# Patient Record
Sex: MALE | Race: WHITE | NOT HISPANIC OR LATINO | Employment: OTHER | URBAN - METROPOLITAN AREA
[De-identification: names, ages, dates, MRNs, and addresses within clinical notes are randomized per-mention and may not be internally consistent; named-entity substitution may affect disease eponyms.]

---

## 2017-01-09 ENCOUNTER — OFFICE VISIT (OUTPATIENT)
Dept: MEDICAL GROUP | Facility: MEDICAL CENTER | Age: 82
End: 2017-01-09
Payer: MEDICARE

## 2017-01-09 VITALS
BODY MASS INDEX: 40.4 KG/M2 | SYSTOLIC BLOOD PRESSURE: 114 MMHG | HEIGHT: 63 IN | HEART RATE: 86 BPM | RESPIRATION RATE: 16 BRPM | WEIGHT: 228 LBS | TEMPERATURE: 98.2 F | DIASTOLIC BLOOD PRESSURE: 64 MMHG | OXYGEN SATURATION: 94 %

## 2017-01-09 DIAGNOSIS — Z95.1 S/P CABG (CORONARY ARTERY BYPASS GRAFT): ICD-10-CM

## 2017-01-09 DIAGNOSIS — E78.2 MIXED HYPERLIPIDEMIA: ICD-10-CM

## 2017-01-09 DIAGNOSIS — I10 ESSENTIAL HYPERTENSION, BENIGN: ICD-10-CM

## 2017-01-09 DIAGNOSIS — E11.9 CONTROLLED TYPE 2 DIABETES MELLITUS WITHOUT COMPLICATION, WITHOUT LONG-TERM CURRENT USE OF INSULIN (HCC): ICD-10-CM

## 2017-01-09 LAB
HBA1C MFR BLD: 9.3 % (ref ?–5.8)
INT CON NEG: NORMAL
INT CON POS: NORMAL

## 2017-01-09 PROCEDURE — 83036 HEMOGLOBIN GLYCOSYLATED A1C: CPT | Performed by: FAMILY MEDICINE

## 2017-01-09 PROCEDURE — 99214 OFFICE O/P EST MOD 30 MIN: CPT | Performed by: FAMILY MEDICINE

## 2017-01-09 NOTE — MR AVS SNAPSHOT
"        Julio Azar   2017 9:20 AM   Office Visit   MRN: [id number]    Department:  [address] [place] [address]   Dept Phone:  [phone]    Description:  Male : 1932   Provider:  Karlee Cheng M.D.           Reason for Visit     Follow-Up routine f/u      Allergies as of 2017     Allergen Noted Reactions    Pcn [Penicillins] 2011   Swelling    Latex 2013   Rash      You were diagnosed with     Controlled type 2 diabetes mellitus without complication, without long-term current use of insulin (HCC)   [7066143]       S/P CABG (coronary artery bypass graft)   [932171]       Essential hypertension, benign   [401.1.ICD-9-CM]       Mixed hyperlipidemia   [272.2.ICD-9-CM]         Vital Signs     Blood Pressure Pulse Temperature Respirations Height Weight    114/64 mmHg 86 36.8 °C (98.2 °F) 16 1.6 m (5' 3\") 103.42 kg (228 lb)    Body Mass Index Oxygen Saturation Smoking Status             40.40 kg/m2 94% Former Smoker         Basic Information     Date Of Birth Sex Race Ethnicity Preferred Language    1932 Male White Non- English      Your appointments     Apr 10, 2017 10:20 AM   Established Patient with Karlee Cheng M.D.   [address] ([address])    [address] [place] [id number]   [phone]           You will be receiving a confirmation call a few days before your appointment from our automated call confirmation system.              Problem List              ICD-10-CM Priority Class Noted - Resolved    Type 2 diabetes mellitus without complication (HCC) E11.9   2013 - Present    Encounter for long-term (current) use of insulin (HCC) Z79.4   2013 - Present    Vitamin D deficiency E55.9   2013 - Present    Mixed hyperlipidemia E78.2   2013 - Present    CAD (coronary artery disease), native coronary artery I25.10   3/31/2014 - Present    Essential hypertension, benign I10   3/31/2014 - Present    Senile " ectropion H02.139   12/31/2014 - Present    Obesity E66.9   3/30/2015 - Present    Dermatophytosis of other specified sites B35.8   3/30/2015 - Present      Health Maintenance        Date Due Completion Dates    IMM DTaP/Tdap/Td Vaccine (1 - Tdap) 7/6/1951 ---    DIABETES MONOFILAMTENT / LE EXAM 9/30/2016 9/30/2015 (Done), 4/6/2015 (Done), 7/30/2014 (Prv Comp), 3/31/2014, 9/18/2013 (Done)    Override on 9/30/2015: Done    Override on 4/6/2015: Done    Override on 7/30/2014: Previously completed    Override on 9/18/2013: Done    A1C SCREENING 3/26/2017 9/26/2016, 9/26/2016, 7/5/2016, 2/16/2016, 9/30/2015, 4/6/2015, 10/6/2014, 6/9/2014, 2/25/2014, 9/12/2013, 11/9/2012, 8/14/2012, 5/11/2012, 2/2/2012, 10/26/2011, 7/5/2011    FASTING LIPID PROFILE 9/26/2017 9/26/2016, 7/5/2016, 2/16/2016, 12/15/2015, 12/30/2014 (Prv Comp), 12/5/2014, 12/5/2014, 6/9/2014, 2/25/2014, 9/12/2013, 11/9/2012, 5/11/2012, 2/2/2012, 10/26/2011, 7/5/2011    Override on 12/30/2014: Previously completed    URINE ACR / MICROALBUMIN 9/26/2017 9/26/2016, 2/16/2016, 12/5/2014 (Prv Comp), 12/5/2014, 9/12/2013, 11/9/2012, 8/14/2012, 5/11/2012, 2/2/2012, 10/26/2011, 7/5/2011    Override on 12/5/2014: Previously completed    SERUM CREATININE 9/26/2017 9/26/2016, 9/26/2016, 7/5/2016, 2/16/2016, 12/15/2015, 3/23/2015, 12/5/2014, 12/5/2014, 6/9/2014, 2/25/2014, 12/20/2013, 12/6/2013, 9/12/2013, 8/7/2013, 11/26/2012, 11/9/2012, 8/14/2012, 5/11/2012, 2/2/2012, 10/26/2011, 7/5/2011, 11/2/2006    RETINAL SCREENING 11/16/2017 11/16/2016 (Done), 11/30/2014 (Prv Comp), 4/16/2013    Override on 11/16/2016: Done    Override on 11/30/2014: Previously completed            Results     POCT Hemoglobin A1c      Component    Glycohemoglobin    9.3    Internal Control Negative    Internal Control Positive                        Current Immunizations     13-VALENT PCV PREVNAR 10/9/2015    INFLUENZA VACCINE H1N1 11/13/2009    Influenza Vaccine Adult HD 10/3/2016, 10/9/2015,  9/26/2014, 11/15/2008    Influenza Vaccine Quad Inj (Preserved) 10/8/2013, 10/5/2012, 10/18/2011    Pneumococcal polysaccharide vaccine (PPSV-23) 10/9/2014    SHINGLES VACCINE 11/22/2009      Below and/or attached are the medications your provider expects you to take. Review all of your home medications and newly ordered medications with your provider and/or pharmacist. Follow medication instructions as directed by your provider and/or pharmacist. Please keep your medication list with you and share with your provider. Update the information when medications are discontinued, doses are changed, or new medications (including over-the-counter products) are added; and carry medication information at all times in the event of emergency situations     Allergies:  PCN - Swelling     LATEX - Rash               Medications  Valid as of: January 09, 2017 -  9:44 AM    Generic Name Brand Name Tablet Size Instructions for use    Aspirin (Chew Tab) ASA 81 MG Take 81 mg by mouth every day.        Atorvastatin Calcium (Tab) LIPITOR 40 MG Take 40 mg by mouth every evening.        B Complex Vitamins   Take  by mouth every day.        Blood Glucose Monitoring Suppl (Misc) Blood Glucose Monitoring Suppl SUPPLIES [employer] Yaneth blood glucose test strips, checking blood sugar 2 daily  .02 insulin requiring.        Cholecalciferol (Cap) Vitamin D 2000 UNITS Take  by mouth every day.        Exenatide (Solution Pen-injector) BYETTA 10 MCG/0.04ML Inject 10 mcg as instructed 2 times daily, before breakfast and dinner.        Furosemide (Tab) LASIX 40 MG Take 40 mg by mouth every day.        GlipiZIDE (Tab) GLUCOTROL 5 MG Take 1 Tab by mouth 2 times a day.        Insulin Glargine (Solution Pen-injector) LANTUS 100 UNIT/ML Inject 20 Units as instructed every evening.        Insulin Pen Needle (Misc) B-D ULTRAFINE III SHORT PEN 31G X 8 MM USE 3 TIMES DAILY        Lancets (Misc) Lancets  His new meter uses the Accu-Chek SoftClix lancets  for 3 time daily testing.  Dx. Code 250.02        Lisinopril (Tab) PRINIVIL 2.5 MG Take 1 Tab by mouth every day.        MetFORMIN HCl (Tab) GLUCOPHAGE 500 MG TAKE 2 TABLETS BY MOUTH TWICE DAILY WITHMEALS        Potassium Chloride Monica CR (Tab CR) K-DUR 20 MEQ Take 20 mEq by mouth every day.        Tadalafil (Tab) CIALIS 20 MG Take 20 mg by mouth every 36 hours. As needed on an empty stomach        Tamsulosin HCl (Cap) FLOMAX 0.4 MG Take 0.4 mg by mouth ONE-HALF HOUR AFTER BREAKFAST. Indications: #90 w/ 3 refills called Sheila Goff 12/14/16        .                 Medicines prescribed today were sent to:     [id number] - [place], [place] - [address] [facility]    [address] [place] [address] [place] [address]    Phone: [phone] Fax: [phone]    Open 24 Hours?: No      Medication refill instructions:       If your prescription bottle indicates you have medication refills left, it is not necessary to call your provider’s office. Please contact your pharmacy and they will refill your medication.    If your prescription bottle indicates you do not have any refills left, you may request refills at any time through one of the following ways: The online [employer] system (except Urgent Care), by calling your provider’s office, or by asking your pharmacy to contact your provider’s office with a refill request. Medication refills are processed only during regular business hours and may not be available until the next business day. Your provider may request additional information or to have a follow-up visit with you prior to refilling your medication.   *Please Note: Medication refills are assigned a new Rx number when refilled electronically. Your pharmacy may indicate that no refills were authorized even though a new prescription for the same medication is available at the pharmacy. Please request the medicine by name with the pharmacy before contacting your provider for a refill.        Your To Do List     Future Labs/Procedures  Complete By Expires    ECHOCARDIOGRAM COMP W/O CONT  As directed 1/9/2018      Instructions      Diabetes Education    • Take your Diabetes medicine as directed, as well as all other prescribed medication, even if you feel good. Tell your provider if you cannot afford your medications or if you are experiencing any side effects.  • Keep simple sugars or glucose tabs with you at all times in the event of low blood sugar. Carry or wear a medical alert card or bracelet/necklace with your diabetic information.   • Check your blood sugar levels daily and according to your providers' recommendations. Keep a log of your blood sugar levels and bring with you to all your appointments. Follow the correctional scale prescribed by your provider, if you were instructed to use rapid acting insulin before meals and before bed. Be sure to account for the carbs in your meal as directed by your provider. Rapid acting insulin: Humulog (lispro), Novolog (aspart), Apidra (glulisine).  • Schedule and keep follow up appointments as indicated by your provider for: diabetes follow ups (bring your glucose meter and blood sugar log), annual dilated eye exam with a qualified ophthalmologist/optometrist, lab tests as requested by your provider (includes blood work and urine test) and semi-annual dental exam.  • Check your feet using a hand held mirror every day for cuts, cracks, sores, bumps, and or red spots. Avoid walking on bare feet. Only use clippers for toenails. See a podiatrist for callous care. Call or schedule an appointment with your provider if you notice sores that are not healing.  • Eat a small meal or healthy snack every 3 - 4 hours. Don't skip meals. Include protein with carbohydrate at most meals. Drink water, plain or flavored with fresh fruit, instead of juice or soda. An ideal plate is made up of half non-starchy vegetables, one quarter protein and one quarter starch.  • Stay or get to a healthy weight by using your meal  plan and moving more. Set a goal to be more active most days of the week. Start slow by taking 10 minute walks 3 times a day. Take the stairs instead of an elevator or escalator. Twice a week, work to increase your muscle strength. Use stretch bands, do yoga, heavy gardening (digging and planting with tools), or use dumb bells to strengthen your arms.  • When to call your Endocrinologist or Primary Care Provider   -If your blood glucose stays over 300 mg/dL for 24 hours    -If you are having frequent low blood sugars    -If you experience any one or all of the following symptoms: blurry    vision, fast breathing, weakness, dizziness or shakiness,     headache, rapid heartbeat, confusion, or irritability.                     MyChart Status: Patient Declined

## 2017-01-09 NOTE — PROGRESS NOTES
CC: Regular follow up visit.    HPI:   Julio presents today to discuss the following issues:    Diabetes mellitus type 2 in obese, has not been adequately controlled. A1C today is 9.9, it was 7.1 last visit. However patient has been asymptomatic.He is currently on metformin 1000 mg bid, Glipizide 10 mg bid, and lantus 15 units daily, but he has not been taking his Byetta injection 10 mcg daily , patient statewd that it is expensive, however he has been getting it less cheaper from a different pharmacy, dso he will change the pharmacy.    Essential hypertension, BP adequately controlled. Has been on HCTZ 50 mg daily, discussed with the patient the benefits of ACEI for her as a diabetic patient.She has not been taking the HCTZ regularly, she takes it only as needed ( as per patient). Discussed that HCTZ are not the antihypertensive of choice in a diabetic patients.    Coronary artery disease, S/P CABG x 3. Has been stable, asymptomatic, however he has been having chronic leg swelling, but no SOB..He is currently on Statin, and Aspirin.His last echocardiogram was done in 1/2014 showed normal EF ( 65-70%).Has normal RT ventricular pressure.He follows up with cardiologist Dr Hammond.    Mixed hyperlipidemia, he has been tolerating the statin. Denies muscle pain LFTs has been normal.He is on lipitor 40 mg daily.      Patient Active Problem List    Diagnosis Date Noted   • Obesity 03/30/2015   • Dermatophytosis of other specified sites 03/30/2015   • Senile ectropion 12/31/2014   • CAD (coronary artery disease), native coronary artery 03/31/2014   • Essential hypertension, benign 03/31/2014   • Type 2 diabetes mellitus without complication (HCC) 09/18/2013   • Encounter for long-term (current) use of insulin (HCC) 09/18/2013   • Vitamin D deficiency 09/18/2013   • Mixed hyperlipidemia 09/18/2013       Current Outpatient Prescriptions   Medication Sig Dispense Refill   • lisinopril (PRINIVIL) 2.5 MG Tab Take 1 Tab by mouth  "every day. 30 Tab 11   • CIALIS 20 MG tablet Take 20 mg by mouth every 36 hours. As needed on an empty stomach     • glipiZIDE (GLUCOTROL) 5 MG Tab Take 1 Tab by mouth 2 times a day. 180 Tab 3   • potassium chloride SA (K-DUR) 20 MEQ Tab CR Take 20 mEq by mouth every day.     • atorvastatin (LIPITOR) 40 MG Tab Take 40 mg by mouth every evening.     • exenatide (BYETTA 10 MCG PEN) 10 MCG/0.04ML Solution Pen-injector Inject 10 mcg as instructed 2 times daily, before breakfast and dinner. 2.4 mL 6   • furosemide (LASIX) 40 MG TABS Take 40 mg by mouth every day.     • B-D ULTRAFINE III SHORT PEN 31G X 8 MM MISC USE 3 TIMES DAILY 100 Each 11   • insulin glargine (LANTUS SOLOSTAR) 100 UNIT/ML SOPN injection Inject 20 Units as instructed every evening. 30 mL 3   • Lancets MISC His new meter uses the Accu-Chek SoftClix lancets for 3 time daily testing.  Dx. Code 250.02 300 Each 3   • Blood Glucose Monitoring Suppl SUPPLIES MISC Accucheck Yaneth blood glucose test strips, checking blood sugar 2 daily  .02 insulin requiring. 200 Strip 3   • tamsulosin (FLOMAX) 0.4 MG capsule Take 0.4 mg by mouth ONE-HALF HOUR AFTER BREAKFAST. Indications: #90 w/ 3 refills called Sheila Goff 12/14/16     • B Complex Vitamins (VITAMIN B COMPLEX PO) Take  by mouth every day.     • Cholecalciferol (VITAMIN D) 2000 UNITS CAPS Take  by mouth every day.     • aspirin (ASA) 81 MG CHEW Take 81 mg by mouth every day.     • metformin (GLUCOPHAGE) 500 MG Tab TAKE 2 TABLETS BY MOUTH TWICE DAILY WITHMEALS 360 Tab 3     No current facility-administered medications for this visit.         Allergies as of 01/09/2017 - Rebel as Reviewed 01/09/2017   Allergen Reaction Noted   • Pcn [penicillins] Swelling 12/25/2011   • Latex Rash 08/07/2013        ROS: Denies any chest pain, Shortness of breath, Changes bowel or bladder, Lower extremity edema.    Physical Exam:  /64 mmHg  Pulse 86  Temp(Src) 36.8 °C (98.2 °F)  Resp 16  Ht 1.6 m (5' 3\")  Wt " 103.42 kg (228 lb)  BMI 40.40 kg/m2  SpO2 94%  Gen.: Well-developed, well-nourished, no apparent distress,pleasant and cooperative with the examination  Skin:  Warm and dry with good turgor. No rashes or suspicious lesions in visible areas  HEENT:Sinuses nontender with palpation, TMs clear, nares patent with pink mucosa and clear rhinorrhea,no septal deviation ,polyps or lesions. lips without lesions, oropharynx clear.  Neck: Trachea midline,no masses or adenopathy. No JVD.  Cor: Regular rate and rhythm without murmur, gallop or rub.  Lungs: Respirations unlabored.Clear to auscultation with equal breath sounds bilaterally. No wheezes, rhonchi.  Extremities: No cyanosis,no clubbing , bilateral leg edema.    Monofilament testing with a 10 gram force: sensation intact: all locations  Visual Inspection: Feet without maceration, ulcers, fissures.  Pedal pulses:intact.      Assessment and Plan.   84 y.o. male     1. Uncontrolled type 2 diabetes mellitus without complication, with long-term current use of insulin (HCC)  Not adequately controlled. A1C today is 9.9, it was 7.1 last visit.  Patient has not been taking the Byetta injection .  Continue on metformin 1000 mg bid, Glipizide 5 mg bid, , Will increase the lantus to 20 units daily.  Resume the Byetta injection.  Monofilament feet exam showed no sign of neuropathy.     - POCT Hemoglobin A1c  - Diabetic Monofilament Lower Extremity Exam  - exenatide (BYETTA 10 MCG PEN) 10 MCG/0.04ML Solution Pen-injector; Inject 10 mcg as instructed 2 times daily, before breakfast and dinner.  Dispense: 2.4 mL; Refill: 6POCT Hemoglobin A1c    2. S/P CABG (coronary artery bypass graft)  Stable, asymptomatic.  Continue on Statin, and Aspirin.  Last echocardiogram was done in 1/2014 showed normal EF ( 65-70%).Has normal RT ventricular pressure.  Will repeat echocardiogram.    - ECHOCARDIOGRAM COMP W/O CONT; Future.    3. Essential hypertension, benign  Adequately controlled. Has been on  HCTZ 50 mg daily, advised to stop the HCTZ nd start lisinopril 2.5 mg daily.    4. Mixed hyperlipidemia  He has been tolerating the statin. Denies muscle pain LFTs has been normal  Continue on lipitor 40 mg daily

## 2017-02-01 ENCOUNTER — HOSPITAL ENCOUNTER (OUTPATIENT)
Dept: CARDIOLOGY | Facility: MEDICAL CENTER | Age: 82
End: 2017-02-01
Attending: FAMILY MEDICINE
Payer: MEDICARE

## 2017-02-01 DIAGNOSIS — Z95.1 S/P CABG (CORONARY ARTERY BYPASS GRAFT): ICD-10-CM

## 2017-02-01 LAB
LV EJECT FRACT  99904: 65
LV EJECT FRACT MOD 2C 99903: 62.82
LV EJECT FRACT MOD 4C 99902: 54.56
LV EJECT FRACT MOD BP 99901: 55.12

## 2017-02-01 PROCEDURE — 93306 TTE W/DOPPLER COMPLETE: CPT

## 2017-02-10 ENCOUNTER — HOSPITAL ENCOUNTER (OUTPATIENT)
Dept: LAB | Facility: MEDICAL CENTER | Age: 82
End: 2017-02-10
Attending: INTERNAL MEDICINE
Payer: MEDICARE

## 2017-02-10 LAB
ALBUMIN SERPL BCP-MCNC: 4.2 G/DL (ref 3.2–4.9)
ALBUMIN/GLOB SERPL: 1.3 G/DL
ALP SERPL-CCNC: 59 U/L (ref 30–99)
ALT SERPL-CCNC: 13 U/L (ref 2–50)
ANION GAP SERPL CALC-SCNC: 6 MMOL/L (ref 0–11.9)
AST SERPL-CCNC: 16 U/L (ref 12–45)
BILIRUB SERPL-MCNC: 0.7 MG/DL (ref 0.1–1.5)
BUN SERPL-MCNC: 28 MG/DL (ref 8–22)
CALCIUM SERPL-MCNC: 9.8 MG/DL (ref 8.5–10.5)
CHLORIDE SERPL-SCNC: 105 MMOL/L (ref 96–112)
CHOLEST SERPL-MCNC: 145 MG/DL (ref 100–199)
CO2 SERPL-SCNC: 28 MMOL/L (ref 20–33)
CREAT SERPL-MCNC: 1.25 MG/DL (ref 0.5–1.4)
GLOBULIN SER CALC-MCNC: 3.3 G/DL (ref 1.9–3.5)
GLUCOSE SERPL-MCNC: 77 MG/DL (ref 65–99)
HDLC SERPL-MCNC: 56 MG/DL
LDLC SERPL CALC-MCNC: 76 MG/DL
POTASSIUM SERPL-SCNC: 4.8 MMOL/L (ref 3.6–5.5)
PROT SERPL-MCNC: 7.5 G/DL (ref 6–8.2)
SODIUM SERPL-SCNC: 139 MMOL/L (ref 135–145)
TRIGL SERPL-MCNC: 65 MG/DL (ref 0–149)

## 2017-02-10 PROCEDURE — 80053 COMPREHEN METABOLIC PANEL: CPT

## 2017-02-10 PROCEDURE — 36415 COLL VENOUS BLD VENIPUNCTURE: CPT

## 2017-02-10 PROCEDURE — 80061 LIPID PANEL: CPT

## 2017-04-10 ENCOUNTER — OFFICE VISIT (OUTPATIENT)
Dept: MEDICAL GROUP | Facility: MEDICAL CENTER | Age: 82
End: 2017-04-10
Payer: MEDICARE

## 2017-04-10 VITALS
WEIGHT: 223.55 LBS | DIASTOLIC BLOOD PRESSURE: 68 MMHG | RESPIRATION RATE: 14 BRPM | TEMPERATURE: 97.8 F | BODY MASS INDEX: 39.61 KG/M2 | HEIGHT: 63 IN | HEART RATE: 73 BPM | SYSTOLIC BLOOD PRESSURE: 118 MMHG | OXYGEN SATURATION: 93 %

## 2017-04-10 DIAGNOSIS — Z95.1 S/P CABG X 3: ICD-10-CM

## 2017-04-10 DIAGNOSIS — E78.2 MIXED HYPERLIPIDEMIA: ICD-10-CM

## 2017-04-10 DIAGNOSIS — Z79.4 TYPE 2 DIABETES MELLITUS WITHOUT COMPLICATION, WITH LONG-TERM CURRENT USE OF INSULIN (HCC): ICD-10-CM

## 2017-04-10 DIAGNOSIS — E11.9 TYPE 2 DIABETES MELLITUS WITHOUT COMPLICATION, WITH LONG-TERM CURRENT USE OF INSULIN (HCC): ICD-10-CM

## 2017-04-10 DIAGNOSIS — N52.8 OTHER MALE ERECTILE DYSFUNCTION: ICD-10-CM

## 2017-04-10 DIAGNOSIS — I10 ESSENTIAL HYPERTENSION, BENIGN: ICD-10-CM

## 2017-04-10 LAB
HBA1C MFR BLD: 7 % (ref ?–5.8)
INT CON NEG: NEGATIVE
INT CON POS: POSITIVE

## 2017-04-10 PROCEDURE — G8417 CALC BMI ABV UP PARAM F/U: HCPCS | Performed by: FAMILY MEDICINE

## 2017-04-10 PROCEDURE — 4040F PNEUMOC VAC/ADMIN/RCVD: CPT | Performed by: FAMILY MEDICINE

## 2017-04-10 PROCEDURE — 99214 OFFICE O/P EST MOD 30 MIN: CPT | Performed by: FAMILY MEDICINE

## 2017-04-10 PROCEDURE — 0518F FALL PLAN OF CARE DOCD: CPT | Mod: 8P | Performed by: FAMILY MEDICINE

## 2017-04-10 PROCEDURE — G8432 DEP SCR NOT DOC, RNG: HCPCS | Performed by: FAMILY MEDICINE

## 2017-04-10 PROCEDURE — 83036 HEMOGLOBIN GLYCOSYLATED A1C: CPT | Performed by: FAMILY MEDICINE

## 2017-04-10 PROCEDURE — 1036F TOBACCO NON-USER: CPT | Performed by: FAMILY MEDICINE

## 2017-04-10 PROCEDURE — G8598 ASA/ANTIPLAT THER USED: HCPCS | Performed by: FAMILY MEDICINE

## 2017-04-10 PROCEDURE — 3288F FALL RISK ASSESSMENT DOCD: CPT | Performed by: FAMILY MEDICINE

## 2017-04-10 PROCEDURE — 1100F PTFALLS ASSESS-DOCD GE2>/YR: CPT | Performed by: FAMILY MEDICINE

## 2017-04-10 RX ORDER — SILDENAFIL 50 MG/1
50 TABLET, FILM COATED ORAL PRN
Qty: 10 TAB | Refills: 3 | Status: SHIPPED | OUTPATIENT
Start: 2017-04-10 | End: 2017-04-10

## 2017-04-10 RX ORDER — SILDENAFIL 50 MG/1
50 TABLET, FILM COATED ORAL PRN
Qty: 10 TAB | Refills: 3 | Status: SHIPPED | OUTPATIENT
Start: 2017-04-10 | End: 2020-02-19

## 2017-04-10 NOTE — PROGRESS NOTES
CC: Multiple medical problems.    HPI:   Julio presents today follow up of his chronic medical issues.    Type 2 diabetes mellitus without complication,has been adequately controlled. His A1C today is 7.0, it was 9.3 last visit.Denies polyuria, and polydipsia.he has been on metformin 1000 mg bid, Glipizide 10 mg bid, , Will increase the lantus to 20 units daily.Continue on metformin 1000 mg bid, Glipizide 5 mg bid, lantus to 10 units daily, and Victoza 1.8 mg daily    Essential hypertension, BP has been adequately controlled on current medication. Denies headache, chest pain, and SOB.has been on lisinopril 2.5 mg daily.No side effects.    H/O coronary artery disease, S/P CABG x 3, has been stable, and asymptomatic, denies chest pain, SOB, and leg swelling.His most recent echocardiogram in  showed normal EF ( 65%), with mild pulmonary hypertension ( RVSP 40 mmHg).has been on Statin, and Aspirin.    Mixed hyperlipidemia, he has been tolerating the statin. Denies muscle pain LFTs has been normal.He is on lipitor 40 mg daily.    Erectile dysfunction, patient wants to try vaigra again, he tried it before and it worked. Discussed avoiding Viagra with flomax within at least 4 hours. Discussed risk of low BP with the flomax, patient understand and agrees with the plan.      Patient Active Problem List    Diagnosis Date Noted   • Obesity 03/30/2015   • Dermatophytosis of other specified sites 03/30/2015   • Senile ectropion 12/31/2014   • CAD (coronary artery disease), native coronary artery 03/31/2014   • Essential hypertension, benign 03/31/2014   • Type 2 diabetes mellitus without complication (CMS-[facility]) 09/18/2013   • Encounter for long-term (current) use of insulin (CMS-[facility]) 09/18/2013   • Vitamin D deficiency 09/18/2013   • Mixed hyperlipidemia 09/18/2013       Current Outpatient Prescriptions   Medication Sig Dispense Refill   • Liraglutide -Weight Management 18 MG/3ML Solution Pen-injector Inject  as instructed.     •  sildenafil citrate (VIAGRA) 50 MG tablet Take 1 Tab by mouth as needed for Erectile Dysfunction. 10 Tab 3   • tamsulosin (FLOMAX) 0.4 MG capsule Take 0.4 mg by mouth 2 times a day. Indications: 1/2 hr after breakfast and dinner  #180 w/ 2 refills called in 1/31/16     • lisinopril (PRINIVIL) 2.5 MG Tab Take 1 Tab by mouth every day. 30 Tab 11   • CIALIS 20 MG tablet Take 20 mg by mouth every 36 hours. As needed on an empty stomach     • glipiZIDE (GLUCOTROL) 5 MG Tab Take 1 Tab by mouth 2 times a day. 180 Tab 3   • potassium chloride SA (K-DUR) 20 MEQ Tab CR Take 20 mEq by mouth every day.     • atorvastatin (LIPITOR) 40 MG Tab Take 40 mg by mouth every evening.     • metformin (GLUCOPHAGE) 500 MG Tab TAKE 2 TABLETS BY MOUTH TWICE DAILY WITHMEALS 360 Tab 3   • furosemide (LASIX) 40 MG TABS Take 40 mg by mouth every day.     • B-D ULTRAFINE III SHORT PEN 31G X 8 MM MISC USE 3 TIMES DAILY 100 Each 11   • insulin glargine (LANTUS SOLOSTAR) 100 UNIT/ML SOPN injection Inject 20 Units as instructed every evening. 30 mL 3   • Lancets MISC His new meter uses the Accu-Chek SoftClix lancets for 3 time daily testing.  Dx. Code 250.02 300 Each 3   • Blood Glucose Monitoring Suppl SUPPLIES MISC Accucheck Yaneth blood glucose test strips, checking blood sugar 2 daily  .02 insulin requiring. 200 Strip 3   • B Complex Vitamins (VITAMIN B COMPLEX PO) Take  by mouth every day.     • Cholecalciferol (VITAMIN D) 2000 UNITS CAPS Take  by mouth every day.     • aspirin (ASA) 81 MG CHEW Take 81 mg by mouth every day.     • exenatide (BYETTA 10 MCG PEN) 10 MCG/0.04ML Solution Pen-injector Inject 10 mcg as instructed 2 times daily, before breakfast and dinner. 2.4 mL 6     No current facility-administered medications for this visit.         Allergies as of 04/10/2017 - Rebel as Reviewed 04/10/2017   Allergen Reaction Noted   • Pcn [penicillins] Swelling 12/25/2011   • Latex Rash 08/07/2013        ROS: Denies any chest pain, Shortness of  "breath, Changes bowel or bladder, Lower extremity edema.    Physical Exam:  /68 mmHg  Pulse 73  Temp(Src) 36.6 °C (97.8 °F)  Resp 14  Ht 1.6 m (5' 2.99\")  Wt 101.4 kg (223 lb 8.7 oz)  BMI 39.61 kg/m2  SpO2 93%  Gen.: Well-developed, well-nourished, no apparent distress,pleasant and cooperative with the examination  Skin:  Warm and dry with good turgor. No rashes or suspicious lesions in visible areas  HEENT:Sinuses nontender with palpation, TMs clear, nares patent with pink mucosa and clear rhinorrhea,no septal deviation ,polyps or lesions. lips without lesions, oropharynx clear.  Neck: Trachea midline,no masses or adenopathy. No JVD.  Cor: Regular rate and rhythm without murmur, gallop or rub.  Lungs: Respirations unlabored.Clear to auscultation with equal breath sounds bilaterally. No wheezes, rhonchi.  Extremities: No cyanosis, clubbing or edema.      Assessment and Plan.   84 y.o. male     1. Type 2 diabetes mellitus without complication, with long-term current use of insulin (CMS-[facility])  Adequately controlled. A1C today is 7.0, it was 9.3 last visit.  Continue on metformin 1000 mg bid, Glipizide 5 mg bid, lantus to 10 units daily, and Victoza 1.8 mg daily    - POCT  A1C    2. Essential hypertension, benign  Adequately controlled.   Continue on lisinopril 2.5 mg daily.    3. H/O coronary artery disease, S/P CABG x 3  Stable, asymptomatic.  Continue on Statin, and Aspirin.  Most recent echocardiogram in  showed normal EF ( 65%), with mild pulmonary hypertension ( RVSP 40 mmHg).    4. Mixed hyperlipidemia  He has been tolerating the statin. Denies muscle pain LFTs has been normal  Continue on lipitor 40 mg daily    5. Other male erectile dysfunction  Wants to try vaigra again. Discussed avoiding Viagra with flomax within at least 4 hours..  - sildenafil citrate (VIAGRA) 50 MG tablet; Take 1 Tab by mouth as needed for Erectile Dysfunction.  Dispense: 10 Tab; Refill: 3                  "

## 2017-04-10 NOTE — MR AVS SNAPSHOT
"        Julio Gaviriaparkertroyshira   4/10/2017 10:20 AM   Office Visit   MRN: [id number]    Department:  [address] [place] [address]   Dept Phone:  [phone]    Description:  Male : 1932   Provider:  Karlee Cheng M.D.           Reason for Visit     Follow-Up 3 month check up    Cerumen Impaction would like ears checked and cleaned if needed      Allergies as of 4/10/2017     Allergen Noted Reactions    Pcn [Penicillins] 2011   Swelling    Latex 2013   Rash      You were diagnosed with     Type 2 diabetes mellitus without complication, with long-term current use of insulin (CMS-HCC)   [6629243]       Essential hypertension, benign   [401.1.ICD-9-CM]       S/P CABG x 3   [007175]       Mixed hyperlipidemia   [272.2.ICD-9-CM]       Other male erectile dysfunction   [3139073]         Vital Signs     Blood Pressure Pulse Temperature Respirations Height Weight    118/68 mmHg 73 36.6 °C (97.8 °F) 14 1.6 m (5' 2.99\") 101.4 kg (223 lb 8.7 oz)    Body Mass Index Oxygen Saturation Smoking Status             39.61 kg/m2 93% Former Smoker         Basic Information     Date Of Birth Sex Race Ethnicity Preferred Language    1932 Male White Non- English      Your appointments     Jul 10, 2017  8:20 AM   Established Patient with Karlee Cheng M.D.   [facility] [address]    [address] [place] [id number] [phone]           You will be receiving a confirmation call a few days before your appointment from our automated call confirmation system.              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   12/31/2014 - Present    Obesity E66.9   3/30/2015 - Present    Dermatophytosis of other specified sites B35.8   3/30/2015 - Present      Health Maintenance        Date Due Completion Dates    IMM DTaP/Tdap/Td Vaccine (1 - Tdap) [birth year]6/1951 ---    A1C SCREENING 7/9/2017 1/9/2017, 9/26/2016, 9/26/2016, 7/5/2016, 2/16/2016, 9/30/2015, 4/6/2015, 10/6/2014, 6/9/2014, 2/25/2014, 9/12/2013, 11/9/2012, 8/14/2012, 5/11/2012, 2/2/2012, 10/26/2011, 7/5/2011    URINE ACR / MICROALBUMIN 9/26/2017 9/26/2016, 2/16/2016, 12/5/2014 (Prv Comp), 12/5/2014, 9/12/2013, 11/9/2012, 8/14/2012, 5/11/2012, 2/2/2012, 10/26/2011, 7/5/2011    Override on 12/5/2014: Previously completed    RETINAL SCREENING 11/16/2017 11/16/2016 (Done), 11/30/2014 (Prv Comp), 4/16/2013    Override on 11/16/2016: Done    Override on 11/30/2014: Previously completed    DIABETES MONOFILAMENT / LE EXAM 1/9/2018 1/9/2017, 9/30/2015 (Done), 4/6/2015 (Done), 7/30/2014 (Prv Comp), 3/31/2014, 9/18/2013 (Done)    Override on 9/30/2015: Done    Override on 4/6/2015: Done    Override on 7/30/2014: Previously completed    Override on 9/18/2013: Done    FASTING LIPID PROFILE 2/10/2018 2/10/2017, 9/26/2016, 7/5/2016, 2/16/2016, 12/15/2015, 12/30/2014 (Prv Comp), 12/5/2014, 12/5/2014, 6/9/2014, 2/25/2014, 9/12/2013, 11/9/2012, 5/11/2012, 2/2/2012, 10/26/2011, 7/5/2011    Override on 12/30/2014: Previously completed    SERUM CREATININE 2/10/2018 2/10/2017, 9/26/2016, 9/26/2016, 7/5/2016, 2/16/2016, 12/15/2015, 3/23/2015, 12/5/2014, 12/5/2014, 6/9/2014, 2/25/2014, 12/20/2013, 12/6/2013, 9/12/2013, 8/7/2013, 11/26/2012, 11/9/2012, 8/14/2012, 5/11/2012, 2/2/2012, 10/26/2011, 7/5/2011, 11/2/2006            Results     POCT  A1C      Component    Glycohemoglobin    7.0    Internal Control Negative    Negative    Internal Control Positive    Positive                           Current Immunizations     13-VALENT PCV PREVNAR 10/9/2015    INFLUENZA VACCINE H1N1 11/13/2009    Influenza Vaccine Adult HD 10/3/2016, 10/9/2015, 9/26/2014, 11/15/2008    Influenza Vaccine Quad Inj (Preserved) 10/8/2013, 10/5/2012, 10/18/2011    Pneumococcal polysaccharide vaccine (PPSV-23) 10/9/2014    SHINGLES VACCINE 11/22/2009      Below and/or attached are the medications your provider expects you to take. Review all of your home medications and newly ordered medications with your provider and/or pharmacist. Follow medication instructions as directed by your provider and/or pharmacist. Please keep your medication list with you and share with your provider. Update the information when medications are discontinued, doses are changed, or new medications (including over-the-counter products) are added; and carry medication information at all times in the event of emergency situations     Allergies:  PCN - Swelling     LATEX - Rash               Medications  Valid as of: April 10, 2017 - 11:05 AM    Generic Name Brand Name Tablet Size Instructions for use    Aspirin (Chew Tab) ASA 81 MG Take 81 mg by mouth every day.        Atorvastatin Calcium (Tab) LIPITOR 40 MG Take 40 mg by mouth every evening.        B Complex Vitamins   Take  by mouth every day.        Blood Glucose Monitoring Suppl (Misc) Blood Glucose Monitoring Suppl SUPPLIES Accucheck Yaneth blood glucose test strips, checking blood sugar 2 daily  .02 insulin requiring.        Cholecalciferol (Cap) Vitamin D 2000 UNITS Take  by mouth every day.        Exenatide (Solution Pen-injector) BYETTA 10 MCG/0.04ML Inject 10 mcg as instructed 2 times daily, before breakfast and dinner.        Furosemide (Tab) LASIX 40 MG Take 40 mg by mouth every day.        GlipiZIDE (Tab) GLUCOTROL 5 MG Take 1 Tab by mouth 2 times a day.        Insulin Glargine (Solution Pen-injector) LANTUS 100 UNIT/ML Inject 20 Units as instructed every evening.        Insulin  Pen Needle (Misc) B-D ULTRAFINE III SHORT PEN 31G X 8 MM USE 3 TIMES DAILY        Lancets (Misc) Lancets  His new meter uses the Accu-Chek SoftClix lancets for 3 time daily testing.  Dx. Code 250.02        Liraglutide -Weight Management (Solution Pen-injector) Liraglutide -Weight Management 18 MG/3ML Inject  as instructed.        Lisinopril (Tab) PRINIVIL 2.5 MG Take 1 Tab by mouth every day.        MetFORMIN HCl (Tab) GLUCOPHAGE 500 MG TAKE 2 TABLETS BY MOUTH TWICE DAILY WITHMEALS        Potassium Chloride Monica CR (Tab CR) Kdur 20 MEQ Take 20 mEq by mouth every day.        Sildenafil Citrate (Tab) VIAGRA 50 MG Take 1 Tab by mouth as needed for Erectile Dysfunction.        Tadalafil (Tab) CIALIS 20 MG Take 20 mg by mouth every 36 hours. As needed on an empty stomach        Tamsulosin HCl (Cap) FLOMAX 0.4 MG Take 0.4 mg by mouth 2 times a day. Indications: 1/2 hr after breakfast and dinner  #180 w/ 2 refills called in 1/31/16        .                 Medicines prescribed today were sent to:     [id number] [place], [place] - [address] [place] [address]    Phone: [phone] Fax: [phone]    Open 24 Hours?: No      Medication refill instructions:       If your prescription bottle indicates you have medication refills left, it is not necessary to call your provider’s office. Please contact your pharmacy and they will refill your medication.    If your prescription bottle indicates you do not have any refills left, you may request refills at any time through one of the following ways: The online [employer] system (except Urgent Care), by calling your provider’s office, or by asking your pharmacy to contact your provider’s office with a refill request. Medication refills are processed only during regular business hours and may not be available until the next business day. Your provider may request additional information or to have a follow-up visit with you prior to refilling your medication.    *Please Note: Medication refills are assigned a new Rx number when refilled electronically. Your pharmacy may indicate that no refills were authorized even though a new prescription for the same medication is available at the pharmacy. Please request the medicine by name with the pharmacy before contacting your provider for a refill.           MyChart Status: Patient Declined

## 2017-05-31 ENCOUNTER — HOSPITAL ENCOUNTER (OUTPATIENT)
Dept: LAB | Facility: MEDICAL CENTER | Age: 82
End: 2017-05-31
Attending: UROLOGY
Payer: MEDICARE

## 2017-05-31 LAB — PSA SERPL-MCNC: 0.09 NG/ML (ref 0–4)

## 2017-05-31 PROCEDURE — 36415 COLL VENOUS BLD VENIPUNCTURE: CPT

## 2017-05-31 PROCEDURE — 84153 ASSAY OF PSA TOTAL: CPT

## 2017-06-13 PROBLEM — C44.329 SQUAMOUS CELL CARCINOMA OF SKIN OF OTHER PARTS OF FACE: Status: ACTIVE | Noted: 2017-06-13

## 2017-06-13 PROBLEM — C44.42 SQUAMOUS CELL CARCINOMA OF SKIN OF SCALP AND NECK: Status: ACTIVE | Noted: 2017-06-13

## 2017-06-27 ENCOUNTER — PATIENT OUTREACH (OUTPATIENT)
Dept: HEALTH INFORMATION MANAGEMENT | Facility: OTHER | Age: 82
End: 2017-06-27

## 2017-06-27 NOTE — PROGRESS NOTES
6/27/17  -  WebIZ Checked & Epic Updated: yes  HealthConnect Verified: yes  Verify PCP: yes    Communication Preference Obtained: yes     Review Care Team: yes    Annual Wellness Visit Scheduling  1. Scheduling Status:Scheduled        Care Gap Scheduling (Attempt to Schedule EACH Overdue Care Gap!)     Health Maintenance Due   Topic Date Due   • IMM DTaP/Tdap/Td Vaccine (1 - Tdap) Wants to discuss with PCP first         MyChart Activation: declined

## 2017-06-28 ENCOUNTER — TELEPHONE (OUTPATIENT)
Dept: MEDICAL GROUP | Facility: MEDICAL CENTER | Age: 82
End: 2017-06-28

## 2017-06-28 NOTE — TELEPHONE ENCOUNTER
Future Appointments       Provider Department Center    7/10/2017 8:20 AM Karlee Cheng M.D. [address]    8/1/2017 11:00 AM Karlee Cheng M.D.; [facility]  [address]          ESTABLISHED PATIENT PRE-VISIT PLANNING     Note: Patient will not be contacted if there is no indication to call. PT was not Contacted.    1.    Reviewed note from last office visit with PCP: YES Last office visit: 04/10/17    2.  If any orders were placed at last visit, do we have Results/Consult Notes?        •  Labs - Labs ordered, completed and results are in chart. 05/31/17       •  Imaging - Imaging was not ordered at last office visit.        •  Referrals - No referrals were ordered at last office visit.     3.  Immunizations were updated in Epic using WebIZ?: Yes       •  Web Iz Recommendations: HEPATITIS A  TDAP    4.  Patient is due for the following Health Maintenance Topics:   Health Maintenance Due   Topic Date Due   • IMM DTaP/Tdap/Td Vaccine (1 - Tdap) 07/06/1951       5.  Patient was not informed to arrive 15 min prior to their scheduled appointment and bring in their medication bottles.

## 2017-07-10 ENCOUNTER — OFFICE VISIT (OUTPATIENT)
Dept: MEDICAL GROUP | Facility: MEDICAL CENTER | Age: 82
End: 2017-07-10
Payer: MEDICARE

## 2017-07-10 VITALS
SYSTOLIC BLOOD PRESSURE: 104 MMHG | WEIGHT: 222.4 LBS | RESPIRATION RATE: 14 BRPM | TEMPERATURE: 97.7 F | OXYGEN SATURATION: 96 % | DIASTOLIC BLOOD PRESSURE: 70 MMHG | HEART RATE: 73 BPM | HEIGHT: 63 IN | BODY MASS INDEX: 39.41 KG/M2

## 2017-07-10 DIAGNOSIS — E11.9 TYPE 2 DIABETES MELLITUS WITHOUT COMPLICATION, WITH LONG-TERM CURRENT USE OF INSULIN (HCC): ICD-10-CM

## 2017-07-10 DIAGNOSIS — Z00.00 HEALTH CARE MAINTENANCE: ICD-10-CM

## 2017-07-10 DIAGNOSIS — I10 ESSENTIAL HYPERTENSION, BENIGN: ICD-10-CM

## 2017-07-10 DIAGNOSIS — Z95.1 S/P CABG (CORONARY ARTERY BYPASS GRAFT): ICD-10-CM

## 2017-07-10 DIAGNOSIS — Z79.4 TYPE 2 DIABETES MELLITUS WITHOUT COMPLICATION, WITH LONG-TERM CURRENT USE OF INSULIN (HCC): ICD-10-CM

## 2017-07-10 DIAGNOSIS — E78.2 MIXED HYPERLIPIDEMIA: ICD-10-CM

## 2017-07-10 LAB
HBA1C MFR BLD: 7.8 % (ref ?–5.8)
INT CON NEG: NEGATIVE
INT CON POS: POSITIVE

## 2017-07-10 PROCEDURE — 90471 IMMUNIZATION ADMIN: CPT | Performed by: FAMILY MEDICINE

## 2017-07-10 PROCEDURE — 99214 OFFICE O/P EST MOD 30 MIN: CPT | Mod: 25 | Performed by: FAMILY MEDICINE

## 2017-07-10 PROCEDURE — 83036 HEMOGLOBIN GLYCOSYLATED A1C: CPT | Performed by: FAMILY MEDICINE

## 2017-07-10 PROCEDURE — 90715 TDAP VACCINE 7 YRS/> IM: CPT | Performed by: FAMILY MEDICINE

## 2017-07-10 ASSESSMENT — PATIENT HEALTH QUESTIONNAIRE - PHQ9: CLINICAL INTERPRETATION OF PHQ2 SCORE: 0

## 2017-07-10 NOTE — MR AVS SNAPSHOT
"        Julio Gaviriajose   7/10/2017 8:20 AM   Office Visit   MRN: [id number]    Department:  [address] [place] [address]   Dept Phone:  [phone]    Description:  Male : 1932   Provider:  Karlee Cheng M.D.           Reason for Visit     Cerumen Impaction would like to have ears check and cleaned.    Immunizations TDAP vaccine      Allergies as of 7/10/2017     Allergen Noted Reactions    Pcn [Penicillins] 2011   Swelling    Latex 2013   Rash      You were diagnosed with     Type 2 diabetes mellitus without complication, with long-term current use of insulin (CMS-[facility])   [4129216]       Essential hypertension, benign   [401.1.ICD-9-CM]       Mixed hyperlipidemia   [272.2.ICD-9-CM]       Health care maintenance   [936707]       S/P CABG (coronary artery bypass graft)   [512954]         Vital Signs     Blood Pressure Pulse Temperature Respirations Height Weight    104/70 mmHg 73 36.5 °C (97.7 °F) 14 1.6 m (5' 3\") 100.88 kg (222 lb 6.4 oz)    Body Mass Index Oxygen Saturation Smoking Status             39.41 kg/m2 96% Former Smoker         Basic Information     Date Of Birth Sex Race Ethnicity Preferred Language    1932 Male White Non- English      Your appointments     Aug 01, 2017 11:00 AM   ANNUAL WELLNESS with Karlee Cheng M.D., [employer]    [facility] [address]    [address] [facility] [address]  [email or web address] [place] [id number] [phone]            Oct 10, 2017  9:40 AM   Established Patient with Karlee Cheng M.D.   [facility] [address]    [address]  [facility] [address]  [email or web address] [place] [id number] [phone]           You will be receiving a confirmation call a few days before your appointment from our automated call confirmation system.              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   12/31/2014 - Present    Obesity E66.9   3/30/2015 - Present    Dermatophytosis of other specified sites B35.8   3/30/2015 - Present      Health Maintenance        Date Due Completion Dates    IMM DTaP/Tdap/Td Vaccine (1 - Tdap) 7[birth year]6/1951 ---    IMM INFLUENZA (1) 9/1/2017 10/3/2016, 10/9/2015, 9/26/2014, 10/8/2013, 10/5/2012, 10/18/2011, 11/15/2008    URINE ACR / MICROALBUMIN 9/26/2017 9/26/2016, 2/16/2016, 12/5/2014 (Prv Comp), 12/5/2014, 9/12/2013, 11/9/2012, 8/14/2012, 5/11/2012, 2/2/2012, 10/26/2011, 7/5/2011    Override on 12/5/2014: Previously completed    A1C SCREENING 10/10/2017 4/10/2017, 1/9/2017, 9/26/2016, 9/26/2016, 7/5/2016, 2/16/2016, 9/30/2015, 4/6/2015, 10/6/2014, 6/9/2014, 2/25/2014, 9/12/2013, 11/9/2012, 8/14/2012, 5/11/2012, 2/2/2012, 10/26/2011, 7/5/2011    RETINAL SCREENING 11/16/2017 11/16/2016 (Done), 11/30/2014 (Prv Comp), 4/16/2013    Override on 11/16/2016: Done    Override on 11/30/2014: Previously completed    DIABETES MONOFILAMENT / LE EXAM 1/9/2018 1/9/2017, 9/30/2015 (Done), 4/6/2015 (Done), 7/30/2014 (Prv Comp), 3/31/2014, 9/18/2013 (Done)    Override on 9/30/2015: Done    Override on 4/6/2015: Done    Override on 7/30/2014: Previously completed    Override on 9/18/2013: Done    FASTING LIPID PROFILE 2/10/2018 2/10/2017, 9/26/2016, 7/5/2016, 2/16/2016, 12/15/2015, 12/30/2014 (Prv Comp), 12/5/2014, 12/5/2014, 6/9/2014, 2/25/2014, 9/12/2013, 11/9/2012, 5/11/2012, 2/2/2012, 10/26/2011, 7/5/2011    Override on 12/30/2014: Previously completed    SERUM CREATININE 2/10/2018 2/10/2017, 9/26/2016, 9/26/2016, 7/5/2016, 2/16/2016, 12/15/2015,  3/23/2015, 12/5/2014, 12/5/2014, 6/9/2014, 2/25/2014, 12/20/2013, 12/6/2013, 9/12/2013, 8/7/2013, 11/26/2012, 11/9/2012, 8/14/2012, 5/11/2012, 2/2/2012, 10/26/2011, 7/5/2011, 11/2/2006            Results     POCT  A1C      Component    Glycohemoglobin    7.8    Internal Control Negative    Negative    Internal Control Positive    Positive                        Current Immunizations     13-VALENT PCV PREVNAR 10/9/2015    INFLUENZA VACCINE H1N1 11/13/2009    Influenza Vaccine Adult HD 10/3/2016, 10/9/2015, 9/26/2014, 11/15/2008    Influenza Vaccine Quad Inj (Preserved) 10/8/2013, 10/5/2012, 10/18/2011    Pneumococcal polysaccharide vaccine (PPSV-23) 10/9/2014    SHINGLES VACCINE 11/22/2009    Tdap Vaccine  Incomplete      Below and/or attached are the medications your provider expects you to take. Review all of your home medications and newly ordered medications with your provider and/or pharmacist. Follow medication instructions as directed by your provider and/or pharmacist. Please keep your medication list with you and share with your provider. Update the information when medications are discontinued, doses are changed, or new medications (including over-the-counter products) are added; and carry medication information at all times in the event of emergency situations     Allergies:  PCN - Swelling     LATEX - Rash               Medications  Valid as of: July 10, 2017 -  9:08 AM    Generic Name Brand Name Tablet Size Instructions for use    Aspirin (Chew Tab) ASA 81 MG Take 81 mg by mouth every day.        Atorvastatin Calcium (Tab) LIPITOR 40 MG Take 40 mg by mouth every evening.        B Complex Vitamins   Take  by mouth every day.        Blood Glucose Monitoring Suppl (Misc) Blood Glucose Monitoring Suppl SUPPLIES Accucheck Yaneth blood glucose test strips, checking blood sugar 2 daily  .02 insulin requiring.        Cholecalciferol (Cap) Vitamin D 2000 UNITS Take  by mouth every day.        Exenatide  (Solution Pen-injector) BYETTA 10 MCG/0.04ML Inject 10 mcg as instructed 2 times daily, before breakfast and dinner.        Furosemide (Tab) LASIX 40 MG Take 40 mg by mouth every day.        GlipiZIDE (Tab) GLUCOTROL 5 MG Take 1 Tab by mouth 2 times a day.        Insulin Glargine (Solution Pen-injector) LANTUS 100 UNIT/ML Inject 20 Units as instructed every evening.        Insulin Pen Needle (Misc) B-D ULTRAFINE III SHORT PEN 31G X 8 MM USE 3 TIMES DAILY        Lancets (Misc) Lancets  His new meter uses the Accu-Chek SoftClix lancets for 3 time daily testing.  Dx. Code 250.02        Liraglutide -Weight Management (Solution Pen-injector) Liraglutide -Weight Management 18 MG/3ML Inject  as instructed.        Lisinopril (Tab) PRINIVIL 2.5 MG Take 1 Tab by mouth every day.        MetFORMIN HCl (Tab) GLUCOPHAGE 500 MG TAKE 2 TABLETS BY MOUTH TWICE DAILY WITHMEALS        Potassium Chloride Monica CR (Tab CR) Kdur 20 MEQ Take 20 mEq by mouth every day.        Sildenafil Citrate (Tab) VIAGRA 50 MG Take 1 Tab by mouth as needed for Erectile Dysfunction.        Tadalafil (Tab) CIALIS 20 MG Take 20 mg by mouth every 36 hours. As needed on an empty stomach        Tamsulosin HCl (Cap) FLOMAX 0.4 MG Take 0.4 mg by mouth 2 times a day. Indications: 1/2 hr after breakfast and dinner  #180 w/ 2 refills called in 1/31/16        .                 Medicines prescribed today were sent to:     [id number] [place] - [address] [address]    Phone: [phone] Fax: [phone]    Open 24 Hours?: No      Medication refill instructions:       If your prescription bottle indicates you have medication refills left, it is not necessary to call your provider’s office. Please contact your pharmacy and they will refill your medication.    If your prescription bottle indicates you do not have any refills left, you may request refills at any time through one of the following ways: The online [employer] system  (except Urgent Care), by calling your provider’s office, or by asking your pharmacy to contact your provider’s office with a refill request. Medication refills are processed only during regular business hours and may not be available until the next business day. Your provider may request additional information or to have a follow-up visit with you prior to refilling your medication.   *Please Note: Medication refills are assigned a new Rx number when refilled electronically. Your pharmacy may indicate that no refills were authorized even though a new prescription for the same medication is available at the pharmacy. Please request the medicine by name with the pharmacy before contacting your provider for a refill.           MyChart Status: Patient Declined

## 2017-07-10 NOTE — PROGRESS NOTES
CC: multiple medical issues.    HPI:   Julio presents today to discuss the following medical issues:    Type 2 diabetes mellitus without complication,has not been adequately controlled. His A1C today 7.8, was  7.0,last visit it was 9.3 last visit. Denies polyuria, and polydipsia.  Patient admitted he has not been behaving in terms of his diet. Has been eating a lot of cake.however he has been checking his BS at home, and it is always less than 160, the highest number was 155.He has been on metformin 1000 mg bid, Glipizide 5 mg bid, lantus to 10 units daily, and Victoza 1.8 mg daily. Continue check BS at home. He has been following up with Dr Baig.    Essential hypertension, BP has been adequately controlled on current medication. Denies headache, chest pain, and SOB.has been on lisinopril 2.5 mg daily.No side effects.    Mixed hyperlipidemia, he has been tolerating the statin. Denies muscle pain LFTs has been normal.He is on lipitor 40 mg daily.    H/O S/P CABG x 3, has been stable, and asymptomatic, denies chest pain, SOB, and leg swelling.Last echocardiogram was normal EF ( 65%), with mild pulmonary hypertension ( RVSP 40 mmHg).has been on Statin, and Aspirin.    Due for Tdap.    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     • sildenafil citrate (VIAGRA) 50 MG tablet Take 1 Tab by mouth as needed for Erectile  Dysfunction. 10 Tab 3   • tamsulosin (FLOMAX) 0.4 MG capsule Take 0.4 mg by mouth 2 times a day. Indications: 1/2 hr after breakfast and dinner  #180 w/ 2 refills called in 1/31/16     • exenatide (BYETTA 10 MCG PEN) 10 MCG/0.04ML Solution Pen-injector Inject 10 mcg as instructed 2 times daily, before breakfast and dinner. 2.4 mL 6   • lisinopril (PRINIVIL) 2.5 MG Tab Take 1 Tab by mouth every day. 30 Tab 11   • CIALIS 20 MG tablet Take 20 mg by mouth every 36 hours. As needed on an empty stomach     • glipiZIDE (GLUCOTROL) 5 MG Tab Take 1 Tab by mouth 2 times a day. 180 Tab 3   • potassium chloride SA (K-DUR) 20 MEQ Tab CR Take 20 mEq by mouth every day.     • atorvastatin (LIPITOR) 40 MG Tab Take 40 mg by mouth every evening.     • metformin (GLUCOPHAGE) 500 MG Tab TAKE 2 TABLETS BY MOUTH TWICE DAILY WITHMEALS 360 Tab 3   • furosemide (LASIX) 40 MG TABS Take 40 mg by mouth every day.     • B-D ULTRAFINE III SHORT PEN 31G X 8 MM MISC USE 3 TIMES DAILY 100 Each 11   • insulin glargine (LANTUS SOLOSTAR) 100 UNIT/ML SOPN injection Inject 20 Units as instructed every evening. 30 mL 3   • Lancets MISC His new meter uses the Accu-Chek SoftClix lancets for 3 time daily testing.  Dx. Code 250.02 300 Each 3   • Blood Glucose Monitoring Suppl SUPPLIES MISC Accucheck Yaneth blood glucose test strips, checking blood sugar 2 daily  .02 insulin requiring. 200 Strip 3   • B Complex Vitamins (VITAMIN B COMPLEX PO) Take  by mouth every day.     • Cholecalciferol (VITAMIN D) 2000 UNITS CAPS Take  by mouth every day.     • aspirin (ASA) 81 MG CHEW Take 81 mg by mouth every day.       No current facility-administered medications for this visit.         Allergies as of 07/10/2017 - Rebel as Reviewed 07/10/2017   Allergen Reaction Noted   • Pcn [penicillins] Swelling 12/25/2011   • Latex Rash 08/07/2013        ROS: Denies any chest pain, Shortness of breath, Changes bowel or bladder, Lower extremity edema.    Physical Exam:  BP  "104/70 mmHg  Pulse 73  Temp(Src) 36.5 °C (97.7 °F)  Resp 14  Ht 1.6 m (5' 3\")  Wt 100.88 kg (222 lb 6.4 oz)  BMI 39.41 kg/m2  SpO2 96%  Gen.: Well-developed, well-nourished, no apparent distress,pleasant and cooperative with the examination  Skin:  Warm and dry with good turgor. No rashes or suspicious lesions in visible areas  HEENT:Sinuses nontender with palpation, TMs clear, nares patent with pink mucosa and clear rhinorrhea,no septal deviation ,polyps or lesions. lips without lesions, oropharynx clear.  Neck: Trachea midline,no masses or adenopathy. No JVD.  Cor: Regular rate and rhythm without murmur, gallop or rub.  Lungs: Respirations unlabored.Clear to auscultation with equal breath sounds bilaterally. No wheezes, rhonchi.  Extremities: No cyanosis, clubbing or edema.      Assessment and Plan.   85 y.o. male     1. Type 2 diabetes mellitus without complication, with long-term current use of insulin (CMS-HCC)  Not adequately controlled.  A1C today is 7.8,last visit it was 7.0. Patient admitted he has not been behaving in terms of his diet. Has been eating a lot of cake.however he has been checking his BS at home, and it is always less than 160, the highest number was 155.  For now continue on metformin 1000 mg bid, Glipizide 5 mg bid, lantus to 10 units daily, and Victoza 1.8 mg daily. Continue check BS at home. Continue follow up with Dr Baig,    - POCT  A1C    2. Essential hypertension, benign  Has been adequately controlled on current medication. Denies headache, chest pain, and SOB.  Continue on lisinopril 2.5 mg daily    3. Mixed hyperlipidemia  He has been tolerating the statin. Denies muscle pain LFTs has been normal  Continue on lipitor 40 mg daily    4. S/P CABG (coronary artery bypass graft)  Stable, asymptomatic.  Continue on Statin, and Aspirin.  Last echocardiogram was normal EF ( 65%), mild pulmonary hypertension ( RVSP 40 mmHg).    5. Health care maintenance  Tdap is given today.    - " TDAP VACCINE =>8YO IM

## 2017-07-20 DIAGNOSIS — E11.9 TYPE 2 DIABETES MELLITUS WITHOUT COMPLICATION, UNSPECIFIED LONG TERM INSULIN USE STATUS: ICD-10-CM

## 2017-07-20 RX ORDER — GLIPIZIDE 5 MG/1
5 TABLET ORAL 2 TIMES DAILY
Qty: 180 TAB | Refills: 3 | Status: SHIPPED | OUTPATIENT
Start: 2017-07-20 | End: 2018-09-06 | Stop reason: SDUPTHER

## 2017-08-07 ENCOUNTER — HOSPITAL ENCOUNTER (OUTPATIENT)
Dept: LAB | Facility: MEDICAL CENTER | Age: 82
End: 2017-08-07
Attending: INTERNAL MEDICINE
Payer: MEDICARE

## 2017-08-07 LAB
ALBUMIN SERPL BCP-MCNC: 3.9 G/DL (ref 3.2–4.9)
ALBUMIN/GLOB SERPL: 1.3 G/DL
ALP SERPL-CCNC: 54 U/L (ref 30–99)
ALT SERPL-CCNC: 18 U/L (ref 2–50)
ANION GAP SERPL CALC-SCNC: 7 MMOL/L (ref 0–11.9)
AST SERPL-CCNC: 19 U/L (ref 12–45)
BILIRUB SERPL-MCNC: 1.1 MG/DL (ref 0.1–1.5)
BUN SERPL-MCNC: 15 MG/DL (ref 8–22)
CALCIUM SERPL-MCNC: 9.6 MG/DL (ref 8.5–10.5)
CHLORIDE SERPL-SCNC: 104 MMOL/L (ref 96–112)
CHOLEST SERPL-MCNC: 126 MG/DL (ref 100–199)
CO2 SERPL-SCNC: 26 MMOL/L (ref 20–33)
CREAT SERPL-MCNC: 0.99 MG/DL (ref 0.5–1.4)
GFR SERPL CREATININE-BSD FRML MDRD: >60 ML/MIN/1.73 M 2
GLOBULIN SER CALC-MCNC: 3.1 G/DL (ref 1.9–3.5)
GLUCOSE SERPL-MCNC: 88 MG/DL (ref 65–99)
HDLC SERPL-MCNC: 60 MG/DL
LDLC SERPL CALC-MCNC: 52 MG/DL
POTASSIUM SERPL-SCNC: 4.5 MMOL/L (ref 3.6–5.5)
PROT SERPL-MCNC: 7 G/DL (ref 6–8.2)
SODIUM SERPL-SCNC: 137 MMOL/L (ref 135–145)
TRIGL SERPL-MCNC: 69 MG/DL (ref 0–149)

## 2017-08-07 PROCEDURE — 80061 LIPID PANEL: CPT

## 2017-08-07 PROCEDURE — 80053 COMPREHEN METABOLIC PANEL: CPT

## 2017-08-07 PROCEDURE — 36415 COLL VENOUS BLD VENIPUNCTURE: CPT

## 2017-08-29 ENCOUNTER — OFFICE VISIT (OUTPATIENT)
Dept: URGENT CARE | Facility: CLINIC | Age: 82
End: 2017-08-29
Payer: MEDICARE

## 2017-08-29 VITALS
HEIGHT: 63 IN | SYSTOLIC BLOOD PRESSURE: 150 MMHG | WEIGHT: 227 LBS | DIASTOLIC BLOOD PRESSURE: 80 MMHG | HEART RATE: 88 BPM | RESPIRATION RATE: 16 BRPM | OXYGEN SATURATION: 91 % | TEMPERATURE: 98 F | BODY MASS INDEX: 40.22 KG/M2

## 2017-08-29 DIAGNOSIS — H61.23 CERUMINOSIS, BILATERAL: ICD-10-CM

## 2017-08-29 DIAGNOSIS — H93.8X2 PLUGGED FEELING IN EAR, LEFT: ICD-10-CM

## 2017-08-29 PROCEDURE — 69210 REMOVE IMPACTED EAR WAX UNI: CPT | Mod: 50 | Performed by: FAMILY MEDICINE

## 2017-08-29 ASSESSMENT — ENCOUNTER SYMPTOMS
CHILLS: 0
FEVER: 0
DIZZINESS: 0
COUGH: 0
FOCAL WEAKNESS: 0
SPUTUM PRODUCTION: 0

## 2017-08-29 NOTE — PROGRESS NOTES
Subjective:      Julio Azar is a 85 y.o. male who presents with Ear Fullness (x 5 days/ left ear plugged)    Chief Complaint   Patient presents with   • Ear Fullness     x 5 days/ left ear plugged        - This is a very pleasant 85 y.o. male with complaints of audiologist told him his ear is clogged. Reports decreased hearing and hard to hear out of Lt ear.           ALLERGIES:  Pcn [penicillins] and Latex     PMH:  Past Medical History:   Diagnosis Date   • CAD (coronary artery disease), native coronary artery 3/31/2014   • Essential hypertension, benign 3/31/2014   • Cancer (CMS-HCC) 2014    PROSTATE-RADIATION [place]   • Type II or unspecified type diabetes mellitus without mention of complication, uncontrolled 9/18/2013   • Encounter for long-term (current) use of insulin (CMS-HCC) 9/18/2013   • Unspecified vitamin D deficiency 9/18/2013   • Other and unspecified hyperlipidemia 9/18/2013   • CAD (coronary artery disease)    • Dental disorder     upper and lower partials   • Diabetes     Dr Latif, IDDM   • Heart burn    • Snoring     has had sleep study        MEDS:    Current Outpatient Prescriptions:   •  glipiZIDE (GLUCOTROL) 5 MG Tab, Take 1 Tab by mouth 2 times a day., Disp: 180 Tab, Rfl: 3  •  Liraglutide -Weight Management 18 MG/3ML Solution Pen-injector, Inject  as instructed., Disp: , Rfl:   •  sildenafil citrate (VIAGRA) 50 MG tablet, Take 1 Tab by mouth as needed for Erectile Dysfunction., Disp: 10 Tab, Rfl: 3  •  tamsulosin (FLOMAX) 0.4 MG capsule, Take 0.4 mg by mouth 2 times a day. Indications: 1/2 hr after breakfast and dinner  #180 w/ 2 refills called in 1/31/16, Disp: , Rfl:   •  exenatide (BYETTA 10 MCG PEN) 10 MCG/0.04ML Solution Pen-injector, Inject 10 mcg as instructed 2 times daily, before breakfast and dinner., Disp: 2.4 mL, Rfl: 6  •  lisinopril (PRINIVIL) 2.5 MG Tab, Take 1 Tab by mouth every day., Disp: 30 Tab, Rfl: 11  •  CIALIS 20 MG tablet, Take 20 mg by mouth every 36 hours. As  "needed on an empty stomach, Disp: , Rfl:   •  potassium chloride SA (K-DUR) 20 MEQ Tab CR, Take 20 mEq by mouth every day., Disp: , Rfl:   •  atorvastatin (LIPITOR) 40 MG Tab, Take 40 mg by mouth every evening., Disp: , Rfl:   •  metformin (GLUCOPHAGE) 500 MG Tab, TAKE 2 TABLETS BY MOUTH TWICE DAILY WITHMEALS, Disp: 360 Tab, Rfl: 3  •  furosemide (LASIX) 40 MG TABS, Take 40 mg by mouth every day., Disp: , Rfl:   •  B-D ULTRAFINE III SHORT PEN 31G X 8 MM MISC, USE 3 TIMES DAILY, Disp: 100 Each, Rfl: 11  •  insulin glargine (LANTUS SOLOSTAR) 100 UNIT/ML SOPN injection, Inject 20 Units as instructed every evening., Disp: 30 mL, Rfl: 3  •  Lancets MIS, His new meter uses the Accu-Chek SoftClix lancets for 3 time daily testing.  Dx. Code 250.02, Disp: 300 Each, Rfl: 3  •  Blood Glucose Monitoring Suppl SUPPLIES MISC, Accucheck Yaneth blood glucose test strips, checking blood sugar 2 daily  .02 insulin requiring., Disp: 200 Strip, Rfl: 3  •  B Complex Vitamins (VITAMIN B COMPLEX PO), Take  by mouth every day., Disp: , Rfl:   •  Cholecalciferol (VITAMIN D) 2000 UNITS CAPS, Take  by mouth every day., Disp: , Rfl:   •  aspirin (ASA) 81 MG CHEW, Take 81 mg by mouth every day., Disp: , Rfl:     ** I have documented what I find to be significant in regards to past medical, social, family and surgical history  in my HPI or under PMH/PSH/FH review section, otherwise it is contributory **           HPI    Review of Systems   Constitutional: Negative for chills and fever.   HENT: Positive for ear pain. Negative for congestion and ear discharge.    Respiratory: Negative for cough and sputum production.    Neurological: Negative for dizziness and focal weakness.          Objective:     /80   Pulse 88   Temp 36.7 °C (98 °F)   Resp 16   Ht 1.6 m (5' 3\")   Wt 103 kg (227 lb)   SpO2 91%   BMI 40.21 kg/m²      Physical Exam   Constitutional: He appears well-developed. No distress.   HENT:   Head: Normocephalic and " atraumatic.   Mouth/Throat: Oropharynx is clear and moist.   Eyes: Conjunctivae are normal.   Neck: Neck supple.   Cardiovascular: Regular rhythm.    No murmur heard.  Pulmonary/Chest: Effort normal. No respiratory distress.   Neurological: He is alert. He exhibits normal muscle tone.   Skin: Skin is warm and dry.   Psychiatric: He has a normal mood and affect. Judgment normal.   Nursing note and vitals reviewed.  Lt ears blocked w/ wax, improved w/ my use of curette and some lavage. Rt ear w/ a few flakes of wax.               Assessment/Plan:         1. Plugged feeling in ear, left     2. Ceruminosis, bilateral               Dx & d/c instructions discussed w/ patient and/or family members. Follow up w/ Prvt Dr or here in 3-4 days if not getting better, sooner if needed,  ER if worse and UC/PCP unavailable.        Possible side effects (i.e. Rash, GI upset/constipation, sedation, elevation of BP or sugars) of any medications given discussed.

## 2017-09-06 ENCOUNTER — TELEPHONE (OUTPATIENT)
Dept: MEDICAL GROUP | Facility: PHYSICIAN GROUP | Age: 82
End: 2017-09-06

## 2017-09-06 NOTE — TELEPHONE ENCOUNTER
Left message for patient to call back regarding pre-visit planning. Please transfer call to Claudia at [phone].

## 2017-09-07 ENCOUNTER — OFFICE VISIT (OUTPATIENT)
Dept: MEDICAL GROUP | Facility: PHYSICIAN GROUP | Age: 82
End: 2017-09-07
Payer: MEDICARE

## 2017-09-07 VITALS
BODY MASS INDEX: 40.57 KG/M2 | HEIGHT: 63 IN | DIASTOLIC BLOOD PRESSURE: 60 MMHG | WEIGHT: 229 LBS | OXYGEN SATURATION: 95 % | HEART RATE: 67 BPM | SYSTOLIC BLOOD PRESSURE: 106 MMHG | TEMPERATURE: 98.2 F | RESPIRATION RATE: 14 BRPM

## 2017-09-07 DIAGNOSIS — Z79.4 TYPE 2 DIABETES MELLITUS WITHOUT COMPLICATION, WITH LONG-TERM CURRENT USE OF INSULIN (HCC): ICD-10-CM

## 2017-09-07 DIAGNOSIS — E11.9 TYPE 2 DIABETES MELLITUS WITHOUT COMPLICATION, WITH LONG-TERM CURRENT USE OF INSULIN (HCC): ICD-10-CM

## 2017-09-07 DIAGNOSIS — I10 ESSENTIAL HYPERTENSION, BENIGN: ICD-10-CM

## 2017-09-07 DIAGNOSIS — B35.8: ICD-10-CM

## 2017-09-07 DIAGNOSIS — E78.2 MIXED HYPERLIPIDEMIA: ICD-10-CM

## 2017-09-07 DIAGNOSIS — I25.10 CORONARY ARTERY DISEASE INVOLVING NATIVE CORONARY ARTERY OF NATIVE HEART WITHOUT ANGINA PECTORIS: ICD-10-CM

## 2017-09-07 DIAGNOSIS — E11.43 DIABETIC AUTONOMIC NEUROPATHY ASSOCIATED WITH TYPE 2 DIABETES MELLITUS (HCC): ICD-10-CM

## 2017-09-07 DIAGNOSIS — E55.9 VITAMIN D DEFICIENCY: ICD-10-CM

## 2017-09-07 PROCEDURE — 99214 OFFICE O/P EST MOD 30 MIN: CPT | Performed by: NURSE PRACTITIONER

## 2017-09-07 ASSESSMENT — PAIN SCALES - GENERAL: PAINLEVEL: NO PAIN

## 2017-09-07 NOTE — LETTER
[facility]  Karlee Cheng M.D.  [address] [facility] [address]  [place] [address]  Fax: [phone]   Authorization for Release/Disclosure of   Protected Health Information   Name: SHAYY ARCHIBALD : 1932 SSN: [id number]   Address:  [place] [address] Phone:    [phone] (home)    I authorize the entity listed below to release/disclose the PHI below to:   [facility]/Karlee Cheng M.D. and Jean Claude Ponce, PACO.P.RMinorN.   Provider or Entity Name:  Dr. Pierre  [facility]   Address   City, [facility], [facility]   Phone:      Fax:     Reason for request: continuity of care   Information to be released:    [  ] LAST COLONOSCOPY,  including any PATH REPORT and follow-up  [  ] LAST FIT/COLOGUARD RESULT [  ] LAST DEXA  [  ] LAST MAMMOGRAM  [  ] LAST PAP  [  ] LAST LABS [  ] RETINA EXAM REPORT  [  ] IMMUNIZATION RECORDS  [x] Release all info      [  ] Check here and initial the line next to each item to release ALL health information INCLUDING  _____ Care and treatment for drug and / or alcohol abuse  _____ HIV testing, infection status, or AIDS  _____ Genetic Testing    DATES OF SERVICE OR TIME PERIOD TO BE DISCLOSED: _____________  I understand and acknowledge that:  * This Authorization may be revoked at any time by you in writing, except if your health information has already been used or disclosed.  * Your health information that will be used or disclosed as a result of you signing this authorization could be re-disclosed by the recipient. If this occurs, your re-disclosed health information may no longer be protected by State or Federal laws.  * You may refuse to sign this Authorization. Your refusal will not affect your ability to obtain treatment.  * This Authorization becomes effective upon signing and will  on (date) __________.      If no date is indicated, this Authorization will  one (1) year from the signature date.    Name: Shayy Baig  Doe    Signature:   Date:     9/7/2017       PLEASE FAX REQUESTED RECORDS BACK TO: [phone]

## 2017-09-08 NOTE — ASSESSMENT & PLAN NOTE
Chronic in nature. Stable. Patient does not take any medication for this issue states he is doing well this time. Patient is followed by Dr. Wilcox podiatry.

## 2017-09-08 NOTE — ASSESSMENT & PLAN NOTE
Chronic in nature. Stable per patient. Patient continues to take atorvastatin 40 mg every evening without side effects.

## 2017-09-08 NOTE — ASSESSMENT & PLAN NOTE
Chronic in nature. Stable. Blood pressure today is 106/60 patient states this is normal for him and denies dizziness or other concerning symptoms. Patient takes Lasix 40 mg daily, lisinopril 2.5 mg daily. Denies chest pain, palpitations, dizziness, shortness of breath, headache. Patient is followed by cardiology.

## 2017-09-08 NOTE — PROGRESS NOTES
Chief Complaint   Patient presents with   • Establish Care     New Patient        HISTORY OF THE PRESENT ILLNESS: This is a 85 y.o. male new patient to our practice. This pleasant patient is here todayTo establish care and discuss multiple issues.    Type 2 diabetes mellitus without complication (CMS-[facility])  Currently on glyburide, Victoza, metformin. Reports compliance with medications. 4 times per week. Must see recent hemoglobin A1c is 7.8. Fasting BG in the morning is typically in 120 range. Denies fasting hypoglycemia episodes. Daytime hypoglycemic episodes denies. Reports he can feel the low BG symptoms well. Reports mild numbness or tingling in feet. Last eye exam was last year. Is following consistent carb diet or counting carbs. Reports some regular exercise routine. Weight is stable over the past year.       Vitamin D deficiency  Chronic in nature. Stable. Patient takes vitamin D 1000 units daily. Denies fatigue.    Mixed hyperlipidemia  Chronic in nature. Stable per patient. Patient continues to take atorvastatin 40 mg every evening without side effects.    CAD (coronary artery disease), native coronary artery  Chronic in nature. Stable per patient. Followed by cardiology.    Essential hypertension, benign  Chronic in nature. Stable. Blood pressure today is 106/60 patient states this is normal for him and denies dizziness or other concerning symptoms. Patient takes Lasix 40 mg daily, lisinopril 2.5 mg daily. Denies chest pain, palpitations, dizziness, shortness of breath, headache. Patient is followed by cardiology.    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 (CMS-[facility])  Chronic in nature. Stable. Patient does not take any medication for this issue states he is doing well this time. Patient is followed by Dr. Wilcox podiatrhiram.      Past Medical History:   Diagnosis Date   • CAD (coronary artery disease), native coronary artery 3/31/2014   • Essential hypertension, benign  3/31/2014   • Cancer (CMS-HCC) 2014    PROSTATE-RADIATION [place]   • Type II or unspecified type diabetes mellitus without mention of complication, uncontrolled 2013   • Encounter for long-term (current) use of insulin (CMS-HCC) 2013   • Unspecified vitamin D deficiency 2013   • Other and unspecified hyperlipidemia 2013   • CAD (coronary artery disease)    • Dental disorder     upper and lower partials   • Diabetes     Dr Latif, IDDM   • Heart burn    • Snoring     has had sleep study       Past Surgical History:   Procedure Laterality Date   • ECTROPIAN REPAIR  2014    Performed by Zen Fregoso M.D. at [facility] ORS   • LACRIMAL DUCT PROBE  2014    Performed by Zen Fregoso M.D. at [facility] ORS   • LORI BY LAPAROSCOPY  2013    Performed by Jez Galvan M.D. at [facility]   • LID TIGHTENING  2012    Performed by Vitor Son M.D. at [facility] ORS   • CATARACT PHACO WITH IOL  2011    Performed by CRUZITO LITTLEJOHN at [facility] ORS   • OTHER CARDIAC SURGERY  2004    CABG X3 at [facility]   • TONSILLECTOMY         Family Status   Relation Status   • Mother  at age 91   • Father  at age 87   • Brother  at age 72   • Brother  at age 50   • Son Alive   • Daughter Alive   • Maternal Grandmother    • Maternal Grandfather    • Paternal Grandmother    • Paternal Grandfather      Family History   Problem Relation Age of Onset   • Heart Disease Mother    • Heart Attack Mother    • Asthma Mother    • Heart Attack Father    • Diabetes Father    • Psychiatry Brother    • Heart Disease Brother      5 way bypass   • Diabetes Brother    • Hyperlipidemia Brother    • Heart Attack Brother    • Psychiatry Brother    • Stroke Brother    • Heart Disease Brother      pacemaker       Social History   Substance Use Topics   • Smoking status: Former Smoker      Packs/day: 2.00     Years: 34.00     Types: Cigarettes     Quit date: 1/1/1982   • Smokeless tobacco: Never Used   • Alcohol use 4.8 - 9.0 oz/week     7 - 14 Glasses of wine, 1 Cans of beer per week       Allergies: Pcn [penicillins] and Latex    Current Outpatient Prescriptions Ordered in [facility]   Medication Sig Dispense Refill   • liraglutide (VICTOZA) 18 MG/3ML Solution Pen-injector injection Inject 0.6 mg as instructed every day.     • vitamin D (CHOLECALCIFEROL) 1000 UNIT Tab Take 1,000 Units by mouth every day.     • glipiZIDE (GLUCOTROL) 5 MG Tab Take 1 Tab by mouth 2 times a day. 180 Tab 3   • sildenafil citrate (VIAGRA) 50 MG tablet Take 1 Tab by mouth as needed for Erectile Dysfunction. 10 Tab 3   • tamsulosin (FLOMAX) 0.4 MG capsule Take 0.4 mg by mouth 2 times a day. Indications: 1/2 hr after breakfast and dinner  #180 w/ 2 refills called in 1/31/16     • lisinopril (PRINIVIL) 2.5 MG Tab Take 1 Tab by mouth every day. 30 Tab 11   • potassium chloride SA (K-DUR) 20 MEQ Tab CR Take 20 mEq by mouth every day.     • atorvastatin (LIPITOR) 40 MG Tab Take 40 mg by mouth every evening.     • metformin (GLUCOPHAGE) 500 MG Tab TAKE 2 TABLETS BY MOUTH TWICE DAILY WITHMEALS 360 Tab 3   • furosemide (LASIX) 40 MG TABS Take 40 mg by mouth every day.     • insulin glargine (LANTUS SOLOSTAR) 100 UNIT/ML SOPN injection Inject 20 Units as instructed every evening. 30 mL 3   • Lancets MISC His new meter uses the Accu-Chek SoftClix lancets for 3 time daily testing.  Dx. Code 250.02 300 Each 3   • Blood Glucose Monitoring Suppl SUPPLIES MISC Accucheck Yaneth blood glucose test strips, checking blood sugar 2 daily  .02 insulin requiring. 200 Strip 3   • B Complex Vitamins (VITAMIN B COMPLEX PO) Take  by mouth every day.     • Cholecalciferol (VITAMIN D) 2000 UNITS CAPS Take  by mouth every day.     • aspirin (ASA) 81 MG CHEW Take 81 mg by mouth every day.     • B-D ULTRAFINE III SHORT PEN 31G X 8 MM MISC USE 3 TIMES DAILY  "100 Each 11     No current [facility]-ordered facility-administered medications on file.        Review of Systems   Constitutional:  Negative for fever, chills, weight loss and malaise/fatigue.   HENT:  Negative for ear pain, nosebleeds, congestion, sore throat and neck pain.    Eyes:  Negative for blurred vision.   Respiratory:  Negative for cough, sputum production, shortness of breath and wheezing.    Cardiovascular:  Negative for chest pain, palpitations, orthopnea and leg swelling.   Gastrointestinal:  Negative for heartburn, nausea, vomiting and abdominal pain.   Genitourinary:  Negative for dysuria, urgency and frequency.   Musculoskeletal:  Negative for myalgias, back pain and joint pain.   Skin:  Negative for rash and itching.   Neurological:  Negative for dizziness, tingling, tremors, sensory change, focal weakness and headaches.   Endo/Heme/Allergies:  Does not bruise/bleed easily.   Psychiatric/Behavioral:  Negative for depression, anxiety, or memory loss.     All other systems reviewed and are negative except as in HPI.    Exam: Blood pressure 106/60, pulse 67, temperature 36.8 °C (98.2 °F), resp. rate 14, height 1.6 m (5' 3\"), weight 103.9 kg (229 lb), SpO2 95 %.  General:  Normal appearing. No distress.  HEENT:  Normocephalic. Eyes conjunctiva clear lids without ptosis, pupils equal and reactive to light accommodation, ears normal shape and contour, canals are clear bilaterally, tympanic membranes are benign, nasal mucosa benign, oropharynx is without erythema, edema or exudates.   Neck:  Supple without JVD or bruit. Thyroid is not enlarged.  Pulmonary:  Clear to ausculation.  Normal effort. No rales, ronchi, or wheezing.  Cardiovascular:  Regular rate and rhythm without murmur. Carotid and radial pulses are intact and equal bilaterally.  Abdomen:  Soft, nontender, nondistended. Normal bowel sounds. Liver and spleen are not palpable  Neurologic:  Grossly nonfocal  Lymph:  No cervical, supraclavicular or " axillary lymph nodes are palpable  Skin:  Warm and dry.  No obvious lesions.  Musculoskeletal:  Normal gait. No extremity cyanosis, clubbing, or edema.  Psych:  Normal mood and affect. Alert and oriented x3. Judgment and insight is normal.    PLAN:    1. Dermatophytosis of other specified sites  Follow-up with dermatology. Patient is currently receiving treatment for skin cancer patient is unsure what type of skin cancer.    2. Coronary artery disease involving native coronary artery of native heart without angina pectoris  Continue current medications, continue follow-up with cardiology.    3. Type 2 diabetes mellitus without complication, with long-term current use of insulin (CMS-HCC)  Continue current medications. Continue follow-up with endocrinology.    4. 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 (CMS-HCC)  Continue follow-up with podiatry, endocrinology.  Patient is encouraged to contact this provider if pain worsens, they said it is mild at this time and does not require treatment.    5. Vitamin D deficiency  Continue vitamin D supplementation.    6. Mixed hyperlipidemia  Continue atorvastatin 40 mg, continue cardiology follow-up.    7. Essential hypertension, benign  Continue follow-up with cardiology, continue medications as prescribed.    Follow-up in 3 months. Continue medications as prescribed, and this provider for any concerns. Patient is encouraged to be seen in the emergency room for chest pain, palpitations, shortness of breath, dizziness, severe abdominal pain or other concerning symptoms.        Please note that this dictation was created using voice recognition software. I have made every reasonable attempt to correct obvious errors, but I expect that there are errors of grammar and possibly content that I did not discover before finalizing the note.      Assessment/Plan  1. Dermatophytosis of other specified sites     2. Coronary artery disease involving native  coronary artery of native heart without angina pectoris     3. Type 2 diabetes mellitus without complication, with long-term current use of insulin (CMS-[facility])     4. 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 (CMS-[facility])     5. Vitamin D deficiency     6. Mixed hyperlipidemia     7. Essential hypertension, benign           I have placed the below orders and discussed them with an approved delegating provider. The MA is performing the below orders under the direction of Dr. Rose.

## 2017-09-08 NOTE — ASSESSMENT & PLAN NOTE
Currently on glyburide, Victoza, metformin. Reports compliance with medications. 4 times per week. Must see recent hemoglobin A1c is 7.8. Fasting BG in the morning is typically in 120 range. Denies fasting hypoglycemia episodes. Daytime hypoglycemic episodes denies. Reports he can feel the low BG symptoms well. Reports mild numbness or tingling in feet. Last eye exam was last year. Is following consistent carb diet or counting carbs. Reports some regular exercise routine. Weight is stable over the past year.

## 2017-09-26 DIAGNOSIS — I10 ESSENTIAL HYPERTENSION: ICD-10-CM

## 2017-09-26 RX ORDER — LISINOPRIL 2.5 MG/1
2.5 TABLET ORAL DAILY
Qty: 90 TAB | Refills: 1 | Status: SHIPPED | OUTPATIENT
Start: 2017-09-26 | End: 2018-03-28 | Stop reason: SDUPTHER

## 2017-09-27 ENCOUNTER — APPOINTMENT (RX ONLY)
Dept: URBAN - METROPOLITAN AREA CLINIC 36 | Facility: CLINIC | Age: 82
Setting detail: DERMATOLOGY
End: 2017-09-27

## 2017-09-27 DIAGNOSIS — L57.8 OTHER SKIN CHANGES DUE TO CHRONIC EXPOSURE TO NONIONIZING RADIATION: ICD-10-CM

## 2017-09-27 PROBLEM — C44.311 BASAL CELL CARCINOMA OF SKIN OF NOSE: Status: ACTIVE | Noted: 2017-09-27

## 2017-09-27 PROCEDURE — 14301 TIS TRNFR ANY 30.1-60 SQ CM: CPT

## 2017-09-27 PROCEDURE — ? MOHS SURGERY

## 2017-09-27 PROCEDURE — 17312 MOHS ADDL STAGE: CPT

## 2017-09-27 PROCEDURE — 17311 MOHS 1 STAGE H/N/HF/G: CPT

## 2017-09-27 PROCEDURE — 99212 OFFICE O/P EST SF 10 MIN: CPT | Mod: 25

## 2017-09-27 PROCEDURE — ? COUNSELING

## 2017-09-27 NOTE — PROCEDURE: MOHS SURGERY
Cheiloplasty (Complex) Text: A decision was made to reconstruct the defect with a  cheiloplasty.  The defect was undermined extensively.  Additional obicularis oris muscle was excised with a 15 blade scalpel.  The defect was converted into a full thickness wedge to facilite a better cosmetic result.  Small vessels were then tied off with 5-0 monocyrl. The obicularis oris, superficial fascia, adipose and dermis were then reapproximated.  After the deeper layers were approximated the epidermis was reapproximated with particular care given to realign the vermilion border.
Referred To Mid-Level For Closure Text (Leave Blank If You Do Not Want): After obtaining clear surgical margins the patient was sent to a mid-level provider for surgical repair.  The patient understands they will receive post-surgical care and follow-up from the mid-level provider.
Previous Accession (Optional): [id number]
Tarsorrhaphy Performed?: No
Xenograft Text: The defect edges were debeveled with a #15 scalpel blade.  Given the location of the defect, shape of the defect and the proximity to free margins a xenograft was deemed most appropriate.  The graft was then trimmed to fit the size of the defect.  The graft was then placed in the primary defect and oriented appropriately.
Subsequent Stages Histo Method Verbiage: Using a similar technique to that described above, a thin layer of tissue was removed from all areas where tumor was visible on the previous stage.  The tissue was again oriented, mapped, dyed, and processed as above.
Simple / Intermediate / Complex Repair - Final Wound Length In Cm: 0
Alternatives Discussed Intro (Do Not Add Period): I discussed alternative treatments to Mohs surgery and specifically discussed the risks and benefits of
V-Y Flap Text: The defect edges were debeveled with a #15 scalpel blade.  Given the location of the defect, shape of the defect and the proximity to free margins a V-Y flap was deemed most appropriate.  Using a sterile surgical marker, an appropriate advancement flap was drawn incorporating the defect and placing the expected incisions within the relaxed skin tension lines where possible.    The area thus outlined was incised deep to adipose tissue with a #15 scalpel blade.  The skin margins were undermined to an appropriate distance in all directions utilizing iris scissors.
O-T Advancement Flap Text: The defect edges were debeveled with a #15 scalpel blade.  Given the location of the defect, shape of the defect and the proximity to free margins an O-T advancement flap was deemed most appropriate.  Using a sterile surgical marker, an appropriate advancement flap was drawn incorporating the defect and placing the expected incisions within the relaxed skin tension lines where possible.    The area thus outlined was incised deep to adipose tissue with a #15 scalpel blade.  The skin margins were undermined to an appropriate distance in all directions utilizing iris scissors.
Rhombic Flap Text: The defect edges were debeveled with a #15 scalpel blade.  Given the location of the defect and the proximity to free margins a rhombic flap was deemed most appropriate.  Using a sterile surgical marker, an appropriate rhombic flap was drawn incorporating the defect.    The area thus outlined was incised deep to adipose tissue with a #15 scalpel blade.  The skin margins were undermined to an appropriate distance in all directions utilizing iris scissors.
Ear Wedge Repair Text: A wedge excision was completed by carrying down an excision through the full thickness of the ear and cartilage with an inward facing Burow's triangle. The wound was then closed in a layered fashion.
Hemostasis: Electrocautery
Closure 3 Information: This tab is for additional flaps and grafts above and beyond our usual structured repairs.  Please note if you enter information here it will not currently bill and you will need to add the billing information manually.
Mucosal Advancement Flap Text: Given the location of the defect, shape of the defect and the proximity to free margins a mucosal advancement flap was deemed most appropriate. Incisions were made with a 15 blade scalpel in the appropriate fashion along the cutaneous vermilion border and the mucosal lip. The remaining actinically damaged mucosal tissue was excised.  The mucosal advancement flap was then elevated to the gingival sulcus with care taken to preserve the neurovascular structures and advanced into the primary defect. Care was taken to ensure that precise realignment of the vermilion border was achieved.
H Plasty Text: Given the location of the defect, shape of the defect and the proximity to free margins a H-plasty was deemed most appropriate for repair.  Using a sterile surgical marker, the appropriate advancement arms of the H-plasty were drawn incorporating the defect and placing the expected incisions within the relaxed skin tension lines where possible. The area thus outlined was incised deep to adipose tissue with a #15 scalpel blade. The skin margins were undermined to an appropriate distance in all directions utilizing iris scissors.  The opposing advancement arms were then advanced into place in opposite direction and anchored with interrupted buried subcutaneous sutures.
Flap Type: Advancement Flap (Double)
Bilobed Flap Text: The defect edges were debeveled with a #15 scalpel blade.  Given the location of the defect and the proximity to free margins a bilobe flap was deemed most appropriate.  Using a sterile surgical marker, an appropriate bilobe flap drawn around the defect.    The area thus outlined was incised deep to adipose tissue with a #15 scalpel blade.  The skin margins were undermined to an appropriate distance in all directions utilizing iris scissors.
Trilobed Flap Text: The defect edges were debeveled with a #15 scalpel blade.  Given the location of the defect and the proximity to free margins a trilobed flap was deemed most appropriate.  Using a sterile surgical marker, an appropriate trilobed flap drawn around the defect.    The area thus outlined was incised deep to adipose tissue with a #15 scalpel blade.  The skin margins were undermined to an appropriate distance in all directions utilizing iris scissors.
Cheek-To-Nose Interpolation Flap Text: A decision was made to reconstruct the defect utilizing an interpolation axial flap and a staged reconstruction.  A telfa template was made of the defect.  This telfa template was then used to outline the Cheek-To-Nose Interpolation flap.  The donor area for the pedicle flap was then injected with anesthesia.  The flap was excised through the skin and subcutaneous tissue down to the layer of the underlying musculature.  The interpolation flap was carefully excised within this deep plane to maintain its blood supply.  The edges of the donor site were undermined.   The donor site was closed in a primary fashion.  The pedicle was then rotated into position and sutured.  Once the tube was sutured into place, adequate blood supply was confirmed with blanching and refill.  The pedicle was then wrapped with xeroform gauze and dressed appropriately with a telfa and gauze bandage to ensure continued blood supply and protect the attached pedicle.
Stage 6: Additional Anesthesia Type: 1% lidocaine with epinephrine
Keystone Flap Text: The defect edges were debeveled with a #15 scalpel blade.  Given the location of the defect, shape of the defect a keystone flap was deemed most appropriate.  Using a sterile surgical marker, an appropriate keystone flap was drawn incorporating the defect, outlining the appropriate donor tissue and placing the expected incisions within the relaxed skin tension lines where possible. The area thus outlined was incised deep to adipose tissue with a #15 scalpel blade.  The skin margins were undermined to an appropriate distance in all directions around the primary defect and laterally outward around the flap utilizing iris scissors.
Area H Indication Text: Tumors in this location are included in Area H (eyelids, eyebrows, nose, lips, chin, ear, pre-auricular, post-auricular, temple, genitalia, hands, feet, ankles and areola).  Tissue conservation is critical in these anatomic locations.
Spiral Flap Text: The defect edges were debeveled with a #15 scalpel blade.  Given the location of the defect, shape of the defect and the proximity to free margins a spiral flap was deemed most appropriate.  Using a sterile surgical marker, an appropriate rotation flap was drawn incorporating the defect and placing the expected incisions within the relaxed skin tension lines where possible. The area thus outlined was incised deep to adipose tissue with a #15 scalpel blade.  The skin margins were undermined to an appropriate distance in all directions utilizing iris scissors.
Home Suture Removal Text: Patient was provided instructions on removing sutures and will remove their sutures at home.  If they have any questions or difficulties they will call the office.
Consent (Lip)/Introductory Paragraph: The rationale for Mohs was explained to the patient and consent was obtained. The risks, benefits and alternatives to therapy were discussed in detail. Specifically, the risks of lip deformity, changes in the oral aperture, infection, scarring, bleeding, prolonged wound healing, incomplete removal, allergy to anesthesia, nerve injury and recurrence were addressed. Prior to the procedure, the treatment site was clearly identified and confirmed by the patient. All components of Universal Protocol/PAUSE Rule completed.
Consent (Ear)/Introductory Paragraph: The rationale for Mohs was explained to the patient and consent was obtained. The risks, benefits and alternatives to therapy were discussed in detail. Specifically, the risks of ear deformity, infection, scarring, bleeding, prolonged wound healing, incomplete removal, allergy to anesthesia, nerve injury and recurrence were addressed. Prior to the procedure, the treatment site was clearly identified and confirmed by the patient. All components of Universal Protocol/PAUSE Rule completed.
Closure 2 Information: This tab is for additional flaps and grafts, including complex repair and grafts and complex repair and flaps. You can also specify a different location for the additional defect, if the location is the same you do not need to select a new one. We will insert the automated text for the repair you select below just as we do for solitary flaps and grafts. Please note that at this time if you select a location with a different insurance zone you will need to override the ICD10 and CPT if appropriate.
Bcc Histology Text: There were numerous aggregates of basaloid cells.
Area L Indication Text: Tumors in this location are included in Area L (trunk and extremities).  Mohs surgery is indicated for larger tumors, or tumors with aggressive histologic features, in these anatomic locations.
Inflammation Suggestive Of Cancer Camouflage Histology Text: There was a dense lymphocytic infiltrate which prevented adequate histologic evaluation of adjacent structures.
Mauc Instructions: By selecting yes to the question below the MAUC number will be added into the note.  This will be calculated automatically based on the diagnosis chosen, the size entered, the body zone selected (H,M,L) and the specific indications you chose. You will also have the option to override the Mohs AUC if you disagree with the automatically calculated number and this option is found in the Case Summary tab.
Complex Repair And Flap Additional Text (Will Appearing After The Standard Complex Repair Text): The complex repair was not sufficient to completely close the primary defect. The remaining additional defect was repaired with the flap mentioned below.
Stage 1: Number Of Blocks?: 1
Surgeon Performing Repair (Optional): Nuzhat
Ftsg Text: The defect edges were debeveled with a #15 scalpel blade.  Given the location of the defect, shape of the defect and the proximity to free margins a full thickness skin graft was deemed most appropriate.  Using a sterile surgical marker, the primary defect shape was transferred to the donor site. The area thus outlined was incised deep to adipose tissue with a #15 scalpel blade.  The harvested graft was then trimmed of adipose tissue until only dermis and epidermis was left.  The skin margins of the secondary defect were undermined to an appropriate distance in all directions utilizing iris scissors.  The secondary defect was closed with interrupted buried subcutaneous sutures.  The skin edges were then re-apposed with running  sutures.  The skin graft was then placed in the primary defect and oriented appropriately.
Secondary Defect Width In Cm (Required For Flaps): 3.5
Dressing: dry sterile dressing
Show Asc Variables: Yes
Crescentic Intermediate Repair Preamble Text (Leave Blank If You Do Not Want): Undermining was performed with blunt dissection.
Melolabial Interpolation Flap Text: A decision was made to reconstruct the defect utilizing an interpolation axial flap and a staged reconstruction.  A telfa template was made of the defect.  This telfa template was then used to outline the melolabial interpolation flap.  The donor area for the pedicle flap was then injected with anesthesia.  The flap was excised through the skin and subcutaneous tissue down to the layer of the underlying musculature.  The pedicle flap was carefully excised within this deep plane to maintain its blood supply.  The edges of the donor site were undermined.   The donor site was closed in a primary fashion.  The pedicle was then rotated into position and sutured.  Once the tube was sutured into place, adequate blood supply was confirmed with blanching and refill.  The pedicle was then wrapped with xeroform gauze and dressed appropriately with a telfa and gauze bandage to ensure continued blood supply and protect the attached pedicle.
Number Of Stages: 2
Anesthesia Volume In Cc: 6
No Repair - Repaired With Adjacent Surgical Defect Text (Leave Blank If You Do Not Want): After obtaining clear surgical margins the defect was repaired concurrently with another surgical defect which was in close approximation.
Consent 2/Introductory Paragraph: Mohs surgery was explained to the patient and consent was obtained. The risks, benefits and alternatives to therapy were discussed in detail. Specifically, the risks of infection, scarring, bleeding, prolonged wound healing, incomplete removal, allergy to anesthesia, nerve injury and recurrence were addressed. Prior to the procedure, the treatment site was clearly identified and confirmed by the patient. All components of Universal Protocol/PAUSE Rule completed.
Dermal Autograft Text: The defect edges were debeveled with a #15 scalpel blade.  Given the location of the defect, shape of the defect and the proximity to free margins a dermal autograft was deemed most appropriate.  Using a sterile surgical marker, the primary defect shape was transferred to the donor site. The area thus outlined was incised deep to adipose tissue with a #15 scalpel blade.  The harvested graft was then trimmed of adipose and epidermal tissue until only dermis was left.  The skin graft was then placed in the primary defect and oriented appropriately.
Consent (Scalp)/Introductory Paragraph: The rationale for Mohs was explained to the patient and consent was obtained. The risks, benefits and alternatives to therapy were discussed in detail. Specifically, the risks of changes in hair growth pattern secondary to repair, infection, scarring, bleeding, prolonged wound healing, incomplete removal, allergy to anesthesia, nerve injury and recurrence were addressed. Prior to the procedure, the treatment site was clearly identified and confirmed by the patient. All components of Universal Protocol/PAUSE Rule completed.
Z Plasty Text: The lesion was extirpated to the level of the fat with a #15 scalpel blade.  Given the location of the defect, shape of the defect and the proximity to free margins a Z-plasty was deemed most appropriate for repair.  Using a sterile surgical marker, the appropriate transposition arms of the Z-plasty were drawn incorporating the defect and placing the expected incisions within the relaxed skin tension lines where possible.    The area thus outlined was incised deep to adipose tissue with a #15 scalpel blade.  The skin margins were undermined to an appropriate distance in all directions utilizing iris scissors.  The opposing transposition arms were then transposed into place in opposite direction and anchored with interrupted buried subcutaneous sutures.
Purse String (Intermediate) Text: Given the location of the defect and the characteristics of the surrounding skin a purse string intermediate closure was deemed most appropriate.  Undermining was performed circumfirentially around the surgical defect.  A purse string suture was then placed and tightened.
Surgeon/Pathologist Verbiage (Will Incorporate Name Of Surgeon From Intro If Not Blank): operated in two distinct and integrated capacities as the surgeon and pathologist.
Skin Substitute Text: The defect edges were debeveled with a #15 scalpel blade.  Given the location of the defect, shape of the defect and the proximity to free margins a skin substitute graft was deemed most appropriate.  The graft material was trimmed to fit the size of the defect. The graft was then placed in the primary defect and oriented appropriately.
Muscle Hinge Flap Text: The defect edges were debeveled with a #15 scalpel blade.  Given the size, depth and location of the defect and the proximity to free margins a muscle hinge flap was deemed most appropriate.  Using a sterile surgical marker, an appropriate hinge flap was drawn incorporating the defect. The area thus outlined was incised with a #15 scalpel blade.  The skin margins were undermined to an appropriate distance in all directions utilizing iris scissors.
Helical Rim Advancement Flap Text: The defect edges were debeveled with a #15 blade scalpel.  Given the location of the defect and the proximity to free margins (helical rim) a double helical rim advancement flap was deemed most appropriate.  Using a sterile surgical marker, the appropriate advancement flaps were drawn incorporating the defect and placing the expected incisions between the helical rim and antihelix where possible.  The area thus outlined was incised through and through with a #15 scalpel blade.  With a skin hook and iris scissors, the flaps were gently and sharply undermined and freed up.
Hatchet Flap Text: The defect edges were debeveled with a #15 scalpel blade.  Given the location of the defect, shape of the defect and the proximity to free margins a hatchet flap was deemed most appropriate.  Using a sterile surgical marker, an appropriate hatchet flap was drawn incorporating the defect and placing the expected incisions within the relaxed skin tension lines where possible.    The area thus outlined was incised deep to adipose tissue with a #15 scalpel blade.  The skin margins were undermined to an appropriate distance in all directions utilizing iris scissors.
Burow's Advancement Flap Text: The defect edges were debeveled with a #15 scalpel blade.  Given the location of the defect and the proximity to free margins a Burow's advancement flap was deemed most appropriate.  Using a sterile surgical marker, the appropriate advancement flap was drawn incorporating the defect and placing the expected incisions within the relaxed skin tension lines where possible.    The area thus outlined was incised deep to adipose tissue with a #15 scalpel blade.  The skin margins were undermined to an appropriate distance in all directions utilizing iris scissors.
Localized Dermabrasion With Wire Brush Text: The patient was draped in routine manner.  Localized dermabrasion using 3 x 17 mm wire brush was performed in routine manner to papillary dermis. This spot dermabrasion is being performed to complete skin cancer reconstruction. It also will eliminate the other sun damaged precancerous cells that are known to be part of the regional effect of a lifetime's worth of sun exposure. This localized dermabrasion is therapeutic and should not be considered cosmetic in any regard.
Modified Advancement Flap Text: The defect edges were debeveled with a #15 scalpel blade.  Given the location of the defect, shape of the defect and the proximity to free margins a modified advancement flap was deemed most appropriate.  Using a sterile surgical marker, an appropriate advancement flap was drawn incorporating the defect and placing the expected incisions within the relaxed skin tension lines where possible.    The area thus outlined was incised deep to adipose tissue with a #15 scalpel blade.  The skin margins were undermined to an appropriate distance in all directions utilizing iris scissors.
Referred To Oculoplastics For Closure Text (Leave Blank If You Do Not Want): After obtaining clear surgical margins the patient was sent to oculoplastics for surgical repair.  The patient understands they will receive post-surgical care and follow-up from the referring physician's office.
Referred To Plastics For Closure Text (Leave Blank If You Do Not Want): After obtaining clear surgical margins the patient was sent to plastics for surgical repair.  The patient understands they will receive post-surgical care and follow-up from the referring physician's office.
Postop Diagnosis: same
Graft Donor Site Bandage (Optional-Leave Blank If You Don't Want In Note): Steri-strips and a pressure bandage were applied to the donor site.
Island Pedicle Flap With Canthal Suspension Text: The defect edges were debeveled with a #15 scalpel blade.  Given the location of the defect, shape of the defect and the proximity to free margins an island pedicle advancement flap was deemed most appropriate.  Using a sterile surgical marker, an appropriate advancement flap was drawn incorporating the defect, outlining the appropriate donor tissue and placing the expected incisions within the relaxed skin tension lines where possible. The area thus outlined was incised deep to adipose tissue with a #15 scalpel blade.  The skin margins were undermined to an appropriate distance in all directions around the primary defect and laterally outward around the island pedicle utilizing iris scissors.  There was minimal undermining beneath the pedicle flap. A suspension suture was placed in the canthal tendon to prevent tension and prevent ectropion.
Bilateral Helical Rim Advancement Flap Text: The defect edges were debeveled with a #15 blade scalpel.  Given the location of the defect and the proximity to free margins (helical rim) a bilateral helical rim advancement flap was deemed most appropriate.  Using a sterile surgical marker, the appropriate advancement flaps were drawn incorporating the defect and placing the expected incisions between the helical rim and antihelix where possible.  The area thus outlined was incised through and through with a #15 scalpel blade.  With a skin hook and iris scissors, the flaps were gently and sharply undermined and freed up.
Wound Care: Aquaphor
Advancement Flap (Single) Text: The defect edges were debeveled with a #15 scalpel blade.  Given the location of the defect and the proximity to free margins a single advancement flap was deemed most appropriate.  Using a sterile surgical marker, an appropriate advancement flap was drawn incorporating the defect and placing the expected incisions within the relaxed skin tension lines where possible.    The area thus outlined was incised deep to adipose tissue with a #15 scalpel blade.  The skin margins were undermined to an appropriate distance in all directions utilizing iris scissors.
Consent (Spinal Accessory)/Introductory Paragraph: The rationale for Mohs was explained to the patient and consent was obtained. The risks, benefits and alternatives to therapy were discussed in detail. Specifically, the risks of damage to the spinal accessory nerve, infection, scarring, bleeding, prolonged wound healing, incomplete removal, allergy to anesthesia, and recurrence were addressed. Prior to the procedure, the treatment site was clearly identified and confirmed by the patient. All components of Universal Protocol/PAUSE Rule completed.
A-T Advancement Flap Text: The defect edges were debeveled with a #15 scalpel blade.  Given the location of the defect, shape of the defect and the proximity to free margins an A-T advancement flap was deemed most appropriate.  Using a sterile surgical marker, an appropriate advancement flap was drawn incorporating the defect and placing the expected incisions within the relaxed skin tension lines where possible.    The area thus outlined was incised deep to adipose tissue with a #15 scalpel blade.  The skin margins were undermined to an appropriate distance in all directions utilizing iris scissors.
Epidermal Closure: running cuticular
Graft Basting Suture (Optional): 5-0 Ethibond
Medical Necessity Statement: Based on my medical judgement, Mohs surgery is the most appropriate treatment for this cancer compared to other treatments.
O-Z Plasty Text: The defect edges were debeveled with a #15 scalpel blade.  Given the location of the defect, shape of the defect and the proximity to free margins an O-Z plasty (double transposition flap) was deemed most appropriate.  Using a sterile surgical marker, the appropriate transposition flaps were drawn incorporating the defect and placing the expected incisions within the relaxed skin tension lines where possible.    The area thus outlined was incised deep to adipose tissue with a #15 scalpel blade.  The skin margins were undermined to an appropriate distance in all directions utilizing iris scissors.  Hemostasis was achieved with electrocautery.  The flaps were then transposed into place, one clockwise and the other counterclockwise, and anchored with interrupted buried subcutaneous sutures.
Posterior Auricular Interpolation Flap Text: A decision was made to reconstruct the defect utilizing an interpolation axial flap and a staged reconstruction.  A telfa template was made of the defect.  This telfa template was then used to outline the posterior auricular interpolation flap.  The donor area for the pedicle flap was then injected with anesthesia.  The flap was excised through the skin and subcutaneous tissue down to the layer of the underlying musculature.  The pedicle flap was carefully excised within this deep plane to maintain its blood supply.  The edges of the donor site were undermined.   The donor site was closed in a primary fashion.  The pedicle was then rotated into position and sutured.  Once the tube was sutured into place, adequate blood supply was confirmed with blanching and refill.  The pedicle was then wrapped with xeroform gauze and dressed appropriately with a telfa and gauze bandage to ensure continued blood supply and protect the attached pedicle.
Mohs Case Number: [id number]
Epidermal Sutures: 5-0 Ethilon
No Residual Tumor Seen Histology Text: There were no malignant cells seen in the sections examined.
Consent (Temporal Branch)/Introductory Paragraph: The rationale for Mohs was explained to the patient and consent was obtained. The risks, benefits and alternatives to therapy were discussed in detail. Specifically, the risks of damage to the temporal branch of the facial nerve, infection, scarring, bleeding, prolonged wound healing, incomplete removal, allergy to anesthesia, and recurrence were addressed. Prior to the procedure, the treatment site was clearly identified and confirmed by the patient. All components of Universal Protocol/PAUSE Rule completed.
Composite Graft Text: The defect edges were debeveled with a #15 scalpel blade.  Given the location of the defect, shape of the defect, the proximity to free margins and the fact the defect was full thickness a composite graft was deemed most appropriate.  The defect was outline and then transferred to the donor site.  A full thickness graft was then excised from the donor site. The graft was then placed in the primary defect, oriented appropriately and then sutured into place.  The secondary defect was then repaired using a primary closure.
Complex Repair And Graft Additional Text (Will Appearing After The Standard Complex Repair Text): The complex repair was not sufficient to completely close the primary defect. The remaining additional defect was repaired with the graft mentioned below.
Dorsal Nasal Flap Text: The defect edges were debeveled with a #15 scalpel blade.  Given the location of the defect and the proximity to free margins a dorsal nasal flap was deemed most appropriate.  Using a sterile surgical marker, an appropriate dorsal nasal flap was drawn around the defect.    The area thus outlined was incised deep to adipose tissue with a #15 scalpel blade.  The skin margins were undermined to an appropriate distance in all directions utilizing iris scissors.
Split-Thickness Skin Graft Text: The defect edges were debeveled with a #15 scalpel blade.  Given the location of the defect, shape of the defect and the proximity to free margins a split thickness skin graft was deemed most appropriate.  Using a sterile surgical marker, the primary defect shape was transferred to the donor site. The split thickness graft was then harvested.  The skin graft was then placed in the primary defect and oriented appropriately.
Estimated Blood Loss (Cc): less than 5 cc
Full Thickness Lip Wedge Repair (Flap) Text: Given the location of the defect and the proximity to free margins a full thickness wedge repair was deemed most appropriate.  Using a sterile surgical marker, the appropriate repair was drawn incorporating the defect and placing the expected incisions perpendicular to the vermilion border.  The vermilion border was also meticulously outlined to ensure appropriate reapproximation during the repair.  The area thus outlined was incised through and through with a #15 scalpel blade.  The muscularis and dermis were reaproximated with deep sutures following hemostasis. Care was taken to realign the vermilion border before proceeding with the superficial closure.  Once the vermilion was realigned the superfical and mucosal closure was finished.
Bcc Infiltrative Histology Text: There were numerous aggregates of basaloid cells demonstrating an infiltrative pattern.
Repair Performed By Another Provider Text (Leave Blank If You Do Not Want): After obtaining clear surgical margins the defect was repaired by another provider.
Bilobed Transposition Flap Text: The defect edges were debeveled with a #15 scalpel blade.  Given the location of the defect and the proximity to free margins a bilobed transposition flap was deemed most appropriate.  Using a sterile surgical marker, an appropriate bilobe flap drawn around the defect.    The area thus outlined was incised deep to adipose tissue with a #15 scalpel blade.  The skin margins were undermined to an appropriate distance in all directions utilizing iris scissors.
Consent (Nose)/Introductory Paragraph: The rationale for Mohs was explained to the patient and consent was obtained. The risks, benefits and alternatives to therapy were discussed in detail. Specifically, the risks of nasal deformity, changes in the flow of air through the nose, infection, scarring, bleeding, prolonged wound healing, incomplete removal, allergy to anesthesia, nerve injury and recurrence were addressed. Prior to the procedure, the treatment site was clearly identified and confirmed by the patient. All components of Universal Protocol/PAUSE Rule completed.
Initial Size Of Lesion: 1.8
Complex Repair Preamble Text (Leave Blank If You Do Not Want): Extensive wide undermining was performed.
Additional Anesthesia Volume In Cc: 3.8
Consent Type: Consent 1 (Standard)
Epidermal Closure Graft Donor Site (Optional): simple interrupted
Interpolation Flap Text: A decision was made to reconstruct the defect utilizing an interpolation axial flap and a staged reconstruction.  A telfa template was made of the defect.  This telfa template was then used to outline the interpolation flap.  The donor area for the pedicle flap was then injected with anesthesia.  The flap was excised through the skin and subcutaneous tissue down to the layer of the underlying musculature.  The interpolation flap was carefully excised within this deep plane to maintain its blood supply.  The edges of the donor site were undermined.   The donor site was closed in a primary fashion.  The pedicle was then rotated into position and sutured.  Once the tube was sutured into place, adequate blood supply was confirmed with blanching and refill.  The pedicle was then wrapped with xeroform gauze and dressed appropriately with a telfa and gauze bandage to ensure continued blood supply and protect the attached pedicle.
Secondary Intention Text (Leave Blank If You Do Not Want): The defect will heal with secondary intention.
Primary Defect Length In Cm (Final Defect Size - Required For Flaps/Grafts): 2.5
O-T Plasty Text: The defect edges were debeveled with a #15 scalpel blade.  Given the location of the defect, shape of the defect and the proximity to free margins an O-T plasty was deemed most appropriate.  Using a sterile surgical marker, an appropriate O-T plasty was drawn incorporating the defect and placing the expected incisions within the relaxed skin tension lines where possible.    The area thus outlined was incised deep to adipose tissue with a #15 scalpel blade.  The skin margins were undermined to an appropriate distance in all directions utilizing iris scissors.
Secondary Defect Length In Cm (Required For Flaps): 7.5
Stage 2: Additional Anesthesia Type: 1% lidocaine with 1:100,000 epinephrine and 408mcg clindamycin/ml and a 1:10 solution of 8.4% sodium bicarbonate
Star Wedge Flap Text: The defect edges were debeveled with a #15 scalpel blade.  Given the location of the defect, shape of the defect and the proximity to free margins a star wedge flap was deemed most appropriate.  Using a sterile surgical marker, an appropriate rotation flap was drawn incorporating the defect and placing the expected incisions within the relaxed skin tension lines where possible. The area thus outlined was incised deep to adipose tissue with a #15 scalpel blade.  The skin margins were undermined to an appropriate distance in all directions utilizing iris scissors.
Detail Level: Detailed
Consent 1/Introductory Paragraph: The rationale for Mohs was explained to the patient and consent was obtained. The risks, benefits and alternatives to therapy were discussed in detail. Specifically, the risks of infection, scarring, bleeding, prolonged wound healing, incomplete removal, allergy to anesthesia, nerve injury and recurrence were addressed. Prior to the procedure, the treatment site was clearly identified and confirmed by the patient. All components of Universal Protocol/PAUSE Rule completed.
V-Y Plasty Text: The defect edges were debeveled with a #15 scalpel blade.  Given the location of the defect, shape of the defect and the proximity to free margins an V-Y advancement flap was deemed most appropriate.  Using a sterile surgical marker, an appropriate advancement flap was drawn incorporating the defect and placing the expected incisions within the relaxed skin tension lines where possible.    The area thus outlined was incised deep to adipose tissue with a #15 scalpel blade.  The skin margins were undermined to an appropriate distance in all directions utilizing iris scissors.
Mohs Rapid Report Verbiage: The area of clinically evident tumor was marked with skin marking ink and appropriately hatched.  The initial incision was made following the Mohs approach through the skin.  The specimen was taken to the lab, divided into the necessary number of pieces, chromacoded and processed according to the Mohs protocol.  This was repeated in successive stages until a tumor free defect was achieved.
Epidermal Autograft Text: The defect edges were debeveled with a #15 scalpel blade.  Given the location of the defect, shape of the defect and the proximity to free margins an epidermal autograft was deemed most appropriate.  Using a sterile surgical marker, the primary defect shape was transferred to the donor site. The epidermal graft was then harvested.  The skin graft was then placed in the primary defect and oriented appropriately.
Cheek Interpolation Flap Text: A decision was made to reconstruct the defect utilizing an interpolation axial flap and a staged reconstruction.  A telfa template was made of the defect.  This telfa template was then used to outline the Cheek Interpolation flap.  The donor area for the pedicle flap was then injected with anesthesia.  The flap was excised through the skin and subcutaneous tissue down to the layer of the underlying musculature.  The interpolation flap was carefully excised within this deep plane to maintain its blood supply.  The edges of the donor site were undermined.   The donor site was closed in a primary fashion.  The pedicle was then rotated into position and sutured.  Once the tube was sutured into place, adequate blood supply was confirmed with blanching and refill.  The pedicle was then wrapped with xeroform gauze and dressed appropriately with a telfa and gauze bandage to ensure continued blood supply and protect the attached pedicle.
O-L Flap Text: The defect edges were debeveled with a #15 scalpel blade.  Given the location of the defect, shape of the defect and the proximity to free margins an O-L flap was deemed most appropriate.  Using a sterile surgical marker, an appropriate advancement flap was drawn incorporating the defect and placing the expected incisions within the relaxed skin tension lines where possible.    The area thus outlined was incised deep to adipose tissue with a #15 scalpel blade.  The skin margins were undermined to an appropriate distance in all directions utilizing iris scissors.
Same Histology In Subsequent Stages Text: The pattern and morphology of the tumor is as described in the first stage.
Tissue Cultured Epidermal Autograft Text: The defect edges were debeveled with a #15 scalpel blade.  Given the location of the defect, shape of the defect and the proximity to free margins a tissue cultured epidermal autograft was deemed most appropriate.  The graft was then trimmed to fit the size of the defect.  The graft was then placed in the primary defect and oriented appropriately.
Advancement Flap (Double) Text: The defect edges were debeveled with a #15 scalpel blade.  Given the location of the defect and the proximity to free margins a double advancement flap was deemed most appropriate.  Using a sterile surgical marker, the appropriate advancement flaps were drawn incorporating the defect and placing the expected incisions within the relaxed skin tension lines where possible.    The area thus outlined was incised deep to adipose tissue with a #15 scalpel blade.  The skin margins were undermined to an appropriate distance in all directions utilizing iris scissors.
Manual Repair Warning Statement: We plan on removing the manually selected variable below in favor of our much easier automatic structured text blocks found in the previous tab. We decided to do this to help make the flow better and give you the full power of structured data. Manual selection is never going to be ideal in our platform and I would encourage you to avoid using manual selection from this point on, especially since I will be sunsetting this feature. It is important that you do one of two things with the customized text below. First, you can save all of the text in a word file so you can have it for future reference. Second, transfer the text to the appropriate area in the Library tab. Lastly, if there is a flap or graft type which we do not have you need to let us know right away so I can add it in before the variable is hidden. No need to panic, we plan to give you roughly 6 months to make the change.
Referred To Asc For Closure Text (Leave Blank If You Do Not Want): After obtaining clear surgical margins the patient was sent to an ASC for surgical repair.  The patient understands they will receive post-surgical care and follow-up from the ASC physician.
Cheiloplasty (Less Than 50%) Text: A decision was made to reconstruct the defect with a  cheiloplasty.  The defect was undermined extensively.  Additional obicularis oris muscle was excised with a 15 blade scalpel.  The defect was converted into a full thickness wedge, of less than 50% of the vertical height of the lip, to facilite a better cosmetic result.  Small vessels were then tied off with 5-0 monocyrl. The obicularis oris, superficial fascia, adipose and dermis were then reapproximated.  After the deeper layers were approximated the epidermis was reapproximated with particular care given to realign the vermilion border.
Bi-Rhombic Flap Text: The defect edges were debeveled with a #15 scalpel blade.  Given the location of the defect and the proximity to free margins a bi-rhombic flap was deemed most appropriate.  Using a sterile surgical marker, an appropriate rhombic flap was drawn incorporating the defect. The area thus outlined was incised deep to adipose tissue with a #15 scalpel blade.  The skin margins were undermined to an appropriate distance in all directions utilizing iris scissors.
Island Pedicle Flap-Requiring Vessel Identification Text: The defect edges were debeveled with a #15 scalpel blade.  Given the location of the defect, shape of the defect and the proximity to free margins an island pedicle advancement flap was deemed most appropriate.  Using a sterile surgical marker, an appropriate advancement flap was drawn, based on the axial vessel mentioned above, incorporating the defect, outlining the appropriate donor tissue and placing the expected incisions within the relaxed skin tension lines where possible.    The area thus outlined was incised deep to adipose tissue with a #15 scalpel blade.  The skin margins were undermined to an appropriate distance in all directions around the primary defect and laterally outward around the island pedicle utilizing iris scissors.  There was minimal undermining beneath the pedicle flap.
Unna Boot Text: An Unna boot was placed to help immobilize the limb and facilitate more rapid healing.
Tarsorrhaphy Text: A tarsorrhaphy was performed using Frost sutures.
Area M Indication Text: Tumors in this location are included in Area M (cheek, forehead, scalp, neck, jawline and pretibial skin).  Mohs surgery is indicated for tumors in these anatomic locations.
W Plasty Text: The lesion was extirpated to the level of the fat with a #15 scalpel blade.  Given the location of the defect, shape of the defect and the proximity to free margins a W-plasty was deemed most appropriate for repair.  Using a sterile surgical marker, the appropriate transposition arms of the W-plasty were drawn incorporating the defect and placing the expected incisions within the relaxed skin tension lines where possible.    The area thus outlined was incised deep to adipose tissue with a #15 scalpel blade.  The skin margins were undermined to an appropriate distance in all directions utilizing iris scissors.  The opposing transposition arms were then transposed into place in opposite direction and anchored with interrupted buried subcutaneous sutures.
Alar Island Pedicle Flap Text: The defect edges were debeveled with a #15 scalpel blade.  Given the location of the defect, shape of the defect and the proximity to the alar rim an island pedicle advancement flap was deemed most appropriate.  Using a sterile surgical marker, an appropriate advancement flap was drawn incorporating the defect, outlining the appropriate donor tissue and placing the expected incisions within the nasal ala running parallel to the alar rim. The area thus outlined was incised with a #15 scalpel blade.  The skin margins were undermined minimally to an appropriate distance in all directions around the primary defect and laterally outward around the island pedicle utilizing iris scissors.  There was minimal undermining beneath the pedicle flap.
Repair Anesthesia Method: local infiltration
Referred To Otolaryngology For Closure Text (Leave Blank If You Do Not Want): After obtaining clear surgical margins the patient was sent to otolaryngology for surgical repair.  The patient understands they will receive post-surgical care and follow-up from the referring physician's office.
Anesthesia Volume In Cc: 3
Paramedian Forehead Flap Text: A decision was made to reconstruct the defect utilizing an interpolation axial flap and a staged reconstruction.  A telfa template was made of the defect.  This telfa template was then used to outline the paramedian forehead pedicle flap.  The donor area for the pedicle flap was then injected with anesthesia.  The flap was excised through the skin and subcutaneous tissue down to the layer of the underlying musculature.  The pedicle flap was carefully excised within this deep plane to maintain its blood supply.  The edges of the donor site were undermined.   The donor site was closed in a primary fashion.  The pedicle was then rotated into position and sutured.  Once the tube was sutured into place, adequate blood supply was confirmed with blanching and refill.  The pedicle was then wrapped with xeroform gauze and dressed appropriately with a telfa and gauze bandage to ensure continued blood supply and protect the attached pedicle.
Island Pedicle Flap Text: The defect edges were debeveled with a #15 scalpel blade.  Given the location of the defect, shape of the defect and the proximity to free margins an island pedicle advancement flap was deemed most appropriate.  Using a sterile surgical marker, an appropriate advancement flap was drawn incorporating the defect, outlining the appropriate donor tissue and placing the expected incisions within the relaxed skin tension lines where possible.    The area thus outlined was incised deep to adipose tissue with a #15 scalpel blade.  The skin margins were undermined to an appropriate distance in all directions around the primary defect and laterally outward around the island pedicle utilizing iris scissors.  There was minimal undermining beneath the pedicle flap.
Partial Purse String (Simple) Text: Given the location of the defect and the characteristics of the surrounding skin a simple purse string closure was deemed most appropriate.  Undermining was performed circumfirentially around the surgical defect.  A purse string suture was then placed and tightened. Wound tension only allowed a partial closure of the circular defect.
Crescentic Advancement Flap Text: The defect edges were debeveled with a #15 scalpel blade.  Given the location of the defect and the proximity to free margins a crescentic advancement flap was deemed most appropriate.  Using a sterile surgical marker, the appropriate advancement flap was drawn incorporating the defect and placing the expected incisions within the relaxed skin tension lines where possible.    The area thus outlined was incised deep to adipose tissue with a #15 scalpel blade.  The skin margins were undermined to an appropriate distance in all directions utilizing iris scissors.
Mohs Histo Method Verbiage: Each section was then chromacoded and processed in the Mohs lab using the Mohs protocol and submitted for frozen section.
Consent 3/Introductory Paragraph: I gave the patient a chance to ask questions they had about the procedure.  Following this I explained the Mohs procedure and consent was obtained. The risks, benefits and alternatives to therapy were discussed in detail. Specifically, the risks of infection, scarring, bleeding, prolonged wound healing, incomplete removal, allergy to anesthesia, nerve injury and recurrence were addressed. Prior to the procedure, the treatment site was clearly identified and confirmed by the patient. All components of Universal Protocol/PAUSE Rule completed.
Mohs Method Verbiage: An incision at a 45 degree angle following the standard Mohs approach was done and the specimen was harvested as a microscopic controlled layer.
Consent (Near Eyelid Margin)/Introductory Paragraph: The rationale for Mohs was explained to the patient and consent was obtained. The risks, benefits and alternatives to therapy were discussed in detail. Specifically, the risks of ectropion or eyelid deformity, infection, scarring, bleeding, prolonged wound healing, incomplete removal, allergy to anesthesia, nerve injury and recurrence were addressed. Prior to the procedure, the treatment site was clearly identified and confirmed by the patient. All components of Universal Protocol/PAUSE Rule completed.
Transposition Flap Text: The defect edges were debeveled with a #15 scalpel blade.  Given the location of the defect and the proximity to free margins a transposition flap was deemed most appropriate.  Using a sterile surgical marker, an appropriate transposition flap was drawn incorporating the defect.    The area thus outlined was incised deep to adipose tissue with a #15 scalpel blade.  The skin margins were undermined to an appropriate distance in all directions utilizing iris scissors.
Eye Protection Verbiage: Before proceeding with the stage, a plastic scleral shield was inserted. The globe was anesthetized with a few drops of 1% lidocaine with 1:100,000 epinephrine. Then, an appropriate sized scleral shield was chosen and coated with lacrilube ointment. The shield was gently inserted and left in place for the duration of each stage. After the stage was completed, the shield was gently removed.
Rotation Flap Text: The defect edges were debeveled with a #15 scalpel blade.  Given the location of the defect, shape of the defect and the proximity to free margins a rotation flap was deemed most appropriate.  Using a sterile surgical marker, an appropriate rotation flap was drawn incorporating the defect and placing the expected incisions within the relaxed skin tension lines where possible.    The area thus outlined was incised deep to adipose tissue with a #15 scalpel blade.  The skin margins were undermined to an appropriate distance in all directions utilizing iris scissors.
Post-Care Instructions: I reviewed with the patient in detail post-care instructions. Patient is not to engage in any heavy lifting, exercise, or swimming for the next 14 days. Should the patient develop any fevers, chills, bleeding, severe pain patient will contact the office immediately.
Melolabial Transposition Flap Text: The defect edges were debeveled with a #15 scalpel blade.  Given the location of the defect and the proximity to free margins a melolabial flap was deemed most appropriate.  Using a sterile surgical marker, an appropriate melolabial transposition flap was drawn incorporating the defect.    The area thus outlined was incised deep to adipose tissue with a #15 scalpel blade.  The skin margins were undermined to an appropriate distance in all directions utilizing iris scissors.
Double Island Pedicle Flap Text: The defect edges were debeveled with a #15 scalpel blade.  Given the location of the defect, shape of the defect and the proximity to free margins a double island pedicle advancement flap was deemed most appropriate.  Using a sterile surgical marker, an appropriate advancement flap was drawn incorporating the defect, outlining the appropriate donor tissue and placing the expected incisions within the relaxed skin tension lines where possible.    The area thus outlined was incised deep to adipose tissue with a #15 scalpel blade.  The skin margins were undermined to an appropriate distance in all directions around the primary defect and laterally outward around the island pedicle utilizing iris scissors.  There was minimal undermining beneath the pedicle flap.
Repair Type: Flap
Mastoid Interpolation Flap Text: A decision was made to reconstruct the defect utilizing an interpolation axial flap and a staged reconstruction.  A telfa template was made of the defect.  This telfa template was then used to outline the mastoid interpolation flap.  The donor area for the pedicle flap was then injected with anesthesia.  The flap was excised through the skin and subcutaneous tissue down to the layer of the underlying musculature.  The pedicle flap was carefully excised within this deep plane to maintain its blood supply.  The edges of the donor site were undermined.   The donor site was closed in a primary fashion.  The pedicle was then rotated into position and sutured.  Once the tube was sutured into place, adequate blood supply was confirmed with blanching and refill.  The pedicle was then wrapped with xeroform gauze and dressed appropriately with a telfa and gauze bandage to ensure continued blood supply and protect the attached pedicle.
S Plasty Text: Given the location and shape of the defect, and the orientation of relaxed skin tension lines, an S-plasty was deemed most appropriate for repair.  Using a sterile surgical marker, the appropriate outline of the S-plasty was drawn, incorporating the defect and placing the expected incisions within the relaxed skin tension lines where possible.  The area thus outlined was incised deep to adipose tissue with a #15 scalpel blade.  The skin margins were undermined to an appropriate distance in all directions utilizing iris scissors. The skin flaps were advanced over the defect.  The opposing margins were then approximated with interrupted buried subcutaneous sutures.
Cartilage Graft Text: The defect edges were debeveled with a #15 scalpel blade.  Given the location of the defect, shape of the defect, the fact the defect involved a full thickness cartilage defect a cartilage graft was deemed most appropriate.  An appropriate donor site was identified, cleansed, and anesthetized. The cartilage graft was then harvested and transferred to the recipient site, oriented appropriately and then sutured into place.  The secondary defect was then repaired using a primary closure.
Consent (Marginal Mandibular)/Introductory Paragraph: The rationale for Mohs was explained to the patient and consent was obtained. The risks, benefits and alternatives to therapy were discussed in detail. Specifically, the risks of damage to the marginal mandibular branch of the facial nerve, infection, scarring, bleeding, prolonged wound healing, incomplete removal, allergy to anesthesia, and recurrence were addressed. Prior to the procedure, the treatment site was clearly identified and confirmed by the patient. All components of Universal Protocol/PAUSE Rule completed.
Ear Star Wedge Flap Text: The defect edges were debeveled with a #15 blade scalpel.  Given the location of the defect and the proximity to free margins (helical rim) an ear star wedge flap was deemed most appropriate.  Using a sterile surgical marker, the appropriate flap was drawn incorporating the defect and placing the expected incisions between the helical rim and antihelix where possible.  The area thus outlined was incised through and through with a #15 scalpel blade.
Suture Removal: 7 days
Purse String (Simple) Text: Given the location of the defect and the characteristics of the surrounding skin a purse string closure was deemed most appropriate.  Undermining was performed circumfirentially around the surgical defect.  A purse string suture was then placed and tightened.
Advancement-Rotation Flap Text: The defect edges were debeveled with a #15 scalpel blade.  Given the location of the defect, shape of the defect and the proximity to free margins an advancement-rotation flap was deemed most appropriate.  Using a sterile surgical marker, an appropriate flap was drawn incorporating the defect and placing the expected incisions within the relaxed skin tension lines where possible. The area thus outlined was incised deep to adipose tissue with a #15 scalpel blade.  The skin margins were undermined to an appropriate distance in all directions utilizing iris scissors.
Partial Purse String (Intermediate) Text: Given the location of the defect and the characteristics of the surrounding skin an intermediate purse string closure was deemed most appropriate.  Undermining was performed circumfirentially around the surgical defect.  A purse string suture was then placed and tightened. Wound tension only allowed a partial closure of the circular defect.
Deep Sutures: 5-0 Vicryl

## 2017-10-04 ENCOUNTER — APPOINTMENT (RX ONLY)
Dept: URBAN - METROPOLITAN AREA CLINIC 36 | Facility: CLINIC | Age: 82
Setting detail: DERMATOLOGY
End: 2017-10-04

## 2017-10-04 DIAGNOSIS — Z48.02 ENCOUNTER FOR REMOVAL OF SUTURES: ICD-10-CM

## 2017-10-04 PROCEDURE — 99024 POSTOP FOLLOW-UP VISIT: CPT

## 2017-10-04 PROCEDURE — ? SUTURE REMOVAL (GLOBAL PERIOD)

## 2017-10-04 ASSESSMENT — LOCATION SIMPLE DESCRIPTION DERM: LOCATION SIMPLE: NOSE

## 2017-10-04 ASSESSMENT — LOCATION DETAILED DESCRIPTION DERM: LOCATION DETAILED: NASAL DORSUM

## 2017-10-04 ASSESSMENT — LOCATION ZONE DERM: LOCATION ZONE: NOSE

## 2017-10-04 NOTE — PROCEDURE: SUTURE REMOVAL (GLOBAL PERIOD)
Body Location Override (Optional - Billing Will Still Be Based On Selected Body Map Location If Applicable): nasal Dorsum
Add 69035 Cpt? (Important Note: In 2017 The Use Of 82983 Is Being Tracked By Cms To Determine Future Global Period Reimbursement For Global Periods): yes
Detail Level: Detailed

## 2017-10-17 ENCOUNTER — APPOINTMENT (OUTPATIENT)
Dept: RADIOLOGY | Facility: MEDICAL CENTER | Age: 82
End: 2017-10-17
Attending: INTERNAL MEDICINE
Payer: MEDICARE

## 2017-10-18 ENCOUNTER — APPOINTMENT (RX ONLY)
Dept: URBAN - METROPOLITAN AREA CLINIC 36 | Facility: CLINIC | Age: 82
Setting detail: DERMATOLOGY
End: 2017-10-18

## 2017-10-18 DIAGNOSIS — Z48.817 ENCOUNTER FOR SURGICAL AFTERCARE FOLLOWING SURGERY ON THE SKIN AND SUBCUTANEOUS TISSUE: ICD-10-CM

## 2017-10-18 PROCEDURE — ? POST-OP WOUND CHECK

## 2017-10-18 PROCEDURE — 99024 POSTOP FOLLOW-UP VISIT: CPT

## 2017-10-18 ASSESSMENT — LOCATION SIMPLE DESCRIPTION DERM
LOCATION SIMPLE: LEFT CHEEK
LOCATION SIMPLE: NOSE

## 2017-10-18 ASSESSMENT — LOCATION ZONE DERM
LOCATION ZONE: FACE
LOCATION ZONE: NOSE

## 2017-10-18 ASSESSMENT — LOCATION DETAILED DESCRIPTION DERM
LOCATION DETAILED: NASAL TIP
LOCATION DETAILED: LEFT MEDIAL MALAR CHEEK

## 2017-10-18 NOTE — PROCEDURE: POST-OP WOUND CHECK
Body Location Override (Optional - Billing Will Still Be Based On Selected Body Map Location If Applicable): nasal tip
Wound Evaluated By: Martinez Noland MD
Add 26867 Cpt? (Important Note: In 2017 The Use Of 34197 Is Being Tracked By Cms To Determine Future Global Period Reimbursement For Global Periods): yes
Detail Level: Detailed
Additional Comments: Follow up with primary dermatologist for continued skin surveillance

## 2017-10-30 ENCOUNTER — HOSPITAL ENCOUNTER (OUTPATIENT)
Dept: RADIOLOGY | Facility: MEDICAL CENTER | Age: 82
End: 2017-10-30
Attending: INTERNAL MEDICINE
Payer: MEDICARE

## 2017-10-30 DIAGNOSIS — I10 ESSENTIAL HYPERTENSION: ICD-10-CM

## 2017-10-30 DIAGNOSIS — Z95.1 POSTSURGICAL AORTOCORONARY BYPASS STATUS: ICD-10-CM

## 2017-10-30 PROCEDURE — A9502 TC99M TETROFOSMIN: HCPCS

## 2017-10-30 PROCEDURE — 700111 HCHG RX REV CODE 636 W/ 250 OVERRIDE (IP)

## 2017-10-30 RX ORDER — REGADENOSON 0.08 MG/ML
INJECTION, SOLUTION INTRAVENOUS
Status: COMPLETED
Start: 2017-10-30 | End: 2017-10-30

## 2017-10-30 RX ADMIN — REGADENOSON 0.4 MG: 0.08 INJECTION, SOLUTION INTRAVENOUS at 08:17

## 2017-10-30 NOTE — PROGRESS NOTES
Nursing care plan includes knowledge deficit, potential for discomfort, potential for compromised cardiac output. POC includes teaching, comfort measures and reassurance, and access to code cart, cardiology stand by and availability of rapid response team. Pt verbalizes good understanding of benefits and risks of pharmacological cardiac stress test. Informed consent obtained. Lexiscan given, pt developed the following symptoms none. VS stable, major symptoms resolved. To waiting room, caffeinated fluids and/or snacks given, awaiting second scan. Nursing goals met.

## 2017-11-02 ENCOUNTER — HOSPITAL ENCOUNTER (OUTPATIENT)
Dept: LAB | Facility: MEDICAL CENTER | Age: 82
End: 2017-11-02
Attending: INTERNAL MEDICINE
Payer: MEDICARE

## 2017-11-02 LAB
ALBUMIN SERPL BCP-MCNC: 4 G/DL (ref 3.2–4.9)
ALBUMIN/GLOB SERPL: 1.3 G/DL
ALP SERPL-CCNC: 63 U/L (ref 30–99)
ALT SERPL-CCNC: 24 U/L (ref 2–50)
ANION GAP SERPL CALC-SCNC: 6 MMOL/L (ref 0–11.9)
AST SERPL-CCNC: 26 U/L (ref 12–45)
BILIRUB SERPL-MCNC: 1 MG/DL (ref 0.1–1.5)
BUN SERPL-MCNC: 17 MG/DL (ref 8–22)
CALCIUM SERPL-MCNC: 9.5 MG/DL (ref 8.5–10.5)
CHLORIDE SERPL-SCNC: 103 MMOL/L (ref 96–112)
CO2 SERPL-SCNC: 26 MMOL/L (ref 20–33)
CREAT SERPL-MCNC: 1.01 MG/DL (ref 0.5–1.4)
EST. AVERAGE GLUCOSE BLD GHB EST-MCNC: 194 MG/DL
GFR SERPL CREATININE-BSD FRML MDRD: >60 ML/MIN/1.73 M 2
GLOBULIN SER CALC-MCNC: 3.2 G/DL (ref 1.9–3.5)
GLUCOSE SERPL-MCNC: 155 MG/DL (ref 65–99)
HBA1C MFR BLD: 8.4 % (ref 0–5.6)
POTASSIUM SERPL-SCNC: 4.8 MMOL/L (ref 3.6–5.5)
PROT SERPL-MCNC: 7.2 G/DL (ref 6–8.2)
SODIUM SERPL-SCNC: 135 MMOL/L (ref 135–145)

## 2017-11-02 PROCEDURE — 36415 COLL VENOUS BLD VENIPUNCTURE: CPT

## 2017-11-02 PROCEDURE — 80053 COMPREHEN METABOLIC PANEL: CPT

## 2017-11-02 PROCEDURE — 83036 HEMOGLOBIN GLYCOSYLATED A1C: CPT

## 2017-12-05 ENCOUNTER — OFFICE VISIT (OUTPATIENT)
Dept: MEDICAL GROUP | Facility: PHYSICIAN GROUP | Age: 82
End: 2017-12-05
Payer: MEDICARE

## 2017-12-05 ENCOUNTER — TELEPHONE (OUTPATIENT)
Dept: MEDICAL GROUP | Facility: PHYSICIAN GROUP | Age: 82
End: 2017-12-05

## 2017-12-05 VITALS
TEMPERATURE: 98.1 F | HEIGHT: 63 IN | OXYGEN SATURATION: 90 % | HEART RATE: 70 BPM | BODY MASS INDEX: 39.16 KG/M2 | RESPIRATION RATE: 20 BRPM | WEIGHT: 221 LBS | SYSTOLIC BLOOD PRESSURE: 124 MMHG | DIASTOLIC BLOOD PRESSURE: 70 MMHG

## 2017-12-05 DIAGNOSIS — I10 ESSENTIAL HYPERTENSION, BENIGN: ICD-10-CM

## 2017-12-05 DIAGNOSIS — Z79.4 ENCOUNTER FOR LONG-TERM (CURRENT) USE OF INSULIN (HCC): ICD-10-CM

## 2017-12-05 DIAGNOSIS — E55.9 VITAMIN D DEFICIENCY: ICD-10-CM

## 2017-12-05 DIAGNOSIS — E11.9 TYPE 2 DIABETES MELLITUS WITHOUT COMPLICATION, WITH LONG-TERM CURRENT USE OF INSULIN (HCC): ICD-10-CM

## 2017-12-05 DIAGNOSIS — Z79.4 TYPE 2 DIABETES MELLITUS WITHOUT COMPLICATION, WITH LONG-TERM CURRENT USE OF INSULIN (HCC): ICD-10-CM

## 2017-12-05 DIAGNOSIS — J18.9 PNEUMONIA OF RIGHT LOWER LOBE DUE TO INFECTIOUS ORGANISM: ICD-10-CM

## 2017-12-05 DIAGNOSIS — E66.9 OBESITY (BMI 30-39.9): ICD-10-CM

## 2017-12-05 DIAGNOSIS — E78.2 MIXED HYPERLIPIDEMIA: ICD-10-CM

## 2017-12-05 DIAGNOSIS — Z85.46 HISTORY OF PROSTATE CANCER: ICD-10-CM

## 2017-12-05 DIAGNOSIS — E11.43 DIABETIC AUTONOMIC NEUROPATHY ASSOCIATED WITH TYPE 2 DIABETES MELLITUS (HCC): ICD-10-CM

## 2017-12-05 DIAGNOSIS — I25.10 CORONARY ARTERY DISEASE INVOLVING NATIVE CORONARY ARTERY OF NATIVE HEART WITHOUT ANGINA PECTORIS: ICD-10-CM

## 2017-12-05 PROCEDURE — 99214 OFFICE O/P EST MOD 30 MIN: CPT | Performed by: NURSE PRACTITIONER

## 2017-12-05 RX ORDER — AZITHROMYCIN 250 MG/1
TABLET, FILM COATED ORAL
Qty: 6 TAB | Refills: 0 | Status: SHIPPED | OUTPATIENT
Start: 2017-12-05 | End: 2018-07-12

## 2017-12-05 ASSESSMENT — PAIN SCALES - GENERAL: PAINLEVEL: NO PAIN

## 2017-12-05 NOTE — ASSESSMENT & PLAN NOTE
Chronic in nature. Stable. Counseled patient regarding healthy diet and exercise. Patient states that he is walking.

## 2017-12-05 NOTE — TELEPHONE ENCOUNTER
ANNUAL WELLNESS VISIT PRE-VISIT PLANNING     1.  Reviewed note from last office visit with PCP: YES    2.  If any orders were placed at last visit, do we have Results/Consult Notes?        •  Labs - Labs ordered, but not to be completed until 03//18.   Note: If patient appointment is for lab review and patient did not complete labs, check with provider if OK to reschedule patient until labs completed.       •  Imaging - Imaging ordered, completed and results are in chart.       •  Referrals - Referral ordered, patient has NOT been seen.    3.  Immunizations were updated in [facility] using WebIZ?: Yes       •  WebIZ Recommendations: Patient is up to date on all vaccines       •  Is patient due for Tdap? NO       •  Is patient due for Shingles? NO     4.  Patient is due for the following Health Maintenance Topics:   Health Maintenance Due   Topic Date Due   • Annual Wellness Visit  07/12/2017   • URINE ACR / MICROALBUMIN  09/26/2017   • RETINAL SCREENING  11/16/2017       - Patient has completed FLU, PNEUMOVAX (PPSV23), PREVNAR (PCV13) , TDAP and ZOSTAVAX (Shingles) Immunization(s) per WebIZ. Chart has been updated.      5.  Reviewed/Updated the following with patient:       •   Preferred Pharmacy? YES       •   Preferred Lab? YES       •   Preferred Communication? NO       •   Allergies? YES       •   Medications? YES. Was Abstract Encounter opened and chart updated? YES       •   Social History? YES. Was Abstract Encounter opened and chart updated? YES       •   Family History (document living status of immediate family members and if + hx of cancer, diabetes, hypertension, hyperlipidemia, heart attack, stroke) YES. Was Abstract Encounter opened and chart updated? YES    6.  Care Team Updated:       •   DME Company (gait device, O2, CPAP, etc.): NO       •   Other Specialists (eye doctor, derm, GYN, cardiology, endo, etc): NO    7.  Patient has the following Care Path diagnoses on Problem List:  NONE    8.  Specialty  Comments was updated with diagnosis information provided by [facility]: NO    9.  Patient was advised: “This is a free wellness visit. The provider will screen for medical conditions to help you stay healthy. If you have other concerns to address you may be asked to discuss these at a separate visit or there may be an additional fee.”     6.  Patient was informed to arrive 15 min prior to their scheduled appointment and bring in their medication bottles.

## 2017-12-05 NOTE — ASSESSMENT & PLAN NOTE
Chronic in nature. Stable. Patient's blood pressure is 124/70. Patient continues to take medications without side effects. Patient denies chest pain, palpitations, dizziness, shortness of breath, headache continues to see Dr. Hammond with cardiology.

## 2017-12-05 NOTE — LETTER
[facility]  JUAN MANUEL Del RosarioP.RGILBERT  [address]  [place] [address]  Fax: [phone]   Authorization for Release/Disclosure of   Protected Health Information   Name: SHAYY AZAR : 1932 SSN: [id number]   Address:  [place] [address] Phone:    [phone] (home)    I authorize the entity listed below to release/disclose the PHI below to:   [facility]/Jean Claude Webb A.P.R.KAHLIL and PACO Del Rosario.P.RGILBERT   Provider or Entity Name:  Dr. Ruelas   Address   City, [facility] [place]   Phone:      Fax:     Reason for request: continuity of care   Information to be released:    [  ] LAST COLONOSCOPY,  including any PATH REPORT and follow-up  [  ] LAST FIT/COLOGUARD RESULT [  ] LAST DEXA  [  ] LAST MAMMOGRAM  [  ] LAST PAP  [  ] LAST LABS [x] RETINA EXAM REPORT  [  ] IMMUNIZATION RECORDS  [  ] Release all info      [  ] Check here and initial the line next to each item to release ALL health information INCLUDING  _____ Care and treatment for drug and / or alcohol abuse  _____ HIV testing, infection status, or AIDS  _____ Genetic Testing    DATES OF SERVICE OR TIME PERIOD TO BE DISCLOSED: _____________  I understand and acknowledge that:  * This Authorization may be revoked at any time by you in writing, except if your health information has already been used or disclosed.  * Your health information that will be used or disclosed as a result of you signing this authorization could be re-disclosed by the recipient. If this occurs, your re-disclosed health information may no longer be protected by State or Federal laws.  * You may refuse to sign this Authorization. Your refusal will not affect your ability to obtain treatment.  * This Authorization becomes effective upon signing and will  on (date) __________.      If no date is indicated, this Authorization will  one (1) year from the signature date.    Name: Shayy Azar    Signature:   Date:       12/5/2017       PLEASE FAX REQUESTED RECORDS BACK TO: [phone]

## 2017-12-05 NOTE — ASSESSMENT & PLAN NOTE
Chronic in nature. Stable. Patient is currently on Victoza, glipizide, metformin, Lantus. Patient reports compliance with medications. Most recent A1c was 8.4. Patient is not due for repeat A1c at this time. Patient states fasting sugar continues to run in the 120s to 130s. Denies issues with hypoglycemia states that he is able to feel low blood sugar well. Patient does report some mild numbness or tingling in his feet I exam is up-to-date and was completed last month records requested at this time. Patient continues to follow consistent carb diet states that he does walk regularly. Weight is stable.

## 2017-12-05 NOTE — PROGRESS NOTES
Chief Complaint   Patient presents with   • Cough     x 1 week    • Sinusitis       HISTORY OF PRESENT ILLNESS: Patient is a 85 y.o. male established patient who presents today toFollow-up on multiple issues.    CAD (coronary artery disease), native coronary artery  Chronic in nature. Stable. Patient continues follow-up with cardiology.    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 (CMS-HCC)  Chronic in nature. Stable. Patient states he is doing well and does not take medication at this time. Continues to podiatry.    Encounter for long-term (current) use of insulin  Chronic in nature. Stable. Patient is currently using Victoza, Lantus.    Essential hypertension, benign  Chronic in nature. Stable. Patient's blood pressure is 124/70. Patient continues to take medications without side effects. Patient denies chest pain, palpitations, dizziness, shortness of breath, headache continues to see Dr. Hammond with cardiology.    History of prostate cancer  Patient has a history of prostate cancer he was treated 3 years ago patient is not currently taking any medications for this issue.    Mixed hyperlipidemia  Chronic in nature. Stable. Labs are ordered to reassess.    Obesity (BMI 30-39.9)  Chronic in nature. Stable. Counseled patient regarding healthy diet and exercise. Patient states that he is walking.    Type 2 diabetes mellitus without complication (CMS-HCC)  Chronic in nature. Stable. Patient is currently on Victoza, glipizide, metformin, Lantus. Patient reports compliance with medications. Most recent A1c was 8.4. Patient is not due for repeat A1c at this time. Patient states fasting sugar continues to run in the 120s to 130s. Denies issues with hypoglycemia states that he is able to feel low blood sugar well. Patient does report some mild numbness or tingling in his feet I exam is up-to-date and was completed last month records requested at this time. Patient continues to follow consistent  carb diet states that he does walk regularly. Weight is stable.    Vitamin D deficiency  Chronic in nature. Stable. Patient continues to take vitamin D supplementation.      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 6 Tab 0   • lisinopril (PRINIVIL) 2.5 MG Tab Take 1 Tab by mouth every day. 90 Tab 1   • liraglutide (VICTOZA) 18 MG/3ML Solution Pen-injector injection Inject 0.6 mg as instructed every day.     • vitamin D (CHOLECALCIFEROL) 1000 UNIT Tab Take 1,000 Units by mouth every day.     • glipiZIDE (GLUCOTROL) 5 MG Tab Take 1 Tab by mouth 2 times a day. 180 Tab 3   • sildenafil citrate (VIAGRA) 50 MG tablet Take 1 Tab by mouth as needed for Erectile Dysfunction. 10 Tab 3   • tamsulosin (FLOMAX) 0.4 MG capsule Take 0.4 mg by mouth 2 times a day. Indications: 1/2 hr after breakfast and dinner  #180 w/ 2 refills called in 1/31/16     • potassium chloride SA (K-DUR) 20 MEQ Tab CR Take 20 mEq by mouth every day.     • atorvastatin (LIPITOR) 40 MG Tab Take 40 mg by mouth every evening.     • metformin (GLUCOPHAGE) 500 MG Tab TAKE 2 TABLETS BY MOUTH TWICE DAILY WITHMEALS 360 Tab 3   • furosemide (LASIX) 40 MG TABS Take 40 mg by  mouth every day.     • insulin glargine (LANTUS SOLOSTAR) 100 UNIT/ML SOPN injection Inject 20 Units as instructed every evening. 30 mL 3   • Lancets MISC His new meter uses the Accu-Chek SoftClix lancets for 3 time daily testing.  Dx. Code 250.02 300 Each 3   • Blood Glucose Monitoring Suppl SUPPLIES MISC Accucheck Yaneth blood glucose test strips, checking blood sugar 2 daily  .02 insulin requiring. 200 Strip 3   • B Complex Vitamins (VITAMIN B COMPLEX PO) Take  by mouth every day.     • Cholecalciferol (VITAMIN D) 2000 UNITS CAPS Take  by mouth every day.     • aspirin (ASA) 81 MG CHEW Take 81 mg by mouth every day.     • B-D ULTRAFINE III SHORT PEN 31G X 8 MM MISC USE 3 TIMES DAILY 100 Each 11     No current facility-administered medications for this visit.        Social History   Substance Use Topics   • Smoking status: Former Smoker     Packs/day: 2.00     Years: 34.00     Types: Cigarettes     Quit date: 1982   • Smokeless tobacco: Never Used   • Alcohol use 4.8 - 9.0 oz/week     7 - 14 Glasses of wine, 1 Cans of beer per week       Family Status   Relation Status   • Mother  at age 91   • Father  at age 87   • Brother  at age 72   • Brother  at age 50   • Son Alive   • Daughter Alive   • Maternal Grandmother    • Maternal Grandfather    • Paternal Grandmother    • Paternal Grandfather      Family History   Problem Relation Age of Onset   • Heart Disease Mother    • Heart Attack Mother    • Asthma Mother    • Heart Attack Father    • Diabetes Father    • Psychiatry Brother    • Heart Disease Brother      5 way bypass   • Diabetes Brother    • Hyperlipidemia Brother    • Heart Attack Brother    • Psychiatry Brother    • Stroke Brother    • Heart Disease Brother      pacemaker       Review of Systems:   Constitutional:  Negative for fever, chills, weight loss and malaise/fatigue.   HEENT:  Negative for ear pain, nosebleeds, congestion,  "sore throat and neck pain.    Eyes:  Negative for blurred vision.   Respiratory:  Negative for cough, sputum production, shortness of breath and wheezing.    Cardiovascular:  Negative for chest pain, palpitations, orthopnea and leg swelling.   Gastrointestinal:  Negative for heartburn, nausea, vomiting and abdominal pain.   Genitourinary:  Negative for dysuria, urgency and frequency.   Musculoskeletal:  Negative for myalgias, back pain and joint pain.   Skin:  Negative for rash and itching.   Neurological:  Negative for dizziness, tingling, tremors, sensory change, focal weakness and headaches.   Endo/Heme/Allergies:  Does not bruise/bleed easily.   Psychiatric/Behavioral:  Negative for depression, suicidal ideas and memory loss.  The patient is not nervous/anxious and does not have insomnia.    All other systems reviewed and are negative except as in HPI.    Exam:  Blood pressure 124/70, pulse 70, temperature 36.7 °C (98.1 °F), resp. rate 20, height 1.6 m (5' 3\"), weight 100.2 kg (221 lb), SpO2 90 %.  General:  Normal appearing. No distress.  HEENT:  Normocephalic. Eyes conjunctiva clear lids without ptosis, pupils equal and reactive to light accommodation, ears normal shape and contour, canals are clear bilaterally, tympanic membranes are benign, nasal mucosa benign, oropharynx is without erythema, edema or exudates.   Neck:  Supple without JVD or bruit. Thyroid is not enlarged.  Pulmonary: Patient does have some noted crackles and his left lower lung, otherwise clear to auscultation.  Normal effort. No rales, ronchi, or wheezing.  Cardiovascular:  Regular rate and rhythm without murmur. Carotid and radial pulses are intact and equal bilaterally.  Abdomen:  Soft, nontender, nondistended. Normal bowel sounds. Liver and spleen are not palpable  Neurologic:  Grossly nonfocal  Lymph:  No cervical, supraclavicular or axillary lymph nodes are palpable  Skin:  Warm and dry.  No obvious lesions.  Musculoskeletal:  Normal " gait. No extremity cyanosis, clubbing, or edema.  Psych:  Normal mood and affect. Alert and oriented x3. Judgment and insight is normal.      PLAN:    1. History of prostate cancer  Continue with specialist.    2. Obesity (BMI 30-39.9)  - Patient identified as having weight management issue.  Appropriate orders and counseling given.    3. 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 (CMS-[facility])  Continue current plan of care.    4. Essential hypertension, benign  Continue current plan of care.  - COMP METABOLIC PANEL; Future  - LIPID PROFILE; Future  - MICROALBUMIN CREAT RATIO URINE; Future    5. Mixed hyperlipidemia  Continue current plan of care.    6. Pneumonia of right lower lobe due to infectious organism (CMS-HCC)  Patient has had a cold over the last week. Patient's O2 saturation today is 90%. Patient is taking over-the-counter cold and cough medication. Discussed with patient azithromycin for possible pneumonia.  - azithromycin (ZITHROMAX) 250 MG Tab; 500 mg on day 1 then 250mg daily for 4 days.  Dispense: 6 Tab; Refill: 0    7. Type 2 diabetes mellitus without complication, with long-term current use of insulin (CMS-HCC)  Continue current plan of care.  - REFERRAL TO COMPLEX CARE MANAGEMENT Services Requested: Care Manager to Evaluate and Recommend,     8. Coronary artery disease involving native coronary artery of native heart without angina pectoris  Continue follow-up with cardiology.    9. Encounter for long-term (current) use of insulin (CMS-HCC)  Continue follow-up with cardiology    10. Vitamin D deficiency  Continue supplementation.    Follow-up for annual wellness visit for the end of the year otherwise follow-up in 3 months. Patient is encouraged to be seen in the emergency room for chest pain, palpitations, shortness of breath, dizziness, severe abdominal pain or other concerning symptoms.        Please note that this dictation was created using voice  recognition software. I have made every reasonable attempt to correct obvious errors, but I expect that there are errors of grammar and possibly content that I did not discover before finalizing the note.    Assessment/Plan:  1. History of prostate cancer     2. Obesity (BMI 30-39.9)  Patient identified as having weight management issue.  Appropriate orders and counseling given.   3. 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 (CMS-[facility])     4. Essential hypertension, benign  COMP METABOLIC PANEL    LIPID PROFILE    MICROALBUMIN CREAT RATIO URINE   5. Mixed hyperlipidemia     6. Pneumonia of right lower lobe due to infectious organism (CMS-HCC)  azithromycin (ZITHROMAX) 250 MG Tab   7. Type 2 diabetes mellitus without complication, with long-term current use of insulin (CMS-HCC)  REFERRAL TO COMPLEX CARE MANAGEMENT Services Requested: Care Manager to Evaluate and Recommend,    8. Coronary artery disease involving native coronary artery of native heart without angina pectoris     9. Encounter for long-term (current) use of insulin (CMS-HCC)     10. Vitamin D deficiency            I have placed the below orders and discussed them with an approved delegating provider. The MA is performing the below orders under the direction of Dr. Rose.

## 2017-12-05 NOTE — ASSESSMENT & PLAN NOTE
Chronic in nature. Stable. Patient states he is doing well and does not take medication at this time. Continues to podiatry.

## 2017-12-11 ENCOUNTER — APPOINTMENT (RX ONLY)
Dept: URBAN - METROPOLITAN AREA CLINIC 36 | Facility: CLINIC | Age: 82
Setting detail: DERMATOLOGY
End: 2017-12-11

## 2017-12-11 DIAGNOSIS — Z48.817 ENCOUNTER FOR SURGICAL AFTERCARE FOLLOWING SURGERY ON THE SKIN AND SUBCUTANEOUS TISSUE: ICD-10-CM

## 2017-12-11 PROCEDURE — ? POST-OP WOUND CHECK

## 2017-12-11 PROCEDURE — 99024 POSTOP FOLLOW-UP VISIT: CPT

## 2017-12-11 ASSESSMENT — LOCATION ZONE DERM: LOCATION ZONE: NOSE

## 2017-12-11 ASSESSMENT — LOCATION SIMPLE DESCRIPTION DERM: LOCATION SIMPLE: NOSE

## 2017-12-11 ASSESSMENT — LOCATION DETAILED DESCRIPTION DERM: LOCATION DETAILED: NASAL DORSUM

## 2017-12-11 NOTE — PROCEDURE: POST-OP WOUND CHECK
Detail Level: Generalized
Add 45549 Cpt? (Important Note: In 2017 The Use Of 38486 Is Being Tracked By Cms To Determine Future Global Period Reimbursement For Global Periods): yes

## 2017-12-12 ENCOUNTER — OFFICE VISIT (OUTPATIENT)
Dept: MEDICAL GROUP | Facility: PHYSICIAN GROUP | Age: 82
End: 2017-12-12
Payer: MEDICARE

## 2017-12-12 VITALS
TEMPERATURE: 98.5 F | HEART RATE: 78 BPM | DIASTOLIC BLOOD PRESSURE: 59 MMHG | SYSTOLIC BLOOD PRESSURE: 120 MMHG | HEIGHT: 64 IN | WEIGHT: 221.12 LBS | RESPIRATION RATE: 16 BRPM | BODY MASS INDEX: 37.75 KG/M2 | OXYGEN SATURATION: 96 %

## 2017-12-12 DIAGNOSIS — I25.10 CORONARY ARTERY DISEASE INVOLVING NATIVE CORONARY ARTERY OF NATIVE HEART WITHOUT ANGINA PECTORIS: ICD-10-CM

## 2017-12-12 DIAGNOSIS — Z79.4 TYPE 2 DIABETES MELLITUS WITHOUT COMPLICATION, WITH LONG-TERM CURRENT USE OF INSULIN (HCC): ICD-10-CM

## 2017-12-12 DIAGNOSIS — I10 ESSENTIAL HYPERTENSION, BENIGN: ICD-10-CM

## 2017-12-12 DIAGNOSIS — E11.9 TYPE 2 DIABETES MELLITUS WITHOUT COMPLICATION, WITH LONG-TERM CURRENT USE OF INSULIN (HCC): ICD-10-CM

## 2017-12-12 DIAGNOSIS — Z85.46 HISTORY OF PROSTATE CANCER: ICD-10-CM

## 2017-12-12 DIAGNOSIS — Z00.00 MEDICARE ANNUAL WELLNESS VISIT, SUBSEQUENT: Primary | ICD-10-CM

## 2017-12-12 DIAGNOSIS — E78.2 MIXED HYPERLIPIDEMIA: ICD-10-CM

## 2017-12-12 DIAGNOSIS — H02.139 SENILE ECTROPION, UNSPECIFIED LATERALITY: ICD-10-CM

## 2017-12-12 DIAGNOSIS — E55.9 VITAMIN D DEFICIENCY: ICD-10-CM

## 2017-12-12 DIAGNOSIS — Z79.4 ENCOUNTER FOR LONG-TERM (CURRENT) USE OF INSULIN (HCC): ICD-10-CM

## 2017-12-12 DIAGNOSIS — E11.43 DIABETIC AUTONOMIC NEUROPATHY ASSOCIATED WITH TYPE 2 DIABETES MELLITUS (HCC): ICD-10-CM

## 2017-12-12 DIAGNOSIS — E66.9 OBESITY (BMI 30-39.9): ICD-10-CM

## 2017-12-12 PROCEDURE — G0439 PPPS, SUBSEQ VISIT: HCPCS | Performed by: NURSE PRACTITIONER

## 2017-12-12 ASSESSMENT — PATIENT HEALTH QUESTIONNAIRE - PHQ9
SUM OF ALL RESPONSES TO PHQ QUESTIONS 1-9: 10
5. POOR APPETITE OR OVEREATING: 0 - NOT AT ALL
CLINICAL INTERPRETATION OF PHQ2 SCORE: 4

## 2017-12-12 NOTE — ASSESSMENT & PLAN NOTE
Chronic in nature. Stable. Patient states that he has set up an appointment with a  and is requesting referral to dietitian.

## 2017-12-12 NOTE — PROGRESS NOTES
Chief Complaint   Patient presents with   • Annual Wellness Visit         HPI:  Julio is a 85 y.o. here for Medicare Annual Wellness Visit    Type 2 diabetes mellitus without complication (CMS-HCC)  Chronic in nature. Stable. Patient continues to follow with Dr. Baig endocrinology.    Encounter for long-term (current) use of insulin  Chronic in nature. Stable. Patient continues to follow with endocrinology.    Vitamin D deficiency  Chronic in nature. Stable. Patient continues supplement vitamin D.    Mixed hyperlipidemia  Chronic in nature. Stable. Patient continues medication as prescribed.    CAD (coronary artery disease), native coronary artery  Chronic in nature. Stable. Patient follows up with cardiology.    Essential hypertension, benign  Chronic in nature. Stable. Blood pressure today is 120/59. Patient continues to take medications without side effects. Patient continues to follow cardiology. Denies shortness of breath, chest pain, palpitations, headaches, blurry vision.    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 Stable. Patient is followed by Dr. Ruelas.    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 Stable. Patient continues to see podiatry.    History of prostate cancer  Chronic in nature. Stable. Patient continues to follow up with urology.    Obesity (BMI 30-39.9)  Chronic in nature. Stable. Patient states that he has set up an appointment with a  and is requesting referral to dietitian.        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 90 Tab 1   • liraglutide (VICTOZA) 18 MG/3ML Solution Pen-injector injection Inject 0.6 mg as instructed every day.     • vitamin D (CHOLECALCIFEROL) 1000 UNIT Tab Take 1,000 Units by mouth every day.     • glipiZIDE (GLUCOTROL) 5 MG Tab Take 1 Tab by mouth 2 times a day. 180 Tab 3   • sildenafil citrate (VIAGRA) 50 MG tablet Take 1 Tab by mouth as needed for Erectile Dysfunction. 10 Tab 3   • tamsulosin (FLOMAX) 0.4 MG capsule Take 0.4 mg by mouth 2 times a day. Indications: 1/2 hr after breakfast and dinner  #180 w/ 2 refills called in 1/31/16     • potassium chloride SA (K-DUR) 20 MEQ Tab CR Take 20 mEq by mouth every day.     • atorvastatin (LIPITOR) 40 MG Tab Take 40 mg by mouth every evening.     • metformin (GLUCOPHAGE) 500 MG Tab TAKE 2 TABLETS BY MOUTH TWICE DAILY WITHMEALS 360 Tab 3   • furosemide (LASIX) 40 MG TABS Take 40 mg by mouth every day.     • insulin glargine (LANTUS SOLOSTAR) 100 UNIT/ML SOPN injection Inject 20 Units as instructed every evening. 30 mL 3   • B Complex Vitamins (VITAMIN B COMPLEX PO) Take  by mouth every day.     • Cholecalciferol (VITAMIN D) 2000 UNITS CAPS Take  by mouth every day.     • aspirin (ASA) 81 MG CHEW Take 81 mg by mouth every day.     • azithromycin (ZITHROMAX) 250 MG Tab 500 mg on day 1 then 250mg daily for 4 days. 6 Tab 0   • B-D ULTRAFINE III SHORT PEN 31G X 8 MM MISC USE 3 TIMES DAILY 100 Each 11   • Lancets MISC His new meter uses the Accu-Chek SoftClix lancets for 3 time daily testing.  Dx. Code 250.02 300 Each 3   • Blood Glucose Monitoring Suppl SUPPLIES MISC Accucheck Yaneth blood glucose test strips, checking blood sugar 2  daily  .02 insulin requiring. 200 Strip 3     No current facility-administered medications for this visit.         Patient is taking medications as noted in medication list.  Current supplements as per medication list.     Allergies: Pcn [penicillins] and Latex    Current social contact/activities:  Clubs [employer] dinner with friends, family dinners    Is patient current with immunizations? Yes.    He  reports that he quit smoking about 35 years ago. His smoking use included Cigarettes. He has a 68.00 pack-year smoking history. He has never used smokeless tobacco. He reports that he drinks about 4.8 - 9.0 oz of alcohol per week . He reports that he does not use drugs.  Counseling given: Not Answered        DPA/Advanced directive: Patient has Living Will on file.     ROS:    Gait: Uses no assistive device   Ostomy: no   Other tubes: no   Amputations: no   Chronic oxygen use no   Last eye exam 11/17   Wears hearing aids: yes biltaeral  : Reports urinary leakage during the last 6 months that has not interfered at all with their daily activities or sleep.      Screening:        DIABETES    Has patient ever had diabetes education? No, but patient is interested. Referral pended.            Little interest or pleasure in doing things?  2 - more than half the days  Feeling down, depressed, or hopeless? 2 - more than half the days  Trouble falling or staying asleep, or sleeping too much?  3 - nearly every day  Feeling tired or having little energy?  3 - nearly every day  Poor appetite or overeating?  0 - not at all  Feeling bad about yourself - or that you are a failure or have let yourself or your family down? 0 - not at all  Trouble concentrating on things, such as reading the newspaper or watching television? 0 - not at all  Moving or speaking so slowly that other people could have noticed.  Or the opposite - being so fidgety or restless that you have been moving around a lot more than usual?  0 - not at  all  Thoughts that you would be better off dead, or of hurting yourself?  0 - not at all  Patient Health Questionnaire Score: 10      If depressive symptoms identified deferred to follow up visit unless specifically addressed in assessment and plan.    Interpretation of PHQ-9 Total Score   Score Severity   1-4 No Depression   5-9 Mild Depression   10-14 Moderate Depression   15-19 Moderately Severe Depression   20-27 Severe Depression      Screening for Cognitive Impairment    Three Minute Recall (apple, watch, aman)  3/3    Draw clock face with all 12 numbers set to the hand to show 10 minutes past 11 o'clock  1 5/5  If cognitive concerns identified, deferred for follow up unless specifically addressed in assessment and plan.    Fall Risk Assessment    Has the patient had two or more falls in the last year or any fall with injury in the last year?  No  If fall risk identified, deferred for follow up unless specifically addressed in assessment and plan.      Safety Assessment    Throw rugs on floor.  Yes  Handrails on all stairs.  Yes  Good lighting in all hallways.  Yes  Difficulty hearing.  Yes  Patient counseled about all safety risks that were identified.    Functional Assessment ADLs    Are there any barriers preventing you from cooking for yourself or meeting nutritional needs?  No.    Are there any barriers preventing you from driving safely or obtaining transportation?  No.    Are there any barriers preventing you from using a telephone or calling for help?  No.    Are there any barriers preventing you from shopping?  No.    Are there any barriers preventing you from taking care of your own finances?  No.    Are there any barriers preventing you from managing your medications?  No.    Are you currently engaging any exercise or physical activity?  No.       Health Maintenance Summary                Annual Wellness Visit Overdue 7/12/2017      Done 7/11/2016 Visit Dx: Medicare annual wellness visit,  subsequent     Patient has more history with this topic...    URINE ACR / MICROALBUMIN Overdue 9/26/2017      Done 9/26/2016 MICROALBUMIN CREAT RATIO URINE (A)     Patient has more history with this topic...    DIABETES MONOFILAMENT / LE EXAM Next Due 1/9/2018      Done 1/9/2017 AMB DIABETIC MONOFILAMENT LOWER EXTREMITY EXAM     Patient has more history with this topic...    A1C SCREENING Next Due 5/2/2018      Done 11/2/2017 HEMOGLOBIN A1C (A)     Patient has more history with this topic...    FASTING LIPID PROFILE Next Due 8/7/2018      Done 8/7/2017 LIPID PROFILE     Patient has more history with this topic...    RETINAL SCREENING Next Due 10/16/2018      Done 10/16/2017 REFERRAL FOR RETINAL SCREENING EXAM     Patient has more history with this topic...    SERUM CREATININE Next Due 11/2/2018      Done 11/2/2017 COMP METABOLIC PANEL (A)     Patient has more history with this topic...    IMM DTaP/Tdap/Td Vaccine Next Due 7/10/2027      Done 7/10/2017 Imm Admin: Tdap Vaccine          Patient Care Team:  PACO Del Rosario.P.RGILBERT as PCP - General (Family Medicine)  Dandre Wilcox D.P.M. as Consulting Physician (Podiatry)  Valdez Pierre M.D. as Consulting Physician (Dermatology)  Wyatt Hammond M.D. as Consulting Physician (Cardiology)  Narendra Freitas M.D. as Consulting Physician (Urology)  Zacarias Ruelas M.D. as Consulting Physician (Ophthalmology)  Gilberto Baig M.D. as Consulting Physician (Endocrinology)      Social History   Substance Use Topics   • Smoking status: Former Smoker     Packs/day: 2.00     Years: 34.00     Types: Cigarettes     Quit date: 1/1/1982   • Smokeless tobacco: Never Used   • Alcohol use 4.8 - 9.0 oz/week     7 - 14 Glasses of wine, 1 Cans of beer per week     Family History   Problem Relation Age of Onset   • Heart Disease Mother    • Heart Attack Mother    • Asthma Mother    • Heart Attack Father    • Diabetes Father    • Psychiatry Brother    • Heart Disease  "Brother      5 way bypass   • Diabetes Brother    • Hyperlipidemia Brother    • Heart Attack Brother    • Psychiatry Brother    • Stroke Brother    • Heart Disease Brother      pacemaker     He  has a past medical history of CAD (coronary artery disease); CAD (coronary artery disease), native coronary artery (3/31/2014); Cancer (CMS-HCC) (2014); Dental disorder; Diabetes; Encounter for long-term (current) use of insulin (CMS-HCC) (9/18/2013); Essential hypertension, benign (3/31/2014); Heart burn; Other and unspecified hyperlipidemia (9/18/2013); Snoring; Type II or unspecified type diabetes mellitus without mention of complication, uncontrolled (9/18/2013); and Unspecified vitamin D deficiency (9/18/2013).   Past Surgical History:   Procedure Laterality Date   • ECTROPIAN REPAIR  12/31/2014    Performed by Zen Fregoso M.D. at [facility] ORS   • LACRIMAL DUCT PROBE  12/31/2014    Performed by Zen Fregoso M.D. at [facility] OR[facility]   • LORI BY LAPAROSCOPY  8/12/2013    Performed by Jez Galvan M.D. at [facility]   • LID TIGHTENING  12/7/2012    Performed by Vitor Son M.D. at [facility] OR[facility]   • CATARACT PHACO WITH IOL  6/30/2011    Performed by CRUZITO LITTLEJOHN at [facility] ORS   • OTHER CARDIAC SURGERY  2004    [facility]G [facility] at [facility]   • TONSILLECTOMY           Exam:     Blood pressure 120/59, pulse 78, temperature 36.9 °C (98.5 °F), resp. rate 16, height 1.626 m (5' 4\"), weight 100.3 kg (221 lb 1.9 oz), SpO2 96 %. Body mass index is 37.96 kg/m².    Hearing excellent.    Dentition good  Alert, oriented in no acute distress.  Eye contact is good, speech goal directed, affect calm      Assessment and Plan. The following treatment and monitoring plan is recommended:        No diagnosis found.      Services suggested: No services needed at this time  Health Care Screening recommendations as per orders if indicated.  Referrals offered: PT/OT/Nutrition " counseling/Behavioral Health/Smoking cessation as per orders if indicated.    Discussion today about general wellness and lifestyle habits:    · Prevent falls and reduce trip hazards; Cautioned about securing or removing rugs.  · Have a working fire alarm and carbon monoxide detector;   · Engage in regular physical activity and social activities       Follow-up: No Follow-up on file.

## 2017-12-12 NOTE — ASSESSMENT & PLAN NOTE
Chronic in nature. Stable. Blood pressure today is 120/59. Patient continues to take medications without side effects. Patient continues to follow cardiology. Denies shortness of breath, chest pain, palpitations, headaches, blurry vision.

## 2017-12-13 ENCOUNTER — PATIENT OUTREACH (OUTPATIENT)
Dept: HEALTH INFORMATION MANAGEMENT | Facility: OTHER | Age: 82
End: 2017-12-13

## 2017-12-13 NOTE — PROGRESS NOTES
Received referral from PCP reporting pt is having difficulties affording his Victoza and may possibly be in the donut hole/coverage gap. Outreach call to pt to introduce self, discuss role of SW care coordination and pt's identified needs. Pt did not answer. LSW left  requesting pt call LSW back.     Plan:  LSW will attempt to reach pt at later time/date, if LSW does not hear back from pt.  Letter and resource information also mailed to pt.

## 2017-12-13 NOTE — LETTER
December 13, 2017        Julio Jovanni Khadraparkertroyshira  [address] [place] [address]        Dear Julio:    I am a  with the [facility] Care Coordination team and assist individuals with connecting with community resources and programs to help meet their social needs. I received a referral from your primary care provider, ESTEE Atkins, indicating you may be having high costing medications and maybe in the coverage gap/donut hole.    I have included information and an application to the State Pharmacy Assistance Program (SPSP)/Senior Rx which is a program to help cover medication cost for individuals in the coverage gap. However I would like to review further with you to determine if this program would be appropriate or if there are other resources available.     Please give me at call at [phone], for us to discuss further.         Sincerely,        Sanjana Irwin, KINJALW, MSW    Electronically Signed

## 2017-12-20 ENCOUNTER — PATIENT OUTREACH (OUTPATIENT)
Dept: HEALTH INFORMATION MANAGEMENT | Facility: OTHER | Age: 82
End: 2017-12-20

## 2017-12-20 DIAGNOSIS — Z59.89 HAS HEALTH INSURANCE WITH INADEQUATE COVERAGE OF HEALTH EXPENSES: ICD-10-CM

## 2017-12-20 SDOH — ECONOMIC STABILITY - INCOME SECURITY: OTHER PROBLEMS RELATED TO HOUSING AND ECONOMIC CIRCUMSTANCES: Z59.89

## 2017-12-20 NOTE — PROGRESS NOTES
2nd attempt to reach pt this morning to introduce self, discuss role of SW CC and pt's identified needs. Pt reported he did receive letter mailed to him last week by LSW and explained his cost for Victoza did increase dramatically reporting a standard copayment of $45 originally. He indicated he has now increased to over $300 the last time he went to obtain the medication.     Discussed with pt that based on his report it does sound as though he has hit the donut hole and provided education on what the donut hole or coverage gap is. Discussed potential programs to assist individuals once they do hit the donut hole such as Extra Help and SPAP. Pt does report a income over the income limits for both programs explaining his SSA is around $1888/month, a pension $65/month, another pension for about $290/month, and some stock around $975/month. Explained to he would be over income but SPAP program does allow individuals to apply for hardship. Pt reported he would like to look into further and would like to meet with LSW face to face to discuss. Appt was scheduled with pt to meet with LSW in office. Pt encouraged to bring in supporting documentation for application such as 12 bank statements or his last year's tax return. Pt voiced understanding.    Plan:  Care plan initiated.   LSW will meet with pt tomorrow 12/21/17 at 9:30 am at [address] [place] [address].

## 2017-12-21 ENCOUNTER — PATIENT OUTREACH (OUTPATIENT)
Dept: HEALTH INFORMATION MANAGEMENT | Facility: OTHER | Age: 82
End: 2017-12-21

## 2017-12-21 NOTE — PROGRESS NOTES
Met with pt in office to review and discuss community resources and programs to assist with medication cost. Per pt's SCP insurance pt hit the donut hole/coverage gap on 05/29/2017. His individual TOOP spent has been $1550.43 and total TOOP is $4924.87 per the year. LSW discussed and provided education on the donut hole, initial phase of coverage, and the catastrophic phase.     Also provided pt education in how to utilize medicare.gov website to get information and understanding on his coverage, average medication cost, and an idea of when he may hit the donut hole in the following years. Showed pt per website it is projected to hit the donut hole next year in April 2018.  Discussed how pt can plan for this and if pt is approved SPAP/Senior Rx program how program would cover his medication cost.     Pt is over income for SPAP/Senior Rx program however discussed program does allow for individuals to apply and request hardship. Explained pt may still be approved program on hardship. Pt indicated he would like to apply. Application was completed with pt and supporting documentation attached. Pt will need a letter from PCP indicating his diagnosis and medication treatments to include with the application to apply for the hardship. Informed pt that LSW would request letter from PCP. Pt voiced understanding.    Plan:  · Letter requested from PCP, once received pt's completed application will be submitted to SPAP/Senior Rx program.

## 2018-01-02 ENCOUNTER — TELEPHONE (OUTPATIENT)
Dept: MEDICAL GROUP | Facility: PHYSICIAN GROUP | Age: 83
End: 2018-01-02

## 2018-01-02 NOTE — TELEPHONE ENCOUNTER
1. Caller Name: Julio Azar                                           Call Back Number: [phone] (home)         Patient approves a detailed voicemail message: N\A    Pt called regarding letter he received from [facility]. Pt chart shows he met with Sanjana Irwin to help get a better understanding of his coverage, and help with hardship paperwork. Pt seemed confused with the letters, stating he didn't want to get in trouble with anyone for not responding. I let pt know to keep in touch with Sanjana in regards to the paperwork she was helping him with. TW

## 2018-01-03 ENCOUNTER — PATIENT OUTREACH (OUTPATIENT)
Dept: HEALTH INFORMATION MANAGEMENT | Facility: OTHER | Age: 83
End: 2018-01-03

## 2018-01-03 NOTE — PROGRESS NOTES
PCP letter to request hardship from SPAP/Senior Rx completed and finalized application was faxed into program by ANDRE Campbell on 12/26/2017. Outreach call to pt to follow up and determine if he heard back from program. Pt reported he received quite a bit of mail and wasn't sure what it was all pertaining to but indicated he got a letter and believes he was denied for the program. Informed pt that LSW would follow up with programs as per pt letter indicated he could apply for hardship which pt did apply for hardship for first submission of application. Pt voiced understanding. Pt also requested to meet with LSW to review other paperwork he has received. Appt scheduled to meet with pt.     LSW did out reach to SPAP/Senior Rx and representative reported he was approved. Letter he received was a general letter that got sent out based on the primarily review. However he was later approved on hardship. LSW will inform pt of information during appt.    Plan:  ANDRE will meet with pt 01/04/2017 at 9:00 am.

## 2018-01-04 ENCOUNTER — PATIENT OUTREACH (OUTPATIENT)
Dept: HEALTH INFORMATION MANAGEMENT | Facility: OTHER | Age: 83
End: 2018-01-04

## 2018-01-04 NOTE — PROGRESS NOTES
Met with pt in office today, pt brought in paperwork he received from [facility] and his insurance company [facility] which he was unsure about. [facility] letter indicated that he was approved program for donut hole coverage through the end of Dec 2018. LSW also notified pt that program representative confirmed he was approved. Pt voiced understanding. Provided education and information again on how to utilize program this year if he hits the donut hole. Pt voiced understanding.     Paperwork from [facility] indicated that pt was authorized for services for referrals placed by pt's PCP. Pt reported he had called the numbers on the authorization forms from [facility] but was unable to reach anyone. LSW was able to look back referrals and get correct contact numbers for pt. Encouraged pt to call to schedule appt and informed him the referrals were for Health Improvement Programs with Medical Weight Management and nutrition. Pt recalled talking to his PCP about the programs and indicated he would call to schedule.     Plan:  Pt encouraged to call LSW if there are any other questions or concerns.   LSW will continue to follow for the next 30 days. If no new social needs/issues will move pt towards graduation as goal has been met.

## 2018-01-24 ENCOUNTER — OFFICE VISIT (OUTPATIENT)
Dept: HEALTH INFORMATION MANAGEMENT | Facility: MEDICAL CENTER | Age: 83
End: 2018-01-24
Payer: MEDICARE

## 2018-01-24 VITALS
DIASTOLIC BLOOD PRESSURE: 68 MMHG | WEIGHT: 219.7 LBS | TEMPERATURE: 98.1 F | HEART RATE: 79 BPM | OXYGEN SATURATION: 90 % | SYSTOLIC BLOOD PRESSURE: 122 MMHG | BODY MASS INDEX: 37.51 KG/M2 | HEIGHT: 64 IN

## 2018-01-24 DIAGNOSIS — Z79.4 TYPE 2 DIABETES MELLITUS WITH HYPERGLYCEMIA, WITH LONG-TERM CURRENT USE OF INSULIN (HCC): ICD-10-CM

## 2018-01-24 DIAGNOSIS — E78.2 MIXED HYPERLIPIDEMIA: ICD-10-CM

## 2018-01-24 DIAGNOSIS — I10 ESSENTIAL HYPERTENSION, BENIGN: ICD-10-CM

## 2018-01-24 DIAGNOSIS — E11.65 TYPE 2 DIABETES MELLITUS WITH HYPERGLYCEMIA, WITH LONG-TERM CURRENT USE OF INSULIN (HCC): ICD-10-CM

## 2018-01-24 DIAGNOSIS — E66.9 OBESITY (BMI 30-39.9): ICD-10-CM

## 2018-01-24 PROCEDURE — 99205 OFFICE O/P NEW HI 60 MIN: CPT | Performed by: INTERNAL MEDICINE

## 2018-01-24 ASSESSMENT — PAIN SCALES - GENERAL: PAINLEVEL: NO PAIN

## 2018-01-24 NOTE — PATIENT INSTRUCTIONS
Reduce carbs as per list and review with RD in IBT  Reduce night time insulin to 10 units daily   Continue to monitor blood sugars  Use meal replacement for one meal

## 2018-01-24 NOTE — PROGRESS NOTES
Bariatric Medicine H&P  Chief Complaint   Patient presents with   • Weight Gain       Referred by:  Evelin Mayberry*    History of Present Illness:   Julio Azar is a 85 y.o. [place] American male who presents for weight management and to help address co-morbidities related to overweight, including coronary artery disease, type 2 diabetes mellitus, hypertension, hyperlipidemia.    Patient presents with obesity and diabetes that is difficult to control. He finds his diabetes medications very expensive, and wants to get off as many as he can. He knows he needs to lose belly fat and does not know how to do that. His last highest weight about 15 years ago was 255 pounds. Since that time, he has been trying to eat less. His wife was sick with cancer for 2 years, and he was her caregiver. He slowly has gained some weight during that time, now cooks for himself and does not know what to eat better.    The patient's blood sugars usually run in the mid 100s, but he did have a low of 82 recently. They are labile despite multiple medications including insulin. His coronary artery disease is stable, with a recent negative stress test several months ago per his cardiologist. His hypertension and hyperlipidemia are medication-controlled    The patient typically has toast, cereal with banana or oatmeal for breakfast. Sometimes eggs and toast. For lunch, he has salami and cheese, crackers, chili or smoothie. For dinner he has soup, potpie, salads. For snacks he has cookies. He does sometimes drink beer and red wine, or water. Usually eats at home, does not eat out much.    He is interested in trying meal replacements for one of his meals daily. He is not interested in weight loss medications, as he is trying to get off medication.      Behavior-Related History:  Binge eating screen: Negative       Exercise:   Had been going to the gym, but it got too expensive. Now his insurance will cover it again so he is to start  "at the gym next month.    Review of Systems   Lack of energy, fatigue at times. Fatigued today after a busy day yesterday cooking meals for an organization is part of. Intermittent diarrhea and constipation. Chronic back and muscle pains  Sleep apnea screen: Negative  Sleeps in a recliner, since his wife was sick with cancer  All other ROS were reviewed with patient today and are negative.      PMH/PSH:  I have reviewed the patient's medical, social and family history, allergies, and medications today.  Prior records reviewed.  FHx Obesity: Brother  Personal Hx of Bariatric Surgery: No  Retired, used to work in food distribution and as a ,       Physical Exam:   /68   Pulse 79   Temp 36.7 °C (98.1 °F)   Ht 1.626 m (5' 4\")   Wt 99.7 kg (219 lb 11.2 oz)   SpO2 90%   BMI 37.71 kg/m²    Waist: 51 in  Body fat % 41.9  REE 1755 kcal/day    Constitutional: Oriented to person, place, and time and well-developed, well-nourished, and in no distress.    HENT: No facial plethora.  No Cushingoid features.  No scalloped tongue.  No dental erosions.  No swollen parotids.  Head: Normocephalic.   Eyes: EOM are normal. Pupils are equal, round, and reactive to light. No periorbital edema.  No lateral thinning of eyebrows.  No vertical nystagmus.  Neck: Normal range of motion. Neck supple. No thyromegaly present. No buffalo hump.  Cardiovascular: Normal rate and regular rhythm.  No murmur heard.  Pulmonary/Chest: Effort normal and breath sounds normal. No wheezes.   Abdominal: Soft. Bowel sounds are normal.  Significant abdominal distention. No ascites. Unable to palpate for hepatosplenomegaly.  No red striae.  Musculoskeletal: Normal range of motion. No edema.   Neurological: Alert and oriented to person, place, and time. Normal reflexes. No cranial nerve deficit. No muscle weakness.  Gait normal.   Skin: Warm and dry. Not diaphoretic. No hirsuitism.  No acanthosis nigricans.  Not excessively dry, scaly.  " No acne.  No bruising/ecchymosis.  No hyperpigmentation.  No xanthomas or acrochordon.  No violaceous striae.  No keratosis pilaris.  Psychiatric: Mood, memory, affect and judgment normal.     Laboratory:   Prior labs reviewed. 11/2/17 fasting glucose 155, glycohemoglobin 8.4  Prior cardiac stress test -11/2017    ASSESSMENT/PLAN:  Body mass index is 37.71 kg/m².   Obesity Stage ([place]) 3; Class 2    1. Obesity (BMI 30-39.9)     2. Type 2 diabetes mellitus with hyperglycemia, with long-term current use of insulin (CMS-HCC)  insulin glargine (LANTUS SOLOSTAR) 100 UNIT/ML Solution Pen-injector injection   3. Essential hypertension, benign     4. Mixed hyperlipidemia       The patient's diabetes is difficult to control, despite multiple medications, due to his significant and fluctuating carbohydrate intake. We will restrict his carbohydrate intake, have him reduce his insulin to ensure no hypoglycemic episodes. This should help his hyperlipidemia and hypertension as well. Meal replacements should help keep him on track, which he is interested in trying.    The patient and I have discussed at length and agree to the following recommendations, which are all addressing the above diagnoses:    Weight Goal: 5% wt loss at one month after start (pt goal weight is 175 lb)  Diet: One meal replacements per day, significantly reduce carbohydrate intake as per food choice handouts today (at least under 100 g per day with a goal of under 60 g per day), use plate method for lunch and dinner  64+ ounces of water per day  Keep a food journal, bring in next visit and to dietitian visit  Schedule IBT via PCP  Physical Activity: Walking 30 minutes daily his target goal, gym once per week at least  Risk level for moderate/vigorous exercise program: Moderate given coronary artery disease  New Rx: None new. Reduce nighttime insulin by 50% to 10 units  Side Effects: Will review consent if applicable.  Behavior change: Mindful eating,  stimulus narrowing with meal replacement  Follow-up: 2 weeks    Face to face time spent 60 minutes,  with >50% of time devoted to one on one counseling on weight management issues, as documented above.      Thank you for your referral!

## 2018-01-30 DIAGNOSIS — E66.9 OBESITY (BMI 30-39.9): ICD-10-CM

## 2018-02-02 ENCOUNTER — PATIENT OUTREACH (OUTPATIENT)
Dept: HEALTH INFORMATION MANAGEMENT | Facility: OTHER | Age: 83
End: 2018-02-02

## 2018-02-02 NOTE — PROGRESS NOTES
1. Attempt #: Final  2. HealthConnect Verified: yes    3. Verify PCP: yes    4. Care Team Updated:       •   DME Company (gait device, O2, CPAP, etc.): NO       •   Other Specialists (eye doctor, derm, GYN, cardiology, endo, etc): NO    5.  Reviewed/Updated the following with patient:       •   Communication Preference Obtained? YES       •   Preferred Pharmacy? YES       •   Preferred Lab? YES       •   Family History (document living status of immediate family members and if + hx of cancer, diabetes, hypertension, hyperlipidemia, heart attack, stroke) NO/ Pt didn't have time to go over family history.    6. [employer] Activation: declined    7. [facility] Sarahi: no    8. Annual Wellness Visit Scheduling  Scheduling Status:Scheduled      9. Care Gap Scheduling (Attempt to Schedule EACH Overdue Care Gap!)     Health Maintenance Due   Topic Date Due   • URINE ACR / MICROALBUMIN  09/26/2017/ Pt has orders for lab pending   • DIABETES MONOFILAMENT / LE EXAM  01/09/2018        Scheduled patient for Annual Wellness Visit    10. Patient was advised: “This is a free wellness visit. The provider will screen for medical conditions to help you stay healthy. If you have other concerns to address you may be asked to discuss these at a separate visit or there may be an additional fee.”     11. Patient was informed to arrive 15 min prior to their scheduled appointment and bring in their medication bottles.

## 2018-02-07 ENCOUNTER — OFFICE VISIT (OUTPATIENT)
Dept: HEALTH INFORMATION MANAGEMENT | Facility: MEDICAL CENTER | Age: 83
End: 2018-02-07
Payer: MEDICARE

## 2018-02-07 VITALS
HEART RATE: 74 BPM | SYSTOLIC BLOOD PRESSURE: 108 MMHG | HEIGHT: 64 IN | OXYGEN SATURATION: 95 % | TEMPERATURE: 98 F | DIASTOLIC BLOOD PRESSURE: 60 MMHG | BODY MASS INDEX: 37.15 KG/M2 | WEIGHT: 217.6 LBS

## 2018-02-07 DIAGNOSIS — Z79.4 TYPE 2 DIABETES MELLITUS WITHOUT COMPLICATION, WITH LONG-TERM CURRENT USE OF INSULIN (HCC): ICD-10-CM

## 2018-02-07 DIAGNOSIS — E66.9 OBESITY (BMI 30-39.9): ICD-10-CM

## 2018-02-07 DIAGNOSIS — E78.2 MIXED HYPERLIPIDEMIA: ICD-10-CM

## 2018-02-07 DIAGNOSIS — E11.9 TYPE 2 DIABETES MELLITUS WITHOUT COMPLICATION, WITH LONG-TERM CURRENT USE OF INSULIN (HCC): ICD-10-CM

## 2018-02-07 DIAGNOSIS — I10 ESSENTIAL HYPERTENSION, BENIGN: ICD-10-CM

## 2018-02-07 PROCEDURE — 99213 OFFICE O/P EST LOW 20 MIN: CPT | Performed by: INTERNAL MEDICINE

## 2018-02-07 NOTE — PATIENT INSTRUCTIONS
Continue to watch carb intake  Increase protein intake when hungry with meal replacement if only having two meals  Continue to walk in stores, as you can

## 2018-02-07 NOTE — PROGRESS NOTES
Bariatric Medicine Follow Up  Chief Complaint   Patient presents with   • Weight Gain       History of Present Illness:   Julio Azar is a 85 y.o. male who presents for weight management follow-up and to help address co-morbidities related to overweight, including type 2 diabetes mellitus, hypertension, hyperlipidemia.    During the patient's last visit, the following were discussed and recommended:  Weight Goal: 5% wt loss at one month after start (pt goal weight is 175 lb)  Diet: One meal replacements per day, significantly reduce carbohydrate intake as per food choice handouts today (at least under 100 g per day with a goal of under 60 g per day), use plate method for lunch and dinner  64+ ounces of water per day  Keep a food journal, bring in next visit and to dietitian visit  Schedule IBT via PCP  Physical Activity: Walking 30 minutes daily his target goal, gym once per week at least  Risk level for moderate/vigorous exercise program: Moderate given coronary artery disease  New Rx: None new. Reduce nighttime insulin by 50% to 10 units  Side Effects: Will review consent if applicable.  Behavior change: Mindful eating, stimulus narrowing with meal replacement  Follow-up: 2 weeks    The patient is doing well. He has been tracking intake. He brings in his food journal today. He has not been feeling very hungry, is able to follow the program, has usually 2 meals a day.    He gets up late, usually has eggs for breakfast and some toast. He has also been using pomegranate juice about a half a bottle per day. Sometimes at night he has wine, meat and vegetables mostly. He has not been using the meal replacements daily, not snacking either. This morning he was hungry.    Continues on Lantus, adjusting his dose. Blood glucoses in the 120s on average. He remains on metformin, statin, antihypertensives, insulin.    Exercise:   Trying to get into a senior care program to exercise more. Has been walking, active with his  "[employer].     Review of Systems   Denies lightheadedness, hypoglycemia  All other ROS were reviewed and are otherwise unchanged from my previous visit with patient.    Physical Exam:    /60   Pulse 74   Temp 36.7 °C (98 °F)   Ht 1.626 m (5' 4\")   Wt 98.7 kg (217 lb 9.6 oz)   SpO2 95%   BMI 37.35 kg/m²   Waist: 49 in  Body fat % 42.9  REE 1744 kcal/day    Weight change since last visit: -2 lb (-2 lb total)  Waist Circum change since last visit: -2 in (-2 in total)    Constitutional: Oriented to person, place, and time and well-developed, well-nourished, and in no distress.    Head: Normocephalic.   Cardiovascular: Normal rate and regular rhythm.  No murmur heard.  Pulmonary/Chest: Effort normal and breath sounds normal. No wheezes.   Abdominal: Soft. Bowel sounds are normal.   Musculoskeletal: Normal range of motion. No edema.   Neurological: Alert and oriented to person, place, and time. No muscle weakness.  Gait normal.   Skin: Warm and dry. Not diaphoretic.   Psychiatric: Mood, memory, affect and judgment normal.     Laboratory:   None new.    ASSESSMENT/PLAN:  Body mass index is 37.35 kg/m².    Obesity Stage (Success):  2; Class 2    1. Obesity (BMI 30-39.9)     2. Essential hypertension, benign     3. Mixed hyperlipidemia     4. Type 2 diabetes mellitus without complication, with long-term current use of insulin (CMS-[facility])       The patient has begun to make some small changes, probably could be eating more calories and protein. We will have him continue to track, so we can better understand his intake, but his blood sugars appear to be under better control and his hypertension is very well controlled at this time as well. We may need to reduce his antihypertensives next visit as well as his diuretics.    The patient and I have discussed at length and agree to the following recommendations, which are all addressing the above diagnoses:    Weight Goal: 3-5% wt loss each month (pt goal weight is " 175 lb)  Diet: Consider resuming meal replacement daily,  significantly reduce carbohydrate intake as per food choice handouts given last visit   (at least under 100 g per day with a goal of under 60 g per day),   use plate method for lunch and dinner  64+ ounces of water per day  Keep a food journal, bring in next visit and to dietitian visit at PCP office  Physical Activity: Walking 30 minutes daily  Risk level for moderate/vigorous exercise program: Moderate  New Rx: Continue to monitor insulin, reduce as needed for blood sugars, keep blood glucose between  before meals  Side Effects: Will review consent if applicable.  Behavior change: Continue mindful eating, stimulus narrowing with meal replacement would be helpful especially if getting hungry  Follow-up: 2 weeks  May need to adjust medications down based on blood pressure and blood sugar readings at follow-up visit  Schedule IBT via PCP

## 2018-02-14 ENCOUNTER — NON-PROVIDER VISIT (OUTPATIENT)
Dept: MEDICAL GROUP | Facility: PHYSICIAN GROUP | Age: 83
End: 2018-02-14
Payer: MEDICARE

## 2018-02-14 VITALS — BODY MASS INDEX: 37.66 KG/M2 | WEIGHT: 220.6 LBS | HEIGHT: 64 IN

## 2018-02-14 PROCEDURE — G0447 BEHAVIOR COUNSEL OBESITY 15M: HCPCS | Performed by: FAMILY MEDICINE

## 2018-02-14 NOTE — PROGRESS NOTES
"IBT INITIAL ASSESMENT    Author: Rhiannon Rai . Date & Time created: 2/14/2018  1:03 PM   Visit #: 1  Referring Provider: Evelin Mayberry*  Patient Age: 85 y.o.  Time in/Out:  428 - 105    ASSESS:  Vitals:    02/14/18 1303   Weight: 100.1 kg (220 lb 9.6 oz)   Height: 1.626 m (5' 4\")    Body mass index is 37.87 kg/m². Goal Weight:  180#  The patient states improved health and less medication as a motivator for weight loss and has tried fads for weight loss in the past with no lasting success.  Comorbidities include CAD, DM, HTN, Hyperlipidemia.       Current Dietary/Exercise Habits  1.  How many servings of fruits and vegetables do you eat in a typical day?  2-3  2. How many servings of whole grains do you eat in a typical day?  0-1  3. How many times in a typical week do you eat foods high in fat or eat at a fast food restaurant?  0-1  4. How many times in a typical week do you eat red meat, pork, or processed meat?  1-2  5. How many days a week do you participate in moderately intense physical activity for 30 minutes?  0  6. How many days a week do you participate in vigorously intense physical activity for 20 minutes?  0  7. How many days a week do you do strength training exercise?  0  Estimated Stage of Change   Actionas evidenced by the patient decreasing the amount of CHO he is consuming on a regular basis.    ADVISE:    Physical Activity:  Julio is not currently exercising d/t the colder weather and would like to possibly join a gym that [facility] will pay for.  I gave him a list of gyms that [facility] has partnered with and he agrees to check on it before our next appointment.     Dietary Guidelines:  Julio has eliminated bread/pasta/rice/potato and is following the advice of Dr. Richardson so far.  He is eating more proteins and states that he likes quick meals and usually will cook enough to eat for 2 meals when he does cook.  He is using the [employer] snack bar at night when " he is craving something sweet.    The patient has been advised of how weight management and physical activity impacts their health and will help to reduce complications and health risk factors.    AGREE:  Visit #2 goal:   1.  Keep a 3-5 day food diary of all foods/drinks consumed, the amount you consumed (approximately), and the time it was when consumed.     2.  Check out the gyms that [facility] will pay for.    ASSIST:  Julio did not bring in his food diary so I was not able to fully assess his intake at this time.  His weight is up 3# from his last appointment (last week) with Dr. Richardson; this difference in weight is likely d/t wearing shoes today and heavier clothing d/t it being cold outside.  Now that he is established on a new scale I will be able to assess his weight trend as well.    ARRANGE:     Return for follow-up in 2 weeks   The patient was assisted in making follow-up appointment per orders.   Normal

## 2018-02-21 ENCOUNTER — OFFICE VISIT (OUTPATIENT)
Dept: HEALTH INFORMATION MANAGEMENT | Facility: MEDICAL CENTER | Age: 83
End: 2018-02-21
Payer: MEDICARE

## 2018-02-21 VITALS
DIASTOLIC BLOOD PRESSURE: 74 MMHG | BODY MASS INDEX: 37.15 KG/M2 | HEIGHT: 64 IN | SYSTOLIC BLOOD PRESSURE: 124 MMHG | TEMPERATURE: 99.2 F | OXYGEN SATURATION: 95 % | WEIGHT: 217.6 LBS | HEART RATE: 83 BPM

## 2018-02-21 DIAGNOSIS — E11.9 TYPE 2 DIABETES MELLITUS WITHOUT COMPLICATION, WITH LONG-TERM CURRENT USE OF INSULIN (HCC): ICD-10-CM

## 2018-02-21 DIAGNOSIS — E55.9 VITAMIN D DEFICIENCY: ICD-10-CM

## 2018-02-21 DIAGNOSIS — E78.2 MIXED HYPERLIPIDEMIA: ICD-10-CM

## 2018-02-21 DIAGNOSIS — Z79.4 TYPE 2 DIABETES MELLITUS WITHOUT COMPLICATION, WITH LONG-TERM CURRENT USE OF INSULIN (HCC): ICD-10-CM

## 2018-02-21 DIAGNOSIS — I25.10 CORONARY ARTERY DISEASE INVOLVING NATIVE CORONARY ARTERY OF NATIVE HEART WITHOUT ANGINA PECTORIS: ICD-10-CM

## 2018-02-21 DIAGNOSIS — E66.9 OBESITY (BMI 30-39.9): ICD-10-CM

## 2018-02-21 PROCEDURE — 99213 OFFICE O/P EST LOW 20 MIN: CPT | Performed by: INTERNAL MEDICINE

## 2018-02-21 NOTE — PROGRESS NOTES
Bariatric Medicine Follow Up  Chief Complaint   Patient presents with   • Weight Gain       History of Present Illness:   Julio Azar is a 85 y.o. male who presents for weight management follow-up and to help address co-morbidities related to overweight, including type 2 diabetes mellitus, vitamin D deficiency, hyperlipidemia, coronary artery disease.    During the patient's last visit, the following were discussed and recommended:  Weight Goal: 3-5% wt loss each month (pt goal weight is 175 lb)  Diet: Consider resuming meal replacement daily,  significantly reduce carbohydrate intake as per food choice handouts given last visit   (at least under 100 g per day with a goal of under 60 g per day),   use plate method for lunch and dinner  64+ ounces of water per day  Keep a food journal, bring in next visit and to dietitian visit at PCP office  Physical Activity: Walking 30 minutes daily  Risk level for moderate/vigorous exercise program: Moderate  New Rx: Continue to monitor insulin, reduce as needed for blood sugars, keep blood glucose between  before meals  Side Effects: Will review consent if applicable.  Behavior change: Continue mindful eating, stimulus narrowing with meal replacement would be helpful especially if getting hungry  Follow-up: 2 weeks  May need to adjust medications down based on blood pressure and blood sugar readings at follow-up visit  Schedule IBT via PCP    Has been using one Robard meal replacement almost daily. Using the Robard shake some mornings with berries. Finding the lemon pudding too sweet, will have half one day, half the next. Not feeling hungry. Likes the Robard snack bars, has them in the evening, about every other day. Keeping a food log, tracking intake, brought in today, will be bringing in to dietitian visit in 2 weeks.    Blood sugars running between 116 and 140s before breakfast on average. Has reduced his insulin from 14 to 10 units. He is very eager to get off  "Victoza, due to expense (over $300 per month), causing some diarrhea as well. Will be meeting with his Endocrinologist next month for follow-up. Continues on statin, vitamin D repletion, ACE inhibitor.    Feels his pants are much looser, has to wear suspenders. Started going to the gym yesterday, tired, but happy he is back at the gym.      Exercise:   Started back at the gym yesterday, paid for by [employer]. 1.5 hours, treadmill, arm exercises, leg strengthening.     Review of Systems   Constipation. Denies lightheadedness, hypoglycemia.  All other ROS were reviewed and are otherwise unchanged from my previous visit with patient.    Physical Exam:    /74   Pulse 83   Temp 37.3 °C (99.2 °F)   Ht 1.626 m (5' 4\")   Wt 98.7 kg (217 lb 9.6 oz)   SpO2 95%   BMI 37.35 kg/m²    Waist: 48.5 in  Body fat % 41.3  REE 1742 kcal/day    Weight change since last visit:  0 lb (-2.1 lb total)  Waist Circum change since last visit: -0.5 in (-2.5 in total)    Constitutional: Oriented to person, place, and time and well-developed, well-nourished, and in no distress.    Head: Normocephalic.   Musculoskeletal: Normal range of motion. No edema.   Neurological: Alert and oriented to person, place, and time. No muscle weakness.  Gait normal.   Skin: Warm and dry. Not diaphoretic.   Psychiatric: Mood, memory, affect and judgment normal.     Laboratory:   None new.      ASSESSMENT/PLAN:  Body mass index is 37.35 kg/m².    Obesity Stage ([place]):  2; Class 2    1. Obesity (BMI 30-39.9)     2. Type 2 diabetes mellitus without complication, with long-term current use of insulin (CMS-[facility])     3. Vitamin D deficiency     4. Mixed hyperlipidemia     5. Coronary artery disease involving native coronary artery of native heart without angina pectoris       The patient is making slow but steady progress. His waist has reduced significantly since starting carb reduction. He is keeping a log, eating more mindfully. The meal " replacements are helping him stay on track, getting enough protein. He is steadily decreasing his insulin requirements, hopes to get off Victoza soon. Continues on statin and vitamin D repletion for now, along with his cardiac medications.    The patient and I have discussed at length and agree to the following recommendations, which are all addressing the above diagnoses:    Weight Goal: 3-5% wt loss each month (pt goal weight is less than 200 lb)  Diet: One Robard meal replacement per day   Continue to significantly reduce carbohydrate intake as per food choice handouts, use plate method  64+ ounces of water per day  Keep a food journal, bring in next visits and to dietitian visit  Physical Activity: Gym 2-3 times per week, 1.5 hours  Risk level for moderate/vigorous exercise program: Moderate  New Rx: None. Titrate down insulin to keep blood sugars around 100  Hope to d/c Victoza soon  Colace as needed for constipation  Side Effects: Will review consent if applicable.  Behavior change: Mindful eating, stimulus narrowing  Follow-up: One month  Dietitian visit via IBT at PCP office 2 weeks

## 2018-02-23 ENCOUNTER — TELEPHONE (OUTPATIENT)
Dept: MEDICAL GROUP | Facility: PHYSICIAN GROUP | Age: 83
End: 2018-02-23

## 2018-02-23 DIAGNOSIS — C61 MALIGNANT NEOPLASM OF PROSTATE (HCC): ICD-10-CM

## 2018-02-23 NOTE — TELEPHONE ENCOUNTER
Left message for patient to call back regarding pre-visit planning. Please transfer call to Claudia at [phone].

## 2018-02-23 NOTE — TELEPHONE ENCOUNTER
PVP WITH OUTREACH  Future Appointments       Provider Department Center    2/28/2018 9:00 AM Rhiannon Rai Jr., R.D. [facility]     3/2/2018 10:20 AM V[facility]U [facility]  [facility]     3/21/2018 10:50 AM Joann Richardson M.D. [facility]    3/26/2018 7:00 AM [facility]     4/3/2018 10:20 AM PACO Del [facility].P[facility]           ANNUAL WELLNESS VISIT PRE-VISIT PLANNING     1.  Immunizations were updated in Epic using WebIZ?: Yes       •  WebIZ Recommendations: Patient is up to date on all vaccines       •  Is patient due for Tdap? NO       •  Is patient due for Shingles? NO     2.  Specialty Comments was updated with diagnosis information provided by [facility]: NO

## 2018-02-26 ENCOUNTER — PATIENT OUTREACH (OUTPATIENT)
Dept: HEALTH INFORMATION MANAGEMENT | Facility: OTHER | Age: 83
End: 2018-02-26

## 2018-02-26 ENCOUNTER — HOSPITAL ENCOUNTER (OUTPATIENT)
Dept: LAB | Facility: MEDICAL CENTER | Age: 83
End: 2018-02-26
Attending: INTERNAL MEDICINE
Payer: MEDICARE

## 2018-02-26 PROBLEM — Z59.89 HAS HEALTH INSURANCE WITH INADEQUATE COVERAGE OF HEALTH EXPENSES: Status: RESOLVED | Noted: 2017-12-20 | Resolved: 2018-02-26

## 2018-02-26 LAB
ALBUMIN SERPL BCP-MCNC: 4.4 G/DL (ref 3.2–4.9)
ALBUMIN/GLOB SERPL: 1.6 G/DL
ALP SERPL-CCNC: 56 U/L (ref 30–99)
ALT SERPL-CCNC: 23 U/L (ref 2–50)
ANION GAP SERPL CALC-SCNC: 5 MMOL/L (ref 0–11.9)
AST SERPL-CCNC: 34 U/L (ref 12–45)
BILIRUB SERPL-MCNC: 1.3 MG/DL (ref 0.1–1.5)
BUN SERPL-MCNC: 24 MG/DL (ref 8–22)
CALCIUM SERPL-MCNC: 9.6 MG/DL (ref 8.5–10.5)
CHLORIDE SERPL-SCNC: 103 MMOL/L (ref 96–112)
CHOLEST SERPL-MCNC: 157 MG/DL (ref 100–199)
CO2 SERPL-SCNC: 29 MMOL/L (ref 20–33)
CREAT SERPL-MCNC: 1.13 MG/DL (ref 0.5–1.4)
EST. AVERAGE GLUCOSE BLD GHB EST-MCNC: 186 MG/DL
GLOBULIN SER CALC-MCNC: 2.8 G/DL (ref 1.9–3.5)
GLUCOSE SERPL-MCNC: 140 MG/DL (ref 65–99)
HBA1C MFR BLD: 8.1 % (ref 0–5.6)
HDLC SERPL-MCNC: 71 MG/DL
LDLC SERPL CALC-MCNC: 68 MG/DL
POTASSIUM SERPL-SCNC: 5.4 MMOL/L (ref 3.6–5.5)
PROT SERPL-MCNC: 7.2 G/DL (ref 6–8.2)
SODIUM SERPL-SCNC: 137 MMOL/L (ref 135–145)
TRIGL SERPL-MCNC: 89 MG/DL (ref 0–149)

## 2018-02-26 PROCEDURE — 36415 COLL VENOUS BLD VENIPUNCTURE: CPT

## 2018-02-26 PROCEDURE — 80061 LIPID PANEL: CPT

## 2018-02-26 PROCEDURE — 83036 HEMOGLOBIN GLYCOSYLATED A1C: CPT

## 2018-02-26 PROCEDURE — 80053 COMPREHEN METABOLIC PANEL: CPT

## 2018-02-26 NOTE — PROGRESS NOTES
W outreach to Julio to discuss graduation from care coordination as goals have been met. Julio is agreeable to graduation and verbalizes understanding on steps he will take once he hits the donut hole. He reports having [facility]'s contact information to reach if he has any questions on how to activate his SPAP card. Julio also reports that he has attended [facility] program and states that he has lost a few pounds. He is hoping to stick with the program and continue to loose weight and get healthier. Encouraged pt to continue with program and congratulated him on his progress thus far.     Plan:  · LSW to graduate pt from  , due to meeting goals/interventions.   · Julio to reach out to [facility] with any questions/concerns.

## 2018-02-28 ENCOUNTER — NON-PROVIDER VISIT (OUTPATIENT)
Dept: MEDICAL GROUP | Facility: PHYSICIAN GROUP | Age: 83
End: 2018-02-28
Payer: MEDICARE

## 2018-02-28 VITALS — HEIGHT: 64 IN | WEIGHT: 219.9 LBS | BODY MASS INDEX: 37.54 KG/M2

## 2018-02-28 PROCEDURE — G0447 BEHAVIOR COUNSEL OBESITY 15M: HCPCS | Performed by: FAMILY MEDICINE

## 2018-02-28 NOTE — PROGRESS NOTES
"2/28/2018     Visit #2       Evelin Mayberry*      85 y.o.           Time in/Out:  850 - 906    ASSESS:    Vitals:    02/28/18 0931   Weight: 99.7 kg (219 lb 14.4 oz)   Height: 1.626 m (5' 4\")            Wt Readings from Last 2 Encounters:   02/28/18 99.7 kg (219 lb 14.4 oz)   02/21/18 98.7 kg (217 lb 9.6 oz)            Body mass index is 37.75 kg/m².       Difference:  - 0.7#     Starting weight:  220.6#     Difference:  - 0.7#     Current Dietary/Exercise Habits:  Julio is exercising at the gym on 3 days each week for ~60 minutes with a mix of walking on the treadmill and resistance exercise with his legs.  He wants to add resistance exercises for his legs and stretching because he does not mind being in the gym for longer than he currently is because he knows he would not be doing anything important at home.    He has brought in his food diary and he states that he occasionally gets diarrhea or constipation and he has not been able to find a pattern of why.  He is eating three meals/day and occasionally uses the Robard snack bar at night if he thinks he needs something sweet at night; this happened only 1x in the last week.    Estimated Stage of Change:    Action as evidenced by the patient increasing his activity.    ADVISE:    Physical Activity:  I want Julio to exercise as tolerated.  If he wants to exercise more in the gym that is completely up to him - as long as he feels safe doing it then I have encouraged him to do it.     Dietary Guidelines:  I want Julio to add when he has diarrhea/constipation to his food diary so we can possibly find a reason why.  He had diarrhea yesterday and it could be related to the sugar alcohols in the Robard snack bar, since he had one the night before.  By tracking when it happens we should be able to find a reason for why it is happening.    The patient has been advised of how weight management and physical activity impacts their health and will help to reduce " complications and health risk factors.      AGREE:  Visit #3 goal:   1.  Track when you have diarrhea/constipation on your food diary.     2.  Make sure you are eating protein with all meals.     3.  Increase exercise as tolerated.    ASSIST:  Julio's weight has moved in the right direction and I think that we can further improve his habits after seeing his food diary.  I will review the basics of nutrition and the plate method with him at his next appointment so he can be better balanced at meals and possibly eat less CHO.  I am also hoping that we can find a reason for his diarrhea/constipation and can resolve that soon as well.    ARRANGE:     Return for follow-up in 2 weeks     The patient was assisted in making follow-up appointment per orders.

## 2018-03-02 ENCOUNTER — OFFICE VISIT (OUTPATIENT)
Dept: MEDICAL GROUP | Facility: PHYSICIAN GROUP | Age: 83
End: 2018-03-02
Payer: MEDICARE

## 2018-03-02 VITALS
RESPIRATION RATE: 16 BRPM | BODY MASS INDEX: 36.47 KG/M2 | DIASTOLIC BLOOD PRESSURE: 65 MMHG | WEIGHT: 218.92 LBS | TEMPERATURE: 99.2 F | SYSTOLIC BLOOD PRESSURE: 120 MMHG | HEIGHT: 65 IN | HEART RATE: 69 BPM | OXYGEN SATURATION: 92 %

## 2018-03-02 DIAGNOSIS — E78.2 MIXED HYPERLIPIDEMIA: ICD-10-CM

## 2018-03-02 DIAGNOSIS — I10 ESSENTIAL HYPERTENSION, BENIGN: ICD-10-CM

## 2018-03-02 DIAGNOSIS — E11.9 TYPE 2 DIABETES MELLITUS WITHOUT COMPLICATION, WITH LONG-TERM CURRENT USE OF INSULIN (HCC): ICD-10-CM

## 2018-03-02 DIAGNOSIS — Z79.4 TYPE 2 DIABETES MELLITUS WITHOUT COMPLICATION, WITH LONG-TERM CURRENT USE OF INSULIN (HCC): ICD-10-CM

## 2018-03-02 DIAGNOSIS — E66.9 OBESITY (BMI 30-39.9): ICD-10-CM

## 2018-03-02 DIAGNOSIS — Z85.46 HISTORY OF PROSTATE CANCER: ICD-10-CM

## 2018-03-02 DIAGNOSIS — E55.9 VITAMIN D DEFICIENCY: ICD-10-CM

## 2018-03-02 DIAGNOSIS — I27.0 PRIMARY PULMONARY HYPERTENSION (HCC): ICD-10-CM

## 2018-03-02 DIAGNOSIS — Z79.4 ENCOUNTER FOR LONG-TERM (CURRENT) USE OF INSULIN (HCC): ICD-10-CM

## 2018-03-02 DIAGNOSIS — H02.139 SENILE ECTROPION, UNSPECIFIED LATERALITY: ICD-10-CM

## 2018-03-02 DIAGNOSIS — Z00.00 MEDICARE ANNUAL WELLNESS VISIT, SUBSEQUENT: ICD-10-CM

## 2018-03-02 DIAGNOSIS — E11.43 DIABETIC AUTONOMIC NEUROPATHY ASSOCIATED WITH TYPE 2 DIABETES MELLITUS (HCC): ICD-10-CM

## 2018-03-02 DIAGNOSIS — I25.10 CORONARY ARTERY DISEASE INVOLVING NATIVE CORONARY ARTERY OF NATIVE HEART WITHOUT ANGINA PECTORIS: ICD-10-CM

## 2018-03-02 PROCEDURE — G0439 PPPS, SUBSEQ VISIT: HCPCS | Performed by: NURSE PRACTITIONER

## 2018-03-02 ASSESSMENT — PATIENT HEALTH QUESTIONNAIRE - PHQ9: CLINICAL INTERPRETATION OF PHQ2 SCORE: 0

## 2018-03-02 ASSESSMENT — ACTIVITIES OF DAILY LIVING (ADL): BATHING_REQUIRES_ASSISTANCE: 0

## 2018-03-02 NOTE — ASSESSMENT & PLAN NOTE
Chronic in nature. Related to congestive heart failure. Records are requested from cardiology for update. Dr. Hammond.

## 2018-03-02 NOTE — ASSESSMENT & PLAN NOTE
Chronic in nature. Stable. Blood pressure today is 120/59. Patient continues to take medications without side effects. Patient is following up with cardiology. Denies chest pain, shortness breath, palpitations, headaches or blurry vision.

## 2018-03-02 NOTE — PROGRESS NOTES
Chief Complaint   Patient presents with   • Annual Wellness Visit         HPI:  Julio is a 85 y.o. here for Medicare Annual Wellness Visit    Type 2 diabetes mellitus without complication (CMS-HCC)  Chronic in nature. Stable. Following up with endocrinology.    Encounter for long-term (current) use of insulin  Continue follow-up with Dr. Baig.    Vitamin D deficiency  Chronic in nature. Stable. Patient continues to supplement vitamin D.    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 Stable. Followed by Dr. Ruelas.    Primary pulmonary hypertension (CMS-[facility])  Chronic in nature. Related to congestive heart failure. Records are requested from cardiology for update. Dr. Hamomnd.    Obesity (BMI 30-39.9)  Chronic in nature. Patient is following with Dr. Willingham regarding this issue and has been slowly losing weight.    Mixed hyperlipidemia  Chronic in nature. Stable. Continues to use atorvastatin 80 mg daily without side effects.    History of prostate cancer  Patient continues to follow with Dr. Young.    Essential hypertension, benign  Chronic in nature. Stable. Blood pressure today is 120/59. Patient continues to take medications without side effects. Patient is following up with cardiology. Denies chest pain, shortness breath, palpitations, headaches or blurry vision.    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 (CMS-HCC)  Chronic in nature. Stable. Patient continues to see podiatry.    CAD (coronary artery disease), native coronary artery  Chronic in nature. Stable. Patient continues following with cardiology.            Patient Active Problem List    Diagnosis Date Noted   • History of prostate cancer 12/05/2017   • Obesity (BMI 30-39.9) 12/05/2017   • 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 (CMS-HCC) 09/07/2017   • Senile ectropion 12/31/2014   • CAD (coronary artery disease), native coronary artery 03/31/2014   • Essential hypertension, benign 03/31/2014    • Type 2 diabetes mellitus without complication (CMS-HCC) 09/18/2013   • Encounter for long-term (current) use of insulin (CMS-HCC) 09/18/2013   • Vitamin D deficiency 09/18/2013   • Mixed hyperlipidemia 09/18/2013       Current Outpatient Prescriptions   Medication Sig Dispense Refill   • insulin glargine (LANTUS SOLOSTAR) 100 UNIT/ML Solution Pen-injector injection Inject 10 Units as instructed every evening. 30 mL 3   • lisinopril (PRINIVIL) 2.5 MG Tab Take 1 Tab by mouth every day. 90 Tab 1   • liraglutide (VICTOZA) 18 MG/3ML Solution Pen-injector injection Inject 0.6 mg as instructed every day.     • glipiZIDE (GLUCOTROL) 5 MG Tab Take 1 Tab by mouth 2 times a day. 180 Tab 3   • sildenafil citrate (VIAGRA) 50 MG tablet Take 1 Tab by mouth as needed for Erectile Dysfunction. 10 Tab 3   • tamsulosin (FLOMAX) 0.4 MG capsule Take 0.4 mg by mouth 2 times a day. Indications: 1/2 hr after breakfast and dinner  #180 w/ 2 refills called in 1/31/16     • potassium chloride SA (K-DUR) 20 MEQ Tab CR Take 20 mEq by mouth every day.     • atorvastatin (LIPITOR) 40 MG Tab Take 40 mg by mouth every evening.     • metformin (GLUCOPHAGE) 500 MG Tab TAKE 2 TABLETS BY MOUTH TWICE DAILY WITHMEALS 360 Tab 3   • furosemide (LASIX) 40 MG TABS Take 40 mg by mouth every day.     • B-D ULTRAFINE III SHORT PEN 31G X 8 MM MISC USE 3 TIMES DAILY 100 Each 11   • Lancets MISC His new meter uses the Accu-Chek SoftClix lancets for 3 time daily testing.  Dx. Code 250.02 300 Each 3   • Blood Glucose Monitoring Suppl SUPPLIES MISC Accucheck Yaneth blood glucose test strips, checking blood sugar 2 daily  .02 insulin requiring. 200 Strip 3   • B Complex Vitamins (VITAMIN B COMPLEX PO) Take  by mouth every day.     • Cholecalciferol (VITAMIN D) 2000 UNITS CAPS Take  by mouth every day.     • aspirin (ASA) 81 MG CHEW Take 81 mg by mouth every day.     • azithromycin (ZITHROMAX) 250 MG Tab 500 mg on day 1 then 250mg daily for 4 days. 6 Tab 0   •  vitamin D (CHOLECALCIFEROL) 1000 UNIT Tab Take 1,000 Units by mouth every day.       No current facility-administered medications for this visit.         Patient is taking medications as noted in medication list.  Current supplements as per medication list.     Allergies: Pcn [penicillins] and Latex    Current social contact/activities: ball games, family      Is patient current with immunizations? Yes.    He  reports that he quit smoking about 36 years ago. His smoking use included Cigarettes. He has a 68.00 pack-year smoking history. He has never used smokeless tobacco. He reports that he drinks about 4.8 - 9.0 oz of alcohol per week . He reports that he does not use drugs.  Counseling given: Not Answered        DPA/Advanced directive: Patient has Advanced Directive on file.     ROS:    Gait: Uses no assistive device   Ostomy: no   Other tubes: no   Amputations: no   Chronic oxygen use no   Last eye exam 08/17   Wears hearing aids: yes   : Reports urinary leakage during the last 6 months that has not interfered at all with their daily activities or sleep.      Screening:        Little interest or pleasure in doing things?  0 - not at all  Feeling down, depressed, or hopeless? 0 - not at all  Patient Health Questionnaire Score: 0    If depressive symptoms identified deferred to follow up visit unless specifically addressed in assessment and plan.    Interpretation of PHQ-9 Total Score   Score Severity   1-4 No Depression   5-9 Mild Depression   10-14 Moderate Depression   15-19 Moderately Severe Depression   20-27 Severe Depression    Screening for Cognitive Impairment    Three Minute Recall (apple, watch, aman)  3/3 Village, Kitchen, Baby  Draw clock face with all 12 numbers set to the hand to show 10 minutes past 11 o'clock  1 Time 3:40   5/5  If cognitive concerns identified, deferred for follow up unless specifically addressed in assessment and plan.    Fall Risk Assessment    Has the patient had two or more  falls in the last year or any fall with injury in the last year?  Yes  If fall risk identified, deferred for follow up unless specifically addressed in assessment and plan.    Safety Assessment    Throw rugs on floor.  Yes  Handrails on all stairs.  Yes  Good lighting in all hallways.  Yes  Difficulty hearing.  Yes  Patient counseled about all safety risks that were identified.    Functional Assessment ADLs    Are there any barriers preventing you from cooking for yourself or meeting nutritional needs?  No.    Are there any barriers preventing you from driving safely or obtaining transportation?  No.    Are there any barriers preventing you from using a telephone or calling for help?  No.    Are there any barriers preventing you from shopping?  No.    Are there any barriers preventing you from taking care of your own finances?  No.    Are there any barriers preventing you from managing your medications?  No.    Are there any barriers preventing you from showering/bathing yourself?  No.    Are you currently engaging any exercise or physical activity?  Yes.  Gym 3 days a week for 1 hour    Health Maintenance Summary                URINE ACR / MICROALBUMIN Overdue 9/26/2017      Done 9/26/2016 MICROALBUMIN CREAT RATIO URINE      Patient has more history with this topic...    DIABETES MONOFILAMENT / LE EXAM Overdue 1/9/2018      Done 1/9/2017 AMB DIABETIC MONOFILAMENT LOWER EXTREMITY EXAM     Patient has more history with this topic...    A1C SCREENING Next Due 8/26/2018      Done 2/26/2018 HEMOGLOBIN A1C      Patient has more history with this topic...    RETINAL SCREENING Next Due 10/16/2018      Done 10/16/2017 REFERRAL FOR RETINAL SCREENING EXAM     Patient has more history with this topic...    Annual Wellness Visit Next Due 12/13/2018      Done 12/12/2017 Visit Dx: Medicare annual wellness visit, subsequent     Patient has more history with this topic...    FASTING LIPID PROFILE Next Due 2/26/2019      Done  2/26/2018 LIPID PROFILE     Patient has more history with this topic...    SERUM CREATININE Next Due 2/26/2019      Done 2/26/2018 COMP METABOLIC PANEL      Patient has more history with this topic...    IMM DTaP/Tdap/Td Vaccine Next Due 7/10/2027      Done 7/10/2017 Imm Admin: Tdap Vaccine          Patient Care Team:  MARCELO Del Rosario as PCP - General (Family Medicine)  Dandre Wilcox D.P.M. as Consulting Physician (Podiatry)  Valdez Pierre M.D. as Consulting Physician (Dermatology)  Wyatt Hammond M.D. as Consulting Physician (Cardiology)  Narendra Freitas M.D. as Consulting Physician (Urology)  Zacarias Ruelas M.D. as Consulting Physician (Ophthalmology)  Gilberto Baig M.D. as Consulting Physician (Endocrinology)    Social History   Substance Use Topics   • Smoking status: Former Smoker     Packs/day: 2.00     Years: 34.00     Types: Cigarettes     Quit date: 1/1/1982   • Smokeless tobacco: Never Used   • Alcohol use 4.8 - 9.0 oz/week     7 - 14 Glasses of wine, 1 Cans of beer per week     Family History   Problem Relation Age of Onset   • Heart Disease Mother    • Heart Attack Mother    • Asthma Mother    • Heart Attack Father    • Diabetes Father    • Psychiatry Brother    • Heart Disease Brother      5 way bypass   • Diabetes Brother    • Hyperlipidemia Brother    • Heart Attack Brother    • Psychiatry Brother    • Stroke Brother    • Heart Disease Brother      pacemaker     He  has a past medical history of CAD (coronary artery disease); CAD (coronary artery disease), native coronary artery (3/31/2014); Cancer (CMS-HCC) (2014); Dental disorder; Diabetes; Encounter for long-term (current) use of insulin (CMS-HCC) (9/18/2013); Essential hypertension, benign (3/31/2014); Heart burn; Other and unspecified hyperlipidemia (9/18/2013); Snoring; Type II or unspecified type diabetes mellitus without mention of complication, uncontrolled (9/18/2013); and Unspecified vitamin D deficiency  "(9/18/2013).   Past Surgical History:   Procedure Laterality Date   • ECTROPIAN REPAIR  12/31/2014    Performed by Zen Fregoso M.D. at [place] [facility]   • LACRIMAL DUCT PROBE  12/31/2014    Performed by Zen Fregoso M.D. at [place] [facility]   • LORI BY LAPAROSCOPY  8/12/2013    Performed by Jez Galvan M.D. at SUR[facility]   • LID TIGHTENING  12/7/2012    Performed by Vitor Son M.D. at [place] [facility]   • CATARACT PHACO WITH IOL  6/30/2011    Performed by CRUZITO LITTLEJOHN at [place] [facility]   • OTHER CARDIAC SURGERY  2004    CABG X3 at [facility]   • TONSILLECTOMY             Exam:     Blood pressure 120/65, pulse 69, temperature 37.3 °C (99.2 °F), resp. rate 16, height 1.638 m (5' 4.5\"), weight 99.3 kg (218 lb 14.7 oz), SpO2 92 %. Body mass index is 37 kg/m².    Hearing good.    Dentition good  Alert, oriented in no acute distress.  Eye contact is good, speech goal directed, affect calm      Assessment and Plan. The following treatment and monitoring plan is recommended: Patient will follow up in 6 months regarding chronic conditions continue to follow up with endocrinology, cardiology, podiatry, urology.  No diagnosis found.      Services suggested: No services needed at this time  Health Care Screening recommendations as per orders if indicated.  Referrals offered: PT/OT/Nutrition counseling/Behavioral Health/Smoking cessation as per orders if indicated.    Discussion today about general wellness and lifestyle habits:    · Prevent falls and reduce trip hazards; Cautioned about securing or removing rugs.  · Have a working fire alarm and carbon monoxide detector;   · Engage in regular physical activity and social activities       Follow-up: No Follow-up on file.  "

## 2018-03-02 NOTE — ASSESSMENT & PLAN NOTE
Chronic in nature. Patient is following with Dr. Willingham regarding this issue and has been slowly losing weight.

## 2018-03-14 ENCOUNTER — NON-PROVIDER VISIT (OUTPATIENT)
Dept: MEDICAL GROUP | Facility: PHYSICIAN GROUP | Age: 83
End: 2018-03-14
Payer: MEDICARE

## 2018-03-14 VITALS — BODY MASS INDEX: 37.42 KG/M2 | WEIGHT: 219.2 LBS | HEIGHT: 64 IN

## 2018-03-14 PROCEDURE — G0447 BEHAVIOR COUNSEL OBESITY 15M: HCPCS | Performed by: FAMILY MEDICINE

## 2018-03-14 NOTE — PROGRESS NOTES
"3/14/2018     Visit #3       Evelin Mayberry*      85 y.o.           Time in/Out:  487 - 778    ASSESS:    Vitals:    03/14/18 0926   Weight: 99.4 kg (219 lb 3.2 oz)   Height: 1.626 m (5' 4\")            Wt Readings from Last 2 Encounters:   03/14/18 99.4 kg (219 lb 3.2 oz)   03/02/18 99.3 kg (218 lb 14.7 oz)            Body mass index is 37.63 kg/m².       Difference:  - 0.7#     Starting weight:  220.6#     Difference:  - 1.4#     Current Dietary/Exercise Habits:  Julio has not had any diarrhea in the last two weeks and states that he thinks that it could have been related to eating blueberries; he eliminated these and his diarrhea stopped.  He is still eating three meals/day and is using the Toovari snack bar at night with good results of resolution of his sweet tooth.    He is attending the gym on three days each week for 60-75 minutes/day and likes what he is currently doing.  He is usually doing 30 minutes of walking on the treadmill and then performing some upper and lower body resistance exercises.    Estimated Stage of Change:    Action as evidenced by the patient increasing his exercise.    ADVISE:    Physical Activity:  I have asked Julio to continue to exercise as tolerated at this time.     Dietary Guidelines:  Julio does not have measuring cups in his house and states that he will go out and buy some at the local dollar store on his way home from our appointment.  I think this will help him out tremendously because he is likely overeating CHO at his meals, although he is eating much less than he used to.  He is going to limit himself to 1/2 cup of CHO at meals and if he is eating yogurt in the morning he will eat 3/4 cup.    The patient has been advised of how weight management and physical activity impacts their health and will help to reduce complications and health risk factors.      AGREE:  Visit #4 goal:   1.  Limit CHO at meals to 1/2 cup each meal.     2.  Limit yogurt in the morning to 3/4 " cup.     3.  Purchase measuring cups to help you out with portion control.     4.  Exercise as tolerated.    ASSIST:  Julio is losing slowly, which is frustrating to him, although he could be putting on some LBM because of his resistance exercises.  When he sees Dr. Richardson next week and has bioelectrical impedance performed along with his weight we will be able to tell if this is the case.  I think the measuring cups will be a big eye-opener for him and should help even more with further weight loss.  We will be seeing each other after his next appointment with Dr. Richardson.    ARRANGE:     Return for follow-up in 2 weeks     The patient was assisted in making follow-up appointment per orders.

## 2018-03-21 ENCOUNTER — OFFICE VISIT (OUTPATIENT)
Dept: HEALTH INFORMATION MANAGEMENT | Facility: MEDICAL CENTER | Age: 83
End: 2018-03-21
Payer: MEDICARE

## 2018-03-21 VITALS
SYSTOLIC BLOOD PRESSURE: 104 MMHG | TEMPERATURE: 98.1 F | HEIGHT: 64 IN | DIASTOLIC BLOOD PRESSURE: 60 MMHG | WEIGHT: 219.5 LBS | HEART RATE: 63 BPM | BODY MASS INDEX: 37.47 KG/M2 | OXYGEN SATURATION: 91 %

## 2018-03-21 DIAGNOSIS — E11.9 TYPE 2 DIABETES MELLITUS WITHOUT COMPLICATION, WITH LONG-TERM CURRENT USE OF INSULIN (HCC): ICD-10-CM

## 2018-03-21 DIAGNOSIS — E55.9 VITAMIN D DEFICIENCY: ICD-10-CM

## 2018-03-21 DIAGNOSIS — E66.9 OBESITY (BMI 30-39.9): ICD-10-CM

## 2018-03-21 DIAGNOSIS — Z79.4 TYPE 2 DIABETES MELLITUS WITHOUT COMPLICATION, WITH LONG-TERM CURRENT USE OF INSULIN (HCC): ICD-10-CM

## 2018-03-21 DIAGNOSIS — I10 ESSENTIAL HYPERTENSION, BENIGN: ICD-10-CM

## 2018-03-21 PROCEDURE — 99213 OFFICE O/P EST LOW 20 MIN: CPT | Performed by: INTERNAL MEDICINE

## 2018-03-21 NOTE — PROGRESS NOTES
Bariatric Medicine Follow Up  Chief Complaint   Patient presents with   • Weight Gain       History of Present Illness:   Julio Azar is a 85 y.o. male who presents for weight management follow-up and to help address co-morbidities related to overweight, including type 2 diabetes mellitus, vitamin D deficiency, hypertension.    During the patient's last visit, the following were discussed and recommended:  Weight Goal: 3-5% wt loss each month (pt goal weight is less than 200 lb)  Diet: One Robard meal replacement per day   Continue to significantly reduce carbohydrate intake as per food choice handouts, use plate method  64+ ounces of water per day  Keep a food journal, bring in next visits and to dietitian visit  Physical Activity: Gym 2-3 times per week, 1.5 hours  Risk level for moderate/vigorous exercise program: Moderate  New Rx: None. Titrate down insulin to keep blood sugars around 100  Hope to d/c Victoza soon  Colace as needed for constipation  Side Effects: Will review consent if applicable.  Behavior change: Mindful eating, stimulus narrowing  Follow-up: One month  Dietitian visit via IBT at PCP office 2 weeks    The patient has lost some waist circumference, has not lost weight. He is going to the gym, watching what he is eating closely. Brings in his food journal today.    He is only using the meal replacement shakes once or twice per week, adding milk and berries. He has toast with an egg or jam for breakfast, a dinner with meat and vegetables. Sometimes skipping lunch. Not really feeling hungry, but notes he eats a smaller dinner when he uses the meal replacement shakes at lunch as directed. Stopped using blueberries when he developed diarrhea. The diarrhea has now resolved.    Went to dietitian visit through his primary care physician, like that and will be going back in 2 weeks.    Blood glucose is running about 136 before breakfast. Continuing on vitamin D repletion, statin. Blood pressure  "controlled on Prinivil and diuretic.    Exercise:   Gym 2-3 days per week     Review of Systems   Denies diarrhea, lightheadedness, hypoglycemia, constipation.  All other ROS were reviewed and are otherwise unchanged from my previous visit with patient.    Physical Exam:    /60   Pulse 63   Temp 36.7 °C (98.1 °F)   Ht 1.626 m (5' 4.02\")   Wt 99.6 kg (219 lb 8 oz)   SpO2 91%   BMI 37.66 kg/m²    Waist: 45 in  Body fat % 37.8  REE 1754 kcal/day    Weight change since last visit: +2 lb (0 lb total)  Waist Circum change since last visit: -3.5 in (-6 in total)    Constitutional: Oriented to person, place, and time and well-developed, well-nourished, and in no distress.    Head: Normocephalic.   Musculoskeletal: Normal range of motion. No edema.   Neurological: Alert and oriented to person, place, and time. No muscle weakness.  Gait normal.   Skin: Warm and dry. Not diaphoretic.   Psychiatric: Mood, memory, affect and judgment normal.     Laboratory:   None new.    ASSESSMENT/PLAN:  Body mass index is 37.66 kg/m².    Obesity Stage ([place]):  2; Class 2    1. Obesity (BMI 30-39.9)     2. Type 2 diabetes mellitus without complication, with long-term current use of insulin (CMS-[facility])     3. Vitamin D deficiency     4. Essential hypertension, benign       The patient has lost significant amount of waist circumference, not lost weight. Likely he is still getting too many carbohydrates (as reflected by fasting blood glucose levels). Overall, does not seem to be getting many calories. Strongly encouraged patient to use meal replacements daily, increase protein intake especially in the morning. Blood pressure low, will stop Lasix and potassium.    The patient and I have discussed at length and agree to the following recommendations, which are all addressing the above diagnoses:    Weight Goal: 3-5% wt loss each month (pt goal weight is less than 200 lb)  Diet: Encouraged one Robard meal replacement shake per day, or " at least every other day at lunch consistently  Continue to significantly reduce carbohydrates as he is doing  Plate method for portion control  64+ ounces of water per day  Continue to keep food journal and bring in to subsequent visits with myself in dietitian  Physical Activity: Gym 2-3 times per week, 1.5 hours each  Risk level for moderate/vigorous exercise program: Moderate  New Rx: None.  Continue to titrate down insulin to keep blood sugars around 100  Patient will stop potassium and Lasix for one week, monitor blood pressure, remain off potassium and Lasix if systolic blood pressure less than 120/80 on 2 or more readings  Side Effects: Will review consent if applicable.  Behavior change: Mindful eating, tracking, stimulus narrowing  Follow-up: One month  RD via IBT    Called patient after his visit to discontinue potassium/Lasix.

## 2018-03-26 ENCOUNTER — TELEPHONE (OUTPATIENT)
Dept: HEALTH INFORMATION MANAGEMENT | Facility: MEDICAL CENTER | Age: 83
End: 2018-03-26

## 2018-03-26 NOTE — TELEPHONE ENCOUNTER
1. Caller Name: Julio Azar                                       Call Back Number:      Patient approves a detailed voicemail message: yes    Pt. Stated that he was not taking his blood pressure medication per Dr. Foreman due to low blood pressure results. As of Friday, Saturday and Sunday, 03/23-03/25/18, his blood pressure has been at 133/83, 120/67, and 132/75. He would like to know if he can start taking his medication again.

## 2018-03-28 ENCOUNTER — NON-PROVIDER VISIT (OUTPATIENT)
Dept: MEDICAL GROUP | Facility: PHYSICIAN GROUP | Age: 83
End: 2018-03-28
Payer: MEDICARE

## 2018-03-28 VITALS — HEIGHT: 64 IN | BODY MASS INDEX: 38 KG/M2 | WEIGHT: 222.6 LBS

## 2018-03-28 DIAGNOSIS — I10 ESSENTIAL HYPERTENSION: ICD-10-CM

## 2018-03-28 PROCEDURE — G0447 BEHAVIOR COUNSEL OBESITY 15M: HCPCS | Performed by: FAMILY MEDICINE

## 2018-03-28 RX ORDER — LISINOPRIL 2.5 MG/1
2.5 TABLET ORAL DAILY
Qty: 90 TAB | Refills: 3 | Status: SHIPPED | OUTPATIENT
Start: 2018-03-28 | End: 2021-05-26

## 2018-03-28 NOTE — PROGRESS NOTES
"3/28/2018     Visit #4       Evelin Mayberry*      85 y.o.           Time in/Out:  225 - 060    ASSESS:    Vitals:    03/28/18 0911   Weight: 101 kg (222 lb 9.6 oz)   Height: 1.626 m (5' 4\")            Wt Readings from Last 2 Encounters:   03/28/18 101 kg (222 lb 9.6 oz)   03/21/18 99.6 kg (219 lb 8 oz)            Body mass index is 38.21 kg/m².       Difference:  + 3.4#     Starting weight:  220.6#     Difference:  + 2#     Current Dietary/Exercise Habits:  Julio states that there was an unexpected death in the family - a 17-year-old that was killed in a MVA.  He has been sad about this and states that it has been hard to be motivated to do things at times.  He is still attending the gym on three days each week and states that he is really looking forward to working in the yard as the weather gets nicer.    Estimated Stage of Change:    Action as evidenced by the patient attending the gym.    ADVISE:    Physical Activity:  No changes at this time.       Dietary Guidelines:  No changes at this time.    The patient has been advised of how weight management and physical activity impacts their health and will help to reduce complications and health risk factors.      AGREE:  Visit #5 goal:   1.  Exercise as tolerated.     2.  Continue your current eating habits.    ASSIST:  I do not want Julio to change anything at this time because he is losing inches off of his waist and improving his body composition.  He is better hydrated, has more muscle, and less fat than when he started so I believe that at some point he will stop putting on more LBM than the amount of fat he is burning and then he will see weight loss again.      I want him to maintain his current habits to see if he will hit the point where he will start losing weight consistently before we make any changes.    ARRANGE:     Return for follow-up in 2 weeks     The patient was assisted in making follow-up appointment per orders.  "

## 2018-03-29 RX ORDER — TAMSULOSIN HYDROCHLORIDE 0.4 MG/1
CAPSULE ORAL
Qty: 180 CAP | Refills: 2 | Status: SHIPPED | OUTPATIENT
Start: 2018-03-29

## 2018-04-03 NOTE — TELEPHONE ENCOUNTER
Per Dr. Pulido, spoke with Pt. And informed him to continues documenting his blood pressure and to continue not taking his BP medication.

## 2018-04-06 ENCOUNTER — OFFICE VISIT (OUTPATIENT)
Dept: MEDICAL GROUP | Facility: PHYSICIAN GROUP | Age: 83
End: 2018-04-06
Payer: MEDICARE

## 2018-04-06 ENCOUNTER — TELEPHONE (OUTPATIENT)
Dept: HEALTH INFORMATION MANAGEMENT | Facility: MEDICAL CENTER | Age: 83
End: 2018-04-06

## 2018-04-06 VITALS
HEART RATE: 60 BPM | SYSTOLIC BLOOD PRESSURE: 120 MMHG | WEIGHT: 224 LBS | OXYGEN SATURATION: 89 % | TEMPERATURE: 98.1 F | HEIGHT: 64 IN | BODY MASS INDEX: 38.24 KG/M2 | DIASTOLIC BLOOD PRESSURE: 64 MMHG | RESPIRATION RATE: 14 BRPM

## 2018-04-06 DIAGNOSIS — E66.9 OBESITY (BMI 30-39.9): ICD-10-CM

## 2018-04-06 DIAGNOSIS — Z79.4 TYPE 2 DIABETES MELLITUS WITHOUT COMPLICATION, WITH LONG-TERM CURRENT USE OF INSULIN (HCC): ICD-10-CM

## 2018-04-06 DIAGNOSIS — I27.0 PRIMARY PULMONARY HYPERTENSION (HCC): ICD-10-CM

## 2018-04-06 DIAGNOSIS — E55.9 VITAMIN D DEFICIENCY: ICD-10-CM

## 2018-04-06 DIAGNOSIS — E11.9 TYPE 2 DIABETES MELLITUS WITHOUT COMPLICATION, WITH LONG-TERM CURRENT USE OF INSULIN (HCC): ICD-10-CM

## 2018-04-06 DIAGNOSIS — I10 ESSENTIAL HYPERTENSION, BENIGN: ICD-10-CM

## 2018-04-06 DIAGNOSIS — E11.43 DIABETIC AUTONOMIC NEUROPATHY ASSOCIATED WITH TYPE 2 DIABETES MELLITUS (HCC): ICD-10-CM

## 2018-04-06 DIAGNOSIS — Z85.46 HISTORY OF PROSTATE CANCER: ICD-10-CM

## 2018-04-06 PROCEDURE — 99214 OFFICE O/P EST MOD 30 MIN: CPT | Performed by: NURSE PRACTITIONER

## 2018-04-06 RX ORDER — ATORVASTATIN CALCIUM 80 MG/1
80 TABLET, FILM COATED ORAL DAILY
COMMUNITY
Start: 2018-01-16

## 2018-04-06 RX ORDER — BLOOD-GLUCOSE METER
1 KIT MISCELLANEOUS DAILY
COMMUNITY
Start: 2018-03-07 | End: 2022-12-14

## 2018-04-06 ASSESSMENT — PAIN SCALES - GENERAL: PAINLEVEL: NO PAIN

## 2018-04-06 ASSESSMENT — PATIENT HEALTH QUESTIONNAIRE - PHQ9: CLINICAL INTERPRETATION OF PHQ2 SCORE: 0

## 2018-04-06 NOTE — ASSESSMENT & PLAN NOTE
Chronic in nature. Stable. Following up with endocrinology. Reports that he is doing well this time.

## 2018-04-06 NOTE — PROGRESS NOTES
Chief Complaint   Patient presents with   • Follow-Up     weight loss        HISTORY OF PRESENT ILLNESS: Patient is a 85 y.o. male established patient who presents today for checkup.    Type 2 diabetes mellitus without complication (CMS-[facility])  Chronic in nature. Stable. Following up with endocrinology. Reports that he is doing well this time.    Vitamin D deficiency  Chronic in nature. Stable. Patient continues to supplement vitamin D.    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 (CMS-HCC)  Chronic in nature. Stable. Sees Dr. Wilcox for podiatry.    History of prostate cancer  Patient continues to follow with Dr. Gallego does not have active cancer or current treatment for prostate cancer.    Obesity (BMI 30-39.9)  Chronic in nature. Stable. Patient is following with Dr. Willingham states he is overall doing well Dr. Willingham stopped his Lasix discussed with patient checking with Dr. Hammond regarding this change.    Primary pulmonary hypertension (CMS-HCC)  Chronic in nature. Related to congestive heart failure. Requested records from Dr. Hammond    Essential hypertension, benign  Chronic in nature. Stable. Blood pressure today is 120/64. Patient denies chest pain, palpitations, shortness of breath, headaches as managed by Dr. Hammond.      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/31/2014   • CAD (coronary artery disease), native coronary artery 03/31/2014   • Essential hypertension, benign 03/31/2014   • Type 2 diabetes mellitus without complication (CMS-[facility]) 09/18/2013   • Encounter for long-term (current) use of insulin (CMS-HCC) 09/18/2013   • Vitamin D deficiency 09/18/2013   • Mixed hyperlipidemia 09/18/2013        Allergies:Pcn [penicillins] and Latex    Current Outpatient Prescriptions   Medication Sig Dispense Refill   • tamsulosin (FLOMAX) 0.4 MG capsule TAKE ONE CAPSULE BY MOUTH TWO TIMES A  Cap 2   • lisinopril (PRINIVIL) 2.5 MG Tab Take 1 Tab by mouth every day. 90 Tab 3   • B-D ULTRAFINE III SHORT PEN 31G X 8 MM MISC USE 3 TIMES DAILY 100 Each 11   • atorvastatin (LIPITOR) 80 MG tablet Take 80 mg by mouth every day.     • FREESTYLE LITE strip 1 Strip by Other route every day.     • insulin glargine (LANTUS SOLOSTAR) 100 UNIT/ML Solution Pen-injector injection Inject 10 Units as instructed every evening. 30 mL 3   • azithromycin (ZITHROMAX) 250 MG Tab 500 mg on day 1 then 250mg daily for 4 days. 6 Tab 0   • liraglutide (VICTOZA) 18 MG/3ML Solution Pen-injector injection Inject 0.6 mg as instructed every day.     • vitamin D (CHOLECALCIFEROL) 1000 UNIT Tab Take 1,000 Units by mouth every day.     • glipiZIDE (GLUCOTROL) 5 MG Tab Take 1 Tab by mouth 2 times a day. 180 Tab 3   • sildenafil citrate (VIAGRA) 50 MG tablet Take 1 Tab by mouth as needed for Erectile Dysfunction. 10 Tab 3   • potassium chloride SA (K-DUR) 20 MEQ Tab CR Take 20 mEq by mouth every day.     • metformin (GLUCOPHAGE) 500 MG Tab TAKE 2 TABLETS BY MOUTH TWICE DAILY WITHMEALS 360 Tab 3   • furosemide (LASIX) 40 MG TABS Take 40 mg by mouth every day.     • Lancets [facility] [place] new meter uses the Accu-Chek SoftClix lancets for 3 time daily testing.  Dx. Code 250.02 300 Each 3   • Blood Glucose Monitoring Suppl SUPPLIES MISC Accucheck Yaneth blood glucose test strips, checking blood sugar 2 daily  .02 insulin requiring. 200 Strip 3   • B Complex Vitamins (VITAMIN B COMPLEX PO) Take  by mouth every day.     • Cholecalciferol (VITAMIN D) 2000 UNITS CAPS Take  by mouth every day.     • aspirin (ASA) 81 MG CHEW Take 81 mg by mouth every day.       No current facility-administered medications for this visit.        Social History   Substance Use  Topics   • Smoking status: Former Smoker     Packs/day: 2.00     Years: 34.00     Types: Cigarettes     Quit date: 1982   • Smokeless tobacco: Never Used   • Alcohol use 4.8 - 9.0 oz/week     7 - 14 Glasses of wine, 1 Cans of beer per week       Family Status   Relation Status   • Mother  at age 91   • Father  at age 87   • Brother  at age 72   • Brother  at age 50   • Son Alive   • Daughter Alive   • Maternal Grandmother    • Maternal Grandfather    • Paternal Grandmother    • Paternal Grandfather      Family History   Problem Relation Age of Onset   • Heart Disease Mother    • Heart Attack Mother    • Asthma Mother    • Heart Attack Father    • Diabetes Father    • Psychiatry Brother    • Heart Disease Brother      5 way bypass   • Diabetes Brother    • Hyperlipidemia Brother    • Heart Attack Brother    • Psychiatry Brother    • Stroke Brother    • Heart Disease Brother      pacemaker       Review of Systems:   Constitutional:  Negative for fever, chills, weight loss and malaise/fatigue.   HEENT:  Negative for ear pain, nosebleeds, congestion, sore throat and neck pain.    Eyes:  Negative for blurred vision.   Respiratory:  Negative for cough, sputum production, shortness of breath and wheezing.    Cardiovascular:  Negative for chest pain, palpitations, orthopnea and leg swelling.   Gastrointestinal:  Negative for heartburn, nausea, vomiting and abdominal pain.   Genitourinary:  Negative for dysuria, urgency and frequency.   Musculoskeletal:  Negative for myalgias, back pain and joint pain.   Skin:  Negative for rash and itching.   Neurological:  Negative for dizziness, tingling, tremors, sensory change, focal weakness and headaches.   Endo/Heme/Allergies:  Does not bruise/bleed easily.   Psychiatric/Behavioral:  Negative for depression, suicidal ideas and memory loss.  The patient is not nervous/anxious and does not have insomnia.    All other  "systems reviewed and are negative except as in HPI.    Exam:  Blood pressure 120/64, pulse 60, temperature 36.7 °C (98.1 °F), resp. rate 14, height 1.626 m (5' 4\"), weight 101.6 kg (224 lb), SpO2 89 %.  General:  Normal appearing. No distress.  Pulmonary:  Clear to ausculation.  Normal effort. No rales, ronchi, or wheezing.  Cardiovascular:  Regular rate and rhythm without murmur. Carotid and radial pulses are intact and equal bilaterally.  Neurologic:  Grossly nonfocal  Lymph:  No cervical, supraclavicular or axillary lymph nodes are palpable  Skin:  Warm and dry.  No obvious lesions.  Musculoskeletal: Normal gait. No extremity cyanosis, clubbing, or edema.  Psych:  Normal mood and affect. Alert and oriented x3. Judgment and insight is normal.      PLAN:    1. Type 2 diabetes mellitus without complication, with long-term current use of insulin (CMS-HCC)  3. 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 (CMS-[facility])  To follow-up with endocrinology and podiatry.    2. Vitamin D deficiency  Continue supplementation.    4. History of prostate cancer  Continue follow-up with Dr. Young    5. Obesity (BMI 30-39.9)  Continue follow-up with Dr. Willingham.    6. Primary pulmonary hypertension (CMS-[facility])  7. Essential hypertension, benign  Continue follow-up with cardiology.      Follow-up in 6 months or sooner as needed. Patient is encouraged to be seen in the emergency room for chest pain, palpitations, shortness of breath, dizziness, severe abdominal pain or other concerning symptoms.    Please note that this dictation was created using voice recognition software. I have made every reasonable attempt to correct obvious errors, but I expect that there are errors of grammar and possibly content that I did not discover before finalizing the note.    Assessment/Plan:  1. Type 2 diabetes mellitus without complication, with long-term current use of insulin (CMS-[facility])     2. Vitamin D deficiency     3. " 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 (CMS-HCC)     4. History of prostate cancer     5. Obesity (BMI 30-39.9)     6. Primary pulmonary hypertension (CMS-[facility])     7. Essential hypertension, benign            I have placed the below orders and discussed them with an approved delegating provider. The MA is performing the below orders under the direction of Dr. Grullon.

## 2018-04-06 NOTE — ASSESSMENT & PLAN NOTE
Patient continues to follow with Dr. Gallego does not have active cancer or current treatment for prostate cancer.

## 2018-04-06 NOTE — ASSESSMENT & PLAN NOTE
Chronic in nature. Stable. Patient is following with Dr. Willingham states he is overall doing well Dr. Willingham stopped his Lasix discussed with patient checking with Dr. Hammond regarding this change.

## 2018-04-06 NOTE — TELEPHONE ENCOUNTER
Patient wanted to Dr. Richardson about the medication he stopped taking.  He is aware she is out until Monday.

## 2018-04-06 NOTE — ASSESSMENT & PLAN NOTE
Chronic in nature. Stable. Blood pressure today is 120/64. Patient denies chest pain, palpitations, shortness of breath, headaches as managed by Dr. Hammond.

## 2018-04-09 ENCOUNTER — TELEPHONE (OUTPATIENT)
Dept: HEALTH INFORMATION MANAGEMENT | Facility: MEDICAL CENTER | Age: 83
End: 2018-04-09

## 2018-04-09 NOTE — TELEPHONE ENCOUNTER
Returned pt's call from last week.  Left voicemail.  Will discuss pt med changes when he comes in for his appointment this week.

## 2018-04-10 ENCOUNTER — APPOINTMENT (RX ONLY)
Dept: URBAN - METROPOLITAN AREA CLINIC 20 | Facility: CLINIC | Age: 83
Setting detail: DERMATOLOGY
End: 2018-04-10

## 2018-04-10 DIAGNOSIS — L57.0 ACTINIC KERATOSIS: ICD-10-CM

## 2018-04-10 DIAGNOSIS — L57.8 OTHER SKIN CHANGES DUE TO CHRONIC EXPOSURE TO NONIONIZING RADIATION: ICD-10-CM

## 2018-04-10 PROBLEM — D48.5 NEOPLASM OF UNCERTAIN BEHAVIOR OF SKIN: Status: ACTIVE | Noted: 2018-04-10

## 2018-04-10 PROCEDURE — ? LIQUID NITROGEN

## 2018-04-10 PROCEDURE — 17000 DESTRUCT PREMALG LESION: CPT

## 2018-04-10 PROCEDURE — 11100: CPT | Mod: 59

## 2018-04-10 PROCEDURE — 99213 OFFICE O/P EST LOW 20 MIN: CPT | Mod: 25

## 2018-04-10 PROCEDURE — ? BIOPSY BY SHAVE METHOD

## 2018-04-10 PROCEDURE — 17003 DESTRUCT PREMALG LES 2-14: CPT

## 2018-04-10 PROCEDURE — ? COUNSELING

## 2018-04-10 ASSESSMENT — LOCATION SIMPLE DESCRIPTION DERM
LOCATION SIMPLE: LEFT TEMPLE
LOCATION SIMPLE: RIGHT CHEEK
LOCATION SIMPLE: LEFT FOREHEAD
LOCATION SIMPLE: SCALP
LOCATION SIMPLE: LEFT FOREARM
LOCATION SIMPLE: RIGHT SCALP
LOCATION SIMPLE: LEFT CHEEK
LOCATION SIMPLE: NOSE
LOCATION SIMPLE: LEFT SCALP

## 2018-04-10 ASSESSMENT — LOCATION DETAILED DESCRIPTION DERM
LOCATION DETAILED: LEFT CENTRAL TEMPLE
LOCATION DETAILED: LEFT INFERIOR LATERAL FOREHEAD
LOCATION DETAILED: RIGHT SUPERIOR PARIETAL SCALP
LOCATION DETAILED: RIGHT SUPERIOR LATERAL MALAR CHEEK
LOCATION DETAILED: LEFT SUPERIOR PARIETAL SCALP
LOCATION DETAILED: LEFT MEDIAL FRONTAL SCALP
LOCATION DETAILED: NASAL DORSUM
LOCATION DETAILED: RIGHT CENTRAL FRONTAL SCALP
LOCATION DETAILED: LEFT PROXIMAL DORSAL FOREARM
LOCATION DETAILED: LEFT SUPERIOR FOREHEAD
LOCATION DETAILED: LEFT INFERIOR CENTRAL MALAR CHEEK

## 2018-04-10 ASSESSMENT — LOCATION ZONE DERM
LOCATION ZONE: ARM
LOCATION ZONE: SCALP
LOCATION ZONE: FACE
LOCATION ZONE: NOSE

## 2018-04-10 NOTE — PROCEDURE: BIOPSY BY SHAVE METHOD
Biopsy Method: Personna blade
Lab Facility: 
Electrodesiccation And Curettage Text: The wound bed was treated with electrodesiccation and curettage after the biopsy was performed.
Hemostasis: Drysol and Electrocautery
Was A Bandage Applied: Yes
Additional Anesthesia Volume In Cc (Will Not Render If 0): 0
Post-Care Instructions: I reviewed with the patient in detail post-care instructions. Patient is to keep the biopsy site dry overnight, and then apply bacitracin twice daily until healed. Patient may apply hydrogen peroxide soaks to remove any crusting.
Detail Level: Detailed
Type Of Destruction Used: Curettage
Electrodesiccation Text: The wound bed was treated with electrodesiccation after the biopsy was performed.
Render Post-Care Instructions In Note?: no
Billing Type: Third-Party Bill
Notification Instructions: Patient will be notified of biopsy results. However, patient instructed to call the office if not contacted within 2 weeks.
Curettage Text: The wound bed was treated with curettage after the biopsy was performed.
Lab: [id number]
X Size Of Lesion In Cm: 1
Silver Nitrate Text: The wound bed was treated with silver nitrate after the biopsy was performed.
Anesthesia Volume In Cc: 0.2
Anesthesia Type: 1% lidocaine with 1:100,000 epinephrine and a 1:6 solution of 8.4% sodium bicarbonate
Biopsy Type: H and E
Consent: Written consent was obtained and risks were reviewed including but not limited to scarring, infection, bleeding, scabbing, incomplete removal, nerve damage and allergy to anesthesia.
Wound Care: Aquaphor
Dressing: Band-Aid
Cryotherapy Text: The wound bed was treated with cryotherapy after the biopsy was performed.

## 2018-04-11 ENCOUNTER — NON-PROVIDER VISIT (OUTPATIENT)
Dept: MEDICAL GROUP | Facility: PHYSICIAN GROUP | Age: 83
End: 2018-04-11
Payer: MEDICARE

## 2018-04-11 VITALS — HEIGHT: 64 IN | WEIGHT: 223.6 LBS | BODY MASS INDEX: 38.17 KG/M2

## 2018-04-11 PROCEDURE — G0447 BEHAVIOR COUNSEL OBESITY 15M: HCPCS | Performed by: FAMILY MEDICINE

## 2018-04-11 NOTE — PROGRESS NOTES
"4/11/2018     Visit #5       Evelin Mayberry*      85 y.o.           Time in/Out:  616 - 944    ASSESS:    Vitals:    04/11/18 0917   Weight: 101.4 kg (223 lb 9.6 oz)   Height: 1.626 m (5' 4\")            Wt Readings from Last 2 Encounters:   04/11/18 101.4 kg (223 lb 9.6 oz)   04/06/18 101.6 kg (224 lb)            Body mass index is 38.38 kg/m².       Difference:  + 1#     Starting weight:  220.6#     Difference:  + 3#     Current Dietary/Exercise Habits:  Julio states that he is now struggling with constipation and cannot figure out why that he is.  He is eating high fiber foods like vegetables and berries and when he eats grains he makes sure they are whole grains as well.    Julio has not been exercising much in the last two weeks d/t the death in his family.  He states that that death really took a lot of his energy away and he has not done much other than yard work for physical activity.    Estimated Stage of Change:    Action as evidenced by the patient eating high fiber foods and decreasing CHO.    ADVISE:    Physical Activity:  I understand Julio's lost desire to exercise and he understands that exercise can help him feel better and give him more positive energy.  He states he will try to get back to the gym on three days each week because he liked the habit he was getting into and the way he was feeling.     Dietary Guidelines:  It appears that Julio's constipation is not d/t his fiber intake and could actually be more d/t his water intake.  He states that he has been drinking ~24 ounces of water daily and that his urine is usually dark yellow; I explained to him that he is likely dehydrated and the increase in fiber without the increase in water has led to constipation.  I want him to increase his water intake by 8 ounces per day until his urine is pale yellow.  I think this will lead to improvement in his constipation.    The patient has been advised of how weight management and physical activity " impacts their health and will help to reduce complications and health risk factors.      AGREE:  Visit #6 goal:   1.  Exercise as tolerated on three days each week.     2.  Increase your water intake by 8 ounces per day until your urine is pale yellow.     3.  Eat protein with all meals.    ASSIST:  Julio has decided that he would like to change up his lunch and eat salads or vegetables for lunch so I reminded him to add a small amount of protein to those meals if he can remember.  We want him to maintain his LBM during this process and consuming adequate protein is the only way to do that.  I will see him again in three weeks, after his next appointment with Dr. Richardson.    ARRANGE:     Return for follow-up in 3 weeks     The patient was assisted in making follow-up appointment per orders.

## 2018-04-16 ENCOUNTER — HOSPITAL ENCOUNTER (OUTPATIENT)
Dept: LAB | Facility: MEDICAL CENTER | Age: 83
End: 2018-04-16
Attending: NURSE PRACTITIONER
Payer: MEDICARE

## 2018-04-16 DIAGNOSIS — I10 ESSENTIAL HYPERTENSION, BENIGN: ICD-10-CM

## 2018-04-16 DIAGNOSIS — E11.43 DIABETIC AUTONOMIC NEUROPATHY ASSOCIATED WITH TYPE 2 DIABETES MELLITUS (HCC): ICD-10-CM

## 2018-04-16 LAB
ALBUMIN SERPL BCP-MCNC: 4 G/DL (ref 3.2–4.9)
ALBUMIN/GLOB SERPL: 1.3 G/DL
ALP SERPL-CCNC: 58 U/L (ref 30–99)
ALT SERPL-CCNC: 19 U/L (ref 2–50)
ANION GAP SERPL CALC-SCNC: 6 MMOL/L (ref 0–11.9)
AST SERPL-CCNC: 18 U/L (ref 12–45)
BILIRUB SERPL-MCNC: 0.9 MG/DL (ref 0.1–1.5)
BUN SERPL-MCNC: 22 MG/DL (ref 8–22)
CALCIUM SERPL-MCNC: 9.4 MG/DL (ref 8.5–10.5)
CHLORIDE SERPL-SCNC: 104 MMOL/L (ref 96–112)
CHOLEST SERPL-MCNC: 126 MG/DL (ref 100–199)
CO2 SERPL-SCNC: 28 MMOL/L (ref 20–33)
CREAT SERPL-MCNC: 1.31 MG/DL (ref 0.5–1.4)
CREAT UR-MCNC: 198.9 MG/DL
EST. AVERAGE GLUCOSE BLD GHB EST-MCNC: 154 MG/DL
GLOBULIN SER CALC-MCNC: 3 G/DL (ref 1.9–3.5)
GLUCOSE SERPL-MCNC: 157 MG/DL (ref 65–99)
HBA1C MFR BLD: 7 % (ref 0–5.6)
HDLC SERPL-MCNC: 62 MG/DL
LDLC SERPL CALC-MCNC: 49 MG/DL
MICROALBUMIN UR-MCNC: 17.4 MG/DL
MICROALBUMIN/CREAT UR: 87 MG/G (ref 0–30)
POTASSIUM SERPL-SCNC: 4.5 MMOL/L (ref 3.6–5.5)
PROT SERPL-MCNC: 7 G/DL (ref 6–8.2)
SODIUM SERPL-SCNC: 138 MMOL/L (ref 135–145)
TRIGL SERPL-MCNC: 74 MG/DL (ref 0–149)

## 2018-04-16 PROCEDURE — 80053 COMPREHEN METABOLIC PANEL: CPT

## 2018-04-16 PROCEDURE — 82043 UR ALBUMIN QUANTITATIVE: CPT

## 2018-04-16 PROCEDURE — 82570 ASSAY OF URINE CREATININE: CPT

## 2018-04-16 PROCEDURE — 36415 COLL VENOUS BLD VENIPUNCTURE: CPT

## 2018-04-16 PROCEDURE — 80061 LIPID PANEL: CPT

## 2018-04-16 PROCEDURE — 83036 HEMOGLOBIN GLYCOSYLATED A1C: CPT

## 2018-04-17 ENCOUNTER — TELEPHONE (OUTPATIENT)
Dept: MEDICAL GROUP | Facility: PHYSICIAN GROUP | Age: 83
End: 2018-04-17

## 2018-04-17 NOTE — TELEPHONE ENCOUNTER
----- Message from AMRCELO Del Rosario sent at 4/17/2018  7:24 AM PDT -----  Please call pt and give lab results: Hemoglobin A1c is improved at 7. Electrolytes, kidney, liver function, proteins are within normal limits. Lipid panel is within normal limits or HDL is 62 which is excellent. GFR which is an indicator of kidney function is decreased at 52, in combination with increasing BUN and creatinine and I am kind of concerned about her kidneys there is also a positive elevation of your microalbumin creatinine ratio. I don't see record that you're seeing a nephrologist, I would like to refer you to check in with a kidney specialist if you are not already seeing someone. Do you need a referral?

## 2018-04-17 NOTE — TELEPHONE ENCOUNTER
Phone Number Called: [phone] (home)     Message: Unable to reach pt left vm to call back regarding results.     Left Message for patient to call back: yes

## 2018-04-17 NOTE — LETTER
May 1, 2018        Julio Gaviriaparkertroyshira  [address] [place] [address]        Dear Julio  We were unable to reach you by phone. Please call us if you have questions or concerns.   Here are the results of your test(s):   ----- Message from MARCELO Del Rosario sent at 4/17/2018  7:24 AM PDT -----  Please call pt and give lab results: Hemoglobin A1c is improved at 7. Electrolytes, kidney, liver function, proteins are within normal limits. Lipid panel is within normal limits or HDL is 62 which is excellent. GFR which is an indicator of kidney function is decreased at 52, in combination with increasing BUN and creatinine and I am kind of concerned about her kidneys there is also a positive elevation of your microalbumin creatinine ratio. I don't see record that you're seeing a nephrologist, I would like to refer you to check in with a kidney specialist if you are not already seeing someone. Do you need a referral?      Sincerely,        Angie Torres  Signed Electronically

## 2018-04-25 ENCOUNTER — OFFICE VISIT (OUTPATIENT)
Dept: HEALTH INFORMATION MANAGEMENT | Facility: MEDICAL CENTER | Age: 83
End: 2018-04-25
Payer: MEDICARE

## 2018-04-25 VITALS
BODY MASS INDEX: 37.13 KG/M2 | HEIGHT: 64 IN | DIASTOLIC BLOOD PRESSURE: 70 MMHG | OXYGEN SATURATION: 93 % | HEART RATE: 60 BPM | SYSTOLIC BLOOD PRESSURE: 110 MMHG | WEIGHT: 217.5 LBS

## 2018-04-25 DIAGNOSIS — E11.9 TYPE 2 DIABETES MELLITUS WITHOUT COMPLICATION, WITH LONG-TERM CURRENT USE OF INSULIN (HCC): ICD-10-CM

## 2018-04-25 DIAGNOSIS — E66.9 OBESITY (BMI 30-39.9): ICD-10-CM

## 2018-04-25 DIAGNOSIS — E78.2 MIXED HYPERLIPIDEMIA: ICD-10-CM

## 2018-04-25 DIAGNOSIS — E55.9 VITAMIN D DEFICIENCY: ICD-10-CM

## 2018-04-25 DIAGNOSIS — Z79.4 TYPE 2 DIABETES MELLITUS WITHOUT COMPLICATION, WITH LONG-TERM CURRENT USE OF INSULIN (HCC): ICD-10-CM

## 2018-04-25 DIAGNOSIS — I10 ESSENTIAL HYPERTENSION, BENIGN: ICD-10-CM

## 2018-04-25 PROCEDURE — 99213 OFFICE O/P EST LOW 20 MIN: CPT | Performed by: INTERNAL MEDICINE

## 2018-04-25 NOTE — PROGRESS NOTES
Bariatric Medicine Follow Up  Chief Complaint   Patient presents with   • Weight Gain       History of Present Illness:   Julio Azar is a 85 y.o. male who presents for weight management follow-up and to help address co-morbidities related to overweight, including T2DM.    During the patient's last visit, the following were discussed and recommended:  Weight Goal: 3-5% wt loss each month (pt goal weight is less than 200 lb)  Diet: Encouraged one Robard meal replacement shake per day, or at least every other day at lunch consistently  Continue to significantly reduce carbohydrates as he is doing  Plate method for portion control  64+ ounces of water per day  Continue to keep food journal and bring in to subsequent visits with myself in dietitian  Physical Activity: Gym 2-3 times per week, 1.5 hours each  Risk level for moderate/vigorous exercise program: Moderate  New Rx: None.  Continue to titrate down insulin to keep blood sugars around 100  Patient will stop potassium and Lasix for one week, monitor blood pressure, remain off potassium and Lasix if systolic blood pressure less than 120/80 on 2 or more readings  Side Effects: Will review consent if applicable.  Behavior change: Mindful eating, tracking, stimulus narrowing  Follow-up: One month  RD via IBT    The patient continues to do well. He feels he is eating a reasonable diet, using the Robard snack bars in the evening regularly. He feels he is using them too often, but does like to have a snack at night before he goes to bed. He is very happy with his recent hemoglobin A1c, much improved for him. He went out for a burger and a milkshake after he got his results but continues to eat well most days.    Blood glucose averaging 152, sometimes lower. Feels this is good for him. 10 units of insulin daily along with his other medications as below.    Legs get very tired with gardening. He is not sure why.    Continues on insulin, Victoza, glipizide, metformin  "for diabetes. Blood pressure controlled on current dose of Prinivil. Continues on vitamin D repletion. On Lipitor for hypercholesterolemia.    Exercise:   Yard work     Review of Systems   Denies lightheadedness, hypoglycemia, worsening fatigue. Sleeping well.  All other ROS were reviewed and are otherwise unchanged from my previous visit with patient.    Physical Exam:    /70   Pulse 60   Ht 1.626 m (5' 4\")   Wt 98.7 kg (217 lb 8 oz)   SpO2 93%   BMI 37.33 kg/m²    Waist: 48.5 in  Body fat % 35.6  REE 1742 kcal/day    Weight change since last visit: -2 lb (-2.2 lb total)  Waist Circum change since last visit: 0 in (-2.5 in total)    Constitutional: Oriented to person, place, and time and well-developed, well-nourished, and in no distress.    Head: Normocephalic.   Musculoskeletal: Normal range of motion. No edema.   Neurological: Alert and oriented to person, place, and time. No muscle weakness.  Gait normal.   Skin: Warm and dry. Not diaphoretic.   Psychiatric: Mood, memory, affect and judgment normal.     Laboratory:   4/16/18. Fasting glucose 157, glycohemoglobin 7.0      ASSESSMENT/PLAN:  Body mass index is 37.33 kg/m².    Obesity Stage ([place]):  2; Class 2    1. Obesity (BMI 30-39.9)     2. Type 2 diabetes mellitus without complication, with long-term current use of insulin ([facility])     3. Vitamin D deficiency     4. Mixed hyperlipidemia     5. Essential hypertension, benign       The patient continues to make positive change. He has not lost much weight since starting, however his blood sugars are under much better control, hemoglobin A1c down. His skeletal muscle mass is up 3 kg as is his fat-free mass. His blood pressure is well controlled. Will continue to monitor lipids, vitamin D levels with his primary care physician. Leg fatigue may be related to polypharmacy.    The patient and I have discussed at length and agree to the following recommendations, which are all addressing the above " diagnoses:    Weight Goal: 3-5% wt loss each month (pt goal weight is 200 lb)  Diet: Continue refined carbohydrate reduction  Continue plate method for portion control  One Robard meal replacement per day  64+ ounces per day  Continue to keep food journal and bring in next visit  Physical Activity: Continue yard work, gym 2-3 times per week 1.5 hours each  Risk level for moderate/vigorous exercise program: Moderate  New Rx: None  Continue to titrate down insulin to keep blood sugars around 100  Side Effects: Will review consent if applicable.  Behavior change: Mindful eating, tracking  Follow-up: 6 weeks  RD via IBT

## 2018-05-01 NOTE — TELEPHONE ENCOUNTER
Phone Number Called: [phone] (home)     Message: Unable to reach pt left vm to call back regarding results. Sent letter to notify pt.     Left Message for patient to call back: yes

## 2018-05-02 ENCOUNTER — NON-PROVIDER VISIT (OUTPATIENT)
Dept: MEDICAL GROUP | Facility: PHYSICIAN GROUP | Age: 83
End: 2018-05-02
Payer: MEDICARE

## 2018-05-02 VITALS — HEIGHT: 64 IN | BODY MASS INDEX: 37.46 KG/M2 | WEIGHT: 219.4 LBS

## 2018-05-02 PROCEDURE — G0447 BEHAVIOR COUNSEL OBESITY 15M: HCPCS | Performed by: FAMILY MEDICINE

## 2018-05-02 NOTE — PROGRESS NOTES
"5/2/2018     Visit #6       Evelin Mayberry*      85 y.o.           Time in/Out:  041 - 450    ASSESS:    Vitals:    05/02/18 0934   Weight: 99.5 kg (219 lb 6.4 oz)   Height: 1.626 m (5' 4\")            Wt Readings from Last 2 Encounters:   05/02/18 99.5 kg (219 lb 6.4 oz)   04/25/18 98.7 kg (217 lb 8 oz)            Body mass index is 37.66 kg/m².       Difference:  - 4.2#     Starting weight:  220.6#     Difference:  - 1.2#     Current Dietary/Exercise Habits:  Julio states that he has been in the gym 2-3 days per week and is also doing much more yard work recently.  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    He is more regular now and states that his urine is pale yellow; once he started drinking enough fluid he states that his constipation resolved quickly.    Estimated Stage of Change:    Action as evidenced by the patient increasing fluid and exercising regularly.    ADVISE:    Physical Activity:  I want Julio to be active at least three days/week; this could be in the gym or it could be yard work, or a combination of both of them each week.  He likes attending the gym and I thinks he will continue going at least two days each week.       Dietary Guidelines:  No changes at this time.  Julio is eating protein with all meals/snack and is listening to his body and not forcing himself to eat nor is he eating large amounts of foods.    The patient has been advised of how weight management and physical activity impacts their health and will help to reduce complications and health risk factors.      AGREE:  Visit #7 goal:   1.  Continue to drink enough fluid to keep your urine pale yellow.     2.  Continue to eat protein with all meals.     3.  Be active on at least three days each week.    ASSIST:  Julio has put on over 3 kg of LBM and has lost ~13# of fat, per the measurements given by the bioelectrical impedance scale at Dr." Dylan's office.  This is great to know that his body composition is changing and he is happy to know this as well.  I am not asking him to make any changes at this time because I like what he has been doing so far.  I will see him again in three weeks to make sure he has stayed on track.    ARRANGE:     Return for follow-up in 3 weeks     The patient was assisted in making follow-up appointment per orders.

## 2018-05-04 NOTE — TELEPHONE ENCOUNTER
1. Caller Name: Julio Perezaguila                                           Call Back Number: [phone] (home)         Patient approves a detailed voicemail message: yes    Patient called back and wanted the referral placed.

## 2018-05-22 ENCOUNTER — APPOINTMENT (RX ONLY)
Dept: URBAN - METROPOLITAN AREA CLINIC 20 | Facility: CLINIC | Age: 83
Setting detail: DERMATOLOGY
End: 2018-05-22

## 2018-05-22 PROBLEM — D04.61 CARCINOMA IN SITU OF SKIN OF RIGHT UPPER LIMB, INCLUDING SHOULDER: Status: ACTIVE | Noted: 2018-05-22

## 2018-05-22 PROCEDURE — 17262 DSTRJ MAL LES T/A/L 1.1-2.0: CPT

## 2018-05-22 PROCEDURE — ? COUNSELING

## 2018-05-22 PROCEDURE — ? CURETTAGE AND DESTRUCTION

## 2018-05-22 NOTE — HPI: PROCEDURE (ELECTRODESSICATION AND CURETTAGE)
Has The Growth Been Previously Biopsied?: has been previously biopsied
Additional History: Accession # [id number]
Body Location Override (Optional): Rt proximal dorsal forearm

## 2018-05-22 NOTE — PROCEDURE: CURETTAGE AND DESTRUCTION
Add Intralesional Injection: No
Size Of Lesion In Cm: 1
Cautery Type: electrodesiccation
Consent was obtained from the patient. The risks, benefits and alternatives to therapy were discussed in detail. Specifically, the risks of infection, scarring, bleeding, prolonged wound healing, nerve injury, incomplete removal, allergy to anesthesia and recurrence were addressed. Alternatives to ED&C, such as: surgical removal and XRT were also discussed.  Prior to the procedure, the treatment site was clearly identified and confirmed by the patient. All components of Universal Protocol/PAUSE Rule completed.
Additional Information: (Optional): The wound was cleaned, and a pressure dressing was applied.  The patient received detailed post-op instructions.
Anesthesia Type: 1% lidocaine with 1:100,000 epinephrine and a 1:10 solution of 8.4% sodium bicarbonate
Post-Care Instructions: I reviewed with the patient in detail post-care instructions. Patient is to keep the area dry for 48 hours, and not to engage in any swimming until the area is healed. Should the patient develop any fevers, chills, bleeding, severe pain patient will contact the office immediately.
Detail Level: Detailed
Anesthesia Volume In Cc: 1.5
Number Of Curettages: 3
Size Of Lesion After Curettage: 1.6
Bill As A Line Item Or As Units: Line Item
What Was Performed First?: Curettage

## 2018-05-23 ENCOUNTER — NON-PROVIDER VISIT (OUTPATIENT)
Dept: MEDICAL GROUP | Facility: PHYSICIAN GROUP | Age: 83
End: 2018-05-23
Payer: MEDICARE

## 2018-05-23 VITALS — BODY MASS INDEX: 37.76 KG/M2 | HEIGHT: 64 IN | WEIGHT: 221.2 LBS

## 2018-05-23 PROCEDURE — G0447 BEHAVIOR COUNSEL OBESITY 15M: HCPCS | Performed by: FAMILY MEDICINE

## 2018-05-23 NOTE — PROGRESS NOTES
"5/23/2018     Visit #7       Evelin Mayberry*      85 y.o.           Time in/Out:  042 - 979    ASSESS:    Vitals:    05/23/18 0942   Weight: 100.3 kg (221 lb 3.2 oz)   Height: 1.626 m (5' 4\")            Wt Readings from Last 2 Encounters:   05/23/18 100.3 kg (221 lb 3.2 oz)   05/02/18 99.5 kg (219 lb 6.4 oz)            Body mass index is 37.97 kg/m².       Difference:  + 1.8#     Starting weight:  220.6#     Difference:  + 0.6#     Current Dietary/Exercise Habits:  Julio has not been to the gym in ~1 week and has been doing yard work instead.  He will be heading up to his trailer and doing a lot of work up there this weekend to get it ready for weekly camping trips this summer.    He reports that he is constipated again and is drinking adequate water; he questions whether or not he has been eating enough vegetables.    Estimated Stage of Change:    Preparation as evidenced by the patient stating he needs to eat more vegetables.    ADVISE:    Physical Activity:  Julio will go back to the gym or do his yard work at least three days each week.  He has a plan in his calendar to go to the gym on Monday-Wednesday and then be out at his trailer Thursday-Sunday, starting in a few weeks.       Dietary Guidelines:  I reviewed Julio's food logs and it does appear that he is not eating enough vegetables; to rectify this, we set a goal to eat at least 1 cup of non-starchy vegetables with all dinners as well as a handful of baby carrots with every lunch.  He likes carrots and states that they will be an easy addition to his lunches.    The patient has been advised of how weight management and physical activity impacts their health and will help to reduce complications and health risk factors.      AGREE:  Visit #8 goal:   1.  Eat at least one cup of vegetables with all dinners.     2.  Eat a handful of baby carrots (or another non-starchy vegetable) with all lunches.     3.  Continue to eat protein with all meals.     4.  " Exercise as tolerated on at least 2-3 days/week.    ASSIST:  I am eager to see Julio's body composition to see if he is still putting muscle on as well as to check his hydration status.  I think the addition of more non-starchy vegetables will be a good thing for his weight, his bowel regularity, and his overall health.  We will assess these things at next appointment.    ARRANGE:     Return for follow-up in 3 weeks     The patient was assisted in making follow-up appointment per orders.

## 2018-05-30 ENCOUNTER — HOSPITAL ENCOUNTER (OUTPATIENT)
Dept: LAB | Facility: MEDICAL CENTER | Age: 83
End: 2018-05-30
Attending: INTERNAL MEDICINE
Payer: MEDICARE

## 2018-05-30 LAB
ALBUMIN SERPL BCP-MCNC: 3.9 G/DL (ref 3.2–4.9)
ALBUMIN/GLOB SERPL: 1.3 G/DL
ALP SERPL-CCNC: 69 U/L (ref 30–99)
ALT SERPL-CCNC: 29 U/L (ref 2–50)
ANION GAP SERPL CALC-SCNC: 8 MMOL/L (ref 0–11.9)
AST SERPL-CCNC: 31 U/L (ref 12–45)
BILIRUB SERPL-MCNC: 0.8 MG/DL (ref 0.1–1.5)
BUN SERPL-MCNC: 30 MG/DL (ref 8–22)
CALCIUM SERPL-MCNC: 9.1 MG/DL (ref 8.5–10.5)
CHLORIDE SERPL-SCNC: 106 MMOL/L (ref 96–112)
CO2 SERPL-SCNC: 20 MMOL/L (ref 20–33)
CREAT SERPL-MCNC: 1.06 MG/DL (ref 0.5–1.4)
EST. AVERAGE GLUCOSE BLD GHB EST-MCNC: 163 MG/DL
GLOBULIN SER CALC-MCNC: 3 G/DL (ref 1.9–3.5)
GLUCOSE SERPL-MCNC: 156 MG/DL (ref 65–99)
HBA1C MFR BLD: 7.3 % (ref 0–5.6)
POTASSIUM SERPL-SCNC: 5 MMOL/L (ref 3.6–5.5)
PROT SERPL-MCNC: 6.9 G/DL (ref 6–8.2)
SODIUM SERPL-SCNC: 134 MMOL/L (ref 135–145)

## 2018-05-30 PROCEDURE — 36415 COLL VENOUS BLD VENIPUNCTURE: CPT

## 2018-05-30 PROCEDURE — 80053 COMPREHEN METABOLIC PANEL: CPT

## 2018-05-30 PROCEDURE — 83036 HEMOGLOBIN GLYCOSYLATED A1C: CPT

## 2018-06-06 ENCOUNTER — OFFICE VISIT (OUTPATIENT)
Dept: HEALTH INFORMATION MANAGEMENT | Facility: MEDICAL CENTER | Age: 83
End: 2018-06-06
Payer: MEDICARE

## 2018-06-06 VITALS
HEART RATE: 60 BPM | WEIGHT: 217.5 LBS | OXYGEN SATURATION: 93 % | BODY MASS INDEX: 37.13 KG/M2 | HEIGHT: 64 IN | DIASTOLIC BLOOD PRESSURE: 58 MMHG | SYSTOLIC BLOOD PRESSURE: 116 MMHG

## 2018-06-06 DIAGNOSIS — I10 ESSENTIAL HYPERTENSION, BENIGN: ICD-10-CM

## 2018-06-06 DIAGNOSIS — R53.83 OTHER FATIGUE: ICD-10-CM

## 2018-06-06 DIAGNOSIS — E66.9 OBESITY (BMI 30-39.9): ICD-10-CM

## 2018-06-06 DIAGNOSIS — E78.2 MIXED HYPERLIPIDEMIA: ICD-10-CM

## 2018-06-06 DIAGNOSIS — E55.9 VITAMIN D DEFICIENCY: ICD-10-CM

## 2018-06-06 PROCEDURE — 99213 OFFICE O/P EST LOW 20 MIN: CPT | Performed by: INTERNAL MEDICINE

## 2018-06-06 NOTE — PROGRESS NOTES
Bariatric Medicine Follow Up  Chief Complaint   Patient presents with   • Weight Gain       History of Present Illness:   Julio Azar is a 85 y.o. male who presents for weight management follow-up and to help address co-morbidities related to overweight, including type 2 diabetes mellitus, hyperlipidemia.    During the patient's last visit, the following were discussed and recommended:  Weight Goal: 3-5% wt loss each month (pt goal weight is 200 lb)  Diet: Continue refined carbohydrate reduction  Continue plate method for portion control  One Robard meal replacement per day  64+ ounces per day  Continue to keep food journal and bring in next visit  Physical Activity: Continue yard work, gym 2-3 times per week 1.5 hours each  Risk level for moderate/vigorous exercise program: Moderate  New Rx: None  Continue to titrate down insulin to keep blood sugars around 100  Side Effects: Will review consent if applicable.  Behavior change: Mindful eating, tracking  Follow-up: 6 weeks  RD via IBT    Has been getting very fatigued, not exercising much in the last 2 weeks.  Felt short of breath, low energy.    The patient was recently diagnosed with atrial fib, disappointed that he has a new ailment.  He thought he was doing well.  He is feeling depressed about having atrial fibrillation.  He will be getting a Holter monitor placed later today, will be meeting with cardiology.    Eating either toast with jam and coffee for breakfast or berries with cottage cheese or yogurt.  Lunch is usually a meal replacement shake, sometimes has a protein with vegetable.  Dinner also includes a protein with vegetable.  Has cheese as a snack, trying to drink 64 ounces water daily.    Blood glucose running 60s on 10 units of insulin.  Still also on Victoza, glipizide.  Blood pressure low normal on Prinivil, Lasix.  Less lower extremity edema, but still present.    Exercise:   None recently.     Review of Systems   Fatigue, shortness of  "breath, low energy level in the last 2 weeks.  All other ROS were reviewed and are otherwise unchanged from my previous visit with patient.    Physical Exam:    /58   Pulse 60   Ht 1.626 m (5' 4\")   Wt 98.7 kg (217 lb 8 oz)   SpO2 93%   BMI 37.33 kg/m²   Waist: 47.5 in  Body fat % 34.2  REE 1744 kcal/day    Weight change since last visit:  0 lb (-2 lb total)  Waist Circum change since last visit: -1 in (-3.5 in total)    Constitutional: Oriented to person, place, and time and well-developed, well-nourished, and in no distress.    Head: Normocephalic.   Cardiovascular: Normal rate, irregularly irregular.    Pulmonary/Chest: Effort normal and breath sounds normal. No wheezes.   Abdominal: Soft. Bowel sounds are normal.   Musculoskeletal: Normal range of motion.  1+ pitting edema bilateral ankles.   Neurological: Alert and oriented to person, place, and time. No muscle weakness.  Gait normal.   Skin: Warm and dry. Not diaphoretic.   Psychiatric: Memory, and judgment normal. Appears somewhat sad, blunted affect.    Laboratory:   Recent labs reviewed.      ASSESSMENT/PLAN:  Body mass index is 37.33 kg/m².    Obesity Stage ([place]): 2; Class 2    1. Obesity (BMI 30-39.9)     2. Diabetes mellitus due to underlying condition, uncontrolled, with other specified complication, with long-term current use of insulin ([facility])     3. Essential hypertension, benign     4. Mixed hyperlipidemia     5. Vitamin D deficiency     6. Other fatigue       Fatigue may be related to hypoglycemia.  Patient also being evaluated for atrial fibrillation, started recently on Eliquis.  Has continued to lose weight, therefore insulin requirements may need to be reduced significantly.  With weight loss, may need to have electrolytes rechecked, including magnesium.  Pt to discuss with Cardiology.    The patient and I have discussed at length and agree to the following recommendations, which are all addressing the above diagnoses:    Weight " Goal: 3-5% wt loss each month (pt goal weight is 200 lb)  Diet: 1 Robard meal replacement soup per day  Try skipping toast with jam for breakfast, instead have eggs with avocado or yogurt/cottage cheese with berries as he is doing other mornings  Continue to record intake, bring into dietitian visit in 2 weeks  Physical Activity: Patient to discuss with cardiology this afternoon what physical activity he can undertake  Risk level for moderate/vigorous exercise program: Moderate  New Rx: None  Reduce insulin from 10 units to 5 units.  Discontinue insulin if blood glucose less than 80 consistently.  Side Effects: Will review consent if applicable.  Follow-up: One month  Patient to discuss with cardiology whether magnesium, potassium should be checked.  IBT 2 weeks      Patient's body mass index is 37.33 kg/m². Exercise and nutrition counseling were performed at this visit.

## 2018-06-07 ENCOUNTER — HOSPITAL ENCOUNTER (OUTPATIENT)
Dept: LAB | Facility: MEDICAL CENTER | Age: 83
End: 2018-06-07
Attending: PHYSICIAN ASSISTANT
Payer: MEDICARE

## 2018-06-07 LAB — PSA SERPL-MCNC: 0.17 NG/ML (ref 0–4)

## 2018-06-07 PROCEDURE — 36415 COLL VENOUS BLD VENIPUNCTURE: CPT

## 2018-06-07 PROCEDURE — 84153 ASSAY OF PSA TOTAL: CPT

## 2018-06-13 ENCOUNTER — NON-PROVIDER VISIT (OUTPATIENT)
Dept: MEDICAL GROUP | Facility: PHYSICIAN GROUP | Age: 83
End: 2018-06-13
Payer: MEDICARE

## 2018-06-13 VITALS — WEIGHT: 217 LBS | HEIGHT: 64 IN | BODY MASS INDEX: 37.05 KG/M2

## 2018-06-13 PROCEDURE — G0447 BEHAVIOR COUNSEL OBESITY 15M: HCPCS | Performed by: FAMILY MEDICINE

## 2018-06-13 NOTE — PROGRESS NOTES
"6/13/2018     Visit #8       Evelin Mayberry*      85 y.o.           Time in/Out:  850 - 238    ASSESS:    Vitals:    06/13/18 0910   Weight: 98.4 kg (217 lb)   Height: 1.626 m (5' 4\")            Wt Readings from Last 2 Encounters:   06/13/18 98.4 kg (217 lb)   06/06/18 98.7 kg (217 lb 8 oz)            Body mass index is 37.25 kg/m².       Difference:  - 4.2#     Starting weight:  220.6#     Difference:  - 3.6#     Current Dietary/Exercise Habits:  Julio has adjusted his breakfast to eat more protein and less CHO by eating cottage cheese with fruit instead of bread with butter.  He prefers cottage cheese to plain yogurt and would like to continue this practice.  He has cut back on his insulin to 5 units and his most recent fasting BG was 139 mg/dL, per patient's log.      He has increased fatigue and SOB, likely d/t his new dx of a-fib.  He is hoping to get this taken care of soon so he can start feeling better and can exercise once again.    Estimated Stage of Change:    Action as evidenced by the patient consuming less CHO in the morning.    ADVISE:    Physical Activity:  I do not want Julio to feel like he has to exercise if he does not feel able to do it.  Instead, I want him to be active, as tolerated, with chores around the house as he typically has been doing.  He sees his cardiologist in a few weeks and can talk to him about his exercise at that time.     Dietary Guidelines:  I love Julio's new breakfasts and he is going to continue eating the cottage cheese with fruit or eggs (a few days week) for his breakfasts.  He is also eating small lunches of vegetables and proteins that satisfy him until dinner, which surprises him that he does not need much food to be satisfied.    The patient has been advised of how weight management and physical activity impacts their health and will help to reduce complications and health risk factors.      AGREE:  Visit #9 goal:   1.  Be active as tolerated until you can " "talk to your cardiologist.     2.  Continue your current eating habits.    ASSIST:  Julio is very happy that he has lost weight on the scale in this office and knows he has lost a lot of weight overall.  When looking at his vitals from his first visit with Dr. Richardson until his appointment last week he has lost a total of 18# of fat since starting in her program and 3.5\" off of his waist, which he can feel in his pants that do not stay up without suspenders.      He is getting close to a place where insulin will not be necessary and weight loss will be easier, which he is excited about.  I think that he will continue losing fat and building muscle over the next month and we will reassess changes to be made at next appointment.    ARRANGE:     Return for follow-up in 1 month     The patient was assisted in making follow-up appointment per orders.  "

## 2018-07-03 ENCOUNTER — OFFICE VISIT (OUTPATIENT)
Dept: HEALTH INFORMATION MANAGEMENT | Facility: MEDICAL CENTER | Age: 83
End: 2018-07-03
Payer: MEDICARE

## 2018-07-03 VITALS
OXYGEN SATURATION: 95 % | DIASTOLIC BLOOD PRESSURE: 70 MMHG | HEART RATE: 60 BPM | SYSTOLIC BLOOD PRESSURE: 114 MMHG | HEIGHT: 64 IN | BODY MASS INDEX: 36.07 KG/M2 | WEIGHT: 211.3 LBS

## 2018-07-03 DIAGNOSIS — E55.9 VITAMIN D DEFICIENCY: ICD-10-CM

## 2018-07-03 DIAGNOSIS — E78.2 MIXED HYPERLIPIDEMIA: ICD-10-CM

## 2018-07-03 DIAGNOSIS — E66.9 OBESITY (BMI 30-39.9): ICD-10-CM

## 2018-07-03 DIAGNOSIS — Z79.4 TYPE 2 DIABETES MELLITUS WITH HYPERGLYCEMIA, WITH LONG-TERM CURRENT USE OF INSULIN (HCC): ICD-10-CM

## 2018-07-03 DIAGNOSIS — E11.65 TYPE 2 DIABETES MELLITUS WITH HYPERGLYCEMIA, WITH LONG-TERM CURRENT USE OF INSULIN (HCC): ICD-10-CM

## 2018-07-03 PROCEDURE — 99214 OFFICE O/P EST MOD 30 MIN: CPT | Performed by: INTERNAL MEDICINE

## 2018-07-03 RX ORDER — APIXABAN 5 MG/1
TABLET, FILM COATED ORAL
COMMUNITY
Start: 2018-05-31 | End: 2018-12-18

## 2018-07-03 NOTE — PROGRESS NOTES
Bariatric Medicine Follow Up  Chief Complaint   Patient presents with   • Weight Gain       History of Present Illness:   Julio Azar is a 85 y.o. male who presents for weight management follow-up and to help address co-morbidities related to overweight, including T2DM, HTN.    During the patient's last visit, the following were discussed and recommended:  Weight Goal: 3-5% wt loss each month (pt goal weight is 200 lb)  Diet: 1 Robard meal replacement soup per day  Try skipping toast with jam for breakfast, instead have eggs with avocado or yogurt/cottage cheese with berries as he is doing other mornings  Continue to record intake, bring into dietitian visit in 2 weeks  Physical Activity: Patient to discuss with cardiology this afternoon what physical activity he can undertake  Risk level for moderate/vigorous exercise program: Moderate  New Rx: None  Reduce insulin from 10 units to 5 units.  Discontinue insulin if blood glucose less than 80 consistently.  Side Effects: Will review consent if applicable.  Follow-up: One month  Patient to discuss with cardiology whether magnesium, potassium should be checked.  IBT 2 weeks    Will be getting cardioversion, looking forward to not needing additional cardiac medications if possible.  FSBG 160s rarely, mostly 120s fasting.  Insulin dosing way down, now at 5 units.    Stopped having toast for breakfast.  Breakfast is usually berries with cottage cheese, having MR bars in afternoon, high protein/low carb lunch and dinner, usually small amt of each.  Robard meal replacement soup for some meals during the week, usually for lunch per    Feeling less disappointed about his atrial fibrillation, looking forward to increasing his exercise again.  Happy he is able to get out, uses trailer regularly.    Blood pressure well controlled on Prinivil.  Continues vitamin D repletion.  On metformin, glipizide for blood glucose control, insulin dosing low now.  Continues on  "statin.    Exercise:   Walking as tolerated     Review of Systems   Denies hypoglycemia, lightheadedness or extreme fatigue as at last visit.  All other ROS were reviewed and are otherwise unchanged from my previous visit with patient.    Physical Exam:    /70   Pulse 60   Ht 1.626 m (5' 4\")   Wt 95.8 kg (211 lb 4.8 oz)   SpO2 95%   BMI 36.27 kg/m²   Waist: 46.7 in  Body fat % 36.6  REE 1710 kcal/day    Weight change since last visit: -6 lb (-8 lb total)  Waist Circum change since last visit: -1 in (-5 in total)    Constitutional: Oriented to person, place, and time and well-developed, well-nourished, and in no distress.    Head: Normocephalic.   Musculoskeletal: Normal range of motion. No edema.   Neurological: Alert and oriented to person, place, and time. No muscle weakness.  Gait normal.   Skin: Warm and dry. Not diaphoretic.   Psychiatric: Mood, memory, affect and judgment normal.     Laboratory:   None new.      ASSESSMENT/PLAN:  Body mass index is 36.27 kg/m².    Obesity Stage ([place]): 2; Class 2    1. Obesity (BMI 30-39.9)     2. Mixed hyperlipidemia     3. Vitamin D deficiency     4. Type 2 diabetes mellitus with hyperglycemia, with long-term current use of insulin (HCC)       The patient continues to lose weight slowly, has increased weight loss likely with reduction of insulin requirements and lower blood glucose.  Has reduce carbohydrates more, which is helping.  Also being treated for atrial fibrillation, fatigue has improved and anxiety less.  More active.  Continue statin, vitamin D repletion, reduce insulin as needed and continue metformin as above.  Continue  stimulus narrowing with protein MR.    The patient and I have discussed at length and agree to the following recommendations, which are all addressing the above diagnoses:    Weight Goal: 3-5% wt loss each month (pt goal weight is 200 lb), close to weight loss goal  Diet: High Protein/Low Carb Meals and 2 snacks between meals " daily  Continue eliminating toast with jam for breakfast  >100 g protein, <100 g total carbs daily  64+ oz water per day  Avoid sweet drinks and sodas  Track daily intake  Physical Activity: Increase treadmill walking as tolerated  Risk level for moderate/vigorous exercise program: Moderate  New Rx: None  Behavior change: Mindful eating, tracking  Follow-up: 2 months    Patient's body mass index is 36.27 kg/m². Exercise and nutrition counseling were performed at this visit.

## 2018-07-11 ENCOUNTER — NON-PROVIDER VISIT (OUTPATIENT)
Dept: MEDICAL GROUP | Facility: PHYSICIAN GROUP | Age: 83
End: 2018-07-11
Payer: MEDICARE

## 2018-07-11 VITALS — BODY MASS INDEX: 36.52 KG/M2 | WEIGHT: 213.9 LBS | HEIGHT: 64 IN

## 2018-07-11 PROCEDURE — G0447 BEHAVIOR COUNSEL OBESITY 15M: HCPCS | Performed by: FAMILY MEDICINE

## 2018-07-11 NOTE — PROGRESS NOTES
"7/11/2018     Visit #9       Evelin Mayberry*      86 y.o.           Time in/Out:  901 - 916    ASSESS:    Vitals:    07/11/18 1228   Weight: 97 kg (213 lb 14.4 oz)   Height: 1.626 m (5' 4\")            Wt Readings from Last 2 Encounters:   07/11/18 97 kg (213 lb 14.4 oz)   07/03/18 95.8 kg (211 lb 4.8 oz)            Body mass index is 36.72 kg/m².       Difference:  - 3.1#     Starting weight:  220.6#     Difference:  - 6.7#     Current Dietary/Exercise Habits:  Julio is no longer taking his insulin and his fasting BG's have ranged  mg/dL in the last week.  He is happy to know that his BG's are under control without insulin and will continue to check his BG's prn, per patient.    He is eating cottage cheese and berries on most mornings now because he likes strawberries and loves the cottage cheese.  He still occasionally will eat plain yogurt or eggs with breakfast in the place of cottage cheese.    He is feeling better and his MD has told him that he can walk prior to his procedure for his heart on 7/13. He is doing some light walking because he is feeling so much better.  He is hoping to be able to return to exercise very soon after his procedure.    Estimated Stage of Change:    Action as evidenced by the patient eating protein with all breakfasts.    ADVISE:    Physical Activity:  I want Julio to exercise, as tolerated, according to what his MD tells him he can do after his procedure.     Dietary Guidelines:  I like what Julio is currently doing, although he does still have some meals without CHO, like a big salad for dinner.  I reminded him that he needs to eat protein with all meals/snacks and he states that he has some hard-boiled eggs in his refrigerator that he can add to a salad now.    The patient has been advised of how weight management and physical activity impacts their health and will help to reduce complications and health risk factors.      AGREE:  Visit #10 goal:   1.  Check BG's at " least every other day.     2.  Stay hydrated with the excessive heat we are experiencing.     3.  Protein with all meals/snacks.     4.  Exercise as tolerated, per MD recommendations.    ASSIST:  Julio is progressing well, and I think making sure he is always eating protein will be important for him to minimize LBM loss.  I love that he is feeling better, his BG's are under control without insulin, and he seems more confident today.  His next appointment with Dr. Richardson is not until September so I will see him every other week until mid August (six month janet of IBT program).    ARRANGE:     Return for follow-up in 2 weeks     The patient was assisted in making follow-up appointment per orders.

## 2018-07-12 ENCOUNTER — APPOINTMENT (OUTPATIENT)
Dept: ADMISSIONS | Facility: MEDICAL CENTER | Age: 83
End: 2018-07-12
Attending: INTERNAL MEDICINE
Payer: MEDICARE

## 2018-07-13 ENCOUNTER — HOSPITAL ENCOUNTER (OUTPATIENT)
Facility: MEDICAL CENTER | Age: 83
End: 2018-07-13
Attending: INTERNAL MEDICINE | Admitting: INTERNAL MEDICINE
Payer: MEDICARE

## 2018-07-13 VITALS
TEMPERATURE: 97.2 F | HEIGHT: 64 IN | BODY MASS INDEX: 36.19 KG/M2 | HEART RATE: 61 BPM | WEIGHT: 212 LBS | OXYGEN SATURATION: 98 % | DIASTOLIC BLOOD PRESSURE: 64 MMHG | SYSTOLIC BLOOD PRESSURE: 126 MMHG | RESPIRATION RATE: 18 BRPM

## 2018-07-13 LAB
EKG IMPRESSION: NORMAL
EKG IMPRESSION: NORMAL

## 2018-07-13 PROCEDURE — 99152 MOD SED SAME PHYS/QHP 5/>YRS: CPT

## 2018-07-13 PROCEDURE — 93005 ELECTROCARDIOGRAM TRACING: CPT | Performed by: INTERNAL MEDICINE

## 2018-07-13 PROCEDURE — 92960 CARDIOVERSION ELECTRIC EXT: CPT

## 2018-07-13 PROCEDURE — 93010 ELECTROCARDIOGRAM REPORT: CPT | Mod: 76 | Performed by: INTERNAL MEDICINE

## 2018-07-13 PROCEDURE — 160002 HCHG RECOVERY MINUTES (STAT)

## 2018-07-13 PROCEDURE — 304952 HCHG R 2 PADS

## 2018-07-13 PROCEDURE — 700111 HCHG RX REV CODE 636 W/ 250 OVERRIDE (IP)

## 2018-07-13 RX ORDER — SODIUM CHLORIDE 9 MG/ML
INJECTION, SOLUTION INTRAVENOUS CONTINUOUS
Status: DISCONTINUED | OUTPATIENT
Start: 2018-07-13 | End: 2018-07-13 | Stop reason: HOSPADM

## 2018-07-13 RX ORDER — MIDAZOLAM HYDROCHLORIDE 1 MG/ML
INJECTION INTRAMUSCULAR; INTRAVENOUS
Status: COMPLETED
Start: 2018-07-13 | End: 2018-07-13

## 2018-07-13 RX ADMIN — FENTANYL CITRATE 50 MCG: 50 INJECTION, SOLUTION INTRAMUSCULAR; INTRAVENOUS at 11:53

## 2018-07-13 RX ADMIN — MIDAZOLAM 4 MG: 1 INJECTION INTRAMUSCULAR; INTRAVENOUS at 11:54

## 2018-07-13 ASSESSMENT — PAIN SCALES - GENERAL: PAINLEVEL_OUTOF10: 0

## 2018-07-13 NOTE — OR NURSING
1226 patient arrived from cath lab s/p cardioversion, patient wide awake, currently a.fib 70s will call and notify the doctor after post cardioversion EKG, vss, no c/o pain, s.o.b,   1245 called and notified Dr albarran of post cardiversion EKG results faxed to doctors office per Dr albarran will call this RN for any new orders after he reviews the ekg strip, will continue to monitor the patient.  1302 ok to discharge patient per Dr albarran  1327 discharge instructions given to patient, patient verbalize understanding of the orders, iv discontinued tip intact, patient not in any distress.

## 2018-07-13 NOTE — OP REPORT
DATE OF SERVICE:  07/13/2018    PROCEDURE PERFORMED:  Electrical cardioversion.    PREOPERATIVE DIAGNOSIS:  Atrial fibrillation.    POSTOPERATIVE DIAGNOSIS:  Sinus rhythm.    HISTORY:  The patient is an 86-year-old gentleman whom I have followed for   many years.  He underwent coronary artery bypass graft surgery in 2004.  He   has hypertension, dyslipidemia, and insulin-dependent diabetes mellitus.  He   was noted to go into atrial fibrillation within the past 2 months.  He was   appropriately anticoagulated and medicated and following appropriate   anticoagulation, was brought for direct cardioversion.    DESCRIPTION OF PROCEDURE:  After informed consent was obtained, the patient   was brought to the outpatient area at [facility].  Anterior and   posterior pads were placed.  An intravenous line was started.  He was given 4   mg of intravenous Versed and 50 of intravenous fentanyl.  He was counter   shocked using 200 joules of direct bipolar biphasic countershock.  He was   noted immediately reverted to sinus rhythm.  There were no complications.    ESTIMATED BLOOD LOSS:  None.    The patient tolerated the procedure well.  He is now starting to respond.  He   will follow up with us in our office.       ____________________________________     MD FRANKO BARBOUR / RICCARDO    DD:  07/13/2018 12:03:36  DT:  07/13/2018 12:12:00    D#:  [id number]  Job#:  [id number]

## 2018-07-13 NOTE — DISCHARGE INSTRUCTIONS
ACTIVITY: Rest and take it easy for the first 24 hours.  A responsible adult is recommended to remain with you during that time.  It is normal to feel sleepy.  We encourage you to not do anything that requires balance, judgment or coordination.    MILD FLU-LIKE SYMPTOMS ARE NORMAL. YOU MAY EXPERIENCE GENERALIZED MUSCLE ACHES, THROAT IRRITATION, HEADACHE AND/OR SOME NAUSEA.    FOR 24 HOURS DO NOT:  Drive, operate machinery or run household appliances.  Drink beer or alcoholic beverages.   Make important decisions or sign legal documents.    SPECIAL INSTRUCTIONS: follow up with primary care physician as needed  Resume your home medications   If you experience chest pain, shortness of breath call 911 return to ER  Follow up with your cardiologist    DIET: To avoid nausea, slowly advance diet as tolerated, avoiding spicy or greasy foods for the first day.  Add more substantial food to your diet according to your physician's instructions.  Babies can be fed formula or breast milk as soon as they are hungry.  INCREASE FLUIDS AND FIBER TO AVOID CONSTIPATION.    SURGICAL DRESSING/BATHING: none     FOLLOW-UP APPOINTMENT:  A follow-up appointment should be arranged with your doctor in [phone]; call to schedule.    You should CALL YOUR PHYSICIAN if you develop:  Fever greater than 101 degrees F.  Pain not relieved by medication, or persistent nausea or vomiting.  Excessive bleeding (blood soaking through dressing) or unexpected drainage from the wound.  Extreme redness or swelling around the incision site, drainage of pus or foul smelling drainage.  Inability to urinate or empty your bladder within 8 hours.  Problems with breathing or chest pain.    You should call 911 if you develop problems with breathing or chest pain.  If you are unable to contact your doctor or surgical center, you should go to the nearest emergency room or urgent care center.  Physician's telephone #: [phone]    If any questions arise, call your  doctor.  If your doctor is not available, please feel free to call the Surgical Center at [phone].  The Center is open Monday through Friday from 7AM to 7PM.  You can also call the HEALTH HOTLINE open 24 hours/day, 7 days/week and speak to a nurse at [phone], or toll free at [phone].    A registered nurse may call you a few days after your surgery to see how you are doing after your procedure.    MEDICATIONS: Resume taking daily medication.  Take prescribed pain medication with food.  If no medication is prescribed, you may take non-aspirin pain medication if needed.  PAIN MEDICATION CAN BE VERY CONSTIPATING.  Take a stool softener or laxative such as senokot, pericolace, or milk of magnesia if needed.  Electrical Cardioversion  Electrical cardioversion is the delivery of a jolt of electricity to restore a normal rhythm to the heart. A rhythm that is too fast or is not regular keeps the heart from pumping well. In this procedure, sticky patches or metal paddles are placed on the chest to deliver electricity to the heart from a device.  This procedure may be done in an emergency if:  · There is low or no blood pressure as a result of the heart rhythm.  · Normal rhythm must be restored as fast as possible to protect the brain and heart from further damage.  · It may save a life.  This procedure may also be done for irregular or fast heart rhythms that are not immediately life-threatening.  Tell a health care provider about:  · Any allergies you have.  · All medicines you are taking, including vitamins, herbs, eye drops, creams, and over-the-counter medicines.  · Any problems you or family members have had with anesthetic medicines.  · Any blood disorders you have.  · Any surgeries you have had.  · Any medical conditions you have.  · Whether you are pregnant or may be pregnant.  What are the risks?  Generally, this is a safe procedure. However, problems may occur, including:  · Allergic reactions to  medicines.  · A blood clot that breaks free and travels to other parts of your body.  · The possible return of an abnormal heart rhythm within hours or days after the procedure.  · Your heart stopping (cardiac arrest ). This is rare.  What happens before the procedure?  Medicines  · Your health care provider may have you start taking:  ¨ Blood-thinning medicines (anticoagulants) so your blood does not clot as easily.  ¨ Medicines may be given to help stabilize your heart rate and rhythm.  · Ask your health care provider about changing or stopping your regular medicines. This is especially important if you are taking diabetes medicines or blood thinners.  General instructions  · Plan to have someone take you home from the hospital or clinic.  · If you will be going home right after the procedure, plan to have someone with you for 24 hours.  · Follow instructions from your health care provider about eating or drinking restrictions.  What happens during the procedure?  · To lower your risk of infection:  ¨ Your health care team will wash or sanitize their hands.  ¨ Your skin will be washed with soap.  · An IV tube will be inserted into one of your veins.  · You will be given a medicine to help you relax (sedative).  · Sticky patches (electrodes) or metal paddles may be placed on your chest.  · An electrical shock will be delivered.  The procedure may vary among health care providers and hospitals.  What happens after the procedure?  · Your blood pressure, heart rate, breathing rate, and blood oxygen level will be monitored until the medicines you were given have worn off.  · Do not drive for 24 hours if you were given a sedative.  · Your heart rhythm will be watched to make sure it does not change.  This information is not intended to replace advice given to you by your health care provider. Make sure you discuss any questions you have with your health care provider.  Document Released: 12/08/2003 Document Revised:  08/16/2017 Document Reviewed: 06/23/2017  [employer] Interactive Patient Education © 2017 [employer]      If your physician has prescribed pain medication that includes Acetaminophen (Tylenol), do not take additional Acetaminophen (Tylenol) while taking the prescribed medication.    Depression / Suicide Risk    As you are discharged from this [facility] facility, it is important to learn how to keep safe from harming yourself.    Recognize the warning signs:  · Abrupt changes in personality, positive or negative- including increase in energy   · Giving away possessions  · Change in eating patterns- significant weight changes-  positive or negative  · Change in sleeping patterns- unable to sleep or sleeping all the time   · Unwillingness or inability to communicate  · Depression  · Unusual sadness, discouragement and loneliness  · Talk of wanting to die  · Neglect of personal appearance   · Rebelliousness- reckless behavior  · Withdrawal from people/activities they love  · Confusion- inability to concentrate     If you or a loved one observes any of these behaviors or has concerns about self-harm, here's what you can do:  · Talk about it- your feelings and reasons for harming yourself  · Remove any means that you might use to hurt yourself (examples: pills, rope, extension cords, firearm)  · Get professional help from the community (Mental Health, Substance Abuse, psychological counseling)  · Do not be alone:Call your Safe Contact- someone whom you trust who will be there for you.  · Call your local CRISIS HOTLINE 133-2328 or 035-211-3602  · Call your local Children's Mobile Crisis Response Team [place] (104) 914-3226 or www.[employer]  · Call the toll free National Suicide Prevention Hotlines   · National Suicide Prevention Lifeline 939-995-OQJQ (0782)  · National Hope Line Network 800-SUICIDE (005-3602)

## 2018-08-06 ENCOUNTER — APPOINTMENT (RX ONLY)
Dept: URBAN - METROPOLITAN AREA CLINIC 20 | Facility: CLINIC | Age: 83
Setting detail: DERMATOLOGY
End: 2018-08-06

## 2018-08-06 DIAGNOSIS — L57.8 OTHER SKIN CHANGES DUE TO CHRONIC EXPOSURE TO NONIONIZING RADIATION: ICD-10-CM

## 2018-08-06 DIAGNOSIS — L57.0 ACTINIC KERATOSIS: ICD-10-CM

## 2018-08-06 DIAGNOSIS — Z86.007 PERSONAL HISTORY OF IN-SITU NEOPLASM OF SKIN: ICD-10-CM | Status: RESOLVED

## 2018-08-06 PROBLEM — Z85.828 PERSONAL HISTORY OF OTHER MALIGNANT NEOPLASM OF SKIN: Status: ACTIVE | Noted: 2018-08-06

## 2018-08-06 PROCEDURE — 99213 OFFICE O/P EST LOW 20 MIN: CPT | Mod: 25

## 2018-08-06 PROCEDURE — 17000 DESTRUCT PREMALG LESION: CPT

## 2018-08-06 PROCEDURE — ? LIQUID NITROGEN

## 2018-08-06 PROCEDURE — 17003 DESTRUCT PREMALG LES 2-14: CPT

## 2018-08-06 PROCEDURE — ? COUNSELING

## 2018-08-06 ASSESSMENT — LOCATION DETAILED DESCRIPTION DERM
LOCATION DETAILED: LEFT SUPERIOR FOREHEAD
LOCATION DETAILED: LEFT MEDIAL ZYGOMA
LOCATION DETAILED: RIGHT PROXIMAL DORSAL FOREARM
LOCATION DETAILED: LEFT VENTRAL LATERAL PROXIMAL FOREARM
LOCATION DETAILED: LEFT LATERAL FOREHEAD
LOCATION DETAILED: LEFT INFERIOR MEDIAL MALAR CHEEK
LOCATION DETAILED: LEFT CENTRAL ZYGOMA
LOCATION DETAILED: LEFT INFERIOR FOREHEAD

## 2018-08-06 ASSESSMENT — LOCATION SIMPLE DESCRIPTION DERM
LOCATION SIMPLE: RIGHT FOREARM
LOCATION SIMPLE: LEFT FOREHEAD
LOCATION SIMPLE: LEFT CHEEK
LOCATION SIMPLE: LEFT ZYGOMA
LOCATION SIMPLE: LEFT FOREARM

## 2018-08-06 ASSESSMENT — LOCATION ZONE DERM
LOCATION ZONE: FACE
LOCATION ZONE: ARM

## 2018-09-05 ENCOUNTER — OFFICE VISIT (OUTPATIENT)
Dept: HEALTH INFORMATION MANAGEMENT | Facility: MEDICAL CENTER | Age: 83
End: 2018-09-05
Payer: MEDICARE

## 2018-09-05 VITALS
BODY MASS INDEX: 35.1 KG/M2 | OXYGEN SATURATION: 93 % | WEIGHT: 205.6 LBS | HEART RATE: 70 BPM | DIASTOLIC BLOOD PRESSURE: 54 MMHG | SYSTOLIC BLOOD PRESSURE: 110 MMHG | HEIGHT: 64 IN

## 2018-09-05 DIAGNOSIS — E55.9 VITAMIN D DEFICIENCY: ICD-10-CM

## 2018-09-05 DIAGNOSIS — I10 ESSENTIAL HYPERTENSION, BENIGN: ICD-10-CM

## 2018-09-05 DIAGNOSIS — E66.9 OBESITY (BMI 30-39.9): ICD-10-CM

## 2018-09-05 DIAGNOSIS — E11.59 TYPE 2 DIABETES MELLITUS WITH OTHER CIRCULATORY COMPLICATION, WITHOUT LONG-TERM CURRENT USE OF INSULIN (HCC): ICD-10-CM

## 2018-09-05 DIAGNOSIS — E78.2 MIXED HYPERLIPIDEMIA: ICD-10-CM

## 2018-09-05 DIAGNOSIS — R53.83 OTHER FATIGUE: ICD-10-CM

## 2018-09-05 PROCEDURE — 99214 OFFICE O/P EST MOD 30 MIN: CPT | Performed by: INTERNAL MEDICINE

## 2018-09-05 NOTE — PROGRESS NOTES
Bariatric Medicine Follow Up  Chief Complaint   Patient presents with   • Weight Gain   • Hyperglycemia       History of Present Illness:   Julio Azar is a 86 y.o. male who presents for weight management follow-up and to help address co-morbidities related to overweight, including HLD, VDD, T2 DM.    During the patient's last visit, the following were discussed and recommended:  Weight Goal: 3-5% wt loss each month (pt goal weight is 200 lb), close to weight loss goal  Diet: High Protein/Low Carb Meals and 2 snacks between meals daily  Continue eliminating toast with jam for breakfast  >100 g protein, <100 g total carbs daily  64+ oz water per day  Avoid sweet drinks and sodas  Track daily intake  Physical Activity: Increase treadmill walking as tolerated  Risk level for moderate/vigorous exercise program: Moderate  New Rx: None  Behavior change: Mindful eating, tracking  Follow-up: 2 months    The patient really feels good.  His 19-year-old grandson has moved in with him, so he feels less lonely, less depressed.  He has been eating well, is losing more weight.  He usually has yogurt with fruit for breakfast, a small lunch such as fruit with some cold cuts, and a late dinner.      Still keeping a written journal.  He finds this very helpful to keep himself accountable, would like to continue using it.  He will be eating some meals that his grandson chooses, may be altering his diet somewhat in the coming months.  Not using meal replacement shakes.  Occasionally uses Robard tortilla soup meal replacement.    Blood sugars continue to improve.  He is usually running in the 120s-140s off insulin now!  He has not used insulin in the last 1-2 weeks.  He is hoping to also get off Victoza, finds it very expensive now that his insurance is not covering it and is hoping he will not needed if he continues with weight loss and reduce CHO intake.  His blood glucose this morning was 170 after eating chocolate covered  "peanuts last night.    Continues on vitamin D repletion.  On metformin, glipizide, liraglutide for T2 DM.  Continues on statin.  On Eliquis for about 2 more months.  No recurrence of his atrial fibrillation, well controlled.  Continues on diuretic, lisinopril for hypertension.    Exercise:   Has not been going to the gym.  Wants to start walking with his grandson, hopes to begin walking as the weather cools off in the next 1-2 weeks.     Review of Systems   Denies lightheadedness, hypoglycemia, palpitations, diaphoresis.  All other ROS were reviewed and are otherwise unchanged from my previous visit with patient.    Physical Exam:    /54   Pulse 70   Ht 1.626 m (5' 4\")   Wt 93.3 kg (205 lb 9.6 oz)   SpO2 93%   BMI 35.29 kg/m²   Waist: 48 in  Body fat % 36.8  REE 1680 kcal/day    Weight change since last visit: -6 lb (-14 lb total)  Waist Circum change since last visit: +1 in (-3 in total)    Constitutional: Oriented to person, place, and time and well-developed, well-nourished, and in no distress.    Head: Normocephalic.   Musculoskeletal: Normal range of motion. No edema.   Neurological: Alert and oriented to person, place, and time. No muscle weakness.  Gait normal.   Skin: Warm and dry. Not diaphoretic.   Psychiatric: Mood, memory, affect and judgment normal.     Laboratory:   None new.      Dietitian Assessment: I have reviewed the Dietitian's assessment related to this encounter.       ASSESSMENT/PLAN:  Body mass index is 35.29 kg/m².    Obesity Stage ([place]): 2; Class 2    1. Obesity (BMI 30-39.9)     2. Essential hypertension, benign     3. Mixed hyperlipidemia     4. Vitamin D deficiency     5. Other fatigue     6. Type 2 diabetes mellitus with other circulatory complication, without long-term current use of insulin (HCC)       The patient continues to make steady progress with weight loss.  His waist circumference, fasting blood glucose today is up slightly, with some dietary indiscretions, " but overall his blood glucoses much improved, and his waist circumference is still down from starting the program 9 months ago.  Blood pressure low normal but patient asymptomatic.  We will continue to monitor blood glucose, lipids, vitamin D with further weight loss.  May need to reduce antihypertensive dosing or reduce diuretic.    The patient and I have discussed at length and agree to the following recommendations, which are all addressing the above diagnoses:    Weight Goal: 3-5% wt loss each month (pt goal weight is 200 lb)  Diet: High Protein/Low Carb Meals and 2 snacks between meals daily  Robard tortilla meal replacement soup once per week on average  >100 g protein, <100 g total carbs daily  64+ oz water per day  Avoid sweet drinks and sodas  Track daily intake in written form, as he is doing  Physical Activity: Encourage returning to the gym, walking to maintain wt loss and control BGs  Risk level for moderate/vigorous exercise program: Low moderate  New Rx: None.  Reduce Lasix next visit if blood pressure still low normal.  Patient hopes to discontinue Victoza soon.  Behavior change: Tracking, mindful eating, planning.  Follow-up: One month    Patient's body mass index is 35.29 kg/m². Exercise and nutrition counseling were performed at this visit.

## 2018-09-06 RX ORDER — GLIPIZIDE 5 MG/1
5 TABLET ORAL 2 TIMES DAILY
Qty: 180 TAB | Refills: 3 | Status: SHIPPED | OUTPATIENT
Start: 2018-09-06 | End: 2019-04-18 | Stop reason: SDUPTHER

## 2018-09-12 ENCOUNTER — TELEPHONE (OUTPATIENT)
Dept: MEDICAL GROUP | Facility: PHYSICIAN GROUP | Age: 83
End: 2018-09-12

## 2018-09-12 NOTE — TELEPHONE ENCOUNTER
ESTABLISHED PATIENT PRE-VISIT PLANNING     Note: Patient will not be contacted if there is no indication to call.     1.  Reviewed notes from the last few office visits within the medical group: Yes    2.  If any orders were placed at last visit or intended to be done for this visit (i.e. 6 mos follow-up), do we have Results/Consult Notes?        •  Labs - Labs ordered, completed on 04/16/18 and results are in chart.   Note: If patient appointment is for lab review and patient did not complete labs, check with provider if OK to reschedule patient until labs completed.       •  Imaging - Imaging was not ordered at last office visit.       •  Referrals - Referral ordered, patient was seen and consult notes are in chart. Care Teams updated  YES.    3. Is this appointment scheduled as a Hospital Follow-Up? No    4.  Immunizations were updated in Epic using WebIZ?: Epic matches WebIZ       •  Web Iz Recommendations: FLU, TD and ZOSTAVAX (Shingles)    5.  Patient is due for the following Health Maintenance Topics:   Health Maintenance Due   Topic Date Due   • IMM HEP B VACCINE (1 of 3 - Risk 3-dose series) 07/06/1951   • IMM ZOSTER VACCINES (2 of 3) 01/17/2010   • IMM INFLUENZA (1) 09/01/2018       - Patient has completed FLU, PNEUMOVAX (PPSV23), PREVNAR (PCV13) , TD, TDAP and SHINGRIX (Shingles) Immunization(s) per WebIZ. Chart has been updated.    6.  MDX printed for Provider? NO    7.  Patient was NOT informed to arrive 15 min prior to their scheduled appointment and bring in their medication bottles.

## 2018-09-13 ENCOUNTER — OFFICE VISIT (OUTPATIENT)
Dept: MEDICAL GROUP | Facility: PHYSICIAN GROUP | Age: 83
End: 2018-09-13
Payer: MEDICARE

## 2018-09-13 VITALS
SYSTOLIC BLOOD PRESSURE: 104 MMHG | RESPIRATION RATE: 16 BRPM | DIASTOLIC BLOOD PRESSURE: 52 MMHG | HEART RATE: 68 BPM | WEIGHT: 207 LBS | OXYGEN SATURATION: 94 % | BODY MASS INDEX: 35.34 KG/M2 | HEIGHT: 64 IN | TEMPERATURE: 97.4 F

## 2018-09-13 DIAGNOSIS — Z23 NEED FOR VACCINATION: ICD-10-CM

## 2018-09-13 DIAGNOSIS — E78.2 MIXED HYPERLIPIDEMIA: ICD-10-CM

## 2018-09-13 DIAGNOSIS — E11.59 TYPE 2 DIABETES MELLITUS WITH OTHER CIRCULATORY COMPLICATION, WITHOUT LONG-TERM CURRENT USE OF INSULIN (HCC): ICD-10-CM

## 2018-09-13 DIAGNOSIS — E11.43 DIABETIC AUTONOMIC NEUROPATHY ASSOCIATED WITH TYPE 2 DIABETES MELLITUS (HCC): ICD-10-CM

## 2018-09-13 DIAGNOSIS — I10 ESSENTIAL HYPERTENSION, BENIGN: ICD-10-CM

## 2018-09-13 LAB
HBA1C MFR BLD: 8.1 % (ref ?–5.8)
INT CON NEG: NEGATIVE
INT CON POS: POSITIVE

## 2018-09-13 PROCEDURE — 83036 HEMOGLOBIN GLYCOSYLATED A1C: CPT | Performed by: NURSE PRACTITIONER

## 2018-09-13 PROCEDURE — 90662 IIV NO PRSV INCREASED AG IM: CPT | Performed by: NURSE PRACTITIONER

## 2018-09-13 PROCEDURE — 99214 OFFICE O/P EST MOD 30 MIN: CPT | Mod: 25 | Performed by: NURSE PRACTITIONER

## 2018-09-13 PROCEDURE — G0008 ADMIN INFLUENZA VIRUS VAC: HCPCS | Performed by: NURSE PRACTITIONER

## 2018-09-13 RX ORDER — METOPROLOL SUCCINATE 25 MG/1
25 TABLET, EXTENDED RELEASE ORAL DAILY
COMMUNITY
Start: 2018-08-27 | End: 2020-02-19

## 2018-09-13 RX ORDER — HYDROCHLOROTHIAZIDE 12.5 MG/1
1 TABLET ORAL DAILY
Status: ON HOLD | COMMUNITY
Start: 2018-08-27 | End: 2020-03-07

## 2018-09-13 ASSESSMENT — PAIN SCALES - GENERAL: PAINLEVEL: NO PAIN

## 2018-09-13 NOTE — ASSESSMENT & PLAN NOTE
Chronic in nature.  Stable.  Patient has been taking Lantus 5 units states that he has not been taking it every night.  Discussed with patient hemoglobin A1c today is 8.1 which is up from 7.3.  Patient states that he will start taking Lantus daily.  He will continue Metformin and glipizide.  He denies numbness or tingling in his feet.  Will continue follow-up with endocrinology.

## 2018-09-13 NOTE — PROGRESS NOTES
Chief Complaint   Patient presents with   • Diabetes       HISTORY OF PRESENT ILLNESS: Patient is a 86 y.o. male established patient who presents today to discuss type 2 diabetes.    Type 2 diabetes mellitus with circulatory disorder, without long-term current use of insulin ([facility])  Chronic in nature.  Stable.  Patient has been taking Lantus 5 units states that he has not been taking it every night.  Discussed with patient hemoglobin A1c today is 8.1 which is up from 7.3.  Patient states that he will start taking Lantus daily.  He will continue Metformin and glipizide.  He denies numbness or tingling in his feet.  Will continue follow-up with endocrinology.    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 (CMS-HCC)  Chronic in nature. Stable. Sees [facility] for podiatry.     Essential hypertension, benign  Chronic in nature. Stable. Blood pressure 104/52.  He denies chest pain, palpitations, dizziness, shortness of breath, headaches, managed by Dr. Kc.    Mixed hyperlipidemia  Chronic in nature. Stable. Will monitor.       Patient Active Problem List    Diagnosis Date Noted   • Other fatigue 06/06/2018   • Primary pulmonary hypertension (HCC) 03/02/2018   • History of prostate cancer 12/05/2017   • Obesity (BMI 30-39.9) 12/05/2017   • 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 (HCC) 09/07/2017   • Senile ectropion 12/31/2014   • CAD (coronary artery disease), native coronary artery 03/31/2014   • Essential hypertension, benign 03/31/2014   • Type 2 diabetes mellitus with circulatory disorder, without long-term current use of insulin ([facility]) 09/18/2013   • Encounter for long-term (current) use of insulin ([facility]) 09/18/2013   • Vitamin D deficiency 09/18/2013   • Mixed hyperlipidemia 09/18/2013       Allergies:Pcn [penicillins] and Latex    Current Outpatient Prescriptions   Medication Sig Dispense Refill   • glipiZIDE (GLUCOTROL) 5 MG Tab Take 1 Tab by mouth 2 times a  day. 180 Tab 3   • ELIQUIS 5 MG Tab      • atorvastatin (LIPITOR) 80 MG tablet Take 80 mg by mouth every day.     • FREESTYLE LITE strip 1 Strip by Other route every day.     • tamsulosin (FLOMAX) 0.4 MG capsule TAKE ONE CAPSULE BY MOUTH TWO TIMES A  Cap 2   • lisinopril (PRINIVIL) 2.5 MG Tab Take 1 Tab by mouth every day. 90 Tab 3   • insulin glargine (LANTUS SOLOSTAR) 100 UNIT/ML Solution Pen-injector injection Inject 10 Units as instructed every evening. 30 mL 3   • liraglutide (VICTOZA) 18 MG/3ML Solution Pen-injector injection Inject 0.6 mg as instructed every day.     • vitamin D (CHOLECALCIFEROL) 1000 UNIT Tab Take 1,000 Units by mouth every day.     • sildenafil citrate (VIAGRA) 50 MG tablet Take 1 Tab by mouth as needed for Erectile Dysfunction. 10 Tab 3   • potassium chloride SA (K-DUR) 20 MEQ Tab CR Take 20 mEq by mouth every day.     • metformin (GLUCOPHAGE) 500 MG Tab TAKE 2 TABLETS BY MOUTH TWICE DAILY WITHMEALS 360 Tab 3   • B-D ULTRAFINE III SHORT PEN 31G X 8 MM MISC USE 3 TIMES DAILY 100 Each 11   • Lancets MISC His new meter uses the Accu-Chek SoftClix lancets for 3 time daily testing.  Dx. Code 250.02 300 Each 3   • Blood Glucose Monitoring Suppl SUPPLIES MISC Accucheck Yaneth blood glucose test strips, checking blood sugar 2 daily  .02 insulin requiring. 200 Strip 3   • B Complex Vitamins (VITAMIN B COMPLEX PO) Take  by mouth every day.     • Cholecalciferol (VITAMIN D) 2000 UNITS CAPS Take  by mouth every day.     • hydroCHLOROthiazide (HYDRODIURIL) 12.5 MG tablet Take 1 mg by mouth every day.     • metoprolol SR (TOPROL XL) 25 MG TABLET SR 24 HR Take 25 mg by mouth every day.       No current facility-administered medications for this visit.        Social History   Substance Use Topics   • Smoking status: Former Smoker     Packs/day: 2.00     Years: 34.00     Types: Cigarettes     Quit date: 1/1/1982   • Smokeless tobacco: Never Used   • Alcohol use 4.8 - 9.0 oz/week     7 - 14  "Glasses of wine, 1 Cans of beer per week      Comment: 2 per day       Family Status   Relation Status   • Mo  at age 91   • Fa  at age 87   • Bro  at age 72   • Bro  at age 50   • Son Alive   • Farooq Alive   • MGMo    • MGFa    • PGMo    • PGFa      Family History   Problem Relation Age of Onset   • Heart Disease Mother    • Heart Attack Mother    • Asthma Mother    • Heart Attack Father    • Diabetes Father    • Psychiatry Brother    • Heart Disease Brother         5 way bypass   • Diabetes Brother    • Hyperlipidemia Brother    • Heart Attack Brother    • Psychiatry Brother    • Stroke Brother    • Heart Disease Brother         pacemaker       Review of Systems:   Constitutional:  Negative for fever, chills, weight loss and malaise/fatigue.   HEENT:  Negative for ear pain, nosebleeds, congestion, sore throat and neck pain.    Eyes:  Negative for blurred vision.   Respiratory:  Negative for cough, sputum production, shortness of breath and wheezing.    Cardiovascular:  Negative for chest pain, palpitations, orthopnea and leg swelling.   Gastrointestinal:  Negative for heartburn, nausea, vomiting and abdominal pain.   Genitourinary:  Negative for dysuria, urgency and frequency.   Musculoskeletal:  Negative for myalgias, back pain and joint pain.   Skin:  Negative for rash and itching.   Neurological:  Negative for dizziness, tingling, tremors, sensory change, focal weakness and headaches.   Endo/Heme/Allergies:  Does not bruise/bleed easily.   Psychiatric/Behavioral:  Negative for depression, suicidal ideas and memory loss.  The patient is not nervous/anxious and does not have insomnia.    All other systems reviewed and are negative except as in HPI.    Exam:  Blood pressure 104/52, pulse 68, temperature 36.3 °C (97.4 °F), resp. rate 16, height 1.626 m (5' 4\"), weight 93.9 kg (207 lb), SpO2 94 %.  General:  Normal appearing. No distress.  Pulmonary:  " Clear to ausculation.  Normal effort. No rales, ronchi, or wheezing.  Cardiovascular:  Regular rate and rhythm without murmur. Carotid and radial pulses are intact and equal bilaterally.  Abdomen:  Soft, nontender, nondistended. Normal bowel sounds. Liver and spleen are not palpable  Neurologic:  Grossly nonfocal  Skin:  Warm and dry.  No obvious lesions.  Musculoskeletal:  Normal gait. No extremity cyanosis, clubbing, or edema.  Psych:  Normal mood and affect. Alert and oriented x3. Judgment and insight is normal.      PLAN:    1. Type 2 diabetes mellitus with other circulatory complication, without long-term current use of insulin (HCC)  3. 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 (HCC)  He will continue to take current medications, will start taking Lantus daily.  Will follow up with Dr. Baig  - COMP METABOLIC PANEL; Future  - LIPID PROFILE; Future  - HEMOGLOBIN A1C; Future  - POCT Hemoglobin A1C    2. Essential hypertension, benign  The pressure is 104/52.  Patient will continue current medications.  - CBC WITH DIFFERENTIAL; Future  - COMP METABOLIC PANEL; Future  - LIPID PROFILE; Future    4. Need for vaccination  - INFLUENZA VACCINE, HIGH DOSE (65+ ONLY)    5. Mixed hyperlipidemia  Continue current management.    Follow-up in 3 months or sooner as needed. Patient is encouraged to be seen in the emergency room for chest pain, palpitations, shortness of breath, dizziness, severe abdominal pain or other concerning symptoms.    Please note that this dictation was created using voice recognition software. I have made every reasonable attempt to correct obvious errors, but I expect that there are errors of grammar and possibly content that I did not discover before finalizing the note.    Assessment/Plan:  1. Type 2 diabetes mellitus with other circulatory complication, without long-term current use of insulin (HCC)  COMP METABOLIC PANEL    LIPID PROFILE    HEMOGLOBIN A1C    POCT  Hemoglobin A1C   2. Essential hypertension, benign  CBC WITH DIFFERENTIAL    COMP METABOLIC PANEL    LIPID PROFILE   3. 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 (HCC)     4. Need for vaccination  INFLUENZA VACCINE, HIGH DOSE (65+ ONLY)   5. Mixed hyperlipidemia            I have placed the below orders and discussed them with an approved delegating provider. The MA is performing the below orders under the direction of Dr. Rose.

## 2018-09-13 NOTE — ASSESSMENT & PLAN NOTE
Chronic in nature. Stable. Blood pressure 104/52.  He denies chest pain, palpitations, dizziness, shortness of breath, headaches, managed by Dr. Kc.

## 2018-10-08 ENCOUNTER — APPOINTMENT (RX ONLY)
Dept: URBAN - METROPOLITAN AREA CLINIC 20 | Facility: CLINIC | Age: 83
Setting detail: DERMATOLOGY
End: 2018-10-08

## 2018-10-08 DIAGNOSIS — L57.0 ACTINIC KERATOSIS: ICD-10-CM

## 2018-10-08 PROCEDURE — ? ADDITIONAL NOTES

## 2018-10-08 PROCEDURE — ? COUNSELING

## 2018-10-08 PROCEDURE — 99213 OFFICE O/P EST LOW 20 MIN: CPT | Mod: 25

## 2018-10-08 PROCEDURE — ? PRESCRIPTION

## 2018-10-08 PROCEDURE — 17000 DESTRUCT PREMALG LESION: CPT

## 2018-10-08 PROCEDURE — ? LIQUID NITROGEN

## 2018-10-08 PROCEDURE — 17003 DESTRUCT PREMALG LES 2-14: CPT

## 2018-10-08 RX ORDER — FLUOROURACIL 50 MG/G
CREAM TOPICAL
Qty: 1 | Refills: 0 | Status: ERX

## 2018-10-08 ASSESSMENT — LOCATION SIMPLE DESCRIPTION DERM
LOCATION SIMPLE: LEFT SCALP
LOCATION SIMPLE: RIGHT FOREHEAD
LOCATION SIMPLE: LEFT FOREHEAD
LOCATION SIMPLE: LEFT NOSE

## 2018-10-08 ASSESSMENT — LOCATION ZONE DERM
LOCATION ZONE: FACE
LOCATION ZONE: SCALP
LOCATION ZONE: NOSE

## 2018-10-08 ASSESSMENT — LOCATION DETAILED DESCRIPTION DERM
LOCATION DETAILED: LEFT MEDIAL FRONTAL SCALP
LOCATION DETAILED: LEFT CENTRAL FRONTAL SCALP
LOCATION DETAILED: LEFT NASAL SIDEWALL
LOCATION DETAILED: RIGHT LATERAL FOREHEAD
LOCATION DETAILED: LEFT SUPERIOR FOREHEAD

## 2018-10-08 NOTE — PROCEDURE: ADDITIONAL NOTES
Detail Level: Simple
Additional Notes: Start 5FU to scalp and face in 1 week\\nApply twice a day for 2 weeks (AM and PM)\\nRTC in end November to recheck areas.

## 2018-10-22 ENCOUNTER — HOSPITAL ENCOUNTER (OUTPATIENT)
Dept: LAB | Facility: MEDICAL CENTER | Age: 83
End: 2018-10-22
Attending: NURSE PRACTITIONER
Payer: MEDICARE

## 2018-10-22 DIAGNOSIS — E11.59 TYPE 2 DIABETES MELLITUS WITH OTHER CIRCULATORY COMPLICATION, WITHOUT LONG-TERM CURRENT USE OF INSULIN (HCC): ICD-10-CM

## 2018-10-22 DIAGNOSIS — I10 ESSENTIAL HYPERTENSION, BENIGN: ICD-10-CM

## 2018-10-22 LAB
ALBUMIN SERPL BCP-MCNC: 4.1 G/DL (ref 3.2–4.9)
ALBUMIN/GLOB SERPL: 1.2 G/DL
ALP SERPL-CCNC: 74 U/L (ref 30–99)
ALT SERPL-CCNC: 15 U/L (ref 2–50)
ANION GAP SERPL CALC-SCNC: 7 MMOL/L (ref 0–11.9)
AST SERPL-CCNC: 20 U/L (ref 12–45)
BASOPHILS # BLD AUTO: 0.8 % (ref 0–1.8)
BASOPHILS # BLD: 0.04 K/UL (ref 0–0.12)
BILIRUB SERPL-MCNC: 1.3 MG/DL (ref 0.1–1.5)
BUN SERPL-MCNC: 32 MG/DL (ref 8–22)
CALCIUM SERPL-MCNC: 10.1 MG/DL (ref 8.5–10.5)
CHLORIDE SERPL-SCNC: 102 MMOL/L (ref 96–112)
CHOLEST SERPL-MCNC: 160 MG/DL (ref 100–199)
CO2 SERPL-SCNC: 26 MMOL/L (ref 20–33)
CREAT SERPL-MCNC: 1.19 MG/DL (ref 0.5–1.4)
EOSINOPHIL # BLD AUTO: 0.01 K/UL (ref 0–0.51)
EOSINOPHIL NFR BLD: 0.2 % (ref 0–6.9)
ERYTHROCYTE [DISTWIDTH] IN BLOOD BY AUTOMATED COUNT: 58.6 FL (ref 35.9–50)
EST. AVERAGE GLUCOSE BLD GHB EST-MCNC: 200 MG/DL
FASTING STATUS PATIENT QL REPORTED: NORMAL
GLOBULIN SER CALC-MCNC: 3.4 G/DL (ref 1.9–3.5)
GLUCOSE SERPL-MCNC: 175 MG/DL (ref 65–99)
HBA1C MFR BLD: 8.6 % (ref 0–5.6)
HCT VFR BLD AUTO: 52.4 % (ref 42–52)
HDLC SERPL-MCNC: 68 MG/DL
HGB BLD-MCNC: 16.9 G/DL (ref 14–18)
IMM GRANULOCYTES # BLD AUTO: 0.01 K/UL (ref 0–0.11)
IMM GRANULOCYTES NFR BLD AUTO: 0.2 % (ref 0–0.9)
LDLC SERPL CALC-MCNC: 74 MG/DL
LYMPHOCYTES # BLD AUTO: 2.05 K/UL (ref 1–4.8)
LYMPHOCYTES NFR BLD: 38.5 % (ref 22–41)
MCH RBC QN AUTO: 30.2 PG (ref 27–33)
MCHC RBC AUTO-ENTMCNC: 32.3 G/DL (ref 33.7–35.3)
MCV RBC AUTO: 93.7 FL (ref 81.4–97.8)
MONOCYTES # BLD AUTO: 0.49 K/UL (ref 0–0.85)
MONOCYTES NFR BLD AUTO: 9.2 % (ref 0–13.4)
NEUTROPHILS # BLD AUTO: 2.72 K/UL (ref 1.82–7.42)
NEUTROPHILS NFR BLD: 51.1 % (ref 44–72)
NRBC # BLD AUTO: 0 K/UL
NRBC BLD-RTO: 0 /100 WBC
PLATELET # BLD AUTO: 131 K/UL (ref 164–446)
PMV BLD AUTO: 11.4 FL (ref 9–12.9)
POTASSIUM SERPL-SCNC: 4.8 MMOL/L (ref 3.6–5.5)
PROT SERPL-MCNC: 7.5 G/DL (ref 6–8.2)
RBC # BLD AUTO: 5.59 M/UL (ref 4.7–6.1)
SODIUM SERPL-SCNC: 135 MMOL/L (ref 135–145)
TRIGL SERPL-MCNC: 88 MG/DL (ref 0–149)
WBC # BLD AUTO: 5.3 K/UL (ref 4.8–10.8)

## 2018-10-22 PROCEDURE — 80061 LIPID PANEL: CPT

## 2018-10-22 PROCEDURE — 36415 COLL VENOUS BLD VENIPUNCTURE: CPT

## 2018-10-22 PROCEDURE — 83036 HEMOGLOBIN GLYCOSYLATED A1C: CPT

## 2018-10-22 PROCEDURE — 80053 COMPREHEN METABOLIC PANEL: CPT

## 2018-10-22 PROCEDURE — 85025 COMPLETE CBC W/AUTO DIFF WBC: CPT

## 2018-10-23 ENCOUNTER — TELEPHONE (OUTPATIENT)
Dept: MEDICAL GROUP | Facility: PHYSICIAN GROUP | Age: 83
End: 2018-10-23

## 2018-10-23 DIAGNOSIS — R79.9 ELEVATED BUN: ICD-10-CM

## 2018-10-23 DIAGNOSIS — R79.89 ABNORMAL CBC: ICD-10-CM

## 2018-10-23 NOTE — TELEPHONE ENCOUNTER
----- Message from MARCELO Del Rosario sent at 10/23/2018  8:14 AM PDT -----  Please call pt and give lab results: Hemoglobin A1c is more elevated at 8.6, I do notice that this was completed early as it has not bee 3 months since the last A1c. I recommend cutting down on sweets and carbs, and increasing vegetables in your diet. We can discuss adjusting medication at your follow-up.  BUN is elevated at 32 this is an indicator of kidney function, but may also be related to protein intake, or dehydration, I also notice that your GFR is slightly decreased at 58.  Cholesterol is 100% within normal limits.  Which has been in the past and your RDW is elevated at 58.6 which could indicate a vitamin B12 deficiency I will order some follow-up labs for you to complete prior to your appointment in December. These labs will not be fasting.

## 2018-12-05 ENCOUNTER — HOSPITAL ENCOUNTER (OUTPATIENT)
Dept: LAB | Facility: MEDICAL CENTER | Age: 83
End: 2018-12-05
Attending: NURSE PRACTITIONER
Payer: MEDICARE

## 2018-12-05 DIAGNOSIS — R79.89 ABNORMAL CBC: ICD-10-CM

## 2018-12-05 DIAGNOSIS — R79.9 ELEVATED BUN: ICD-10-CM

## 2018-12-05 LAB
ANION GAP SERPL CALC-SCNC: 8 MMOL/L (ref 0–11.9)
BUN SERPL-MCNC: 27 MG/DL (ref 8–22)
CALCIUM SERPL-MCNC: 9.7 MG/DL (ref 8.5–10.5)
CHLORIDE SERPL-SCNC: 98 MMOL/L (ref 96–112)
CO2 SERPL-SCNC: 28 MMOL/L (ref 20–33)
CREAT SERPL-MCNC: 1.14 MG/DL (ref 0.5–1.4)
FASTING STATUS PATIENT QL REPORTED: NORMAL
FOLATE SERPL-MCNC: 11.8 NG/ML
GLUCOSE SERPL-MCNC: 276 MG/DL (ref 65–99)
POTASSIUM SERPL-SCNC: 4.2 MMOL/L (ref 3.6–5.5)
SODIUM SERPL-SCNC: 134 MMOL/L (ref 135–145)
VIT B12 SERPL-MCNC: 1101 PG/ML (ref 211–911)

## 2018-12-05 PROCEDURE — 82607 VITAMIN B-12: CPT

## 2018-12-05 PROCEDURE — 82746 ASSAY OF FOLIC ACID SERUM: CPT

## 2018-12-05 PROCEDURE — 80048 BASIC METABOLIC PNL TOTAL CA: CPT

## 2018-12-05 PROCEDURE — 36415 COLL VENOUS BLD VENIPUNCTURE: CPT

## 2018-12-06 ENCOUNTER — APPOINTMENT (RX ONLY)
Dept: URBAN - METROPOLITAN AREA CLINIC 20 | Facility: CLINIC | Age: 83
Setting detail: DERMATOLOGY
End: 2018-12-06

## 2018-12-06 DIAGNOSIS — L57.0 ACTINIC KERATOSIS: ICD-10-CM

## 2018-12-06 PROCEDURE — ? LIQUID NITROGEN

## 2018-12-06 PROCEDURE — 99213 OFFICE O/P EST LOW 20 MIN: CPT | Mod: 25

## 2018-12-06 PROCEDURE — 17003 DESTRUCT PREMALG LES 2-14: CPT

## 2018-12-06 PROCEDURE — 17000 DESTRUCT PREMALG LESION: CPT

## 2018-12-06 PROCEDURE — ? COUNSELING

## 2018-12-06 ASSESSMENT — LOCATION SIMPLE DESCRIPTION DERM
LOCATION SIMPLE: RIGHT EAR
LOCATION SIMPLE: LEFT FOREHEAD
LOCATION SIMPLE: NOSE
LOCATION SIMPLE: SCALP
LOCATION SIMPLE: LEFT TEMPLE
LOCATION SIMPLE: LEFT SCALP
LOCATION SIMPLE: LEFT CHEEK
LOCATION SIMPLE: RIGHT FOREHEAD

## 2018-12-06 ASSESSMENT — LOCATION DETAILED DESCRIPTION DERM
LOCATION DETAILED: LEFT SUPERIOR MEDIAL FOREHEAD
LOCATION DETAILED: LEFT SUPERIOR PREAURICULAR CHEEK
LOCATION DETAILED: NASAL DORSUM
LOCATION DETAILED: LEFT LATERAL TEMPLE
LOCATION DETAILED: RIGHT SUPERIOR HELIX
LOCATION DETAILED: LEFT CENTRAL PARIETAL SCALP
LOCATION DETAILED: LEFT SUPERIOR PARIETAL SCALP
LOCATION DETAILED: RIGHT SUPERIOR FOREHEAD
LOCATION DETAILED: LEFT MEDIAL FRONTAL SCALP

## 2018-12-06 ASSESSMENT — LOCATION ZONE DERM
LOCATION ZONE: SCALP
LOCATION ZONE: FACE
LOCATION ZONE: EAR
LOCATION ZONE: NOSE

## 2018-12-17 ENCOUNTER — TELEPHONE (OUTPATIENT)
Dept: MEDICAL GROUP | Facility: PHYSICIAN GROUP | Age: 83
End: 2018-12-17

## 2018-12-17 NOTE — TELEPHONE ENCOUNTER
ESTABLISHED PATIENT PRE-VISIT PLANNING     Patient was NOT contacted to complete PVP.     Note: Patient will not be contacted if there is no indication to call.     1.  Reviewed notes from the last few office visits within the medical group: Yes    2.  If any orders were placed at last visit or intended to be done for this visit (i.e. 6 mos follow-up), do we have Results/Consult Notes?        •  Labs - Labs ordered, completed on 12/05/18 and results are in chart.   Note: If patient appointment is for lab review and patient did not complete labs, check with provider if OK to reschedule patient until labs completed.       •  Imaging - Imaging was not ordered at last office visit.       •  Referrals - No referrals were ordered at last office visit.    3. Is this appointment scheduled as a Hospital Follow-Up? No    4.  Immunizations were updated in Epic using WebIZ?: Epic matches WebIZ       •  Web Iz Recommendations: TD and SHINGRIX (Shingles)    5.  Patient is due for the following Health Maintenance Topics:   Health Maintenance Due   Topic Date Due   • IMM HEP B VACCINE (1 of 3 - Risk 3-dose series) 07/06/1951   • IMM ZOSTER VACCINES (2 of 3) 01/17/2010   • RETINAL SCREENING  10/16/2018       - Patient has completed FLU, PNEUMOVAX (PPSV23), PREVNAR (PCV13) , TDAP and SHINGRIX (Shingles) Immunization(s) per WebIZ. Chart has been updated.    6.  MDX printed for Provider? NO    7.  Patient was NOT informed to arrive 15 min prior to their scheduled appointment and bring in their medication bottles.

## 2018-12-18 ENCOUNTER — OFFICE VISIT (OUTPATIENT)
Dept: MEDICAL GROUP | Facility: PHYSICIAN GROUP | Age: 83
End: 2018-12-18
Payer: MEDICARE

## 2018-12-18 VITALS
DIASTOLIC BLOOD PRESSURE: 76 MMHG | HEIGHT: 64 IN | WEIGHT: 212.4 LBS | HEART RATE: 80 BPM | TEMPERATURE: 98.1 F | RESPIRATION RATE: 16 BRPM | SYSTOLIC BLOOD PRESSURE: 124 MMHG | BODY MASS INDEX: 36.26 KG/M2 | OXYGEN SATURATION: 91 %

## 2018-12-18 DIAGNOSIS — E78.2 MIXED HYPERLIPIDEMIA: ICD-10-CM

## 2018-12-18 DIAGNOSIS — E66.9 OBESITY (BMI 30-39.9): ICD-10-CM

## 2018-12-18 DIAGNOSIS — E11.43 DIABETIC AUTONOMIC NEUROPATHY ASSOCIATED WITH TYPE 2 DIABETES MELLITUS (HCC): ICD-10-CM

## 2018-12-18 DIAGNOSIS — E11.59 TYPE 2 DIABETES MELLITUS WITH OTHER CIRCULATORY COMPLICATION, WITHOUT LONG-TERM CURRENT USE OF INSULIN (HCC): ICD-10-CM

## 2018-12-18 DIAGNOSIS — I27.0 PRIMARY PULMONARY HYPERTENSION (HCC): ICD-10-CM

## 2018-12-18 DIAGNOSIS — E53.8 B12 DEFICIENCY: ICD-10-CM

## 2018-12-18 DIAGNOSIS — I10 ESSENTIAL HYPERTENSION, BENIGN: ICD-10-CM

## 2018-12-18 PROCEDURE — 99214 OFFICE O/P EST MOD 30 MIN: CPT | Performed by: NURSE PRACTITIONER

## 2018-12-18 RX ORDER — FLUOROURACIL 50 MG/G
CREAM TOPICAL
COMMUNITY
Start: 2018-10-08 | End: 2020-02-19

## 2018-12-18 RX ORDER — FUROSEMIDE 40 MG/1
TABLET ORAL
COMMUNITY
Start: 2018-10-25 | End: 2020-02-19

## 2018-12-18 NOTE — PROGRESS NOTES
Chief Complaint   Patient presents with   • Diabetes       HISTORY OF THE PRESENT ILLNESS: This is a 86 y.o. male established patient who presents today for follow up on diabetes.    Patient last saw Dr. Baig (endocrinology) 2.5 months ago. He has an upcoming appointment in January 2019. Last blood sugar was 276, however, patient was not fasting during that time. He states he has been weaning down on his insulin. Reports compliance with medications. Checks BG 1-2 times a day. Denies fasting hypoglycemia episodes.  Daytime hypoglycemic episodes denies. Reports can feel the low BG symptoms well. Reports stable numbness or tingling in feet.  Is following consistent carb diet or counting carbs. Denies regular exercise routine. Weight is stable over the past year.     B12 is elevated. Patient is taking 3 B12 supplements per day. BUN is improved. Patient notes being taken off of Eliquis. He is feeling well overall at this time. No chest pain, palpitations, shortness of breath, dizziness, headaches, or blurred vision. He has an upcoming appointment with cardiology in a couple of weeks. He notes he has not been exercising much lately.    He requests for his ears to be checked for ear wax. He reports having some decreased hearing, likely from the ear wax.      Past Medical History:   Diagnosis Date   • Arrhythmia    • Bowel habit changes    • Breath shortness    • CAD (coronary artery disease)    • CAD (coronary artery disease), native coronary artery 3/31/2014   • Cancer ([facility]) 2014    PROSTATE-RADIATION [place]   • Dental disorder     upper and lower partials   • Diabetes     Dr Latif, IDDM   • Encounter for long-term (current) use of insulin ([facility]) 9/18/2013   • Essential hypertension, benign 3/31/2014   • Heart burn    • Other and unspecified hyperlipidemia 9/18/2013   • Snoring     has had sleep study   • Type II or unspecified type diabetes mellitus without mention of complication, uncontrolled 9/18/2013   • Unspecified  vitamin D deficiency 2013       Past Surgical History:   Procedure Laterality Date   • ECTROPIAN REPAIR  2014    Performed by Zen Fregoso M.D. at SURGERY [facility] ORS   • LACRIMAL DUCT PROBE  2014    Performed by Zen Fregoso M.D. at SURGERY [facility] ORS   • LORI BY LAPAROSCOPY  2013    Performed by Jez Galvan M.D. at SURGERY [facility] ORS   • LID TIGHTENING  2012    Performed by Vitor Son M.D. at SURGERY [facility] ORS   • CATARACT PHACO WITH IOL  2011    Performed by CRUZITO LITTLEJOHN at SURGERY [facility] ORS   • OTHER CARDIAC SURGERY  2004    CABG X3 at [facility]   • TONSILLECTOMY         Family Status   Relation Status   • Mo  at age 91   • Fa  at age 87   • Bro  at age 72   • Bro  at age 50   • Son Alive   • Farooq Alive   • MGMo    • MGFa    • PGMo    • PGFa      Family History   Problem Relation Age of Onset   • Heart Disease Mother    • Heart Attack Mother    • Asthma Mother    • Heart Attack Father    • Diabetes Father    • Psychiatry Brother    • Heart Disease Brother         5 way bypass   • Diabetes Brother    • Hyperlipidemia Brother    • Heart Attack Brother    • Psychiatry Brother    • Stroke Brother    • Heart Disease Brother         pacemaker       Social History   Substance Use Topics   • Smoking status: Former Smoker     Packs/day: 2.00     Years: 34.00     Types: Cigarettes     Quit date: 1982   • Smokeless tobacco: Never Used   • Alcohol use 4.8 - 9.0 oz/week     7 - 14 Glasses of wine, 1 Cans of beer per week      Comment: 2 per day       Allergies: Pcn [penicillins] and Latex    Current Outpatient Prescriptions Ordered in [facility]   Medication Sig Dispense Refill   • furosemide (LASIX) 40 MG Tab      • fluorouracil (EFUDEX) 5 % cream      • hydroCHLOROthiazide (HYDRODIURIL) 12.5 MG tablet Take 1 mg by mouth every day.     • metoprolol SR (TOPROL XL) 25 MG TABLET SR  24 HR Take 25 mg by mouth every day.     • glipiZIDE (GLUCOTROL) 5 MG Tab Take 1 Tab by mouth 2 times a day. 180 Tab 3   • atorvastatin (LIPITOR) 80 MG tablet Take 80 mg by mouth every day.     • FREESTYLE LITE strip 1 Strip by Other route every day.     • tamsulosin (FLOMAX) 0.4 MG capsule TAKE ONE CAPSULE BY MOUTH TWO TIMES A  Cap 2   • lisinopril (PRINIVIL) 2.5 MG Tab Take 1 Tab by mouth every day. 90 Tab 3   • insulin glargine (LANTUS SOLOSTAR) 100 UNIT/ML Solution Pen-injector injection Inject 10 Units as instructed every evening. 30 mL 3   • liraglutide (VICTOZA) 18 MG/3ML Solution Pen-injector injection Inject 0.6 mg as instructed every day.     • vitamin D (CHOLECALCIFEROL) 1000 UNIT Tab Take 1,000 Units by mouth every day.     • sildenafil citrate (VIAGRA) 50 MG tablet Take 1 Tab by mouth as needed for Erectile Dysfunction. 10 Tab 3   • potassium chloride SA (K-DUR) 20 MEQ Tab CR Take 20 mEq by mouth every day.     • metformin (GLUCOPHAGE) 500 MG Tab TAKE 2 TABLETS BY MOUTH TWICE DAILY WITHMEALS 360 Tab 3   • B-D ULTRAFINE III SHORT PEN 31G X 8 MM MISC USE 3 TIMES DAILY 100 Each 11   • Lancets MISC His new meter uses the Accu-Chek SoftClix lancets for 3 time daily testing.  Dx. Code 250.02 300 Each 3   • Blood Glucose Monitoring Suppl SUPPLIES MISC Accucheck Yaneth blood glucose test strips, checking blood sugar 2 daily  .02 insulin requiring. 200 Strip 3   • B Complex Vitamins (VITAMIN B COMPLEX PO) Take  by mouth every day.     • Cholecalciferol (VITAMIN D) 2000 UNITS CAPS Take  by mouth every day.       No current [facility]-ordered facility-administered medications on file.        Review of Systems   Constitutional: Negative for fever, chills, weight loss and malaise/fatigue.   HENT: Decreased hearing (likely from ear wax). Negative for ear pain, nosebleeds, congestion, sore throat and neck pain.    Eyes: Negative for blurred vision.   Respiratory:  Negative for cough, sputum production, shortness  "of breath and wheezing.    Cardiovascular:  Negative for chest pain, palpitations, orthopnea and leg swelling.   Neurological: Negative for dizziness, tingling, tremors, sensory change, focal weakness and headaches.   All other systems reviewed and are negative except as in HPI.    Exam: Blood pressure 124/76, pulse 80, temperature 36.7 °C (98.1 °F), resp. rate 16, height 1.626 m (5' 4\"), weight 96.3 kg (212 lb 6.4 oz), SpO2 91 %.  General:  Normal appearing. No distress.  HEENT: Normocephalic. Eyes conjunctiva clear lids without ptosis, pupils equal and reactive to light accommodation, ears normal shape and contour, canals with cerumen bilaterally, tympanic membranes are benign, nasal mucosa benign, oropharynx is without erythema, edema or exudates.   Neck: Supple without JVD or bruit. Thyroid is not enlarged.  Pulmonary:  Clear to ausculation.  Normal effort. No rales, ronchi, or wheezing.  Cardiovascular:  Regular rate and rhythm without murmur. Carotid and radial pulses are intact and equal bilaterally.  Neurologic:  Grossly nonfocal  Lymph: No cervical, supraclavicular or axillary lymph nodes are palpable  Skin:  Warm and dry.  No obvious lesions.  Musculoskeletal:  Normal gait. No extremity cyanosis, clubbing, or edema.  Psych:  Normal mood and affect. Alert and oriented x3. Judgment and insight is normal.    Procedure: Cerumen Removal  Risks and benefits of procedure discussed with patient.  Cerumen removed with lavage   Patient tolerated the procedure well  Pt educated about proper care of ear canal. Q-tip cleaning discouraged, use of debrox and warm water lavage discussed.  Post lavage curette performed by provider, Post procedure exam with clear canal and normal TM.      PLAN:    1. Type 2 diabetes mellitus with other circulatory complication, without long-term current use of insulin (HCC)  6. 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 (HCC)  Continue follow-up with " endocrinology.    2. Primary pulmonary hypertension (HCC)  Continue follow-up with cardiology    3. Mixed hyperlipidemia  Continue Atorvastatin    4. Obesity (BMI 30-39.9)  Discussed healthy diet and exercise.     5. Essential hypertension, benign  Continue current management.   Follow-up with   Consider D/C HCTZ r/t kidney dysfunction, discuss with Dr. Hammond    7. B12 deficiency  - B12 is elevated on labs. Patient currently takes B12 3 times per day. Advised only taking B12 1-2 times per day.    Follow-up in 3 months or sooner as needed. Patient is encouraged to be seen in the emergency room for chest pain, palpitations, shortness of breath, dizziness, severe abdominal pain or other concerning symptoms.    Please note that this dictation was created using voice recognition software. I have made every reasonable attempt to correct obvious errors, but I expect that there are errors of grammar and possibly content that I did not discover before finalizing the note.      Assessment/Plan  1. Type 2 diabetes mellitus with other circulatory complication, without long-term current use of insulin (HCC)     2. Primary pulmonary hypertension (HCC)     3. Mixed hyperlipidemia     4. Obesity (BMI 30-39.9)     5. Essential hypertension, benign     6. 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 (HCC)     7. B12 deficiency           I have placed the below orders and discussed them with an approved delegating provider. The MA is performing the below orders under the direction of Dr. Rose.       Tito JOSÉ (Scribe), am scribing for, and in the presence of, ESTEE Lerma    Electronically signed by: Tito Hazel (Bijan), 12/18/2018    Jean Claude JOSÉ APRN personally performed the services described in this documentation, as scribed by Tito Hazel in my presence, and it is both accurate and complete.

## 2018-12-26 ENCOUNTER — HOSPITAL ENCOUNTER (OUTPATIENT)
Dept: LAB | Facility: MEDICAL CENTER | Age: 83
End: 2018-12-26
Attending: PHYSICIAN ASSISTANT
Payer: MEDICARE

## 2018-12-26 LAB — PSA SERPL-MCNC: 0.29 NG/ML (ref 0–4)

## 2018-12-26 PROCEDURE — 36415 COLL VENOUS BLD VENIPUNCTURE: CPT

## 2018-12-26 PROCEDURE — 84153 ASSAY OF PSA TOTAL: CPT

## 2019-03-01 ENCOUNTER — HOSPITAL ENCOUNTER (OUTPATIENT)
Dept: RADIOLOGY | Facility: MEDICAL CENTER | Age: 84
End: 2019-03-01
Attending: INTERNAL MEDICINE
Payer: MEDICARE

## 2019-03-01 DIAGNOSIS — I10 ESSENTIAL HYPERTENSION, MALIGNANT: ICD-10-CM

## 2019-03-01 DIAGNOSIS — Z95.1 POSTSURGICAL AORTOCORONARY BYPASS STATUS: ICD-10-CM

## 2019-03-01 PROCEDURE — A9502 TC99M TETROFOSMIN: HCPCS

## 2019-03-01 PROCEDURE — 700111 HCHG RX REV CODE 636 W/ 250 OVERRIDE (IP)

## 2019-03-01 PROCEDURE — 93018 CV STRESS TEST I&R ONLY: CPT | Performed by: INTERNAL MEDICINE

## 2019-03-01 PROCEDURE — 78452 HT MUSCLE IMAGE SPECT MULT: CPT | Mod: 26 | Performed by: INTERNAL MEDICINE

## 2019-03-01 RX ORDER — REGADENOSON 0.08 MG/ML
INJECTION, SOLUTION INTRAVENOUS
Status: COMPLETED
Start: 2019-03-01 | End: 2019-03-01

## 2019-03-01 RX ADMIN — REGADENOSON 0.4 MG: 0.08 INJECTION, SOLUTION INTRAVENOUS at 09:09

## 2019-04-03 ENCOUNTER — APPOINTMENT (RX ONLY)
Dept: URBAN - METROPOLITAN AREA CLINIC 20 | Facility: CLINIC | Age: 84
Setting detail: DERMATOLOGY
End: 2019-04-03

## 2019-04-03 DIAGNOSIS — Z86.007 PERSONAL HISTORY OF IN-SITU NEOPLASM OF SKIN: ICD-10-CM

## 2019-04-03 DIAGNOSIS — Z85.828 PERSONAL HISTORY OF OTHER MALIGNANT NEOPLASM OF SKIN: ICD-10-CM

## 2019-04-03 DIAGNOSIS — L82.1 OTHER SEBORRHEIC KERATOSIS: ICD-10-CM

## 2019-04-03 DIAGNOSIS — L57.8 OTHER SKIN CHANGES DUE TO CHRONIC EXPOSURE TO NONIONIZING RADIATION: ICD-10-CM

## 2019-04-03 DIAGNOSIS — L57.0 ACTINIC KERATOSIS: ICD-10-CM | Status: IMPROVED

## 2019-04-03 PROCEDURE — ? LIQUID NITROGEN

## 2019-04-03 PROCEDURE — 17003 DESTRUCT PREMALG LES 2-14: CPT

## 2019-04-03 PROCEDURE — 99213 OFFICE O/P EST LOW 20 MIN: CPT | Mod: 25

## 2019-04-03 PROCEDURE — 17000 DESTRUCT PREMALG LESION: CPT

## 2019-04-03 PROCEDURE — ? COUNSELING

## 2019-04-03 ASSESSMENT — LOCATION DETAILED DESCRIPTION DERM
LOCATION DETAILED: LEFT RADIAL DORSAL HAND
LOCATION DETAILED: LEFT INFERIOR CENTRAL MALAR CHEEK
LOCATION DETAILED: LEFT DISTAL ULNAR DORSAL FOREARM
LOCATION DETAILED: LEFT SUPERIOR MEDIAL MALAR CHEEK
LOCATION DETAILED: RIGHT DISTAL ULNAR DORSAL FOREARM
LOCATION DETAILED: RIGHT DISTAL RADIAL DORSAL FOREARM
LOCATION DETAILED: RIGHT SUPERIOR VERMILION LIP
LOCATION DETAILED: RIGHT PROXIMAL RADIAL DORSAL FOREARM
LOCATION DETAILED: RIGHT DISTAL DORSAL FOREARM
LOCATION DETAILED: LEFT SUPERIOR FRONTAL SCALP
LOCATION DETAILED: RIGHT POSTERIOR PARIETAL SCALP
LOCATION DETAILED: LEFT PROXIMAL DORSAL FOREARM
LOCATION DETAILED: LEFT CENTRAL FRONTAL SCALP
LOCATION DETAILED: LEFT DISTAL DORSAL FOREARM
LOCATION DETAILED: RIGHT RADIAL DORSAL HAND
LOCATION DETAILED: RIGHT INFERIOR CENTRAL MALAR CHEEK
LOCATION DETAILED: RIGHT INFERIOR ANTERIOR NECK

## 2019-04-03 ASSESSMENT — LOCATION SIMPLE DESCRIPTION DERM
LOCATION SIMPLE: LEFT HAND
LOCATION SIMPLE: RIGHT FOREARM
LOCATION SIMPLE: POSTERIOR SCALP
LOCATION SIMPLE: RIGHT HAND
LOCATION SIMPLE: LEFT FOREARM
LOCATION SIMPLE: RIGHT LIP
LOCATION SIMPLE: RIGHT ANTERIOR NECK
LOCATION SIMPLE: SCALP
LOCATION SIMPLE: RIGHT CHEEK
LOCATION SIMPLE: LEFT CHEEK

## 2019-04-03 ASSESSMENT — LOCATION ZONE DERM
LOCATION ZONE: NECK
LOCATION ZONE: FACE
LOCATION ZONE: ARM
LOCATION ZONE: LIP
LOCATION ZONE: SCALP
LOCATION ZONE: HAND

## 2019-04-03 NOTE — PROCEDURE: COUNSELING
Detail Level: Detailed
Mantoux results NEGATIVE.  0 mm. No induration.  No swelling.  No redness.    Neela Dominguez RN    
Detail Level: Zone
Detail Level: Simple

## 2019-04-08 ENCOUNTER — HOSPITAL ENCOUNTER (OUTPATIENT)
Dept: LAB | Facility: MEDICAL CENTER | Age: 84
End: 2019-04-08
Attending: INTERNAL MEDICINE
Payer: MEDICARE

## 2019-04-08 ENCOUNTER — HOSPITAL ENCOUNTER (OUTPATIENT)
Dept: LAB | Facility: MEDICAL CENTER | Age: 84
End: 2019-04-08
Attending: PHYSICIAN ASSISTANT
Payer: MEDICARE

## 2019-04-08 LAB
ALBUMIN SERPL BCP-MCNC: 4.3 G/DL (ref 3.2–4.9)
ALBUMIN/GLOB SERPL: 1.3 G/DL
ALP SERPL-CCNC: 73 U/L (ref 30–99)
ALT SERPL-CCNC: 17 U/L (ref 2–50)
ANION GAP SERPL CALC-SCNC: 11 MMOL/L (ref 0–11.9)
AST SERPL-CCNC: 19 U/L (ref 12–45)
BILIRUB SERPL-MCNC: 1.1 MG/DL (ref 0.1–1.5)
BUN SERPL-MCNC: 25 MG/DL (ref 8–22)
CALCIUM SERPL-MCNC: 10 MG/DL (ref 8.5–10.5)
CHLORIDE SERPL-SCNC: 101 MMOL/L (ref 96–112)
CHOLEST SERPL-MCNC: 152 MG/DL (ref 100–199)
CO2 SERPL-SCNC: 25 MMOL/L (ref 20–33)
CREAT SERPL-MCNC: 1.07 MG/DL (ref 0.5–1.4)
CREAT UR-MCNC: 48.6 MG/DL
EST. AVERAGE GLUCOSE BLD GHB EST-MCNC: 212 MG/DL
FASTING STATUS PATIENT QL REPORTED: NORMAL
GLOBULIN SER CALC-MCNC: 3.3 G/DL (ref 1.9–3.5)
GLUCOSE SERPL-MCNC: 205 MG/DL (ref 65–99)
HBA1C MFR BLD: 9 % (ref 0–5.6)
HDLC SERPL-MCNC: 73 MG/DL
LDLC SERPL CALC-MCNC: 52 MG/DL
MICROALBUMIN UR-MCNC: 6.4 MG/DL
MICROALBUMIN/CREAT UR: 132 MG/G (ref 0–30)
POTASSIUM SERPL-SCNC: 4.1 MMOL/L (ref 3.6–5.5)
PROT SERPL-MCNC: 7.6 G/DL (ref 6–8.2)
SODIUM SERPL-SCNC: 137 MMOL/L (ref 135–145)
TRIGL SERPL-MCNC: 135 MG/DL (ref 0–149)

## 2019-04-08 PROCEDURE — 80061 LIPID PANEL: CPT

## 2019-04-08 PROCEDURE — 82570 ASSAY OF URINE CREATININE: CPT

## 2019-04-08 PROCEDURE — 36415 COLL VENOUS BLD VENIPUNCTURE: CPT

## 2019-04-08 PROCEDURE — 80053 COMPREHEN METABOLIC PANEL: CPT

## 2019-04-08 PROCEDURE — 84153 ASSAY OF PSA TOTAL: CPT

## 2019-04-08 PROCEDURE — 83036 HEMOGLOBIN GLYCOSYLATED A1C: CPT

## 2019-04-08 PROCEDURE — 82043 UR ALBUMIN QUANTITATIVE: CPT

## 2019-04-09 LAB — PSA SERPL-MCNC: 0.35 NG/ML (ref 0–4)

## 2019-04-18 RX ORDER — GLIPIZIDE 5 MG/1
5 TABLET ORAL 2 TIMES DAILY
Qty: 200 TAB | Refills: 0 | Status: SHIPPED | OUTPATIENT
Start: 2019-04-18 | End: 2019-07-01 | Stop reason: SDUPTHER

## 2019-06-17 ENCOUNTER — TELEPHONE (OUTPATIENT)
Dept: MEDICAL GROUP | Facility: PHYSICIAN GROUP | Age: 84
End: 2019-06-17

## 2019-06-17 NOTE — TELEPHONE ENCOUNTER
ESTABLISHED PATIENT PRE-VISIT PLANNING     Patient was NOT contacted to complete PVP.     Note: Patient will not be contacted if there is no indication to call.     1.  Reviewed notes from the last few office visits within the medical group: Yes    2.  If any orders were placed at last visit or intended to be done for this visit (i.e. 6 mos follow-up), do we have Results/Consult Notes?        •  Labs - Labs were not ordered at last office visit.   Note: If patient appointment is for lab review and patient did not complete labs, check with provider if OK to reschedule patient until labs completed.       •  Imaging - Imaging was not ordered at last office visit.       •  Referrals - No referrals were ordered at last office visit.    3. Is this appointment scheduled as a Hospital Follow-Up? No    4.  Immunizations were updated in Epic using WebIZ?: Epic matches WebIZ       •  Web Iz Recommendations: TD, VARICELLA (Chicken Pox)  and SHINGRIX (Shingles)    5.  Patient is due for the following Health Maintenance Topics:   Health Maintenance Due   Topic Date Due   • IMM HEP B VACCINE (1 of 3 - Risk 3-dose series) 07/06/1951   • RETINAL SCREENING  10/16/2018   • DIABETES MONOFILAMENT / LE EXAM  03/02/2019   • Annual Wellness Visit  03/03/2019       - Patient has completed FLU, PNEUMOVAX (PPSV23), PREVNAR (PCV13) , TDAP and SHINGRIX (Shingles) Immunization(s) per WebIZ. Chart has been updated.    6. Orders for overdue Health Maintenance topics pended in Pre-Charting? NO    7.  AHA (MDX) form printed for Provider? YES    8.  Patient was NOT informed to arrive 15 min prior to their scheduled appointment and bring in their medication bottles.

## 2019-06-18 ENCOUNTER — OFFICE VISIT (OUTPATIENT)
Dept: MEDICAL GROUP | Facility: PHYSICIAN GROUP | Age: 84
End: 2019-06-18
Payer: MEDICARE

## 2019-06-18 VITALS
RESPIRATION RATE: 16 BRPM | SYSTOLIC BLOOD PRESSURE: 110 MMHG | TEMPERATURE: 98.8 F | HEART RATE: 73 BPM | OXYGEN SATURATION: 93 % | DIASTOLIC BLOOD PRESSURE: 72 MMHG | HEIGHT: 64 IN | BODY MASS INDEX: 36.19 KG/M2 | WEIGHT: 212 LBS

## 2019-06-18 DIAGNOSIS — I27.0 PRIMARY PULMONARY HYPERTENSION (HCC): ICD-10-CM

## 2019-06-18 DIAGNOSIS — Z79.4 ENCOUNTER FOR LONG-TERM (CURRENT) USE OF INSULIN (HCC): ICD-10-CM

## 2019-06-18 DIAGNOSIS — E66.9 OBESITY (BMI 35.0-39.9 WITHOUT COMORBIDITY): ICD-10-CM

## 2019-06-18 DIAGNOSIS — E11.59 TYPE 2 DIABETES MELLITUS WITH OTHER CIRCULATORY COMPLICATION, WITHOUT LONG-TERM CURRENT USE OF INSULIN (HCC): ICD-10-CM

## 2019-06-18 DIAGNOSIS — E78.2 MIXED HYPERLIPIDEMIA: ICD-10-CM

## 2019-06-18 DIAGNOSIS — E11.43 DIABETIC AUTONOMIC NEUROPATHY ASSOCIATED WITH TYPE 2 DIABETES MELLITUS (HCC): ICD-10-CM

## 2019-06-18 DIAGNOSIS — I25.10 CORONARY ARTERY DISEASE INVOLVING NATIVE CORONARY ARTERY OF NATIVE HEART WITHOUT ANGINA PECTORIS: ICD-10-CM

## 2019-06-18 DIAGNOSIS — Z85.46 HISTORY OF PROSTATE CANCER: ICD-10-CM

## 2019-06-18 DIAGNOSIS — I10 ESSENTIAL HYPERTENSION, BENIGN: ICD-10-CM

## 2019-06-18 PROCEDURE — 99214 OFFICE O/P EST MOD 30 MIN: CPT | Performed by: NURSE PRACTITIONER

## 2019-06-18 PROCEDURE — 8041 PR SCP AHA: Performed by: NURSE PRACTITIONER

## 2019-06-18 RX ORDER — METOPROLOL SUCCINATE 25 MG/1
12.5 TABLET, EXTENDED RELEASE ORAL DAILY
Status: ON HOLD | COMMUNITY
Start: 2019-04-18 | End: 2022-12-16

## 2019-06-18 RX ORDER — ATORVASTATIN CALCIUM 80 MG/1
TABLET, FILM COATED ORAL
COMMUNITY
Start: 2019-03-26 | End: 2020-02-19

## 2019-06-18 ASSESSMENT — PATIENT HEALTH QUESTIONNAIRE - PHQ9: CLINICAL INTERPRETATION OF PHQ2 SCORE: 0

## 2019-06-18 NOTE — PROGRESS NOTES
"Subjective:     Julio Azar is a 86 y.o. male here today to discuss his medical problems and for Annual Health Assessment.    Type 2 diabetes mellitus with other circulatory complication, without long-term current use of insulin (HCC)  Encounter for long-term (current) use of insulin (HCC)  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 (HCC)  Chronic. Patient brought in recent lab results which showed elevated glucose. Patient admits that his elevated A1C is due to himself. States he sometimes misses his insulin injections due to high cost as well as forgetting to take it. States he missed his shot last night due to falling asleep. He notes recently being in the \"donut hole\" for [facility]. He has been skipping shots about every other day due to this. He denies having a regular exercise routine. States he is thinking about playing golf again. States he has not been eating heavy. He has been eating fruits and cottage cheese in the morning. He sometimes eats some eggs. He is followed by endocrinology. No reports of any hypoglycemic episodes.    Mixed hyperlipidemia  Chronic. Patient is currently on atorvastatin. No reports of medication side effects or associated symptoms regarding hyperlipidemia.    Coronary artery disease involving native coronary artery of native heart without angina pectoris  Chronic and stable. Patient states his last last cardiac work up was overall unrevealing. He is followed by cardiology.    Essential hypertension, benign  Chronic. Patient is taking the same medications. Blood pressure in clinic today was 110/72. No reports of medication side effects or associated symptoms regarding hypertension. No reports of recent chest pain, palpitations, shortness of breath, dizziness, headaches, or vision changes.      Primary pulmonary hypertension (HCC)  Chronic in nature and stable. Patient follows with cardiology. No reports of any active associated symptoms " "regarding this condition.    History of prostate cancer  Chronic. Patient states he leaks urine every once in a while. He goes to the bathroom \"quite a bit\" but notes he is on a water pill. No reports of any other associated symptoms or concerns regarding his prostate.     Health Maintenance Summary                IMM HEP B VACCINE Overdue 7/6/1951     RETINAL SCREENING Overdue 10/16/2018      Done 10/16/2017 REFERRAL FOR RETINAL SCREENING EXAM     Patient has more history with this topic...    DIABETES MONOFILAMENT / LE EXAM Overdue 3/2/2019      Done 3/2/2018 AMB DIABETIC MONOFILAMENT LOWER EXTREMITY EXAM     Patient has more history with this topic...    Annual Wellness Visit Overdue 3/3/2019      Done 3/2/2018 Visit Dx: Medicare annual wellness visit, subsequent     Patient has more history with this topic...    IMM ZOSTER VACCINES Postponed 6/30/2020 Originally 1/17/2010. System: vaccine not available, other system reasons     Done 11/22/2009 Imm Admin: Zoster Vaccine Live (ZVL) (Zostavax)     Patient has more history with this topic...    A1C SCREENING Next Due 10/8/2019      Done 4/8/2019 HEMOGLOBIN A1C      Patient has more history with this topic...    FASTING LIPID PROFILE Next Due 4/8/2020      Done 4/8/2019 LIPID PROFILE     Patient has more history with this topic...    URINE ACR / MICROALBUMIN Next Due 4/8/2020      Done 4/8/2019 MICROALBUMIN CREAT RATIO URINE      Patient has more history with this topic...    SERUM CREATININE Next Due 4/8/2020      Done 4/8/2019 COMP METABOLIC PANEL      Patient has more history with this topic...    IMM DTaP/Tdap/Td Vaccine Next Due 7/10/2027      Done 7/10/2017 Imm Admin: Tdap Vaccine           Annual Health Assessment Questions:    1.  Are you currently engaging in any exercise or physical activity? No    2.  How would you describe your mood or emotional well-being today? good    3.  Have you had any falls in the last year? No    4.  Have you noticed any problems " with your balance or had difficulty walking? No    5.  In the last six months have you experienced any leakage of urine? Yes    6. DPA/Advanced Directive: Patient does not have an Advanced Directive.  A packet and workshop information was given on Advanced Directives.    Current medicines (including changes today)  Current Outpatient Prescriptions   Medication Sig Dispense Refill   • glipiZIDE (GLUCOTROL) 5 MG Tab Take 1 Tab by mouth 2 times a day. 200 Tab 0   • furosemide (LASIX) 40 MG Tab      • hydroCHLOROthiazide (HYDRODIURIL) 12.5 MG tablet Take 1 mg by mouth every day.     • metoprolol SR (TOPROL XL) 25 MG TABLET SR 24 HR Take 25 mg by mouth every day.     • atorvastatin (LIPITOR) 80 MG tablet Take 80 mg by mouth every day.     • lisinopril (PRINIVIL) 2.5 MG Tab Take 1 Tab by mouth every day. 90 Tab 3   • insulin glargine (LANTUS SOLOSTAR) 100 UNIT/ML Solution Pen-injector injection Inject 10 Units as instructed every evening. 30 mL 3   • liraglutide (VICTOZA) 18 MG/3ML Solution Pen-injector injection Inject 0.6 mg as instructed every day.     • vitamin D (CHOLECALCIFEROL) 1000 UNIT Tab Take 1,000 Units by mouth every day.     • sildenafil citrate (VIAGRA) 50 MG tablet Take 1 Tab by mouth as needed for Erectile Dysfunction. 10 Tab 3   • potassium chloride SA (K-DUR) 20 MEQ Tab CR Take 20 mEq by mouth every day.     • metformin (GLUCOPHAGE) 500 MG Tab TAKE 2 TABLETS BY MOUTH TWICE DAILY WITHMEALS 360 Tab 3   • B-D ULTRAFINE III SHORT PEN 31G X 8 MM MISC USE 3 TIMES DAILY 100 Each 11   • Lancets MISC His new meter uses the Accu-Chek SoftClix lancets for 3 time daily testing.  Dx. Code 250.02 300 Each 3   • Blood Glucose Monitoring Suppl SUPPLIES MISC Accucheck Yaneth blood glucose test strips, checking blood sugar 2 daily  .02 insulin requiring. 200 Strip 3   • Metoprolol Succinate 25 MG Capsule ER 24 Hour Sprinkle      • metoprolol SR (TOPROL XL) 25 MG TABLET SR 24 HR      • atorvastatin (LIPITOR) 80 MG  "tablet      • fluorouracil (EFUDEX) 5 % cream      • FREESTYLE LITE strip 1 Strip by Other route every day.     • tamsulosin (FLOMAX) 0.4 MG capsule TAKE ONE CAPSULE BY MOUTH TWO TIMES A  Cap 2   • B Complex Vitamins (VITAMIN B COMPLEX PO) Take  by mouth every day.     • Cholecalciferol (VITAMIN D) 2000 UNITS CAPS Take  by mouth every day.       No current facility-administered medications for this visit.        He  has a past medical history of Arrhythmia; Bowel habit changes; Breath shortness; CAD (coronary artery disease); CAD (coronary artery disease), native coronary artery (3/31/2014); Cancer ([facility]) (2014); Dental disorder; Diabetes; Encounter for long-term (current) use of insulin ([facility]) (9/18/2013); Essential hypertension, benign (3/31/2014); Heart burn; Other and unspecified hyperlipidemia (9/18/2013); Snoring; Type II or unspecified type diabetes mellitus without mention of complication, uncontrolled (9/18/2013); and Unspecified vitamin D deficiency (9/18/2013).    Pcn [penicillins] and Latex    He  reports that he quit smoking about 37 years ago. His smoking use included Cigarettes. He has a 68.00 pack-year smoking history. He has never used smokeless tobacco. He reports that he drinks about 4.8 - 9.0 oz of alcohol per week . He reports that he does not use drugs.  Counseling given: Yes      ROS  Positive for: urinary leakage, urinary frequency (on water pill)  Negative for: chest pain, palpitations, shortness of breath, dizziness, headaches, or vision changes.   As above, all other systems negative.      Objective:     Physical Exam:  /72 (BP Location: Left arm, Patient Position: Sitting, BP Cuff Size: Adult)   Pulse 73   Temp 37.1 °C (98.8 °F) (Temporal)   Resp 16   Ht 1.626 m (5' 4\")   Wt 96.2 kg (212 lb)   SpO2 93%  Body mass index is 36.39 kg/m².   Constitutional: Alert, no distress.  Skin: Warm, dry, good turgor, no rashes in visible areas.  Eye: Equal, round and reactive, " "conjunctiva clear, lids normal.  ENMT: Lips without lesions, good dentition, oropharynx clear.  Neck: Trachea midline, no masses, no thyromegaly. No cervical or supraclavicular lymphadenopathy.  Respiratory: Unlabored respiratory effort, lungs clear to auscultation, no wheezes, no rhonchi.  Cardiovascular: Normal S1, S2, no murmur, no edema.  Abdomen: Soft, non-tender, no masses, no hepatosplenomegaly.  Psych: Alert and oriented x3, normal affect and mood.    Assessment and Plan:     1. Obesity (BMI 35.0-39.9 without comorbidity)  - Patient identified as having weight management issue.  Appropriate orders and counseling given.    2. Type 2 diabetes mellitus with other circulatory complication, without long-term current use of insulin (HCC)  3. Encounter for long-term (current) use of insulin (HCC)  7. 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 (HCC)  - Patient brought in last lab results which showed elevated glucose. States he was recently in the \"donut hole\" for his insurance and was skipping his insulin injections about every other day. He also admits to sometimes forgetting to do the injections. Continue current plan of care and medications. Continue following up with endocrinology  - Counseled patient on diet and exercise. States he is thinking about playing golf again. Advised decreasing intake of carbohydrates and sugary foods and increasing intake of vegetables.     4. Mixed hyperlipidemia  - Continue current plan of care and medications.   - Advised to eat low fat, low carbohydrate and high fiber diet as well as do physical exercise regularly.      5. Coronary artery disease involving native coronary artery of native heart without angina pectoris  - Continue current plan of care and medications. Continue follow ups with cardiology    6. Essential hypertension, benign  - Well controlled on current regimen. Continue current plan of care and medications.   - Counseled patient on " diet and exercise. Advised low sodium diet.     8. Primary pulmonary hypertension (HCC)  - Continue current plan of care and medications. Continue follow ups with cardiology    9. History of prostate cancer  - Patient reports having some urinary leakage. He also has some urinary frequency but difficult to determine if this is due to being on a water pill. Otherwise stable, will continue to monitor.     Discussion today about general wellness and lifestyle habits:    · Engage in regular physical activity and social activities.  · Prevent falls and reduce trip hazards; using ambulatory aides, hearing and vision testing if appropriate.  · Steps to improve urinary incontinence.  · Advanced care planning.    Patient will follow up in 6 months. He is encouraged to be seen in the emergency room for chest pain, palpitations, shortness of breath, dizziness, severe abdominal pain or other concerning symptoms.     Follow-Up: No Follow-up on file.         PLEASE NOTE: This dictation was created using voice recognition software. I have made every reasonable attempt to correct obvious errors, but I expect that there are errors of grammar and possibly content that I did not discover before finalizing the note.      Tito JOSÉ (Scribe), am scribing for, and in the presence of, ESTEE Lerma    Electronically signed by: Tito Hazel (Scribe), 6/18/2019    Jean Claude JOSÉ APRN personally performed the services described in this documentation, as scribed by Tito Hazel in my presence, and it is both accurate and complete.

## 2019-07-01 RX ORDER — GLIPIZIDE 5 MG/1
5 TABLET ORAL 2 TIMES DAILY
Qty: 200 TAB | Refills: 0 | Status: SHIPPED | OUTPATIENT
Start: 2019-07-01 | End: 2019-10-23 | Stop reason: SDUPTHER

## 2019-07-01 NOTE — TELEPHONE ENCOUNTER
Was the patient seen in the last year in this department? Yes    Does patient have an active prescription for medications requested? No     Received Request Via: Patient       Ins wants 200. LM

## 2019-07-18 ENCOUNTER — HOSPITAL ENCOUNTER (OUTPATIENT)
Dept: LAB | Facility: MEDICAL CENTER | Age: 84
End: 2019-07-18
Attending: INTERNAL MEDICINE
Payer: MEDICARE

## 2019-07-18 LAB
ALBUMIN SERPL BCP-MCNC: 4 G/DL (ref 3.2–4.9)
ALBUMIN/GLOB SERPL: 1.3 G/DL
ALP SERPL-CCNC: 83 U/L (ref 30–99)
ALT SERPL-CCNC: 23 U/L (ref 2–50)
ANION GAP SERPL CALC-SCNC: 8 MMOL/L (ref 0–11.9)
AST SERPL-CCNC: 23 U/L (ref 12–45)
BILIRUB SERPL-MCNC: 1.2 MG/DL (ref 0.1–1.5)
BUN SERPL-MCNC: 26 MG/DL (ref 8–22)
CALCIUM SERPL-MCNC: 9.6 MG/DL (ref 8.5–10.5)
CHLORIDE SERPL-SCNC: 101 MMOL/L (ref 96–112)
CHOLEST SERPL-MCNC: 124 MG/DL (ref 100–199)
CO2 SERPL-SCNC: 28 MMOL/L (ref 20–33)
CREAT SERPL-MCNC: 1.06 MG/DL (ref 0.5–1.4)
FASTING STATUS PATIENT QL REPORTED: NORMAL
GLOBULIN SER CALC-MCNC: 3.1 G/DL (ref 1.9–3.5)
GLUCOSE SERPL-MCNC: 124 MG/DL (ref 65–99)
HDLC SERPL-MCNC: 58 MG/DL
LDLC SERPL CALC-MCNC: 42 MG/DL
POTASSIUM SERPL-SCNC: 4.2 MMOL/L (ref 3.6–5.5)
PROT SERPL-MCNC: 7.1 G/DL (ref 6–8.2)
SODIUM SERPL-SCNC: 137 MMOL/L (ref 135–145)
TRIGL SERPL-MCNC: 121 MG/DL (ref 0–149)

## 2019-07-18 PROCEDURE — 80061 LIPID PANEL: CPT

## 2019-07-18 PROCEDURE — 36415 COLL VENOUS BLD VENIPUNCTURE: CPT

## 2019-07-18 PROCEDURE — 80053 COMPREHEN METABOLIC PANEL: CPT

## 2019-07-24 ENCOUNTER — APPOINTMENT (OUTPATIENT)
Dept: LAB | Facility: MEDICAL CENTER | Age: 84
End: 2019-07-24
Attending: PHYSICIAN ASSISTANT
Payer: MEDICARE

## 2019-07-24 LAB — PSA SERPL-MCNC: 0.51 NG/ML (ref 0–4)

## 2019-07-24 PROCEDURE — 84153 ASSAY OF PSA TOTAL: CPT

## 2019-07-24 PROCEDURE — 36415 COLL VENOUS BLD VENIPUNCTURE: CPT

## 2019-08-05 ENCOUNTER — APPOINTMENT (RX ONLY)
Dept: URBAN - METROPOLITAN AREA CLINIC 20 | Facility: CLINIC | Age: 84
Setting detail: DERMATOLOGY
End: 2019-08-05

## 2019-08-05 DIAGNOSIS — L57.0 ACTINIC KERATOSIS: ICD-10-CM | Status: INADEQUATELY CONTROLLED

## 2019-08-05 PROBLEM — D48.5 NEOPLASM OF UNCERTAIN BEHAVIOR OF SKIN: Status: ACTIVE | Noted: 2019-08-05

## 2019-08-05 PROCEDURE — 99213 OFFICE O/P EST LOW 20 MIN: CPT | Mod: 25

## 2019-08-05 PROCEDURE — 17000 DESTRUCT PREMALG LESION: CPT | Mod: 59

## 2019-08-05 PROCEDURE — 11102 TANGNTL BX SKIN SINGLE LES: CPT

## 2019-08-05 PROCEDURE — ? LIQUID NITROGEN

## 2019-08-05 PROCEDURE — ? COUNSELING

## 2019-08-05 PROCEDURE — ? ADDITIONAL NOTES

## 2019-08-05 PROCEDURE — ? BIOPSY BY SHAVE METHOD AND DESTRUCTION

## 2019-08-05 PROCEDURE — 11103 TANGNTL BX SKIN EA SEP/ADDL: CPT

## 2019-08-05 PROCEDURE — ? BIOPSY BY SHAVE METHOD

## 2019-08-05 PROCEDURE — 17003 DESTRUCT PREMALG LES 2-14: CPT

## 2019-08-05 ASSESSMENT — LOCATION DETAILED DESCRIPTION DERM
LOCATION DETAILED: LEFT CENTRAL PARIETAL SCALP
LOCATION DETAILED: LEFT SUPERIOR FOREHEAD
LOCATION DETAILED: LEFT SUPERIOR PARIETAL SCALP
LOCATION DETAILED: RIGHT CENTRAL PARIETAL SCALP
LOCATION DETAILED: RIGHT SUPERIOR PARIETAL SCALP
LOCATION DETAILED: LEFT LATERAL MALAR CHEEK
LOCATION DETAILED: LEFT LATERAL FRONTAL SCALP
LOCATION DETAILED: RIGHT CENTRAL FRONTAL SCALP

## 2019-08-05 ASSESSMENT — LOCATION SIMPLE DESCRIPTION DERM
LOCATION SIMPLE: SCALP
LOCATION SIMPLE: LEFT CHEEK
LOCATION SIMPLE: LEFT SCALP
LOCATION SIMPLE: LEFT FOREHEAD
LOCATION SIMPLE: RIGHT SCALP

## 2019-08-05 ASSESSMENT — LOCATION ZONE DERM
LOCATION ZONE: SCALP
LOCATION ZONE: FACE

## 2019-08-05 NOTE — PROCEDURE: BIOPSY BY SHAVE METHOD AND DESTRUCTION
Lab: [id number]
Destruction Type: electrodesiccation
Size Of Lesion After Curettage: 1.7
Post-Care Instructions: I reviewed with the patient in detail post-care instructions. Patient is to keep the biopsy site dry overnight, and then apply bacitracin twice daily until healed. Patient may apply hydrogen peroxide soaks to remove any crusting.
Bill As?: Biopsy by Shave Method
Lab Facility: 
Bill For Surgical Tray: no
Notification Instructions: Patient will be notified of biopsy results. However, patient instructed to call the office if not contacted within 2 weeks.
Detail Level: Detailed
Anesthesia Volume In Cc: 1
Number Of Curettages: 3
Consent: Written consent was obtained and risks were reviewed including but not limited to scarring, infection, bleeding, scabbing, incomplete removal, nerve damage and allergy to anesthesia.
Hemostasis: Drysol
Wound Care: Petrolatum
Biopsy Type: H and E
Anesthesia Type: 1% lidocaine with epinephrine and a 1:10 solution of 8.4% sodium bicarbonate
Size Of Lesion In Cm (Optional): 1.1
Billing Type: Third-Party Bill

## 2019-08-05 NOTE — PROCEDURE: BIOPSY BY SHAVE METHOD
Type Of Destruction Used: Curettage
Was A Bandage Applied: Yes
Electrodesiccation And Curettage Text: The wound bed was treated with electrodesiccation and curettage after the biopsy was performed.
Anesthesia Volume In Cc: 0.2
Dressing: Band-Aid
Biopsy Method: Personna blade
Silver Nitrate Text: The wound bed was treated with silver nitrate after the biopsy was performed.
Detail Level: Detailed
Render Post-Care Instructions In Note?: no
Hemostasis: Drysol and Electrocautery
Billing Type: Third-Party Bill
Curettage Text: The wound bed was treated with curettage after the biopsy was performed.
Size Of Lesion In Cm: 0
Lab: [id number]
Post-Care Instructions: I reviewed with the patient in detail post-care instructions. Patient is to keep the biopsy site dry overnight, and then apply bacitracin twice daily until healed. Patient may apply hydrogen peroxide soaks to remove any crusting.
Cryotherapy Text: The wound bed was treated with cryotherapy after the biopsy was performed.
Anesthesia Type: 1% lidocaine with 1:100,000 epinephrine and a 1:6 solution of 8.4% sodium bicarbonate
Depth Of Biopsy: dermis
Lab Facility: 
Wound Care: Aquaphor
Biopsy Type: H and E
Electrodesiccation Text: The wound bed was treated with electrodesiccation after the biopsy was performed.
Notification Instructions: Patient will be notified of biopsy results. However, patient instructed to call the office if not contacted within 2 weeks.
Consent: Written consent was obtained and risks were reviewed including but not limited to scarring, infection, bleeding, scabbing, incomplete removal, nerve damage and allergy to anesthesia.

## 2019-08-20 ENCOUNTER — HOSPITAL ENCOUNTER (OUTPATIENT)
Dept: LAB | Facility: MEDICAL CENTER | Age: 84
End: 2019-08-20
Attending: INTERNAL MEDICINE
Payer: MEDICARE

## 2019-08-20 LAB
ALBUMIN SERPL BCP-MCNC: 4.1 G/DL (ref 3.2–4.9)
ALBUMIN/GLOB SERPL: 1.4 G/DL
ALP SERPL-CCNC: 97 U/L (ref 30–99)
ALT SERPL-CCNC: 31 U/L (ref 2–50)
ANION GAP SERPL CALC-SCNC: 8 MMOL/L (ref 0–11.9)
AST SERPL-CCNC: 28 U/L (ref 12–45)
BILIRUB SERPL-MCNC: 1.5 MG/DL (ref 0.1–1.5)
BUN SERPL-MCNC: 22 MG/DL (ref 8–22)
CALCIUM SERPL-MCNC: 9.7 MG/DL (ref 8.5–10.5)
CHLORIDE SERPL-SCNC: 103 MMOL/L (ref 96–112)
CHOLEST SERPL-MCNC: 118 MG/DL (ref 100–199)
CO2 SERPL-SCNC: 29 MMOL/L (ref 20–33)
CREAT SERPL-MCNC: 1.11 MG/DL (ref 0.5–1.4)
GLOBULIN SER CALC-MCNC: 3 G/DL (ref 1.9–3.5)
GLUCOSE SERPL-MCNC: 141 MG/DL (ref 65–99)
HDLC SERPL-MCNC: 61 MG/DL
LDLC SERPL CALC-MCNC: 41 MG/DL
POTASSIUM SERPL-SCNC: 4.5 MMOL/L (ref 3.6–5.5)
PROT SERPL-MCNC: 7.1 G/DL (ref 6–8.2)
SODIUM SERPL-SCNC: 140 MMOL/L (ref 135–145)
TRIGL SERPL-MCNC: 79 MG/DL (ref 0–149)

## 2019-08-20 PROCEDURE — 80061 LIPID PANEL: CPT

## 2019-08-20 PROCEDURE — 36415 COLL VENOUS BLD VENIPUNCTURE: CPT

## 2019-08-20 PROCEDURE — 80053 COMPREHEN METABOLIC PANEL: CPT

## 2019-10-02 ENCOUNTER — APPOINTMENT (RX ONLY)
Dept: URBAN - METROPOLITAN AREA CLINIC 36 | Facility: CLINIC | Age: 84
Setting detail: DERMATOLOGY
End: 2019-10-02

## 2019-10-02 PROBLEM — C44.42 SQUAMOUS CELL CARCINOMA OF SKIN OF SCALP AND NECK: Status: ACTIVE | Noted: 2019-10-02

## 2019-10-02 PROCEDURE — 17312 MOHS ADDL STAGE: CPT

## 2019-10-02 PROCEDURE — 17311 MOHS 1 STAGE H/N/HF/G: CPT

## 2019-10-02 PROCEDURE — ? MOHS SURGERY

## 2019-10-02 PROCEDURE — 13121 CMPLX RPR S/A/L 2.6-7.5 CM: CPT

## 2019-10-02 NOTE — PROCEDURE: MOHS SURGERY
Bcc Histology Text: There were numerous aggregates of basaloid cells.
Stage 13: Number Of Blocks?: 0
Mohs Case Number: [id number]
Bi-Rhombic Flap Text: The defect edges were debeveled with a #15 scalpel blade.  Given the location of the defect and the proximity to free margins a bi-rhombic flap was deemed most appropriate.  Using a sterile surgical marker, an appropriate rhombic flap was drawn incorporating the defect. The area thus outlined was incised deep to adipose tissue with a #15 scalpel blade.  The skin margins were undermined to an appropriate distance in all directions utilizing iris scissors.
Crescentic Intermediate Repair Preamble Text (Leave Blank If You Do Not Want): Undermining was performed with blunt dissection.
Epidermal Autograft Text: The defect edges were debeveled with a #15 scalpel blade.  Given the location of the defect, shape of the defect and the proximity to free margins an epidermal autograft was deemed most appropriate.  Using a sterile surgical marker, the primary defect shape was transferred to the donor site. The epidermal graft was then harvested.  The skin graft was then placed in the primary defect and oriented appropriately.
Plastic Surgeon Procedure Text (B): After obtaining clear surgical margins the patient was sent to plastics for surgical repair.  The patient understands they will receive post-surgical care and follow-up from the referring physician's office.
Display The Individual Mohs Indications As Separate Paragraphs: Yes
Deep Sutures: 3-0 Vicryl
Stage 5: Additional Anesthesia Type: 1% lidocaine with epinephrine
Unique Flap 4 Name: Banner Flap
Cartilage Fenestration Performed?: No
Consent (Near Eyelid Margin)/Introductory Paragraph: The rationale for Mohs was explained to the patient and consent was obtained. The risks, benefits and alternatives to therapy were discussed in detail. Specifically, the risks of ectropion or eyelid deformity, infection, scarring, bleeding, prolonged wound healing, incomplete removal, allergy to anesthesia, nerve injury and recurrence were addressed. Prior to the procedure, the treatment site was clearly identified and confirmed by the patient. All components of Universal Protocol/PAUSE Rule completed.
W Plasty Text: The lesion was extirpated to the level of the fat with a #15 scalpel blade.  Given the location of the defect, shape of the defect and the proximity to free margins a W-plasty was deemed most appropriate for repair.  Using a sterile surgical marker, the appropriate transposition arms of the W-plasty were drawn incorporating the defect and placing the expected incisions within the relaxed skin tension lines where possible.    The area thus outlined was incised deep to adipose tissue with a #15 scalpel blade.  The skin margins were undermined to an appropriate distance in all directions utilizing iris scissors.  The opposing transposition arms were then transposed into place in opposite direction and anchored with interrupted buried subcutaneous sutures.
O-L Flap Text: The defect edges were debeveled with a #15 scalpel blade.  Given the location of the defect, shape of the defect and the proximity to free margins an O-L flap was deemed most appropriate.  Using a sterile surgical marker, an appropriate advancement flap was drawn incorporating the defect and placing the expected incisions within the relaxed skin tension lines where possible.    The area thus outlined was incised deep to adipose tissue with a #15 scalpel blade.  The skin margins were undermined to an appropriate distance in all directions utilizing iris scissors.
Xenograft Text: The defect edges were debeveled with a #15 scalpel blade.  Given the location of the defect, shape of the defect and the proximity to free margins a xenograft was deemed most appropriate.  The graft was then trimmed to fit the size of the defect.  The graft was then placed in the primary defect and oriented appropriately.
Non-Graft Cartilage Fenestration Text: The cartilage was fenestrated with a 2mm punch biopsy to help facilitate healing.
Banner Transposition Flap Text: The defect edges were debeveled with a #15 scalpel blade.  Given the location of the defect and the proximity to free margins a Banner transposition flap was deemed most appropriate.  Using a sterile surgical marker, an appropriate flap drawn around the defect. The area thus outlined was incised deep to adipose tissue with a #15 scalpel blade.  The skin margins were undermined to an appropriate distance in all directions utilizing iris scissors.
Surgical Defect Width In Cm (Optional): 1.4
Asc Procedure Text (B): After obtaining clear surgical margins the patient was sent to an ASC for surgical repair.  The patient understands they will receive post-surgical care and follow-up from the ASC physician.
Advancement-Rotation Flap Text: The defect edges were debeveled with a #15 scalpel blade.  Given the location of the defect, shape of the defect and the proximity to free margins an advancement-rotation flap was deemed most appropriate.  Using a sterile surgical marker, an appropriate flap was drawn incorporating the defect and placing the expected incisions within the relaxed skin tension lines where possible. The area thus outlined was incised deep to adipose tissue with a #15 scalpel blade.  The skin margins were undermined to an appropriate distance in all directions utilizing iris scissors.
Consent (Scalp)/Introductory Paragraph: The rationale for Mohs was explained to the patient and consent was obtained. The risks, benefits and alternatives to therapy were discussed in detail. Specifically, the risks of changes in hair growth pattern secondary to repair, infection, scarring, bleeding, prolonged wound healing, incomplete removal, allergy to anesthesia, nerve injury and recurrence were addressed. Prior to the procedure, the treatment site was clearly identified and confirmed by the patient. All components of Universal Protocol/PAUSE Rule completed.
Interpolation Flap Text: A decision was made to reconstruct the defect utilizing an interpolation axial flap and a staged reconstruction.  A telfa template was made of the defect.  This telfa template was then used to outline the interpolation flap.  The donor area for the pedicle flap was then injected with anesthesia.  The flap was excised through the skin and subcutaneous tissue down to the layer of the underlying musculature.  The interpolation flap was carefully excised within this deep plane to maintain its blood supply.  The edges of the donor site were undermined.   The donor site was closed in a primary fashion.  The pedicle was then rotated into position and sutured.  Once the tube was sutured into place, adequate blood supply was confirmed with blanching and refill.  The pedicle was then wrapped with xeroform gauze and dressed appropriately with a telfa and gauze bandage to ensure continued blood supply and protect the attached pedicle.
Unique Flap 4 Text: The defect edges were debeveled with a #15 scalpel blade.  Given the location of the defect and the proximity to free margins a Banner transposition flap was deemed most appropriate.  Using a sterile surgical marker, an appropriate Banner transposition flap was drawn incorporating the defect.    The area thus outlined was incised deep to adipose tissue with a #15 scalpel blade.  The skin margins were undermined to an appropriate distance in all directions utilizing iris scissors.
Mohs Method Verbiage: An incision at a 45 degree angle following the standard Mohs approach was done and the specimen was harvested as a microscopic controlled layer.
Surgical Defect Width In Cm (Optional): 1.2
Otolaryngologist Procedure Text (C): After obtaining clear surgical margins the patient was sent to otolaryngology for surgical repair.  The patient understands they will receive post-surgical care and follow-up from the referring physician's office.
Dorsal Nasal Flap Text: The defect edges were debeveled with a #15 scalpel blade.  Given the location of the defect and the proximity to free margins a dorsal nasal flap,based upon the glabellar folds, was deemed most appropriate.  Using a sterile surgical marker, an appropriate dorsal nasal flap was drawn around the defect.    The area thus outlined was incised deep to adipose tissue with a #15 scalpel blade.  The skin margins were undermined to an appropriate distance in all directions utilizing iris scissors.
Anesthesia Volume In Cc: 6
Partial Purse String (Intermediate) Text: Given the location of the defect and the characteristics of the surrounding skin an intermediate purse string closure was deemed most appropriate.  Undermining was performed circumfirentially around the surgical defect.  A purse string suture was then placed and tightened. Wound tension only allowed a partial closure of the circular defect.
Medical Necessity Statement: Based on my medical judgement, Mohs surgery is the most appropriate treatment for this cancer compared to other treatments.
Referred To Oculoplastics For Closure Text (Leave Blank If You Do Not Want): After obtaining clear surgical margins the patient was sent to oculoplastics for surgical repair.  The patient understands they will receive post-surgical care and follow-up from the referring physician's office.
Island Pedicle Flap Text: The defect edges were debeveled with a #15 scalpel blade.  Given the location of the defect, shape of the defect and the proximity to free margins an island pedicle advancement flap was deemed most appropriate.  Using a sterile surgical marker, an appropriate advancement flap was drawn incorporating the defect, outlining the appropriate donor tissue and placing the expected incisions within the relaxed skin tension lines where possible.    The area thus outlined was incised deep to adipose tissue with a #15 scalpel blade.  The skin margins were undermined to an appropriate distance in all directions around the primary defect and laterally outward around the island pedicle utilizing iris scissors.  There was minimal undermining beneath the pedicle flap.
Area L Indication Text: Tumors in this location are included in Area L (trunk and extremities).  Mohs surgery is indicated for larger tumors, or tumors with aggressive histologic features, in these anatomic locations.
Alternatives Discussed Intro (Do Not Add Period): I discussed alternative treatments to Mohs surgery and specifically discussed the risks and benefits of
Cheiloplasty (Less Than 50%) Text: A decision was made to reconstruct the defect with a  cheiloplasty.  The defect was undermined extensively.  Additional obicularis oris muscle was excised with a 15 blade scalpel.  The defect was converted into a full thickness wedge, of less than 50% of the vertical height of the lip, to facilite a better cosmetic result.  Small vessels were then tied off with 5-0 monocyrl. The obicularis oris, superficial fascia, adipose and dermis were then reapproximated.  After the deeper layers were approximated the epidermis was reapproximated with particular care given to realign the vermilion border.
Same Histology In Subsequent Stages Text: The pattern and morphology of the tumor is as described in the first stage.
Subsequent Stages Histo Method Verbiage: Using a similar technique to that described above, a thin layer of tissue was removed from all areas where tumor was visible on the previous stage.  The tissue was again oriented, mapped, dyed, and processed as above.
Rotation Flap Text: The defect edges were debeveled with a #15 scalpel blade.  Given the location of the defect, shape of the defect and the proximity to free margins a rotation flap was deemed most appropriate.  Using a sterile surgical marker, an appropriate rotation flap was drawn incorporating the defect and placing the expected incisions within the relaxed skin tension lines where possible.    The area thus outlined was incised deep to adipose tissue with a #15 scalpel blade.  The skin margins were undermined to an appropriate distance in all directions utilizing iris scissors.
Provider Procedure Text (D): After obtaining clear surgical margins the defect was repaired by another provider.
Island Pedicle Flap-Requiring Vessel Identification Text: The defect edges were debeveled with a #15 scalpel blade.  Given the location of the defect, shape of the defect and the proximity to free margins an island pedicle advancement flap was deemed most appropriate.  Using a sterile surgical marker, an appropriate advancement flap was drawn, based on the axial vessel mentioned above, incorporating the defect, outlining the appropriate donor tissue and placing the expected incisions within the relaxed skin tension lines where possible.    The area thus outlined was incised deep to adipose tissue with a #15 scalpel blade.  The skin margins were undermined to an appropriate distance in all directions around the primary defect and laterally outward around the island pedicle utilizing iris scissors.  There was minimal undermining beneath the pedicle flap.
Mid-Level Procedure Text (A): After obtaining clear surgical margins the patient was sent to a mid-level provider for surgical repair.  The patient understands they will receive post-surgical care and follow-up from the mid-level provider.
Retention Suture Bite Size: 3 mm
Ftsg Text: The defect edges were debeveled with a #15 scalpel blade.  Given the location of the defect, shape of the defect and the proximity to free margins a full thickness skin graft was deemed most appropriate.  Using a sterile surgical marker, the primary defect shape was transferred to the donor site. The area thus outlined was incised deep to adipose tissue with a #15 scalpel blade.  The harvested graft was then trimmed of adipose tissue until only dermis and epidermis was left.  The skin margins of the secondary defect were undermined to an appropriate distance in all directions utilizing iris scissors.  The secondary defect was closed with interrupted buried subcutaneous sutures.  The skin edges were then re-apposed with running  sutures.  The skin graft was then placed in the primary defect and oriented appropriately.
Muscle Hinge Flap Text: The defect edges were debeveled with a #15 scalpel blade.  Given the size, depth and location of the defect and the proximity to free margins a muscle hinge flap was deemed most appropriate.  Using a sterile surgical marker, an appropriate hinge flap was drawn incorporating the defect. The area thus outlined was incised with a #15 scalpel blade.  The skin margins were undermined to an appropriate distance in all directions utilizing iris scissors.
Crescentic Complex Repair Preamble Text (Leave Blank If You Do Not Want): Extensive wide undermining was performed at least 2 cm in all directions.
Chonodrocutaneous Helical Advancement Flap Text: The defect edges were debeveled with a #15 scalpel blade.  Given the location of the defect and the proximity to free margins a chondrocutaneous helical advancement flap was deemed most appropriate.  Using a sterile surgical marker, the appropriate advancement flap was drawn incorporating the defect and placing the expected incisions within the relaxed skin tension lines where possible.    The area thus outlined was incised deep to adipose tissue with a #15 scalpel blade.  The skin margins were undermined to an appropriate distance in all directions utilizing iris scissors.
Complex Repair And Graft Additional Text (Will Appearing After The Standard Complex Repair Text): The complex repair was not sufficient to completely close the primary defect. The remaining additional defect was repaired with the graft mentioned below.
Simple / Intermediate / Complex Repair - Final Wound Length In Cm: 4.5
Consent 3/Introductory Paragraph: I gave the patient a chance to ask questions they had about the procedure.  Following this I explained the Mohs procedure and consent was obtained. The risks, benefits and alternatives to therapy were discussed in detail. Specifically, the risks of infection, scarring, bleeding, prolonged wound healing, incomplete removal, allergy to anesthesia, nerve injury and recurrence were addressed. Prior to the procedure, the treatment site was clearly identified and confirmed by the patient. All components of Universal Protocol/PAUSE Rule completed.
Double O-Z Plasty Text: The defect edges were debeveled with a #15 scalpel blade.  Given the location of the defect, shape of the defect and the proximity to free margins a Double O-Z plasty (double transposition flap) was deemed most appropriate.  Using a sterile surgical marker, the appropriate transposition flaps were drawn incorporating the defect and placing the expected incisions within the relaxed skin tension lines where possible. The area thus outlined was incised deep to adipose tissue with a #15 scalpel blade.  The skin margins were undermined to an appropriate distance in all directions utilizing iris scissors.  Hemostasis was achieved with electrocautery.  The flaps were then transposed into place, one clockwise and the other counterclockwise, and anchored with interrupted buried subcutaneous sutures.
Where Do You Want The Question To Include Opioid Counseling Located?: Case Summary Tab
S Plasty Text: Given the location and shape of the defect, and the orientation of relaxed skin tension lines, an S-plasty was deemed most appropriate for repair.  Using a sterile surgical marker, the appropriate outline of the S-plasty was drawn, incorporating the defect and placing the expected incisions within the relaxed skin tension lines where possible.  The area thus outlined was incised deep to adipose tissue with a #15 scalpel blade.  The skin margins were undermined to an appropriate distance in all directions utilizing iris scissors. The skin flaps were advanced over the defect.  The opposing margins were then approximated with interrupted buried subcutaneous sutures.
Consent (Temporal Branch)/Introductory Paragraph: The rationale for Mohs was explained to the patient and consent was obtained. The risks, benefits and alternatives to therapy were discussed in detail. Specifically, the risks of damage to the temporal branch of the facial nerve, infection, scarring, bleeding, prolonged wound healing, incomplete removal, allergy to anesthesia, and recurrence were addressed. Prior to the procedure, the treatment site was clearly identified and confirmed by the patient. All components of Universal Protocol/PAUSE Rule completed.
Suturegard Intro: Intraoperative tissue expansion was performed, utilizing the SUTUREGARD device, in order to reduce wound tension.
Dermal Autograft Text: The defect edges were debeveled with a #15 scalpel blade.  Given the location of the defect, shape of the defect and the proximity to free margins a dermal autograft was deemed most appropriate.  Using a sterile surgical marker, the primary defect shape was transferred to the donor site. The area thus outlined was incised deep to adipose tissue with a #15 scalpel blade.  The harvested graft was then trimmed of adipose and epidermal tissue until only dermis was left.  The skin graft was then placed in the primary defect and oriented appropriately.
Bcc Infiltrative Histology Text: There were numerous aggregates of basaloid cells demonstrating an infiltrative pattern.
Helical Rim Advancement Flap Text: The defect edges were debeveled with a #15 blade scalpel.  Given the location of the defect and the proximity to free margins (helical rim) a double helical rim advancement flap was deemed most appropriate.  Using a sterile surgical marker, the appropriate advancement flaps were drawn incorporating the defect and placing the expected incisions between the helical rim and antihelix where possible.  The area thus outlined was incised through and through with a #15 scalpel blade.  With a skin hook and iris scissors, the flaps were gently and sharply undermined and freed up.
Stage 2: Additional Anesthesia Type: 1% lidocaine with 1:100,000 epinephrine and 408mcg clindamycin/ml and a 1:10 solution of 8.4% sodium bicarbonate
Unna Boot Text: An Unna boot was placed to help immobilize the limb and facilitate more rapid healing.
Consent (Ear)/Introductory Paragraph: The rationale for Mohs was explained to the patient and consent was obtained. The risks, benefits and alternatives to therapy were discussed in detail. Specifically, the risks of ear deformity, infection, scarring, bleeding, prolonged wound healing, incomplete removal, allergy to anesthesia, nerve injury and recurrence were addressed. Prior to the procedure, the treatment site was clearly identified and confirmed by the patient. All components of Universal Protocol/PAUSE Rule completed.
Z Plasty Text: The lesion was extirpated to the level of the fat with a #15 scalpel blade.  Given the location of the defect, shape of the defect and the proximity to free margins a Z-plasty was deemed most appropriate for repair.  Using a sterile surgical marker, the appropriate transposition arms of the Z-plasty were drawn incorporating the defect and placing the expected incisions within the relaxed skin tension lines where possible.    The area thus outlined was incised deep to adipose tissue with a #15 scalpel blade.  The skin margins were undermined to an appropriate distance in all directions utilizing iris scissors.  The opposing transposition arms were then transposed into place in opposite direction and anchored with interrupted buried subcutaneous sutures.
Unique Flap 1 Text: A decision was made to reconstruct the defect utilizing a myocutaneous Island pedicle Flap based on the levator labii superioris muscle.  A telfa template was made of the defect.  This telfa template was then used to outline the myocutaneous flap, based along the meilolabial fold.  The donor area for the pedicle flap was then injected with anesthesia.  The flap was excised through the skin and subcutaneous tissue down to the layer of the underlying musculature.  The myocutaneous flap was carefully excised within this deep plane to maintain its blood supply. Based on the muscle. The edges of the donor site were undermined.   The donor site was closed in a primary fashion to the point of transposition.  The pedicle was then transposed into position and sutured.  Once the flap was sutured into place, adequate blood supply was confirmed with blanching and refill.
O-Z Flap Text: The defect edges were debeveled with a #15 scalpel blade.  Given the location of the defect, shape of the defect and the proximity to free margins an O-Z flap was deemed most appropriate.  Using a sterile surgical marker, an appropriate transposition flap was drawn incorporating the defect and placing the expected incisions within the relaxed skin tension lines where possible. The area thus outlined was incised deep to adipose tissue with a #15 scalpel blade.  The skin margins were undermined to an appropriate distance in all directions utilizing iris scissors.
Stage 1: Number Of Blocks?: 1
Referring Physician (Optional): Sim
Bilobed Flap Text: The defect edges were debeveled with a #15 scalpel blade.  Given the location of the defect and the proximity to free margins a bilobe flap was deemed most appropriate.  Using a sterile surgical marker, an appropriate bilobe flap drawn around the defect.    The area thus outlined was incised deep to adipose tissue with a #15 scalpel blade.  The skin margins were undermined to an appropriate distance in all directions utilizing iris scissors.
Purse String (Simple) Text: Given the location of the defect and the characteristics of the surrounding skin a purse string closure was deemed most appropriate.  Undermining was performed circumfirentially around the surgical defect.  A purse string suture was then placed and tightened.
Post-Care Instructions: I reviewed with the patient in detail post-care instructions. Patient is not to engage in any heavy lifting, exercise, or swimming for the next 14 days. Should the patient develop any fevers, chills, bleeding, severe pain patient will contact the office immediately.
Graft Cartilage Fenestration Text: The cartilage was fenestrated with a 2mm punch biopsy to help facilitate graft survival and healing.
Dressing: dry sterile dressing
Mercedes Flap Text: The defect edges were debeveled with a #15 scalpel blade.  Given the location of the defect, shape of the defect and the proximity to free margins a Mercedes flap was deemed most appropriate.  Using a sterile surgical marker, an appropriate advancement flap was drawn incorporating the defect and placing the expected incisions within the relaxed skin tension lines where possible. The area thus outlined was incised deep to adipose tissue with a #15 scalpel blade.  The skin margins were undermined to an appropriate distance in all directions utilizing iris scissors.
Surgeon/Pathologist Verbiage (Will Incorporate Name Of Surgeon From Intro If Not Blank): operated in two distinct and integrated capacities as the surgeon and pathologist.
Melolabial Interpolation Flap Text: A decision was made to reconstruct the defect utilizing an interpolation axial flap and a staged reconstruction.  A telfa template was made of the defect.  This telfa template was then used to outline the melolabial interpolation flap.  The donor area for the pedicle flap was then injected with anesthesia.  The flap was excised through the skin and subcutaneous tissue down to the layer of the underlying musculature.  The pedicle flap was carefully excised within this deep plane to maintain its blood supply.  The edges of the donor site were undermined.   The donor site was closed in a primary fashion.  The pedicle was then rotated into position and sutured.  Once the tube was sutured into place, adequate blood supply was confirmed with blanching and refill.  The pedicle was then wrapped with xeroform gauze and dressed appropriately with a telfa and gauze bandage to ensure continued blood supply and protect the attached pedicle.
Detail Level: Detailed
Manual Repair Warning Statement: We plan on removing the manually selected variable below in favor of our much easier automatic structured text blocks found in the previous tab. We decided to do this to help make the flow better and give you the full power of structured data. Manual selection is never going to be ideal in our platform and I would encourage you to avoid using manual selection from this point on, especially since I will be sunsetting this feature. It is important that you do one of two things with the customized text below. First, you can save all of the text in a word file so you can have it for future reference. Second, transfer the text to the appropriate area in the Library tab. Lastly, if there is a flap or graft type which we do not have you need to let us know right away so I can add it in before the variable is hidden. No need to panic, we plan to give you roughly 6 months to make the change.
Repair Anesthesia Method: local infiltration
Consent 1/Introductory Paragraph: The rationale for Mohs was explained to the patient and consent was obtained. The risks, benefits and alternatives to therapy were discussed in detail. Specifically, the risks of infection, scarring, bleeding, prolonged wound healing, incomplete removal, allergy to anesthesia, nerve injury and recurrence were addressed. Prior to the procedure, the treatment site was clearly identified and confirmed by the patient. All components of Universal Protocol/PAUSE Rule completed.
Island Pedicle Flap With Canthal Suspension Text: The defect edges were debeveled with a #15 scalpel blade.  Given the location of the defect, shape of the defect and the proximity to free margins an island pedicle advancement flap was deemed most appropriate.  Using a sterile surgical marker, an appropriate advancement flap was drawn incorporating the defect, outlining the appropriate donor tissue and placing the expected incisions within the relaxed skin tension lines where possible. The area thus outlined was incised deep to adipose tissue with a #15 scalpel blade.  The skin margins were undermined to an appropriate distance in all directions around the primary defect and laterally outward around the island pedicle utilizing iris scissors.  There was minimal undermining beneath the pedicle flap. A suspension suture was placed in the canthal tendon to prevent tension and prevent ectropion.
Spiral Flap Text: The defect edges were debeveled with a #15 scalpel blade.  Given the location of the defect, shape of the defect and the proximity to free margins a spiral flap was deemed most appropriate.  Using a sterile surgical marker, an appropriate rotation flap was drawn incorporating the defect and placing the expected incisions within the relaxed skin tension lines where possible. The area thus outlined was incised deep to adipose tissue with a #15 scalpel blade.  The skin margins were undermined to an appropriate distance in all directions utilizing iris scissors.
Cheiloplasty (Complex) Text: A decision was made to reconstruct the defect with a  cheiloplasty.  The defect was undermined extensively.  Additional obicularis oris muscle was excised with a 15 blade scalpel.  The defect was converted into a full thickness wedge to facilite a better cosmetic result.  Small vessels were then tied off with 5-0 monocyrl. The obicularis oris, superficial fascia, adipose and dermis were then reapproximated.  After the deeper layers were approximated the epidermis was reapproximated with particular care given to realign the vermilion border.
No Residual Tumor Seen Histology Text: There were no malignant cells seen in the sections examined.
Mohs Rapid Report Verbiage: The area of clinically evident tumor was marked with skin marking ink and appropriately hatched.  The initial incision was made following the Mohs approach through the skin.  The specimen was taken to the lab, divided into the necessary number of pieces, chromacoded and processed according to the Mohs protocol.  This was repeated in successive stages until a tumor free defect was achieved.
Epidermal Closure Graft Donor Site (Optional): simple interrupted
Advancement Flap (Single) Text: The defect edges were debeveled with a #15 scalpel blade.  Given the location of the defect and the proximity to free margins a single advancement flap was deemed most appropriate.  Using a sterile surgical marker, an appropriate advancement flap was drawn incorporating the defect and placing the expected incisions within the relaxed skin tension lines where possible.    The area thus outlined was incised deep to adipose tissue with a #15 scalpel blade.  The skin margins were undermined to an appropriate distance in all directions utilizing iris scissors.
Keystone Flap Text: The defect edges were debeveled with a #15 scalpel blade.  Given the location of the defect, shape of the defect a keystone flap was deemed most appropriate.  Using a sterile surgical marker, an appropriate keystone flap was drawn incorporating the defect, outlining the appropriate donor tissue and placing the expected incisions within the relaxed skin tension lines where possible. The area thus outlined was incised deep to adipose tissue with a #15 scalpel blade.  The skin margins were undermined to an appropriate distance in all directions around the primary defect and laterally outward around the flap utilizing iris scissors.
Estimated Blood Loss (Cc): less than 5 cc
Localized Dermabrasion With Wire Brush Text: The patient was draped in routine manner.  Localized dermabrasion using 3 x 17 mm wire brush was performed in routine manner to papillary dermis. This spot dermabrasion is being performed to complete skin cancer reconstruction. It also will eliminate the other sun damaged precancerous cells that are known to be part of the regional effect of a lifetime's worth of sun exposure. This localized dermabrasion is therapeutic and should not be considered cosmetic in any regard.
Melolabial Transposition Flap Text: The defect edges were debeveled with a #15 scalpel blade.  Given the location of the defect and the proximity to free margins a melolabial flap was deemed most appropriate.  Using a sterile surgical marker, an appropriate melolabial transposition flap was drawn incorporating the defect.    The area thus outlined was incised deep to adipose tissue with a #15 scalpel blade.  The skin margins were undermined to an appropriate distance in all directions utilizing iris scissors.
Split-Thickness Skin Graft Text: The defect edges were debeveled with a #15 scalpel blade.  Given the location of the defect, shape of the defect and the proximity to free margins a split thickness skin graft was deemed most appropriate.  Using a sterile surgical marker, the primary defect shape was transferred to the donor site. The split thickness graft was then harvested.  The skin graft was then placed in the primary defect and oriented appropriately.
Closure 4 Information: This tab is for additional flaps and grafts above and beyond our usual structured repairs.  Please note if you enter information here it will not currently bill and you will need to add the billing information manually.
Crescentic Advancement Flap Text: The defect edges were debeveled with a #15 scalpel blade.  Given the location of the defect and the proximity to free margins a crescentic advancement flap was deemed most appropriate.  Using a sterile surgical marker, the appropriate advancement flap was drawn incorporating the defect and placing the expected incisions within the relaxed skin tension lines where possible.    The area thus outlined was incised deep to adipose tissue with a #15 scalpel blade.  The skin margins were undermined to an appropriate distance in all directions utilizing iris scissors.
Unique Flap 1 Name: Myocutaneous Island pedicle Flap
Unique Flap 2 Name: Peng Flap
Consent (Marginal Mandibular)/Introductory Paragraph: The rationale for Mohs was explained to the patient and consent was obtained. The risks, benefits and alternatives to therapy were discussed in detail. Specifically, the risks of damage to the marginal mandibular branch of the facial nerve, infection, scarring, bleeding, prolonged wound healing, incomplete removal, allergy to anesthesia, and recurrence were addressed. Prior to the procedure, the treatment site was clearly identified and confirmed by the patient. All components of Universal Protocol/PAUSE Rule completed.
V-Y Plasty Text: The defect edges were debeveled with a #15 scalpel blade.  Given the location of the defect, shape of the defect and the proximity to free margins an V-Y advancement flap was deemed most appropriate.  Using a sterile surgical marker, an appropriate advancement flap was drawn incorporating the defect and placing the expected incisions within the relaxed skin tension lines where possible.    The area thus outlined was incised deep to adipose tissue with a #15 scalpel blade.  The skin margins were undermined to an appropriate distance in all directions utilizing iris scissors.
Suturegard Body: The suture ends were repeatedly re-tightened and re-clamped to achieve the desired tissue expansion.
A-T Advancement Flap Text: The defect edges were debeveled with a #15 scalpel blade.  Given the location of the defect, shape of the defect and the proximity to free margins an A-T advancement flap was deemed most appropriate.  Using a sterile surgical marker, an appropriate advancement flap was drawn incorporating the defect and placing the expected incisions within the relaxed skin tension lines where possible.    The area thus outlined was incised deep to adipose tissue with a #15 scalpel blade.  The skin margins were undermined to an appropriate distance in all directions utilizing iris scissors.
Previous Accession (Optional): [id number]
Skin Substitute Text: The defect edges were debeveled with a #15 scalpel blade.  Given the location of the defect, shape of the defect and the proximity to free margins a skin substitute graft was deemed most appropriate.  The graft material was trimmed to fit the size of the defect. The graft was then placed in the primary defect and oriented appropriately.
Bilateral Helical Rim Advancement Flap Text: The defect edges were debeveled with a #15 blade scalpel.  Given the location of the defect and the proximity to free margins (helical rim) a bilateral helical rim advancement flap was deemed most appropriate.  Using a sterile surgical marker, the appropriate advancement flaps were drawn incorporating the defect and placing the expected incisions between the helical rim and antihelix where possible.  The area thus outlined was incised through and through with a #15 scalpel blade.  With a skin hook and iris scissors, the flaps were gently and sharply undermined and freed up.
Home Suture Removal Text: Patient was provided instructions on removing sutures and will remove their sutures at home.  If they have any questions or difficulties they will call the office.
Double O-Z Flap Text: The defect edges were debeveled with a #15 scalpel blade.  Given the location of the defect, shape of the defect and the proximity to free margins a Double O-Z flap was deemed most appropriate.  Using a sterile surgical marker, an appropriate transposition flap was drawn incorporating the defect and placing the expected incisions within the relaxed skin tension lines where possible. The area thus outlined was incised deep to adipose tissue with a #15 scalpel blade.  The skin margins were undermined to an appropriate distance in all directions utilizing iris scissors.
Cheek Interpolation Flap Text: A decision was made to reconstruct the defect utilizing an interpolation axial flap and a staged reconstruction.  A telfa template was made of the defect.  This telfa template was then used to outline the Cheek Interpolation flap.  The donor area for the pedicle flap was then injected with anesthesia.  The flap was excised through the skin and subcutaneous tissue down to the layer of the underlying musculature.  The interpolation flap was carefully excised within this deep plane to maintain its blood supply.  The edges of the donor site were undermined.   The donor site was closed in a primary fashion.  The pedicle was then rotated into position and sutured.  Once the tube was sutured into place, adequate blood supply was confirmed with blanching and refill.  The pedicle was then wrapped with xeroform gauze and dressed appropriately with a telfa and gauze bandage to ensure continued blood supply and protect the attached pedicle.
Consent (Nose)/Introductory Paragraph: The rationale for Mohs was explained to the patient and consent was obtained. The risks, benefits and alternatives to therapy were discussed in detail. Specifically, the risks of nasal deformity, changes in the flow of air through the nose, infection, scarring, bleeding, prolonged wound healing, incomplete removal, allergy to anesthesia, nerve injury and recurrence were addressed. Prior to the procedure, the treatment site was clearly identified and confirmed by the patient. All components of Universal Protocol/PAUSE Rule completed.
Unique Flap 2 Text: A decision was made to reconstruct the defect utilizing a Peng Flap (Bilateral Advancement Rotation Flap). Given the location of the defect and the proximity to free margins, this flap was deemed most appropriate.  Using a sterile surgical marker, the appropriate rotation flaps were drawn incorporating the defect and placing the expected incisions within the relaxed skin tension lines where possible.    The area thus outlined was incised deep to adipose tissue with a #15 scalpel blade.  The skin margins were undermined to an appropriate distance in all directions utilizing iris scissors.
Graft Basting Suture (Optional): 5-0 Fast Absorbing Gut
Information: Selecting Yes will display possible errors in your note based on the variables you have selected. This validation is only offered as a suggestion for you. PLEASE NOTE THAT THE VALIDATION TEXT WILL BE REMOVED WHEN YOU FINALIZE YOUR NOTE. IF YOU WANT TO FAX A PRELIMINARY NOTE YOU WILL NEED TO TOGGLE THIS TO 'NO' IF YOU DO NOT WANT IT IN YOUR FAXED NOTE.
Consent Type: Consent 1 (Standard)
Secondary Intention Text (Leave Blank If You Do Not Want): The defect will heal with secondary intention.
Bilobed Transposition Flap Text: The defect edges were debeveled with a #15 scalpel blade.  Given the location of the defect and the proximity to free margins a bilobed transposition flap was deemed most appropriate.  Using a sterile surgical marker, an appropriate bilobe flap drawn around the defect.    The area thus outlined was incised deep to adipose tissue with a #15 scalpel blade.  The skin margins were undermined to an appropriate distance in all directions utilizing iris scissors.
Purse String (Intermediate) Text: Given the location of the defect and the characteristics of the surrounding skin a purse string intermediate closure was deemed most appropriate.  Undermining was performed circumfirentially around the surgical defect.  A purse string suture was then placed and tightened.
Anesthesia Type: 0.5% lidocaine with 1:200,000 epinephrine and a 1:10 solution of 8.4% sodium bicarbonate and 408mcg clindamycin/ml
Epidermal Sutures: 3-0 Ethilon
Mohs Histo Method Verbiage: Each section was then chromacoded and processed in the Mohs lab using the Mohs protocol and submitted for frozen section.
Mastoid Interpolation Flap Text: A decision was made to reconstruct the defect utilizing an interpolation axial flap and a staged reconstruction.  A telfa template was made of the defect.  This telfa template was then used to outline the mastoid interpolation flap.  The donor area for the pedicle flap was then injected with anesthesia.  The flap was excised through the skin and subcutaneous tissue down to the layer of the underlying musculature.  The pedicle flap was carefully excised within this deep plane to maintain its blood supply.  The edges of the donor site were undermined.   The donor site was closed in a primary fashion.  The pedicle was then rotated into position and sutured.  Once the tube was sutured into place, adequate blood supply was confirmed with blanching and refill.  The pedicle was then wrapped with xeroform gauze and dressed appropriately with a telfa and gauze bandage to ensure continued blood supply and protect the attached pedicle.
Modified Advancement Flap Text: The defect edges were debeveled with a #15 scalpel blade.  Given the location of the defect, shape of the defect and the proximity to free margins a modified advancement flap was deemed most appropriate.  Using a sterile surgical marker, an appropriate advancement flap was drawn incorporating the defect and placing the expected incisions within the relaxed skin tension lines where possible.    The area thus outlined was incised deep to adipose tissue with a #15 scalpel blade.  The skin margins were undermined to an appropriate distance in all directions utilizing iris scissors.
Mucosal Advancement Flap Text: Given the location of the defect, shape of the defect and the proximity to free margins a mucosal advancement flap was deemed most appropriate. Incisions were made with a 15 blade scalpel in the appropriate fashion along the cutaneous vermilion border and the mucosal lip. The remaining actinically damaged mucosal tissue was excised.  The mucosal advancement flap was then elevated to the gingival sulcus with care taken to preserve the neurovascular structures and advanced into the primary defect. Care was taken to ensure that precise realignment of the vermilion border was achieved.
Posterior Auricular Interpolation Flap Text: A decision was made to reconstruct the defect utilizing an interpolation axial flap and a staged reconstruction.  A telfa template was made of the defect.  This telfa template was then used to outline the posterior auricular interpolation flap.  The donor area for the pedicle flap was then injected with anesthesia.  The flap was excised through the skin and subcutaneous tissue down to the layer of the underlying musculature.  The pedicle flap was carefully excised within this deep plane to maintain its blood supply.  The edges of the donor site were undermined.   The donor site was closed in a primary fashion.  The pedicle was then rotated into position and sutured.  Once the tube was sutured into place, adequate blood supply was confirmed with blanching and refill.  The pedicle was then wrapped with xeroform gauze and dressed appropriately with a telfa and gauze bandage to ensure continued blood supply and protect the attached pedicle.
Alar Island Pedicle Flap Text: The defect edges were debeveled with a #15 scalpel blade.  Given the location of the defect, shape of the defect and the proximity to the alar rim an island pedicle advancement flap was deemed most appropriate.  Using a sterile surgical marker, an appropriate advancement flap was drawn incorporating the defect, outlining the appropriate donor tissue and placing the expected incisions within the nasal ala running parallel to the alar rim. The area thus outlined was incised with a #15 scalpel blade.  The skin margins were undermined minimally to an appropriate distance in all directions around the primary defect and laterally outward around the island pedicle utilizing iris scissors.  There was minimal undermining beneath the pedicle flap.
Suturegard Retention Suture: 2-0 Nylon
Consent 2/Introductory Paragraph: Mohs surgery was explained to the patient and consent was obtained. The risks, benefits and alternatives to therapy were discussed in detail. Specifically, the risks of infection, scarring, bleeding, prolonged wound healing, incomplete removal, allergy to anesthesia, nerve injury and recurrence were addressed. Prior to the procedure, the treatment site was clearly identified and confirmed by the patient. All components of Universal Protocol/PAUSE Rule completed.
Star Wedge Flap Text: The defect edges were debeveled with a #15 scalpel blade.  Given the location of the defect, shape of the defect and the proximity to free margins a star wedge flap was deemed most appropriate.  Using a sterile surgical marker, an appropriate rotation flap was drawn incorporating the defect and placing the expected incisions within the relaxed skin tension lines where possible. The area thus outlined was incised deep to adipose tissue with a #15 scalpel blade.  The skin margins were undermined to an appropriate distance in all directions utilizing iris scissors.
Ear Wedge Repair Text: A wedge excision was completed by carrying down an excision through the full thickness of the ear and cartilage with an inward facing Burow's triangle. The wound was then closed in a layered fashion.
Inflammation Suggestive Of Cancer Camouflage Histology Text: There was a dense lymphocytic infiltrate which prevented adequate histologic evaluation of adjacent structures.
Number Of Stages: 2
Advancement Flap (Double) Text: The defect edges were debeveled with a #15 scalpel blade.  Given the location of the defect and the proximity to free margins a double advancement flap was deemed most appropriate.  Using a sterile surgical marker, the appropriate advancement flaps were drawn incorporating the defect and placing the expected incisions within the relaxed skin tension lines where possible.    The area thus outlined was incised deep to adipose tissue with a #15 scalpel blade.  The skin margins were undermined to an appropriate distance in all directions utilizing iris scissors.
O-T Plasty Text: The defect edges were debeveled with a #15 scalpel blade.  Given the location of the defect, shape of the defect and the proximity to free margins an O-T plasty was deemed most appropriate.  Using a sterile surgical marker, an appropriate O-T plasty was drawn incorporating the defect and placing the expected incisions within the relaxed skin tension lines where possible.    The area thus outlined was incised deep to adipose tissue with a #15 scalpel blade.  The skin margins were undermined to an appropriate distance in all directions utilizing iris scissors.
Tarsorrhaphy Text: A tarsorrhaphy was performed using Frost sutures.
Length To Time In Minutes Device Was In Place: 10
Pain Refusal Text: I offered to prescribe pain medication but the patient refused to take this medication.
Cartilage Graft Text: The defect edges were debeveled with a #15 scalpel blade.  Given the location of the defect, shape of the defect, the fact the defect involved a full thickness cartilage defect a cartilage graft was deemed most appropriate.  An appropriate donor site was identified, cleansed, and anesthetized. The cartilage graft was then harvested and transferred to the recipient site, oriented appropriately and then sutured into place.  The secondary defect was then repaired using a primary closure.
Rhombic Flap Text: The defect edges were debeveled with a #15 scalpel blade.  Given the location of the defect and the proximity to free margins a rhombic flap was deemed most appropriate.  Using a sterile surgical marker, an appropriate rhombic flap was drawn incorporating the defect.    The area thus outlined was incised deep to adipose tissue with a #15 scalpel blade.  The skin margins were undermined to an appropriate distance in all directions utilizing iris scissors.
M-Plasty Complex Repair Preamble Text (Leave Blank If You Do Not Want): Extensive wide undermining was performed.
Closure 2 Information: This tab is for additional flaps and grafts, including complex repair and grafts and complex repair and flaps. You can also specify a different location for the additional defect, if the location is the same you do not need to select a new one. We will insert the automated text for the repair you select below just as we do for solitary flaps and grafts. Please note that at this time if you select a location with a different insurance zone you will need to override the ICD10 and CPT if appropriate.
Repair Type: Complex Repair
O-T Advancement Flap Text: The defect edges were debeveled with a #15 scalpel blade.  Given the location of the defect, shape of the defect and the proximity to free margins an O-T advancement flap was deemed most appropriate.  Using a sterile surgical marker, an appropriate advancement flap was drawn incorporating the defect and placing the expected incisions within the relaxed skin tension lines where possible.    The area thus outlined was incised deep to adipose tissue with a #15 scalpel blade.  The skin margins were undermined to an appropriate distance in all directions utilizing iris scissors.
Consent (Spinal Accessory)/Introductory Paragraph: The rationale for Mohs was explained to the patient and consent was obtained. The risks, benefits and alternatives to therapy were discussed in detail. Specifically, the risks of damage to the spinal accessory nerve, infection, scarring, bleeding, prolonged wound healing, incomplete removal, allergy to anesthesia, and recurrence were addressed. Prior to the procedure, the treatment site was clearly identified and confirmed by the patient. All components of Universal Protocol/PAUSE Rule completed.
H Plasty Text: Given the location of the defect, shape of the defect and the proximity to free margins a H-plasty was deemed most appropriate for repair.  Using a sterile surgical marker, the appropriate advancement arms of the H-plasty were drawn incorporating the defect and placing the expected incisions within the relaxed skin tension lines where possible. The area thus outlined was incised deep to adipose tissue with a #15 scalpel blade. The skin margins were undermined to an appropriate distance in all directions utilizing iris scissors.  The opposing advancement arms were then advanced into place in opposite direction and anchored with interrupted buried subcutaneous sutures.
Unique Flap 3 Name: Mercedes Flap
Donor Site Anesthesia Type: same as repair anesthesia
Ear Star Wedge Flap Text: The defect edges were debeveled with a #15 blade scalpel.  Given the location of the defect and the proximity to free margins (helical rim) an ear star wedge flap was deemed most appropriate.  Using a sterile surgical marker, the appropriate flap was drawn incorporating the defect and placing the expected incisions between the helical rim and antihelix where possible.  The area thus outlined was incised through and through with a #15 scalpel blade.
Tissue Cultured Epidermal Autograft Text: The defect edges were debeveled with a #15 scalpel blade.  Given the location of the defect, shape of the defect and the proximity to free margins a tissue cultured epidermal autograft was deemed most appropriate.  The graft was then trimmed to fit the size of the defect.  The graft was then placed in the primary defect and oriented appropriately.
Surgical Defect Length In Cm (Optional): 1.6
Oculoplastic Surgeon (A): Kieran
Unique Flap 3 Text: The defect edges were debeveled with a #15 scalpel blade.  Given the location of the defect, shape of the defect and the proximity to free margins a Mercedes (double advancement flap) was deemed most appropriate.  Using a sterile surgical marker, the appropriate transposition flaps were drawn incorporating the defect and placing the expected incisions within the relaxed skin tension lines where possible.    The area thus outlined was incised deep to adipose tissue with a #15 scalpel blade.  The skin margins were undermined to an appropriate distance in all directions utilizing iris scissors.  Hemostasis was achieved with electrocautery.  The flaps were then advanced into the defect and anchored with interrupted buried subcutaneous sutures.
Eye Protection Verbiage: Before proceeding with the stage, a plastic scleral shield was inserted. The globe was anesthetized with a few drops of 1% lidocaine with 1:100,000 epinephrine. Then, an appropriate sized scleral shield was chosen and coated with lacrilube ointment. The shield was gently inserted and left in place for the duration of each stage. After the stage was completed, the shield was gently removed.
Wound Care: Aquaphor
Consent (Lip)/Introductory Paragraph: The rationale for Mohs was explained to the patient and consent was obtained. The risks, benefits and alternatives to therapy were discussed in detail. Specifically, the risks of lip deformity, changes in the oral aperture, infection, scarring, bleeding, prolonged wound healing, incomplete removal, allergy to anesthesia, nerve injury and recurrence were addressed. Prior to the procedure, the treatment site was clearly identified and confirmed by the patient. All components of Universal Protocol/PAUSE Rule completed.
Cheek-To-Nose Interpolation Flap Text: A decision was made to reconstruct the defect utilizing an interpolation axial flap and a staged reconstruction.  A telfa template was made of the defect.  This telfa template was then used to outline the Cheek-To-Nose Interpolation flap.  The donor area for the pedicle flap was then injected with anesthesia.  The flap was excised through the skin and subcutaneous tissue down to the layer of the underlying musculature.  The interpolation flap was carefully excised within this deep plane to maintain its blood supply.  The edges of the donor site were undermined.   The donor site was closed in a primary fashion.  The pedicle was then rotated into position and sutured.  Once the tube was sutured into place, adequate blood supply was confirmed with blanching and refill.  The pedicle was then wrapped with xeroform gauze and dressed appropriately with a telfa and gauze bandage to ensure continued blood supply and protect the attached pedicle.
V-Y Flap Text: The defect edges were debeveled with a #15 scalpel blade.  Given the location of the defect, shape of the defect and the proximity to free margins a V-Y flap was deemed most appropriate.  Using a sterile surgical marker, an appropriate advancement flap was drawn incorporating the defect and placing the expected incisions within the relaxed skin tension lines where possible.    The area thus outlined was incised deep to adipose tissue with a #15 scalpel blade.  The skin margins were undermined to an appropriate distance in all directions utilizing iris scissors.
Partial Purse String (Simple) Text: Given the location of the defect and the characteristics of the surrounding skin a simple purse string closure was deemed most appropriate.  Undermining was performed circumfirentially around the surgical defect.  A purse string suture was then placed and tightened. Wound tension only allowed a partial closure of the circular defect.
No Repair - Repaired With Adjacent Surgical Defect Text (Leave Blank If You Do Not Want): After obtaining clear surgical margins the defect was repaired concurrently with another surgical defect which was in close approximation.
Trilobed Flap Text: The defect edges were debeveled with a #15 scalpel blade.  Given the location of the defect and the proximity to free margins a trilobed flap was deemed most appropriate.  Using a sterile surgical marker, an appropriate trilobed flap drawn around the defect.    The area thus outlined was incised deep to adipose tissue with a #15 scalpel blade.  The skin margins were undermined to an appropriate distance in all directions utilizing iris scissors.
Area H Indication Text: Tumors in this location are included in Area H (eyelids, eyebrows, nose, lips, chin, ear, pre-auricular, post-auricular, temple, genitalia, hands, feet, ankles and areola).  Tissue conservation is critical in these anatomic locations.
Area M Indication Text: Tumors in this location are included in Area M (cheek, forehead, scalp, neck, jawline and pretibial skin).  Mohs surgery is indicated for tumors in these anatomic locations.
Wound Care (No Sutures): Petrolatum
Hatchet Flap Text: The defect edges were debeveled with a #15 scalpel blade.  Given the location of the defect, shape of the defect and the proximity to free margins a hatchet flap based from the glabella was deemed most appropriate.  Using a sterile surgical marker, an appropriate glabellar hatchet flap was drawn incorporating the defect and placing the expected incisions within the relaxed skin tension lines where possible.    The area thus outlined was incised deep to adipose tissue with a #15 scalpel blade.  The skin margins were undermined to an appropriate distance in all directions utilizing iris scissors.
Paramedian Forehead Flap Text: A decision was made to reconstruct the defect utilizing an interpolation axial flap and a staged reconstruction.  A telfa template was made of the defect.  This telfa template was then used to outline the paramedian forehead pedicle flap.  The donor area for the pedicle flap was then injected with anesthesia.  The flap was excised through the skin and subcutaneous tissue down to the layer of the underlying musculature.  The pedicle flap was carefully excised within this deep plane to maintain its blood supply.  The edges of the donor site were undermined.   The donor site was closed in a primary fashion.  The pedicle was then rotated into position and sutured.  Once the tube was sutured into place, adequate blood supply was confirmed with blanching and refill.  The pedicle was then wrapped with xeroform gauze and dressed appropriately with a telfa and gauze bandage to ensure continued blood supply and protect the attached pedicle.
Graft Donor Site Epidermal Sutures (Optional): 5-0 Ethibond
Surgeon Performing Repair (Optional): Nuzhat
Double Island Pedicle Flap Text: The defect edges were debeveled with a #15 scalpel blade.  Given the location of the defect, shape of the defect and the proximity to free margins a double island pedicle advancement flap was deemed most appropriate.  Using a sterile surgical marker, an appropriate advancement flap was drawn incorporating the defect, outlining the appropriate donor tissue and placing the expected incisions within the relaxed skin tension lines where possible.    The area thus outlined was incised deep to adipose tissue with a #15 scalpel blade.  The skin margins were undermined to an appropriate distance in all directions around the primary defect and laterally outward around the island pedicle utilizing iris scissors.  There was minimal undermining beneath the pedicle flap.
Hemostasis: Electrocautery
Epidermal Closure: running cuticular
Postop Diagnosis: same
Full Thickness Lip Wedge Repair (Flap) Text: Given the location of the defect and the proximity to free margins a full thickness wedge repair was deemed most appropriate.  Using a sterile surgical marker, the appropriate repair was drawn incorporating the defect and placing the expected incisions perpendicular to the vermilion border.  The vermilion border was also meticulously outlined to ensure appropriate reapproximation during the repair.  The area thus outlined was incised through and through with a #15 scalpel blade.  The muscularis and dermis were reaproximated with deep sutures following hemostasis. Care was taken to realign the vermilion border before proceeding with the superficial closure.  Once the vermilion was realigned the superfical and mucosal closure was finished.
Suture Removal: 7 days
Location Indication Override (Is Already Calculated Based On Selected Body Location): Area M
Transposition Flap Text: The defect edges were debeveled with a #15 scalpel blade.  Given the location of the defect and the proximity to free margins a transposition flap was deemed most appropriate.  Using a sterile surgical marker, an appropriate transposition flap was drawn incorporating the defect.    The area thus outlined was incised deep to adipose tissue with a #15 scalpel blade.  The skin margins were undermined to an appropriate distance in all directions utilizing iris scissors.
Graft Donor Site Bandage (Optional-Leave Blank If You Don't Want In Note): Aquaphor and telefa placed on wound. Pressure dressing applied to donor site
Complex Repair And Flap Additional Text (Will Appearing After The Standard Complex Repair Text): The complex repair was not sufficient to completely close the primary defect. The remaining additional defect was repaired with the flap mentioned below.
O-Z Plasty Text: The defect edges were debeveled with a #15 scalpel blade.  Given the location of the defect, shape of the defect and the proximity to free margins an O-Z plasty (double transposition flap) was deemed most appropriate.  Using a sterile surgical marker, the appropriate transposition flaps were drawn incorporating the defect and placing the expected incisions within the relaxed skin tension lines where possible.    The area thus outlined was incised deep to adipose tissue with a #15 scalpel blade.  The skin margins were undermined to an appropriate distance in all directions utilizing iris scissors.  Hemostasis was achieved with electrocautery.  The flaps were then transposed into place, one clockwise and the other counterclockwise, and anchored with interrupted buried subcutaneous sutures.
Burow's Advancement Flap Text: The defect edges were debeveled with a #15 scalpel blade.  Given the location of the defect and the proximity to free margins a Burow's advancement flap was deemed most appropriate.  Using a sterile surgical marker, the appropriate advancement flap was drawn incorporating the defect and placing the expected incisions within the relaxed skin tension lines where possible.    The area thus outlined was incised deep to adipose tissue with a #15 scalpel blade.  The skin margins were undermined to an appropriate distance in all directions utilizing iris scissors.
Mauc Instructions: By selecting yes to the question below the MAUC number will be added into the note.  This will be calculated automatically based on the diagnosis chosen, the size entered, the body zone selected (H,M,L) and the specific indications you chose. You will also have the option to override the Mohs AUC if you disagree with the automatically calculated number and this option is found in the Case Summary tab.
Composite Graft Text: The defect edges were debeveled with a #15 scalpel blade.  Given the location of the defect, shape of the defect, the proximity to free margins and the fact the defect was full thickness a composite graft was deemed most appropriate.  The defect was outline and then transferred to the donor site.  A full thickness graft was then excised from the donor site. The graft was then placed in the primary defect, oriented appropriately and then sutured into place.  The secondary defect was then repaired using a primary closure.
Rhomboid Transposition Flap Text: The defect edges were debeveled with a #15 scalpel blade.  Given the location of the defect and the proximity to free margins a rhomboid transposition flap was deemed most appropriate.  Using a sterile surgical marker, an appropriate rhomboid flap was drawn incorporating the defect.    The area thus outlined was incised deep to adipose tissue with a #15 scalpel blade.  The skin margins were undermined to an appropriate distance in all directions utilizing iris scissors.

## 2019-10-04 ENCOUNTER — APPOINTMENT (RX ONLY)
Dept: URBAN - METROPOLITAN AREA CLINIC 20 | Facility: CLINIC | Age: 84
Setting detail: DERMATOLOGY
End: 2019-10-04

## 2019-10-04 DIAGNOSIS — L57.8 OTHER SKIN CHANGES DUE TO CHRONIC EXPOSURE TO NONIONIZING RADIATION: ICD-10-CM

## 2019-10-04 DIAGNOSIS — Z85.828 PERSONAL HISTORY OF OTHER MALIGNANT NEOPLASM OF SKIN: ICD-10-CM | Status: STABLE

## 2019-10-04 DIAGNOSIS — L57.0 ACTINIC KERATOSIS: ICD-10-CM | Status: INADEQUATELY CONTROLLED

## 2019-10-04 PROCEDURE — ? LIQUID NITROGEN

## 2019-10-04 PROCEDURE — 99214 OFFICE O/P EST MOD 30 MIN: CPT | Mod: 24,25

## 2019-10-04 PROCEDURE — 17003 DESTRUCT PREMALG LES 2-14: CPT | Mod: 79

## 2019-10-04 PROCEDURE — ? ADDITIONAL NOTES

## 2019-10-04 PROCEDURE — 17000 DESTRUCT PREMALG LESION: CPT | Mod: 79

## 2019-10-04 PROCEDURE — ? COUNSELING

## 2019-10-04 ASSESSMENT — LOCATION DETAILED DESCRIPTION DERM
LOCATION DETAILED: LEFT MID PREAURICULAR CHEEK
LOCATION DETAILED: LEFT MEDIAL FRONTAL SCALP
LOCATION DETAILED: LEFT VENTRAL DISTAL FOREARM
LOCATION DETAILED: LEFT VENTRAL PROXIMAL FOREARM
LOCATION DETAILED: LEFT INFERIOR TEMPLE
LOCATION DETAILED: LEFT INFERIOR CENTRAL MALAR CHEEK
LOCATION DETAILED: RIGHT PROXIMAL DORSAL FOREARM
LOCATION DETAILED: LEFT CENTRAL FRONTAL SCALP
LOCATION DETAILED: RIGHT FOREHEAD
LOCATION DETAILED: RIGHT LATERAL FOREHEAD
LOCATION DETAILED: RIGHT DISTAL DORSAL FOREARM

## 2019-10-04 ASSESSMENT — LOCATION ZONE DERM
LOCATION ZONE: FACE
LOCATION ZONE: SCALP
LOCATION ZONE: ARM

## 2019-10-04 ASSESSMENT — LOCATION SIMPLE DESCRIPTION DERM
LOCATION SIMPLE: RIGHT FOREARM
LOCATION SIMPLE: LEFT CHEEK
LOCATION SIMPLE: LEFT TEMPLE
LOCATION SIMPLE: LEFT SCALP
LOCATION SIMPLE: RIGHT FOREHEAD
LOCATION SIMPLE: LEFT FOREARM

## 2019-10-16 ENCOUNTER — APPOINTMENT (RX ONLY)
Dept: URBAN - METROPOLITAN AREA CLINIC 36 | Facility: CLINIC | Age: 84
Setting detail: DERMATOLOGY
End: 2019-10-16

## 2019-10-16 DIAGNOSIS — Z48.02 ENCOUNTER FOR REMOVAL OF SUTURES: ICD-10-CM

## 2019-10-16 PROCEDURE — 99024 POSTOP FOLLOW-UP VISIT: CPT

## 2019-10-16 PROCEDURE — ? SUTURE REMOVAL (GLOBAL PERIOD)

## 2019-10-16 ASSESSMENT — LOCATION SIMPLE DESCRIPTION DERM: LOCATION SIMPLE: SCALP

## 2019-10-16 ASSESSMENT — LOCATION DETAILED DESCRIPTION DERM: LOCATION DETAILED: LEFT SUPERIOR FRONTAL SCALP

## 2019-10-16 ASSESSMENT — LOCATION ZONE DERM: LOCATION ZONE: SCALP

## 2019-10-16 NOTE — PROCEDURE: SUTURE REMOVAL (GLOBAL PERIOD)
Detail Level: Detailed
Add 92561 Cpt? (Important Note: In 2017 The Use Of 41227 Is Being Tracked By Cms To Determine Future Global Period Reimbursement For Global Periods): yes
Body Location Override (Optional - Billing Will Still Be Based On Selected Body Map Location If Applicable): left superior lat. frontal scalp

## 2019-10-22 ENCOUNTER — HOSPITAL ENCOUNTER (OUTPATIENT)
Dept: LAB | Facility: MEDICAL CENTER | Age: 84
End: 2019-10-22
Attending: PHYSICIAN ASSISTANT
Payer: MEDICARE

## 2019-10-22 LAB — PSA SERPL-MCNC: 0.68 NG/ML (ref 0–4)

## 2019-10-22 PROCEDURE — 84153 ASSAY OF PSA TOTAL: CPT

## 2019-10-22 PROCEDURE — 36415 COLL VENOUS BLD VENIPUNCTURE: CPT

## 2019-10-23 RX ORDER — GLIPIZIDE 5 MG/1
TABLET ORAL
Qty: 200 TAB | Refills: 0 | Status: SHIPPED | OUTPATIENT
Start: 2019-10-23 | End: 2020-02-19 | Stop reason: SDUPTHER

## 2019-10-25 ENCOUNTER — HOSPITAL ENCOUNTER (OUTPATIENT)
Dept: RADIOLOGY | Facility: MEDICAL CENTER | Age: 84
End: 2019-10-25
Attending: INTERNAL MEDICINE
Payer: MEDICARE

## 2019-10-25 DIAGNOSIS — I48.0 PAROXYSMAL ATRIAL FIBRILLATION (HCC): ICD-10-CM

## 2019-10-25 DIAGNOSIS — I45.19 OTHER RIGHT BUNDLE-BRANCH BLOCK: ICD-10-CM

## 2019-10-25 PROCEDURE — A9502 TC99M TETROFOSMIN: HCPCS

## 2019-10-25 PROCEDURE — 78452 HT MUSCLE IMAGE SPECT MULT: CPT | Mod: 26 | Performed by: INTERNAL MEDICINE

## 2019-10-25 PROCEDURE — 93018 CV STRESS TEST I&R ONLY: CPT | Performed by: INTERNAL MEDICINE

## 2019-10-25 RX ORDER — REGADENOSON 0.08 MG/ML
INJECTION, SOLUTION INTRAVENOUS
Status: DISPENSED
Start: 2019-10-25 | End: 2019-10-25

## 2019-11-08 ENCOUNTER — PATIENT OUTREACH (OUTPATIENT)
Dept: HEALTH INFORMATION MANAGEMENT | Facility: OTHER | Age: 84
End: 2019-11-08

## 2019-11-08 NOTE — PROGRESS NOTES
1. Attempt #:1    2. [facility]Connect Verified: yes    3. Verify PCP: yes    4. Review Care Team: yes    5. Reviewed/Updated the following with patient:       •   Communication Preference Obtained? YES       •   Preferred Pharmacy? YES       •   Preferred Lab? YES       •   Family History (document living status of immediate family members and if + hx of cancer, diabetes, hypertension, hyperlipidemia, heart attack, stroke)       6. Annual Wellness Visit Scheduling  · Scheduling Status:Scheduled     7. Care Gap Scheduling (Attempt to Schedule EACH Overdue Care Gap!)     Health Maintenance Due   Topic Date Due   • IMM HEP B VACCINE (1 of 3 - Risk 3-dose series) 07/06/1951   • RETINAL SCREENING  10/16/2018   • DIABETES MONOFILAMENT / LE EXAM  03/02/2019   • Annual Wellness Visit  03/03/2019   • IMM INFLUENZA (1) 09/01/2019   • A1C SCREENING  10/08/2019        Scheduled patient for Annual Wellness Visit and Immunizations: HEPATITIS B     8. [employer] Activation: declined    9. [employer] Sarahi: no    10. Virtual Visits: no    11. Opt In to Text Messages: no    12. Patient was advised: “This is a free wellness visit. The provider will screen for medical conditions to help you stay healthy. If you have other concerns to address you may be asked to discuss these at a separate visit or there may be an additional fee.”     13. Patient was informed to arrive 15 min prior to their scheduled appointment and bring in their medication bottles.

## 2019-11-08 NOTE — PROGRESS NOTES
1. HealthConnect Verified: yes    2. Verify PCP: yes    3. Review and add  to Care Team: yes      4. Reviewed/Updated the following with patient:       •   Communication Preference Obtained? YES  • MyChart Activation: declined       •   E-Mail Address Obtained? NO       •   Appointment Day and Time Preferences? YES       •   Preferred Pharmacy? YES       •   Preferred Lab? YES    5. Care Gap Scheduling (Attempt to Schedule EACH Overdue Care Gap!)    Scheduled patient for Annual Wellness Visit and Immunizations: HEPATITIS B

## 2019-12-10 NOTE — ASSESSMENT & PLAN NOTE
Patient has a history of prostate cancer he was treated 3 years ago patient is not currently taking any medications for this issue.   [FreeTextEntry1] : electrocardiogram reveals a regular sinus rhythm and is within normal limits.Spirometry is normal.\par \par Chest x-ray reveals clear lung fields, normal heart size and normal bony thorax-no active disease.\par \par Blood work is reviewed. Blood sugar is in the prediabetic range and cholesterol panel is borderline.

## 2020-01-16 ENCOUNTER — HOSPITAL ENCOUNTER (OUTPATIENT)
Dept: LAB | Facility: MEDICAL CENTER | Age: 85
End: 2020-01-16
Attending: INTERNAL MEDICINE
Payer: MEDICARE

## 2020-01-16 LAB
ALBUMIN SERPL BCP-MCNC: 4.1 G/DL (ref 3.2–4.9)
ALBUMIN/GLOB SERPL: 1.4 G/DL
ALP SERPL-CCNC: 101 U/L (ref 30–99)
ALT SERPL-CCNC: 16 U/L (ref 2–50)
ANION GAP SERPL CALC-SCNC: 7 MMOL/L (ref 0–11.9)
AST SERPL-CCNC: 15 U/L (ref 12–45)
BILIRUB SERPL-MCNC: 1.2 MG/DL (ref 0.1–1.5)
BUN SERPL-MCNC: 22 MG/DL (ref 8–22)
CALCIUM SERPL-MCNC: 9.7 MG/DL (ref 8.5–10.5)
CHLORIDE SERPL-SCNC: 101 MMOL/L (ref 96–112)
CHOLEST SERPL-MCNC: 113 MG/DL (ref 100–199)
CO2 SERPL-SCNC: 28 MMOL/L (ref 20–33)
CREAT SERPL-MCNC: 1.04 MG/DL (ref 0.5–1.4)
CREAT UR-MCNC: 63.4 MG/DL
EST. AVERAGE GLUCOSE BLD GHB EST-MCNC: 260 MG/DL
FASTING STATUS PATIENT QL REPORTED: NORMAL
GLOBULIN SER CALC-MCNC: 2.9 G/DL (ref 1.9–3.5)
GLUCOSE SERPL-MCNC: 214 MG/DL (ref 65–99)
HBA1C MFR BLD: 10.7 % (ref 0–5.6)
HDLC SERPL-MCNC: 61 MG/DL
LDLC SERPL CALC-MCNC: 32 MG/DL
MICROALBUMIN UR-MCNC: 15.7 MG/DL
MICROALBUMIN/CREAT UR: 248 MG/G (ref 0–30)
POTASSIUM SERPL-SCNC: 4.4 MMOL/L (ref 3.6–5.5)
PROT SERPL-MCNC: 7 G/DL (ref 6–8.2)
SODIUM SERPL-SCNC: 136 MMOL/L (ref 135–145)
TRIGL SERPL-MCNC: 99 MG/DL (ref 0–149)

## 2020-01-16 PROCEDURE — 83036 HEMOGLOBIN GLYCOSYLATED A1C: CPT

## 2020-01-16 PROCEDURE — 36415 COLL VENOUS BLD VENIPUNCTURE: CPT

## 2020-01-16 PROCEDURE — 80061 LIPID PANEL: CPT

## 2020-01-16 PROCEDURE — 80053 COMPREHEN METABOLIC PANEL: CPT

## 2020-01-16 PROCEDURE — 82570 ASSAY OF URINE CREATININE: CPT

## 2020-01-16 PROCEDURE — 82043 UR ALBUMIN QUANTITATIVE: CPT

## 2020-01-27 NOTE — PROGRESS NOTES
See Notes - Requesting LIS forms  Call was transferred from [facility] and pt would like the LIS forms sent to him   Msg sent to SRIRAM Deras

## 2020-02-04 ENCOUNTER — TELEPHONE (OUTPATIENT)
Dept: MEDICAL GROUP | Facility: PHYSICIAN GROUP | Age: 85
End: 2020-02-04

## 2020-02-04 NOTE — TELEPHONE ENCOUNTER
ANNUAL WELLNESS VISIT PRE-VISIT PLANNING WITHOUT OUTREACH    1.  Reviewed note from last office visit with PCP: YES    2.  If any orders were placed at last visit, do we have Results/Consult Notes?        •  Labs - Labs were not ordered at last office visit.         •  Imaging - Imaging was not ordered at last office visit.       •  Referrals - No referrals were ordered at last office visit.    3.  Immunizations were updated in Epic using WebIZ?: Epic matches WebIZ       •  WebIZ Recommendations: FLU, TD and SHINGRIX (Shingles)        •  Is patient due for Tdap? NO       •  Is patient due for Shingrix? YES. Patient was not notified of copay/out of pocket cost.     4.  Patient is due for the following Health Maintenance Topics:   Health Maintenance Due   Topic Date Due   • IMM HEP B VACCINE (1 of 3 - Risk 3-dose series) 07/06/1951   • RETINAL SCREENING  10/16/2018   • DIABETES MONOFILAMENT / LE EXAM  03/02/2019   • Annual Wellness Visit  03/03/2019   • IMM INFLUENZA (1) 09/01/2019       - Patient plans to schedule appointment for Annual Wellness Visit (AWV) and Retinal Eye Exam.    5.  Reviewed/Updated the following with patient:       •   Preferred Pharmacy? YES       •   Preferred Lab? YES       •   Preferred Communication? YES       •   Allergies? YES       •   Medications? NO       •   Social History? NO       •   Family History (document living status of immediate family members and if + hx of  cancer, diabetes, hypertension, hyperlipidemia, heart attack, stroke) NO    6.  Care Team Updated:       •   DME Company (gait device, O2, CPAP, etc.): N\A       •   Other Specialists (eye doctor, derm, GYN, cardiology, endo, etc): N\A    7. Orders for overdue Health Maintenance topics pended in Pre-Charting? NO    8.  Patient has the following Care Path diagnoses on Problem List:  DIABETES    Has patient ever had diabetes education? No, patient is NOT interested.      9.  Patient was not advised: “This is a free wellness  visit. The provider will screen for medical conditions to help you stay healthy. If you have other concerns to address you may be asked to discuss these at a separate visit or there may be an additional fee.”     10.  Patient was NOT informed to arrive 15 min prior to their scheduled appointment and bring in their medication bottles.

## 2020-02-19 ENCOUNTER — OFFICE VISIT (OUTPATIENT)
Dept: MEDICAL GROUP | Facility: PHYSICIAN GROUP | Age: 85
End: 2020-02-19
Payer: MEDICARE

## 2020-02-19 VITALS
SYSTOLIC BLOOD PRESSURE: 102 MMHG | DIASTOLIC BLOOD PRESSURE: 58 MMHG | BODY MASS INDEX: 36.88 KG/M2 | HEIGHT: 64 IN | RESPIRATION RATE: 16 BRPM | HEART RATE: 59 BPM | WEIGHT: 216 LBS | TEMPERATURE: 98.2 F | OXYGEN SATURATION: 92 %

## 2020-02-19 DIAGNOSIS — E55.9 VITAMIN D DEFICIENCY: ICD-10-CM

## 2020-02-19 DIAGNOSIS — E11.59 TYPE 2 DIABETES MELLITUS WITH OTHER CIRCULATORY COMPLICATION, WITHOUT LONG-TERM CURRENT USE OF INSULIN (HCC): ICD-10-CM

## 2020-02-19 DIAGNOSIS — E11.43 DIABETIC AUTONOMIC NEUROPATHY ASSOCIATED WITH TYPE 2 DIABETES MELLITUS (HCC): ICD-10-CM

## 2020-02-19 DIAGNOSIS — I48.0 PAROXYSMAL ATRIAL FIBRILLATION (HCC): ICD-10-CM

## 2020-02-19 DIAGNOSIS — Z00.00 MEDICARE ANNUAL WELLNESS VISIT, SUBSEQUENT: Primary | ICD-10-CM

## 2020-02-19 DIAGNOSIS — Z85.46 PERSONAL HISTORY OF PROSTATE CANCER: ICD-10-CM

## 2020-02-19 DIAGNOSIS — C61 MALIGNANT TUMOR OF PROSTATE (HCC): ICD-10-CM

## 2020-02-19 DIAGNOSIS — Z85.46 HISTORY OF PROSTATE CANCER: ICD-10-CM

## 2020-02-19 DIAGNOSIS — Z79.4 ENCOUNTER FOR LONG-TERM (CURRENT) USE OF INSULIN (HCC): ICD-10-CM

## 2020-02-19 DIAGNOSIS — E66.9 OBESITY (BMI 35.0-39.9 WITHOUT COMORBIDITY): ICD-10-CM

## 2020-02-19 DIAGNOSIS — I10 ESSENTIAL HYPERTENSION, BENIGN: ICD-10-CM

## 2020-02-19 DIAGNOSIS — E78.2 MIXED HYPERLIPIDEMIA: ICD-10-CM

## 2020-02-19 DIAGNOSIS — R06.02 SHORTNESS OF BREATH: ICD-10-CM

## 2020-02-19 DIAGNOSIS — I25.10 CORONARY ARTERY DISEASE INVOLVING NATIVE CORONARY ARTERY OF NATIVE HEART WITHOUT ANGINA PECTORIS: ICD-10-CM

## 2020-02-19 PROBLEM — I27.0 PRIMARY PULMONARY HYPERTENSION (HCC): Status: RESOLVED | Noted: 2018-03-02 | Resolved: 2020-02-19

## 2020-02-19 PROBLEM — R33.9 INCOMPLETE EMPTYING OF BLADDER: Status: ACTIVE | Noted: 2020-02-19

## 2020-02-19 PROBLEM — N13.9 URINARY TRACT OBSTRUCTION: Status: ACTIVE | Noted: 2020-02-19

## 2020-02-19 PROBLEM — N52.9 IMPOTENCE OF ORGANIC ORIGIN: Status: ACTIVE | Noted: 2020-02-19

## 2020-02-19 PROBLEM — R39.9 LOWER URINARY TRACT SYMPTOMS: Status: ACTIVE | Noted: 2020-02-19

## 2020-02-19 PROBLEM — N40.0 BENIGN PROSTATIC HYPERPLASIA: Status: ACTIVE | Noted: 2020-02-19

## 2020-02-19 PROBLEM — R97.20 RAISED PROSTATE SPECIFIC ANTIGEN: Status: ACTIVE | Noted: 2018-06-06

## 2020-02-19 RX ORDER — GLIPIZIDE 5 MG/1
5 TABLET ORAL 2 TIMES DAILY
Qty: 200 TAB | Refills: 3 | Status: SHIPPED | OUTPATIENT
Start: 2020-02-19 | End: 2022-12-14

## 2020-02-19 RX ORDER — POTASSIUM CHLORIDE 20 MEQ/1
TABLET, EXTENDED RELEASE ORAL
COMMUNITY
End: 2020-02-19

## 2020-02-19 RX ORDER — METOPROLOL SUCCINATE 25 MG/1
TABLET, EXTENDED RELEASE ORAL
COMMUNITY
End: 2020-02-19

## 2020-02-19 RX ORDER — LISINOPRIL 2.5 MG/1
TABLET ORAL
COMMUNITY
End: 2020-02-19

## 2020-02-19 RX ORDER — LIRAGLUTIDE 6 MG/ML
INJECTION SUBCUTANEOUS
Status: ON HOLD | COMMUNITY
End: 2020-03-07

## 2020-02-19 RX ORDER — TAMSULOSIN HYDROCHLORIDE 0.4 MG/1
CAPSULE ORAL
COMMUNITY
End: 2020-02-19

## 2020-02-19 RX ORDER — CEPHALEXIN 500 MG/1
CAPSULE ORAL
COMMUNITY
Start: 2019-12-04 | End: 2020-02-19

## 2020-02-19 RX ORDER — HYDROCHLOROTHIAZIDE 12.5 MG/1
TABLET ORAL
COMMUNITY
End: 2020-02-19

## 2020-02-19 RX ORDER — FUROSEMIDE 40 MG/1
TABLET ORAL
COMMUNITY
End: 2020-02-19

## 2020-02-19 RX ORDER — ATORVASTATIN CALCIUM 80 MG/1
TABLET, FILM COATED ORAL
COMMUNITY
End: 2020-02-19

## 2020-02-19 RX ORDER — GLIPIZIDE 5 MG/1
TABLET ORAL
COMMUNITY
End: 2020-02-19

## 2020-02-19 ASSESSMENT — ENCOUNTER SYMPTOMS: GENERAL WELL-BEING: POOR

## 2020-02-19 ASSESSMENT — ACTIVITIES OF DAILY LIVING (ADL): BATHING_REQUIRES_ASSISTANCE: 0

## 2020-02-19 ASSESSMENT — PATIENT HEALTH QUESTIONNAIRE - PHQ9: CLINICAL INTERPRETATION OF PHQ2 SCORE: 0

## 2020-02-19 NOTE — LETTER
[facility]  JUAN MANUEL Del RosarioP.RGILBERT  [address]  [place] [address]  Fax: [phone]   Authorization for Release/Disclosure of   Protected Health Information   Name: SHAYY ARCHIBALD : 1932 SSN: [id number]   Address:  [place] [address] Phone:    [phone] (home)    I authorize the entity listed below to release/disclose the PHI below to:   [facility]/PACO Del Rosario.P.R.KAHLIL and VENU Del Rosario.RGILBERT   Provider or Entity Name:   [facility]    Address   [facility], [facility], [facility]   Phone:      Fax:     Reason for request: continuity of care   Information to be released:    [  ] LAST COLONOSCOPY,  including any PATH REPORT and follow-up  [  ] LAST FIT/COLOGUARD RESULT [  ] LAST DEXA  [  ] LAST MAMMOGRAM  [  ] LAST PAP  [  ] LAST LABS [xx  ] RETINA EXAM REPORT  [  ] IMMUNIZATION RECORDS  [  xx] Release all info      [  ] Check here and initial the line next to each item to release ALL health information INCLUDING  _____ Care and treatment for drug and / or alcohol abuse  _____ HIV testing, infection status, or AIDS  _____ Genetic Testing    DATES OF SERVICE OR TIME PERIOD TO BE DISCLOSED: _____________  I understand and acknowledge that:  * This Authorization may be revoked at any time by you in writing, except if your health information has already been used or disclosed.  * Your health information that will be used or disclosed as a result of you signing this authorization could be re-disclosed by the recipient. If this occurs, your re-disclosed health information may no longer be protected by State or Federal laws.  * You may refuse to sign this Authorization. Your refusal will not affect your ability to obtain treatment.  * This Authorization becomes effective upon signing and will  on (date) __________.      If no date is indicated, this Authorization will  one (1) year from the signature date.    Name: Shayy Baig  Doe    Signature:   Date:     2/19/2020       PLEASE FAX REQUESTED RECORDS BACK TO: [phone]

## 2020-02-19 NOTE — PROGRESS NOTES
Subjective:     Julio Azar is a 87 y.o. male here today for and Annual Health Assessment. The patient states that he is currently dealing with a large amount of life stress. At this time he denies SI and plans to live a long life. At this time he denies medication side effects and is happy with his current dosage. The patient notes that he is in glipizide medication refill. He states that he is currently eating a diet higher in sugars than normal and understands that he needs to make a change. His blood sugars have since returned to normal, states his blood sugar this morning was 101. Regularly checks his sugars and denies low readings.  States that he does feel symptoms of low readings.  Denies dizziness or diaphoresis. Patient endorses shortness of breath on exertion. He adds that he has 'always had low oxygen'.  Denies associated chest pain adding that he followed up with cardiology in January 2020 and everything was returned normal.  Most recent cardiac tests did not indicate continued pulmonary hypertension and heart failure per radiology.  Not a current smoking; ceased use in 1982.  Patient continues to take vitamin D supplements, continues to monitor hyperlipidemia closely with cardiology.  Hypertension is well managed at this time.  Current blood pressure is 102/58 and patient denies any chest pain, palpitations, dizziness, blurry vision.  Atrial fibrillation is stable patient has had not had any known episodes or symptoms including chest fluttering.  Patient will continue to monitor with cardiology.      Health Maintenance Summary                DIABETES MONOFILAMENT / LE EXAM Overdue 3/2/2019      Done 3/2/2018 AMB DIABETIC MONOFILAMENT LOWER EXTREMITY EXAM     Patient has more history with this topic...    IMM ZOSTER VACCINES Postponed 6/30/2020 Originally 1/17/2010. System: vaccine not available, other system reasons     Done 11/22/2009 Imm Admin: Zoster Vaccine Live (ZVL) (Zostavax)     Patient  has more history with this topic...    IMM HEP B VACCINE Postponed 2/19/2021 Originally 7/6/1951. Patient Refused    A1C SCREENING Next Due 7/16/2020      Done 1/16/2020 HEMOGLOBIN A1C     Patient has more history with this topic...    RETINAL SCREENING Next Due 11/27/2020      Done 11/27/2019 REFERRAL FOR RETINAL SCREENING EXAM     Patient has more history with this topic...    FASTING LIPID PROFILE Next Due 1/16/2021      Done 1/16/2020 LIPID PROFILE     Patient has more history with this topic...    URINE ACR / MICROALBUMIN Next Due 1/16/2021      Done 1/16/2020 MICROALBUMIN CREAT RATIO URINE     Patient has more history with this topic...    SERUM CREATININE Next Due 1/16/2021      Done 1/16/2020 COMP METABOLIC PANEL     Patient has more history with this topic...    Annual Wellness Visit Next Due 2/19/2021      Done 2/19/2020 Visit Dx: Medicare annual wellness visit, subsequent     Patient has more history with this topic...    IMM DTaP/Tdap/Td Vaccine Next Due 7/10/2027      Done 7/10/2017 Imm Admin: Tdap Vaccine      Allergies: Pcn [penicillins] and Latex    Current social contact/activities: no      He  reports that he quit smoking about 38 years ago. His smoking use included cigarettes. He has a 68.00 pack-year smoking history. He has never used smokeless tobacco. He reports current alcohol use of about 4.2 - 8.4 oz of alcohol per week. He reports that he does not use drugs.  Counseling given: Not Answered      DPA/Advanced Directive:  Patient does not have an Advanced Directive.  A packet and workshop information was given on Advanced Directives.    ROS:    Gait: Uses no assistive device  Ostomy: No  Other tubes: No  Amputations: No  Chronic oxygen use: No  Last eye exam: 12/2019  Wears hearing aids: Yes   : Reports urinary leakage during the last 6 months that has somewhat interfered with their daily activities or sleep.    Annual Health Assessment Questions:    1.  Are you currently engaging in any  exercise or physical activity? No    2.  How would you describe your mood or emotional well-being today? depressed    3.  Have you had any falls in the last year? No    4.  Have you noticed any problems with your balance or had difficulty walking? Yes    5.  In the last six months have you experienced any leakage of urine? Yes    6. DPA/Advanced Directive: Patient does not have an Advanced Directive.  A packet and workshop information was given on Advanced Directives.  Screening:  Annual Exam   Depression Screening    Little interest or pleasure in doing things?  0 - not at all  Feeling down, depressed , or hopeless? 0 - not at all  Patient Health Questionnaire Score: 0     If depressive symptoms identified deferred to follow up visit unless specifically addressed in assessment and plan.    Interpretation of PHQ-9 Total Score   Score Severity   1-4 No Depression   5-9 Mild Depression   10-14 Moderate Depression   15-19 Moderately Severe Depression   20-27 Severe Depression    Screening for Cognitive Impairment    Three Minute Recall (village, kitchen, baby) 2/3    Frankie clock face with all 12 numbers and set the hands to show 10 past 10.  Yes    Cognitive concerns identified deferred for follow up unless specifically addressed in assessment and plan.    Fall Risk Assessment    Has the patient had two or more falls in the last year or any fall with injury in the last year?  No    Safety Assessment    Throw rugs on floor.  Yes  Handrails on all stairs.  Yes  Good lighting in all hallways.  Yes  Difficulty hearing.  No  Patient counseled about all safety risks that were identified.    Functional Assessment ADLs    Are there any barriers preventing you from cooking for yourself or meeting nutritional needs?  No.    Are there any barriers preventing you from driving safely or obtaining transportation?  No.    Are there any barriers preventing you from using a telephone or calling for help?  No.    Are there any barriers  preventing you from shopping?  No.    Are there any barriers preventing you from taking care of your own finances?  No.    Are there any barriers preventing you from managing your medications?  No.    Are there any barriers preventing you from showering, bathing or dressing yourself?  No.    Are you currently engaging in any exercise or physical activity?  No.     What is your perception of your health?  Poor.      Current medicines (including changes today)  Current Outpatient Medications   Medication Sig Dispense Refill   • liraglutide (VICTOZA) 18 MG/3ML Solution Pen-injector Victoza 3-Paddy 0.6 mg/0.1 mL (18 mg/3 mL) subcutaneous pen injector     • glipiZIDE (GLUCOTROL) 5 MG Tab Take 1 Tab by mouth 2 times a day. 200 Tab 3   • metoprolol SR (TOPROL XL) 25 MG TABLET SR 24 HR      • hydroCHLOROthiazide (HYDRODIURIL) 12.5 MG tablet Take 1 mg by mouth every day.     • atorvastatin (LIPITOR) 80 MG tablet Take 80 mg by mouth every day.     • FREESTYLE LITE strip 1 Strip by Other route every day.     • tamsulosin (FLOMAX) 0.4 MG capsule TAKE ONE CAPSULE BY MOUTH TWO TIMES A  Cap 2   • lisinopril (PRINIVIL) 2.5 MG Tab Take 1 Tab by mouth every day. 90 Tab 3   • insulin glargine (LANTUS SOLOSTAR) 100 UNIT/ML Solution Pen-injector injection Inject 10 Units as instructed every evening. 30 mL 3   • vitamin D (CHOLECALCIFEROL) 1000 UNIT Tab Take 1,000 Units by mouth every day.     • potassium chloride SA (K-DUR) 20 MEQ Tab CR Take 20 mEq by mouth every day.     • metformin (GLUCOPHAGE) 500 MG Tab TAKE 2 TABLETS BY MOUTH TWICE DAILY WITHMEALS 360 Tab 3   • B-D ULTRAFINE III SHORT PEN 31G X 8 MM MISC USE 3 TIMES DAILY 100 Each 11   • Lancets MISC His new meter uses the Accu-Chek SoftClix lancets for 3 time daily testing.  Dx. Code 250.02 300 Each 3   • Blood Glucose Monitoring Suppl SUPPLIES MISC Accucheck Yaneth blood glucose test strips, checking blood sugar 2 daily  .02 insulin requiring. 200 Strip 3   • B Complex  "Vitamins (VITAMIN B COMPLEX PO) Take  by mouth every day.     • Cholecalciferol (VITAMIN D) 2000 UNITS CAPS Take  by mouth every day.       No current facility-administered medications for this visit.        He  has a past medical history of Arrhythmia, Bowel habit changes, Breath shortness, CAD (coronary artery disease), CAD (coronary artery disease), native coronary artery (3/31/2014), Cancer ([facility]) (2014), Dental disorder, Diabetes, Encounter for long-term (current) use of insulin ([facility]) (9/18/2013), Essential hypertension, benign (3/31/2014), Heart burn, Other and unspecified hyperlipidemia (9/18/2013), Snoring, Type II or unspecified type diabetes mellitus without mention of complication, uncontrolled (9/18/2013), and Unspecified vitamin D deficiency (9/18/2013).    Pcn [penicillins] and Latex    He  reports that he quit smoking about 38 years ago. His smoking use included cigarettes. He has a 68.00 pack-year smoking history. He has never used smokeless tobacco. He reports current alcohol use of about 4.2 - 8.4 oz of alcohol per week. He reports that he does not use drugs.  Counseling given: Not Answered      ROS   Endorses shortness of breath.   No chest pain, no abdominal pain.      Objective:     Physical Exam:  /58 (BP Location: Left arm, Patient Position: Sitting, BP Cuff Size: Adult)   Pulse (!) 59   Temp 36.8 °C (98.2 °F) (Temporal)   Resp 16   Ht 1.626 m (5' 4\")   Wt 98 kg (216 lb)   SpO2 92%  Body mass index is 37.08 kg/m².   Constitutional: Alert, no distress.  Skin: Warm, dry, good turgor, no rashes in visible areas.  Eye: Equal, round and reactive, conjunctiva clear, lids normal.  ENMT: Lips without lesions, good dentition, oropharynx clear.  Neck: Trachea midline, no masses, no thyromegaly. No cervical or supraclavicular lymphadenopathy.  Respiratory: Unlabored respiratory effort, lungs clear to auscultation, no wheezes, no rhonchi.  Cardiovascular: Normal S1, S2, no murmur, no " edema.  Abdomen: Soft, non-tender, no masses, no hepatosplenomegaly.  Psych: Alert and oriented x3, normal affect and mood.    Assessment and Plan:       1. Type 2 diabetes mellitus with other circulatory complication, without long-term current use of insulin (HCC)  Continue current plan of care and follow-up.  - glipiZIDE (GLUCOTROL) 5 MG Tab; Take 1 Tab by mouth 2 times a day.  Dispense: 200 Tab; Refill: 3    2. Shortness of breath  - PULMONARY FUNCTION TESTS -Test requested: Complete Pulmonary Function Test; Future    3. Vitamin D deficiency  Continue supplementation    4. Mixed hyperlipidemia  5. Coronary artery disease involving native coronary artery of native heart without angina pectoris   6. Essential hypertension, benign  Continue follow-up with cardiology    7. 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 (HCC)  Continue current management.    8. History of prostate cancer  9. Malignant tumor of prostate (HCC)  Continue monitoring, follow-up with urology    10. Obesity (BMI 35.0-39.9 without comorbidity) ([facility])  Counseled regarding healthy diet and exercise.    11. Paroxysmal atrial fibrillation (HCC)  Continue current follow-up with cardiology.      Discussion today about general wellness and lifestyle habits:    · Engage in regular physical activity and social activities.  · Prevent falls and reduce trip hazards; using ambulatory aides, hearing and vision testing if appropriate.  · Steps to improve urinary incontinence.  · Advanced care planning.  · Discussed plan to evaluate his A1C in the future.   · Discussed refill for his glipizide and Victoza medications.  · Concerning his shortness of breath, he was informed that we will evaluate him with a pulmonary function test.  · Diabetic sensory neuropathy screening completed in clinic today.     Follow-Up: Return in about 3 months (around 5/19/2020).         PLEASE NOTE: This dictation was created using voice recognition software.  I have made every reasonable attempt to correct obvious errors, but I expect that there are errors of grammar and possibly content that I did not discover before finalizing the note.    I have placed the below orders and discussed them with an approved delegating provider. The MA is performing the below orders under the direction of Dr. Rose.     Mike JOSÉ (Scribe), am scribing for, and in the presence of, ESTEE Lerma    Electronically signed by: Mike Patel (Bijan), 2/19/2020    Jean Claude JOSÉ APRN personally performed the services described in this documentation, as scribed by Mike Patel in my presence, and it is both accurate and complete.

## 2020-02-26 PROBLEM — I48.0 PAROXYSMAL ATRIAL FIBRILLATION (HCC): Status: ACTIVE | Noted: 2020-02-26

## 2020-03-04 ENCOUNTER — APPOINTMENT (OUTPATIENT)
Dept: RADIOLOGY | Facility: MEDICAL CENTER | Age: 85
DRG: 291 | End: 2020-03-04
Attending: EMERGENCY MEDICINE
Payer: MEDICARE

## 2020-03-04 ENCOUNTER — HOSPITAL ENCOUNTER (INPATIENT)
Facility: MEDICAL CENTER | Age: 85
LOS: 3 days | DRG: 291 | End: 2020-03-07
Attending: EMERGENCY MEDICINE | Admitting: INTERNAL MEDICINE
Payer: MEDICARE

## 2020-03-04 DIAGNOSIS — I50.20 SYSTOLIC CONGESTIVE HEART FAILURE, UNSPECIFIED HF CHRONICITY (HCC): Primary | ICD-10-CM

## 2020-03-04 DIAGNOSIS — I50.21 ACUTE SYSTOLIC CONGESTIVE HEART FAILURE (HCC): ICD-10-CM

## 2020-03-04 DIAGNOSIS — R60.9 PERIPHERAL EDEMA: ICD-10-CM

## 2020-03-04 DIAGNOSIS — R06.09 DOE (DYSPNEA ON EXERTION): ICD-10-CM

## 2020-03-04 PROBLEM — I50.9 ACUTE CHF (CONGESTIVE HEART FAILURE) (HCC): Status: ACTIVE | Noted: 2020-03-04

## 2020-03-04 LAB
ALBUMIN SERPL BCP-MCNC: 3.6 G/DL (ref 3.2–4.9)
ALBUMIN/GLOB SERPL: 1.3 G/DL
ALP SERPL-CCNC: 101 U/L (ref 30–99)
ALT SERPL-CCNC: 19 U/L (ref 2–50)
ANION GAP SERPL CALC-SCNC: 10 MMOL/L (ref 0–11.9)
AST SERPL-CCNC: 24 U/L (ref 12–45)
BASOPHILS # BLD AUTO: 0.9 % (ref 0–1.8)
BASOPHILS # BLD: 0.04 K/UL (ref 0–0.12)
BILIRUB SERPL-MCNC: 1.7 MG/DL (ref 0.1–1.5)
BUN SERPL-MCNC: 24 MG/DL (ref 8–22)
CALCIUM SERPL-MCNC: 9 MG/DL (ref 8.5–10.5)
CHLORIDE SERPL-SCNC: 104 MMOL/L (ref 96–112)
CO2 SERPL-SCNC: 25 MMOL/L (ref 20–33)
CREAT SERPL-MCNC: 1.14 MG/DL (ref 0.5–1.4)
EKG IMPRESSION: NORMAL
EOSINOPHIL # BLD AUTO: 0.02 K/UL (ref 0–0.51)
EOSINOPHIL NFR BLD: 0.5 % (ref 0–6.9)
ERYTHROCYTE [DISTWIDTH] IN BLOOD BY AUTOMATED COUNT: 58.4 FL (ref 35.9–50)
GLOBULIN SER CALC-MCNC: 2.8 G/DL (ref 1.9–3.5)
GLUCOSE SERPL-MCNC: 231 MG/DL (ref 65–99)
HCT VFR BLD AUTO: 47.5 % (ref 42–52)
HGB BLD-MCNC: 15.2 G/DL (ref 14–18)
IMM GRANULOCYTES # BLD AUTO: 0.01 K/UL (ref 0–0.11)
IMM GRANULOCYTES NFR BLD AUTO: 0.2 % (ref 0–0.9)
LYMPHOCYTES # BLD AUTO: 1.26 K/UL (ref 1–4.8)
LYMPHOCYTES NFR BLD: 29 % (ref 22–41)
MCH RBC QN AUTO: 30.9 PG (ref 27–33)
MCHC RBC AUTO-ENTMCNC: 32 G/DL (ref 33.7–35.3)
MCV RBC AUTO: 96.5 FL (ref 81.4–97.8)
MONOCYTES # BLD AUTO: 0.36 K/UL (ref 0–0.85)
MONOCYTES NFR BLD AUTO: 8.3 % (ref 0–13.4)
NEUTROPHILS # BLD AUTO: 2.66 K/UL (ref 1.82–7.42)
NEUTROPHILS NFR BLD: 61.1 % (ref 44–72)
NRBC # BLD AUTO: 0 K/UL
NRBC BLD-RTO: 0 /100 WBC
NT-PROBNP SERPL IA-MCNC: 940 PG/ML (ref 0–125)
PLATELET # BLD AUTO: 123 K/UL (ref 164–446)
PMV BLD AUTO: 10.2 FL (ref 9–12.9)
POTASSIUM SERPL-SCNC: 4.1 MMOL/L (ref 3.6–5.5)
PROT SERPL-MCNC: 6.4 G/DL (ref 6–8.2)
RBC # BLD AUTO: 4.92 M/UL (ref 4.7–6.1)
SODIUM SERPL-SCNC: 139 MMOL/L (ref 135–145)
TROPONIN T SERPL-MCNC: 26 NG/L (ref 6–19)
WBC # BLD AUTO: 4.4 K/UL (ref 4.8–10.8)

## 2020-03-04 PROCEDURE — 99285 EMERGENCY DEPT VISIT HI MDM: CPT

## 2020-03-04 PROCEDURE — 36415 COLL VENOUS BLD VENIPUNCTURE: CPT

## 2020-03-04 PROCEDURE — 85025 COMPLETE CBC W/AUTO DIFF WBC: CPT

## 2020-03-04 PROCEDURE — 99223 1ST HOSP IP/OBS HIGH 75: CPT | Mod: AI | Performed by: INTERNAL MEDICINE

## 2020-03-04 PROCEDURE — 700111 HCHG RX REV CODE 636 W/ 250 OVERRIDE (IP): Performed by: EMERGENCY MEDICINE

## 2020-03-04 PROCEDURE — 93005 ELECTROCARDIOGRAM TRACING: CPT

## 2020-03-04 PROCEDURE — 700117 HCHG RX CONTRAST REV CODE 255: Performed by: EMERGENCY MEDICINE

## 2020-03-04 PROCEDURE — 80053 COMPREHEN METABOLIC PANEL: CPT

## 2020-03-04 PROCEDURE — 93005 ELECTROCARDIOGRAM TRACING: CPT | Performed by: EMERGENCY MEDICINE

## 2020-03-04 PROCEDURE — 71045 X-RAY EXAM CHEST 1 VIEW: CPT

## 2020-03-04 PROCEDURE — 96374 THER/PROPH/DIAG INJ IV PUSH: CPT

## 2020-03-04 PROCEDURE — 71275 CT ANGIOGRAPHY CHEST: CPT

## 2020-03-04 PROCEDURE — 83880 ASSAY OF NATRIURETIC PEPTIDE: CPT

## 2020-03-04 PROCEDURE — 94760 N-INVAS EAR/PLS OXIMETRY 1: CPT

## 2020-03-04 PROCEDURE — 84443 ASSAY THYROID STIM HORMONE: CPT

## 2020-03-04 PROCEDURE — 770020 HCHG ROOM/CARE - TELE (206)

## 2020-03-04 PROCEDURE — 84484 ASSAY OF TROPONIN QUANT: CPT

## 2020-03-04 RX ORDER — ACETAMINOPHEN 325 MG/1
650 TABLET ORAL EVERY 6 HOURS PRN
Status: DISCONTINUED | OUTPATIENT
Start: 2020-03-04 | End: 2020-03-07 | Stop reason: HOSPADM

## 2020-03-04 RX ORDER — AMOXICILLIN 250 MG
2 CAPSULE ORAL 2 TIMES DAILY
Status: DISCONTINUED | OUTPATIENT
Start: 2020-03-05 | End: 2020-03-07 | Stop reason: HOSPADM

## 2020-03-04 RX ORDER — POTASSIUM CHLORIDE 20 MEQ/1
20 TABLET, EXTENDED RELEASE ORAL DAILY
Status: DISCONTINUED | OUTPATIENT
Start: 2020-03-05 | End: 2020-03-07 | Stop reason: HOSPADM

## 2020-03-04 RX ORDER — TAMSULOSIN HYDROCHLORIDE 0.4 MG/1
0.4 CAPSULE ORAL
Status: DISCONTINUED | OUTPATIENT
Start: 2020-03-05 | End: 2020-03-07 | Stop reason: HOSPADM

## 2020-03-04 RX ORDER — BISACODYL 10 MG
10 SUPPOSITORY, RECTAL RECTAL
Status: DISCONTINUED | OUTPATIENT
Start: 2020-03-04 | End: 2020-03-07 | Stop reason: HOSPADM

## 2020-03-04 RX ORDER — ATORVASTATIN CALCIUM 20 MG/1
80 TABLET, FILM COATED ORAL DAILY
Status: DISCONTINUED | OUTPATIENT
Start: 2020-03-05 | End: 2020-03-07 | Stop reason: HOSPADM

## 2020-03-04 RX ORDER — GLIPIZIDE 5 MG/1
5 TABLET ORAL 2 TIMES DAILY WITH MEALS
Status: DISCONTINUED | OUTPATIENT
Start: 2020-03-05 | End: 2020-03-07 | Stop reason: HOSPADM

## 2020-03-04 RX ORDER — FUROSEMIDE 10 MG/ML
20 INJECTION INTRAMUSCULAR; INTRAVENOUS
Status: DISCONTINUED | OUTPATIENT
Start: 2020-03-05 | End: 2020-03-06

## 2020-03-04 RX ORDER — FUROSEMIDE 10 MG/ML
40 INJECTION INTRAMUSCULAR; INTRAVENOUS ONCE
Status: COMPLETED | OUTPATIENT
Start: 2020-03-04 | End: 2020-03-04

## 2020-03-04 RX ORDER — METOPROLOL SUCCINATE 25 MG/1
25 TABLET, EXTENDED RELEASE ORAL
Status: DISCONTINUED | OUTPATIENT
Start: 2020-03-05 | End: 2020-03-06

## 2020-03-04 RX ORDER — POLYETHYLENE GLYCOL 3350 17 G/17G
1 POWDER, FOR SOLUTION ORAL
Status: DISCONTINUED | OUTPATIENT
Start: 2020-03-04 | End: 2020-03-07 | Stop reason: HOSPADM

## 2020-03-04 RX ADMIN — FUROSEMIDE 40 MG: 10 INJECTION, SOLUTION INTRAMUSCULAR; INTRAVENOUS at 21:03

## 2020-03-04 RX ADMIN — IOHEXOL 70 ML: 350 INJECTION, SOLUTION INTRAVENOUS at 20:46

## 2020-03-04 ASSESSMENT — ENCOUNTER SYMPTOMS
WHEEZING: 0
NECK PAIN: 0
ABDOMINAL PAIN: 0
DIAPHORESIS: 0
ORTHOPNEA: 1
BRUISES/BLEEDS EASILY: 0
COUGH: 0
SEIZURES: 0
NAUSEA: 0
HEADACHES: 0
BLURRED VISION: 0
SPUTUM PRODUCTION: 0
MYALGIAS: 0
FOCAL WEAKNESS: 0
SORE THROAT: 0
DIZZINESS: 0
SHORTNESS OF BREATH: 1
FLANK PAIN: 0
DIARRHEA: 0
BLOOD IN STOOL: 0
PALPITATIONS: 0
CHILLS: 0
VOMITING: 0
BACK PAIN: 0
FEVER: 0

## 2020-03-04 ASSESSMENT — FIBROSIS 4 INDEX: FIB4 SCORE: 2.49

## 2020-03-05 PROBLEM — R79.89 ELEVATED TROPONIN: Status: ACTIVE | Noted: 2020-03-05

## 2020-03-05 PROBLEM — J96.21 ACUTE ON CHRONIC RESPIRATORY FAILURE WITH HYPOXIA (HCC): Status: ACTIVE | Noted: 2020-03-05

## 2020-03-05 LAB
GLUCOSE BLD-MCNC: 126 MG/DL (ref 65–99)
GLUCOSE BLD-MCNC: 144 MG/DL (ref 65–99)
GLUCOSE BLD-MCNC: 184 MG/DL (ref 65–99)
GLUCOSE BLD-MCNC: 190 MG/DL (ref 65–99)
TROPONIN T SERPL-MCNC: 31 NG/L (ref 6–19)
TSH SERPL DL<=0.005 MIU/L-ACNC: 1.61 UIU/ML (ref 0.38–5.33)

## 2020-03-05 PROCEDURE — A9270 NON-COVERED ITEM OR SERVICE: HCPCS | Performed by: HOSPITALIST

## 2020-03-05 PROCEDURE — 82962 GLUCOSE BLOOD TEST: CPT | Mod: 91

## 2020-03-05 PROCEDURE — 700111 HCHG RX REV CODE 636 W/ 250 OVERRIDE (IP): Performed by: INTERNAL MEDICINE

## 2020-03-05 PROCEDURE — 770020 HCHG ROOM/CARE - TELE (206)

## 2020-03-05 PROCEDURE — 99232 SBSQ HOSP IP/OBS MODERATE 35: CPT | Performed by: HOSPITALIST

## 2020-03-05 PROCEDURE — 700102 HCHG RX REV CODE 250 W/ 637 OVERRIDE(OP): Performed by: HOSPITALIST

## 2020-03-05 PROCEDURE — 700102 HCHG RX REV CODE 250 W/ 637 OVERRIDE(OP): Performed by: INTERNAL MEDICINE

## 2020-03-05 PROCEDURE — A9270 NON-COVERED ITEM OR SERVICE: HCPCS | Performed by: INTERNAL MEDICINE

## 2020-03-05 RX ORDER — INSULIN GLARGINE 100 [IU]/ML
10 INJECTION, SOLUTION SUBCUTANEOUS EVERY EVENING
Status: DISCONTINUED | OUTPATIENT
Start: 2020-03-05 | End: 2020-03-07 | Stop reason: HOSPADM

## 2020-03-05 RX ADMIN — METOPROLOL SUCCINATE 25 MG: 25 TABLET, EXTENDED RELEASE ORAL at 05:11

## 2020-03-05 RX ADMIN — ENOXAPARIN SODIUM 40 MG: 100 INJECTION SUBCUTANEOUS at 05:12

## 2020-03-05 RX ADMIN — INSULIN GLARGINE 10 UNITS: 100 INJECTION, SOLUTION SUBCUTANEOUS at 17:06

## 2020-03-05 RX ADMIN — APIXABAN 5 MG: 5 TABLET, FILM COATED ORAL at 17:06

## 2020-03-05 RX ADMIN — GLIPIZIDE 5 MG: 5 TABLET ORAL at 08:05

## 2020-03-05 RX ADMIN — POTASSIUM CHLORIDE 20 MEQ: 1500 TABLET, EXTENDED RELEASE ORAL at 05:11

## 2020-03-05 RX ADMIN — INSULIN HUMAN 2 UNITS: 100 INJECTION, SOLUTION PARENTERAL at 17:07

## 2020-03-05 RX ADMIN — ATORVASTATIN CALCIUM 80 MG: 20 TABLET, FILM COATED ORAL at 05:11

## 2020-03-05 RX ADMIN — INSULIN HUMAN 2 UNITS: 100 INJECTION, SOLUTION PARENTERAL at 10:56

## 2020-03-05 RX ADMIN — FUROSEMIDE 20 MG: 10 INJECTION, SOLUTION INTRAMUSCULAR; INTRAVENOUS at 17:06

## 2020-03-05 RX ADMIN — FUROSEMIDE 20 MG: 10 INJECTION, SOLUTION INTRAMUSCULAR; INTRAVENOUS at 05:12

## 2020-03-05 RX ADMIN — TAMSULOSIN HYDROCHLORIDE 0.4 MG: 0.4 CAPSULE ORAL at 08:05

## 2020-03-05 RX ADMIN — GLIPIZIDE 5 MG: 5 TABLET ORAL at 17:06

## 2020-03-05 ASSESSMENT — COGNITIVE AND FUNCTIONAL STATUS - GENERAL
TOILETING: A LITTLE
MOVING FROM LYING ON BACK TO SITTING ON SIDE OF FLAT BED: A LITTLE
MOBILITY SCORE: 20
CLIMB 3 TO 5 STEPS WITH RAILING: A LITTLE
SUGGESTED CMS G CODE MODIFIER MOBILITY: CJ
HELP NEEDED FOR BATHING: A LITTLE
SUGGESTED CMS G CODE MODIFIER DAILY ACTIVITY: CJ
DAILY ACTIVITIY SCORE: 20
WALKING IN HOSPITAL ROOM: A LITTLE
DRESSING REGULAR UPPER BODY CLOTHING: A LITTLE
STANDING UP FROM CHAIR USING ARMS: A LITTLE
DRESSING REGULAR LOWER BODY CLOTHING: A LITTLE

## 2020-03-05 ASSESSMENT — CHA2DS2 SCORE
DIABETES: YES
VASCULAR DISEASE: NO
CHF OR LEFT VENTRICULAR DYSFUNCTION: YES
CHA2DS2 VASC SCORE: 5
AGE 65 TO 74: NO
HYPERTENSION: YES
SEX: MALE
PRIOR STROKE OR TIA OR THROMBOEMBOLISM: NO
AGE 75 OR GREATER: YES

## 2020-03-05 ASSESSMENT — PATIENT HEALTH QUESTIONNAIRE - PHQ9
SUM OF ALL RESPONSES TO PHQ QUESTIONS 1-9: 3
1. LITTLE INTEREST OR PLEASURE IN DOING THINGS: SEVERAL DAYS
6. FEELING BAD ABOUT YOURSELF - OR THAT YOU ARE A FAILURE OR HAVE LET YOURSELF OR YOUR FAMILY DOWN: NOT AL ALL
8. MOVING OR SPEAKING SO SLOWLY THAT OTHER PEOPLE COULD HAVE NOTICED. OR THE OPPOSITE, BEING SO FIGETY OR RESTLESS THAT YOU HAVE BEEN MOVING AROUND A LOT MORE THAN USUAL: NOT AT ALL
4. FEELING TIRED OR HAVING LITTLE ENERGY: SEVERAL DAYS
SUM OF ALL RESPONSES TO PHQ9 QUESTIONS 1 AND 2: 2
3. TROUBLE FALLING OR STAYING ASLEEP OR SLEEPING TOO MUCH: NOT AT ALL
7. TROUBLE CONCENTRATING ON THINGS, SUCH AS READING THE NEWSPAPER OR WATCHING TELEVISION: NOT AT ALL
5. POOR APPETITE OR OVEREATING: NOT AT ALL
9. THOUGHTS THAT YOU WOULD BE BETTER OFF DEAD, OR OF HURTING YOURSELF: NOT AT ALL
2. FEELING DOWN, DEPRESSED, IRRITABLE, OR HOPELESS: SEVERAL DAYS

## 2020-03-05 ASSESSMENT — LIFESTYLE VARIABLES
TOTAL SCORE: 0
AVERAGE NUMBER OF DAYS PER WEEK YOU HAVE A DRINK CONTAINING ALCOHOL: 5
TOTAL SCORE: 0
ON A TYPICAL DAY WHEN YOU DRINK ALCOHOL HOW MANY DRINKS DO YOU HAVE: 1
HAVE PEOPLE ANNOYED YOU BY CRITICIZING YOUR DRINKING: NO
HAVE YOU EVER FELT YOU SHOULD CUT DOWN ON YOUR DRINKING: NO
DOES PATIENT WANT TO STOP DRINKING: NO
ALCOHOL_USE: YES
TOTAL SCORE: 0
HOW MANY TIMES IN THE PAST YEAR HAVE YOU HAD 5 OR MORE DRINKS IN A DAY: 0
EVER FELT BAD OR GUILTY ABOUT YOUR DRINKING: NO
EVER HAD A DRINK FIRST THING IN THE MORNING TO STEADY YOUR NERVES TO GET RID OF A HANGOVER: NO
CONSUMPTION TOTAL: NEGATIVE
EVER_SMOKED: YES

## 2020-03-05 ASSESSMENT — ENCOUNTER SYMPTOMS
COUGH: 1
ABDOMINAL PAIN: 0
SHORTNESS OF BREATH: 1
VOMITING: 0
NAUSEA: 0
CHILLS: 0
FEVER: 0

## 2020-03-05 ASSESSMENT — COPD QUESTIONNAIRES
DO YOU EVER COUGH UP ANY MUCUS OR PHLEGM?: NO/ONLY WITH OCCASIONAL COLDS OR INFECTIONS
HAVE YOU SMOKED AT LEAST 100 CIGARETTES IN YOUR ENTIRE LIFE: YES
IN THE PAST 12 MONTHS DO YOU DO LESS THAN YOU USED TO BECAUSE OF YOUR BREATHING PROBLEMS: AGREE
DURING THE PAST 4 WEEKS HOW MUCH DID YOU FEEL SHORT OF BREATH: SOME OF THE TIME
COPD SCREENING SCORE: 6

## 2020-03-05 ASSESSMENT — FIBROSIS 4 INDEX: FIB4 SCORE: 3.89

## 2020-03-05 NOTE — ED NOTES
Pt desat to 83% while standing up to use the urinal. The pt informs he does not feel light headed or dizzy, but appears short of breath. Pt's nasal cannula titrated up to 6 lpm for pt to catch his breath.

## 2020-03-05 NOTE — PROGRESS NOTES
[facility] Medicine Daily Progress Note    Date of Service  3/5/2020    Chief Complaint  87 y.o. male admitted 3/4/2020 with dyspnea edema and suspected CHF    Hospital Course          Interval Problem Update    Dyspnea is improved currently on 3 L nasal cannula  -3 L  Afebrile    Consultants/Specialty  None    Code Status  Full code    Disposition  To be determined    Review of Systems  Review of Systems   Constitutional: Negative for chills and fever.   Respiratory: Positive for cough and shortness of breath.    Cardiovascular: Positive for leg swelling.   Gastrointestinal: Negative for abdominal pain, nausea and vomiting.   All other systems reviewed and are negative.       Physical Exam  Temp:  [36.3 °C (97.3 °F)-37 °C (98.6 °F)] 36.3 °C (97.4 °F)  Pulse:  [53-80] 61  Resp:  [15-20] 17  BP: (117-145)/(54-81) 117/54  SpO2:  [83 %-96 %] 90 %    Physical Exam  Vitals signs and nursing note reviewed.   Constitutional:       Appearance: He is well-developed. He is not diaphoretic.   HENT:      Head: Normocephalic and atraumatic.      Mouth/Throat:      Pharynx: No oropharyngeal exudate.   Eyes:      General: No scleral icterus.        Right eye: No discharge.         Left eye: No discharge.      Conjunctiva/sclera: Conjunctivae normal.      Pupils: Pupils are equal, round, and reactive to light.   Neck:      Musculoskeletal: Neck supple.      Vascular: No JVD.      Trachea: No tracheal deviation.   Cardiovascular:      Rate and Rhythm: Normal rate. Rhythm irregular.      Heart sounds: No murmur. No friction rub. No gallop.    Pulmonary:      Effort: Pulmonary effort is normal. No respiratory distress.      Breath sounds: No stridor. Rales present. No wheezing.   Chest:      Chest wall: No tenderness.   Abdominal:      General: Bowel sounds are normal. There is no distension.      Palpations: Abdomen is soft.      Tenderness: There is no abdominal tenderness. There is no rebound.   Musculoskeletal:         General:  Swelling present. No tenderness.   Skin:     General: Skin is warm and dry.      Nails: There is no clubbing.     Neurological:      Mental Status: He is alert and oriented to person, place, and time.      Cranial Nerves: No cranial nerve deficit.      Motor: No abnormal muscle tone.   Psychiatric:         Behavior: Behavior normal.         Fluids    Intake/Output Summary (Last 24 hours) at 3/5/2020 1335  Last data filed at 3/5/2020 1000  Gross per 24 hour   Intake 240 ml   Output 3250 ml   Net -3010 ml       Laboratory  Recent Labs     03/04/20  1931   WBC 4.4*   RBC 4.92   HEMOGLOBIN 15.2   HEMATOCRIT 47.5   MCV 96.5   MCH 30.9   MCHC 32.0*   RDW 58.4*   PLATELETCT 123*   MPV 10.2     Recent Labs     03/04/20 1931   SODIUM 139   POTASSIUM 4.1   CHLORIDE 104   CO2 25   GLUCOSE 231*   BUN 24*   CREATININE 1.14   CALCIUM 9.0                   Imaging  CT-CTA CHEST PULMONARY ARTERY W/ RECONS   Final Result      1.  No CT evidence for pulmonary emboli.   2.  Findings suggest congestive heart failure.   3.  Mild emphysema.               DX-CHEST-PORTABLE (1 VIEW)   Final Result      1.  Findings consistent with congestive heart failure.   2.  Hypoinflation with patchy bibasilar atelectasis.  Superimposed pneumonia is difficult to exclude.   3.  Mild cardiomegaly.      EC-ECHOCARDIOGRAM COMPLETE W/O CONT    (Results Pending)        Assessment/Plan  Acute CHF (congestive heart failure) (HCC)- (present on admission)  Assessment & Plan  Continue IV Lasix  Monitor intake and output  Follow-up on echocardiogram    Elevated troponin- (present on admission)  Assessment & Plan  Likely demand ischemia    Troponin only mildly elevated patient denies chest pain  Follow-up on echocardiogram    Acute on chronic respiratory failure with hypoxia (HCC)- (present on admission)  Assessment & Plan  Continue diuresis and oxygen    Paroxysmal atrial fibrillation (HCC)- (present on admission)  Assessment & Plan  Continue metoprolol  Patient  has prior history of A. fib for which he underwent cardioversion  Was previously on Eliquis  Discussed risk benefits alternatives of long-term anticoagulation he is in agreement to resume Eliquis    Coronary arteriosclerosis- (present on admission)  Assessment & Plan  Continue atorvastatin    Type 2 diabetes mellitus with circulatory disorder, without long-term current use of insulin (HCC)- (present on admission)  Assessment & Plan  Uncontrolled with hyperglycemia    Continue sliding-scale insulin monitor CBGs  Resume home dose of Lantus           Plan of care reviewed with patient and discussed with nursing staff and pharmacist    VTE prophylaxis: Lovenox

## 2020-03-05 NOTE — H&P
Hospital Medicine History & Physical Note    Date of Service  3/4/2020    Primary Care Physician  Jean Claude Webb, MARCELO    Consultants  None    Code Status  Full Code    Chief Complaint  Shortness of breath    History of Presenting Illness  87 y.o. male with a past medical history of CAD status post CABG, atrial fibrillation, hypertension, hyperlipidemia, type 2 diabetes mellitus, obesity, tobacco abuse who presented 3/4/2020 with shortness of breath for the past 3 weeks.  The patient reports dyspnea on exertion, orthopnea and bilateral lower extremity edema that is been progressively worsening over the past 3 weeks.  He denies any chest pain, fevers, chills, lightheadedness or abdominal pain.  In the ER he was noted to be hypoxic and was placed on 2 L of oxygen.    EKG interpreted by me reveals atrial fibrillation with right bundle branch block  CXR interpreted by me reveals congestive heart failure    Review of Systems  Review of Systems   Constitutional: Negative for chills, diaphoresis and fever.   HENT: Negative for hearing loss and sore throat.    Eyes: Negative for blurred vision.   Respiratory: Positive for shortness of breath. Negative for cough, sputum production and wheezing.    Cardiovascular: Positive for orthopnea and leg swelling. Negative for chest pain and palpitations.   Gastrointestinal: Negative for abdominal pain, blood in stool, diarrhea, nausea and vomiting.   Genitourinary: Negative for dysuria, flank pain and urgency.   Musculoskeletal: Negative for back pain, joint pain, myalgias and neck pain.   Skin: Negative for rash.   Neurological: Negative for dizziness, focal weakness, seizures and headaches.   Endo/Heme/Allergies: Does not bruise/bleed easily.   Psychiatric/Behavioral: Negative for suicidal ideas.   All other systems reviewed and are negative.      Past Medical History   has a past medical history of Arrhythmia, Bowel habit changes, Breath shortness, CAD (coronary  artery disease), CAD (coronary artery disease), native coronary artery (3/31/2014), Cancer ([facility]) (2014), Dental disorder, Diabetes, Encounter for long-term (current) use of insulin ([facility]) (9/18/2013), Essential hypertension, benign (3/31/2014), Heart burn, Other and unspecified hyperlipidemia (9/18/2013), Snoring, Type II or unspecified type diabetes mellitus without mention of complication, uncontrolled (9/18/2013), and Unspecified vitamin D deficiency (9/18/2013).    Surgical History   has a past surgical history that includes cataract phaco with iol (6/30/2011); lid tightening (12/7/2012); other cardiac surgery (2004); humberto by laparoscopy (8/12/2013); ectropian repair (12/31/2014); lacrimal duct probe (12/31/2014); and tonsillectomy.     Family History  family history includes Asthma in his mother; Diabetes in his brother and father; Heart Attack in his brother, father, and mother; Heart Disease in his brother, brother, and mother; Hyperlipidemia in his brother; Psychiatric Illness in his brother and brother; Stroke in his brother.     Social History   reports that he quit smoking about 38 years ago. His smoking use included cigarettes. He has a 68.00 pack-year smoking history. He has never used smokeless tobacco. He reports current alcohol use of about 4.2 - 8.4 oz of alcohol per week. He reports that he does not use drugs.    Allergies  Allergies   Allergen Reactions   • Pcn [Penicillins] Swelling   • Latex Rash     Where latex has touched       Medications  Prior to Admission Medications   Prescriptions Last Dose Informant Patient Reported? Taking?   B Complex Vitamins (VITAMIN B COMPLEX PO)   Yes No   Sig: Take  by mouth every day.   B-D ULTRAFINE III SHORT PEN 31G X 8 MM MISC   No No   Sig: USE 3 TIMES DAILY   Blood Glucose Monitoring Suppl SUPPLIES MISC   No No   Sig: Accucheck Yaneth blood glucose test strips, checking blood sugar 2 daily  .02 insulin requiring.   Cholecalciferol (VITAMIN D) 2000  UNITS CAPS  Patient Yes No   Sig: Take  by mouth every day.   FREESTYLE LITE strip   Yes No   Si Strip by Other route every day.   Lancets MISC   No No   Sig: His new meter uses the Accu-Chek SoftClix lancets for 3 time daily testing.  Dx. Code 250.02   atorvastatin (LIPITOR) 80 MG tablet   Yes No   Sig: Take 80 mg by mouth every day.   glipiZIDE (GLUCOTROL) 5 MG Tab   No No   Sig: Take 1 Tab by mouth 2 times a day.   hydroCHLOROthiazide (HYDRODIURIL) 12.5 MG tablet   Yes No   Sig: Take 1 mg by mouth every day.   insulin glargine (LANTUS SOLOSTAR) 100 UNIT/ML Solution Pen-injector injection   No No   Sig: Inject 10 Units as instructed every evening.   liraglutide (VICTOZA) 18 MG/3ML Solution Pen-injector   Yes No   Sig: Victoza 3-Paddy 0.6 mg/0.1 mL (18 mg/3 mL) subcutaneous pen injector   lisinopril (PRINIVIL) 2.5 MG Tab   No No   Sig: Take 1 Tab by mouth every day.   metformin (GLUCOPHAGE) 500 MG Tab   No No   Sig: TAKE 2 TABLETS BY MOUTH TWICE DAILY WITHMEALS   metoprolol SR (TOPROL XL) 25 MG TABLET SR 24 HR   Yes No   potassium chloride SA (K-DUR) 20 MEQ Tab CR   Yes No   Sig: Take 20 mEq by mouth every day.   tamsulosin (FLOMAX) 0.4 MG capsule   No No   Sig: TAKE ONE CAPSULE BY MOUTH TWO TIMES A DAY   vitamin D (CHOLECALCIFEROL) 1000 UNIT Tab   Yes No   Sig: Take 1,000 Units by mouth every day.      Facility-Administered Medications: None       Physical Exam  Temp:  [37 °C (98.6 °F)] 37 °C (98.6 °F)  Pulse:  [53-80] 53  Resp:  [15-20] 15  BP: (126-141)/(54-71) 126/61  SpO2:  [83 %-96 %] 94 %    Physical Exam  Vitals signs and nursing note reviewed.   Constitutional:       General: He is not in acute distress.     Appearance: Normal appearance.   HENT:      Head: Normocephalic and atraumatic.      Nose: Nose normal.      Mouth/Throat:      Mouth: Mucous membranes are moist.   Eyes:      Extraocular Movements: Extraocular movements intact.      Conjunctiva/sclera: Conjunctivae normal.      Pupils: Pupils are  equal, round, and reactive to light.   Neck:      Musculoskeletal: Normal range of motion and neck supple.   Cardiovascular:      Rate and Rhythm: Normal rate and regular rhythm.      Pulses: Normal pulses.      Heart sounds: Normal heart sounds.   Pulmonary:      Effort: No respiratory distress.      Breath sounds: Rales (Bibasilar) present. No wheezing or rhonchi.   Abdominal:      General: Bowel sounds are normal. There is no distension.      Palpations: Abdomen is soft.      Tenderness: There is no abdominal tenderness.   Musculoskeletal: Normal range of motion.         General: No swelling or tenderness.      Right lower leg: Edema present.      Left lower leg: Edema present.   Lymphadenopathy:      Cervical: No cervical adenopathy.   Skin:     General: Skin is warm.      Coloration: Skin is not jaundiced.      Findings: No rash.   Neurological:      General: No focal deficit present.      Mental Status: He is alert and oriented to person, place, and time.      Cranial Nerves: No cranial nerve deficit.      Motor: No weakness.   Psychiatric:         Mood and Affect: Mood normal.         Behavior: Behavior normal.         Laboratory:  Recent Labs     03/04/20 1931   WBC 4.4*   RBC 4.92   HEMOGLOBIN 15.2   HEMATOCRIT 47.5   MCV 96.5   MCH 30.9   MCHC 32.0*   RDW 58.4*   PLATELETCT 123*   MPV 10.2     Recent Labs     03/04/20 1931   SODIUM 139   POTASSIUM 4.1   CHLORIDE 104   CO2 25   GLUCOSE 231*   BUN 24*   CREATININE 1.14   CALCIUM 9.0     Recent Labs     03/04/20 1931   ALTSGPT 19   ASTSGOT 24   ALKPHOSPHAT 101*   TBILIRUBIN 1.7*   GLUCOSE 231*         Recent Labs     03/04/20 1931   NTPROBNP 940*         Recent Labs     03/04/20 1931   TROPONINT 26*       Urinalysis:    No results found     Imaging:  CT-CTA CHEST PULMONARY ARTERY W/ RECONS   Final Result      1.  No CT evidence for pulmonary emboli.   2.  Findings suggest congestive heart failure.   3.  Mild emphysema.               DX-CHEST-PORTABLE (1  VIEW)   Final Result      1.  Findings consistent with congestive heart failure.   2.  Hypoinflation with patchy bibasilar atelectasis.  Superimposed pneumonia is difficult to exclude.   3.  Mild cardiomegaly.      EC-ECHOCARDIOGRAM COMPLETE W/O CONT    (Results Pending)         Assessment/Plan:  I anticipate this patient will require at least two midnights for appropriate medical management, necessitating inpatient admission.    Acute CHF (congestive heart failure) (HCC)- (present on admission)  Assessment & Plan  Started on IV lasix 20 mg BID  Fluid and salt restriction   Check 2D echo    Elevated troponin- (present on admission)  Assessment & Plan  Type II Nstemi   Denies any chest pain  Continuous cardiac monitoring with serial EKG and troponin    Acute on chronic respiratory failure with hypoxia (HCC)- (present on admission)  Assessment & Plan  Secondary to CHF exacerbation  Placed on 3 L of oxygen by nasal cannula  RT protocol    Paroxysmal atrial fibrillation (HCC)- (present on admission)  Assessment & Plan  Continue Toprol  Have started the patient on Lovenox 1 mg/kg twice daily, will consider transitioning to DOAC prior to discharge  Check TSH    Coronary arteriosclerosis- (present on admission)  Assessment & Plan  Continue Lipitor    Type 2 diabetes mellitus with circulatory disorder, without long-term current use of insulin (HCC)- (present on admission)  Assessment & Plan  Uncontrolled with hyperglycemia  Start on insulin sliding scale with serial Accu-Checks  Hypoglycemic protocol in place          VTE prophylaxis: lovenox

## 2020-03-05 NOTE — ED TRIAGE NOTES
"Chief Complaint   Patient presents with   • Shortness of Breath   • Leg Swelling     86 yo male to triage for above complaint. SOB and leg swelling x 3 months, on Lasix, reports worsening SOB and swelling this week, at  pt's oxygen saturation in the 70's, 90's on 8L.    Charge RN notified, pt to R6.    /66   Pulse 80   Resp 18   Ht 1.626 m (5' 4\")   Wt 98 kg (216 lb 0.8 oz)   SpO2 96% Comment: 77% room air  BMI 37.09 kg/m²   "

## 2020-03-05 NOTE — PROGRESS NOTES
Cardiovascular Nurse Navigator () Advanced Heart Failure Program Inpatient Progress Note:     Patient presented to ED on 3/4/20 with c/o SOB and leg swelling x 3 months with escalation of both symptoms this week. He was found to be hypoxic with sats in the 70's.    Patient's only history with cardiology in our system is from 2018 when he underwent DCCV with Dr. Wyatt Hammond of [facility]. Patient has been seeing Dr. Hammond for many years and has a cardiac history of CABG, Afib, HTN, DLD, and DM.    In both the H&P and OP report from this visit on 7/13/18, Dr. Hammond does not make mention of Heart Failure as a diagnosis.    Patient has been diagnosed with Heart Failure by admitting hospitalist, Dr. Trinh. Echocardiogram is pending, last one on file was 2017.    If any education is provided to patient and family, please specify that heart failure is a working diagnosis until MD has confirmed diagnosis and has discussed it with the patient.     If after echocardiogram results are available, heart failure is ruled out, respectfully request that:    1. Attending provider clearly state in note that HF is ruled out. If this is not done, patient will still code for heart failure.    2. Remove HF from the problems list so that staff are not confused about necessary interventions and patient does not receive education on a diagnosis he does not have.    If echocardiogram results do not eliminate HF as a diagnosis, please consult cardiology as is expected for all new HF diagnoses and initiate the below measures.    Many thanks, Evelin, Cardiovascular Nurse Navigator, RN, [facility] [phone], & [employer] M-F (excluding holidays).    Addendum 03.06.20 0939 echo is complete but still not resulted. Dr. Conchita Keller is saying possible HF.     If diagnosis is confirmed over the weekend, below are measures that need to be addressed.    HF Measures:  1. Documentation of LV systolic function (echo or cath) PTA, during this  hospitalization, or plan to assess post discharge or reason for not assessing documented  2. Documentation of fluid intake and urine output every nursing shift  3. 2 hour post diuretic assessment documented 2 hours after diuretic given  4. HF Patient Education using the Living Well With Heart Failure Booklet and Symptom Tracker documented every nursing shift  5. Nutrition consult for diet education  6. Daily weights (one weight documented every 24 hours) on a standing scale unless standing is contraindicated in which case bed scale can be used - have patient write weight on symptom tracker  7. For LVEF less than or equal to 40%, ACE-I, ARNI or ARB prescribed at discharge   8. For LVEF less than or equal to 40%, an Evidence Based Beta Blocker (bisoprolol, carvedilol, toprol xl) must be prescribed at discharge  9. For LVEF less than or equal to 35% aldosterone blockade prescribed at discharge  10. The combination of hydralazine and isosorbide dinitrate is recommended to reduce morbidity and mortality for patients self-described  Americans with NYHA class III-IV HFrEF (EF 40% or less), receiving optimal therapy with ACE inhibitors and beta blockers, unless contraindicated (Class I, HOLLI: A).  11. If a HF patient is diabetic or is newly diagnosed with DM: prescribed diabetes treatment at discharge in the form of glycemic control (diet or anti-hyperglycemic medication) or f/u appointment for diabetes management scheduled at discharge.  12. If a HF patient has diabetes: prescribed lipid lowering medication at discharge  13. Documented smoking cessation advice or counseling  14. If a HF patient has a-fib: anticoagulation is prescribed upon discharge or contraindication is documented  15. Screening for and administering immunizations as long as no contraindications: Pneumonia (regardless of age) and Influenza  16. Written discharge instructions include:  ? Daily weights  ? Record weight on tracker  ? Bring tracker to  appointments  ? Call MD for weight gain of 3lb /day or 5lb/week  ? HF medication teaching  ? Low sodium diet  ? Follow up appointment within seven calendar days of d/c must include: date, time and location  ? Activity  ? Worsening symptoms    What if any of the above HF measures are contraindicated?  ? Request that the discharging provider document the medication/intervention and the contraindication specifically in a progress note  ? For example: “no CHF meds due to hypotension” is not enough. It needs to say: “No ACE-I, ARNI, ARB due to hypotension”; “No Beta Blockade due to bradycardia”…

## 2020-03-05 NOTE — ED PROVIDER NOTES
ED Provider Note    Scribed for Obi Bhat M.D. by Rosario Baig. 3/4/2020  7:57 PM    Primary care provider: MARCELO Del Rosario  Means of arrival: Walk in  History obtained from: Patient, family  History limited by: None    CHIEF COMPLAINT  Chief Complaint   Patient presents with   • Shortness of Breath   • Leg Swelling       HPI  Julio Azar is a 87 y.o. male, with a history of CAD on aspirin, CABG, diabetes, hypertension, and  diabetes type II, who presents to the Emergency Department for ongoing shortness of breath that began 3 months ago, progressively worsening this past week. Patient reports that he was sweeping and doing his chores when his symptoms worsened today, requiring him to take a break to catch his breath. Family describes his shortness of breath as not being able to get enough air in, which makes him develop some associated anxiety as well. His son notes that the patient's oxygen saturation decreased down to the 70's this afternoon which prompted them to come to the ED. Exacerbating factors include exertion. He notes that sleeping in his recliner in a sightly sitting up position is an alleviating factor. He reports associated fatigue, and bilateral leg swelling. He denies any associated headache, fever, chills, vomiting, or abdominal pain. Patient was previously on Ellquis for atrial fibrillation, but has discontinued this medication 6 months ago. He denies history of oxygen dependence, DVT, or PE. He has a family history of stroke and PE in his son. Patient was a former smoker 37 years ago.    REVIEW OF SYSTEMS  Pertinent negatives include no headache, fever, chills, vomiting, or abdominal pain. As above, all other systems reviewed and are negative.   See HPI for further details.     PAST MEDICAL HISTORY   has a past medical history of Arrhythmia, Bowel habit changes, Breath shortness, CAD (coronary artery disease), CAD (coronary artery disease), native coronary artery  (3/31/2014), Cancer ([facility]) (), Dental disorder, Diabetes, Encounter for long-term (current) use of insulin ([facility]) (2013), Essential hypertension, benign (3/31/2014), Heart burn, Other and unspecified hyperlipidemia (2013), Snoring, Type II or unspecified type diabetes mellitus without mention of complication, uncontrolled (2013), and Unspecified vitamin D deficiency (2013).    SURGICAL HISTORY   has a past surgical history that includes cataract phaco with iol (2011); lid tightening (2012); other cardiac surgery (); humberto by laparoscopy (2013); ectropian repair (2014); lacrimal duct probe (2014); and tonsillectomy.    SOCIAL HISTORY  Social History     Tobacco Use   • Smoking status: Former Smoker     Packs/day: 2.00     Years: 34.00     Pack years: 68.00     Types: Cigarettes     Last attempt to quit: 1982     Years since quittin.1   • Smokeless tobacco: Never Used   Substance Use Topics   • Alcohol use: Yes     Alcohol/week: 4.2 - 8.4 oz     Types: 7 - 14 Glasses of wine per week     Comment: 1 per day   • Drug use: No      Social History     Substance and Sexual Activity   Drug Use No       FAMILY HISTORY  Family History   Problem Relation Age of Onset   • Heart Disease Mother    • Heart Attack Mother    • Asthma Mother    • Heart Attack Father    • Diabetes Father    • Psychiatric Illness Brother    • Heart Disease Brother         5 way bypass   • Diabetes Brother    • Hyperlipidemia Brother    • Heart Attack Brother    • Psychiatric Illness Brother    • Stroke Brother    • Heart Disease Brother         pacemaker       CURRENT MEDICATIONS    Current Facility-Administered Medications:   •  furosemide (LASIX) injection 40 mg, 40 mg, Intravenous, Once, Obi Bhat M.D.    Current Outpatient Medications:   •  liraglutide (VICTOZA) 18 MG/3ML Solution Pen-injector, Victoza 3-Paddy 0.6 mg/0.1 mL (18 mg/3 mL) subcutaneous pen injector, Disp: , Rfl:   •   "glipiZIDE (GLUCOTROL) 5 MG Tab, Take 1 Tab by mouth 2 times a day., Disp: 200 Tab, Rfl: 3  •  metoprolol SR (TOPROL XL) 25 MG TABLET SR 24 HR, , Disp: , Rfl:   •  hydroCHLOROthiazide (HYDRODIURIL) 12.5 MG tablet, Take 1 mg by mouth every day., Disp: , Rfl:   •  atorvastatin (LIPITOR) 80 MG tablet, Take 80 mg by mouth every day., Disp: , Rfl:   •  FREESTYLE LITE strip, 1 Strip by Other route every day., Disp: , Rfl:   •  tamsulosin (FLOMAX) 0.4 MG capsule, TAKE ONE CAPSULE BY MOUTH TWO TIMES A DAY, Disp: 180 Cap, Rfl: 2  •  lisinopril (PRINIVIL) 2.5 MG Tab, Take 1 Tab by mouth every day., Disp: 90 Tab, Rfl: 3  •  insulin glargine (LANTUS SOLOSTAR) 100 UNIT/ML Solution Pen-injector injection, Inject 10 Units as instructed every evening., Disp: 30 mL, Rfl: 3  •  vitamin D (CHOLECALCIFEROL) 1000 UNIT Tab, Take 1,000 Units by mouth every day., Disp: , Rfl:   •  potassium chloride SA (K-DUR) 20 MEQ Tab CR, Take 20 mEq by mouth every day., Disp: , Rfl:   •  metformin (GLUCOPHAGE) 500 MG Tab, TAKE 2 TABLETS BY MOUTH TWICE DAILY WITHMEALS, Disp: 360 Tab, Rfl: 3  •  B-D ULTRAFINE III SHORT PEN 31G X 8 MM MISC, USE 3 TIMES DAILY, Disp: 100 Each, Rfl: 11  •  Lancets MISC, His new meter uses the Accu-Chek SoftClix lancets for 3 time daily testing.  Dx. Code 250.02, Disp: 300 Each, Rfl: 3  •  Blood Glucose Monitoring Suppl SUPPLIES MISC, Accucheck Yaneth blood glucose test strips, checking blood sugar 2 daily  .02 insulin requiring., Disp: 200 Strip, Rfl: 3  •  B Complex Vitamins (VITAMIN B COMPLEX PO), Take  by mouth every day., Disp: , Rfl:   •  Cholecalciferol (VITAMIN D) 2000 UNITS CAPS, Take  by mouth every day., Disp: , Rfl:       ALLERGIES  Allergies   Allergen Reactions   • Pcn [Penicillins] Swelling   • Latex Rash     Where latex has touched       PHYSICAL EXAM  VITAL SIGNS: /66   Pulse 80   Resp 18   Ht 1.626 m (5' 4\")   Wt 98 kg (216 lb 0.8 oz)   SpO2 96% Comment: 77% room air  BMI 37.09 kg/m² "   Constitutional: Well developed, Well nourished, No acute distress, Non-toxic appearance.   HENT: Normocephalic, Atraumatic, Bilateral external ears normal, Oropharynx is clear mucous membranes are moist. No oral exudates or nasal discharge.   Eyes: Pupils are equal round and reactive, EOMI, Conjunctiva normal, No discharge.   Neck: Normal range of motion, No tenderness, Supple, No stridor. No meningismus.  Lymphatic: No lymphadenopathy noted.   Cardiovascular: Regular rate and rhythm without murmur rub or gallop.  Thorax & Lungs: Crackles at the lung bases bilaterally, no wheezes, rhonchi or rales. There is no chest wall tenderness.   Abdomen: Soft non-tender non-distended. There is no rebound or guarding. No organomegaly is appreciated. Bowel sounds are normal.  Skin: Normal without rash.   Back: No CVA or spinal tenderness.   Extremities: 2-3+ bilateral lower extremity pitting edema, Intact distal pulses, No tenderness, No cyanosis, No clubbing. Capillary refill is less than 2 seconds.  Musculoskeletal: Good range of motion in all major joints. No tenderness to palpation or major deformities noted.   Neurologic: Alert & oriented x 3, Normal motor function, Normal sensory function, No focal deficits noted. Reflexes are normal.  Psychiatric: Affect normal, Judgment normal, Mood normal. There is no suicidal ideation or patient reported hallucinations.       DIAGNOSTIC STUDIES / PROCEDURES    LABS  Labs Reviewed   CBC WITH DIFFERENTIAL - Abnormal; Notable for the following components:       Result Value    WBC 4.4 (*)     MCHC 32.0 (*)     RDW 58.4 (*)     Platelet Count 123 (*)     All other components within normal limits   COMP METABOLIC PANEL - Abnormal; Notable for the following components:    Glucose 231 (*)     Bun 24 (*)     Alkaline Phosphatase 101 (*)     Total Bilirubin 1.7 (*)     All other components within normal limits   TROPONIN - Abnormal; Notable for the following components:    Troponin T 26 (*)      All other components within normal limits   PROBRAIN NATRIURETIC PEPTIDE, NT - Abnormal; Notable for the following components:    NT-proBNP 940 (*)     All other components within normal limits   ESTIMATED GFR      All labs reviewed by me.    EKG Interpretation:  Results for orders placed or performed during the hospital encounter of 20   EKG (NOW)   Result Value Ref Range    Report       [facility] Emergency Dept.    Test Date:  2020  Pt Name:    SHAYY ARCHIBALD               Department: ER  MRN:        [id number]                      Room:  Gender:     Male                         Technician: [id number]  :        [id number]                   Requested By:ER TRIAGE PROTOCOL  Order #:    [id number]                    Reading MD: MELLO STEINER MD    Measurements  Intervals                                Axis  Rate:       65                           P:  ND:                                      QRS:        -76  QRSD:       153                          T:          28  QT:         451  QTc:        469    Interpretive Statements  Atrial fibrillation  Right bundle branch block  Anterolateral infarct, age indeterminate  Compared to ECG 2018 12:24:02  Myocardial infarct finding now present  Electronically Signed On 3-4-2020 20:58:00 PST by MELLO STEINER MD         RADIOLOGY  CT-CTA CHEST PULMONARY ARTERY W/ RECONS   Final Result      1.  No CT evidence for pulmonary emboli.   2.  Findings suggest congestive heart failure.   3.  Mild emphysema.               DX-CHEST-PORTABLE (1 VIEW)   Final Result      1.  Findings consistent with congestive heart failure.   2.  Hypoinflation with patchy bibasilar atelectasis.  Superimposed pneumonia is difficult to exclude.   3.  Mild cardiomegaly.        The radiologist's interpretation of all radiological studies have been reviewed by me.    COURSE & MEDICAL DECISION MAKING  Nursing notes, VS, PMSFHx reviewed in chart.    7:54 PM - Obtained and reviewed  past medical records from October 2019 which indicated patient had an ejection fraction of 62% from echo and normal liver function test at that time.    Initially there was concern about failure versus pneumonia versus occult MI.  EKG does not show any evidence of acute ischemic changes and there is old atrial fibrillation with bundle branch block    Chest x-ray shows evidence of congestive heart failure without evidence of effusion.  There is mild cardiomegaly.  I was still concerned about the possibility of pulmonary embolism however and CT scan will be performed    Laboratory evaluation shows no evidence of leukocytosis, shift, anemia, electrolyte derangements or acidosis.  Glucose is elevated 231.  Troponin is unremarkable 26 but BNP is elevated at 940 and I think clinically he does have congestive heart failure.  His last ejection fraction was measured at 62% therefore he may have had an occult event in the interim that has produced a significant decline in his function    7:57 PM Patient seen and examined at bedside. Discussed plan of care with patient. I informed them that labs and imaging will be ordered to evaluate symptoms. Patient is understanding and agreeable with plan. Ordered for DX chest, CT-CTA chest pulmonary artery, and labs  to evaluate.     8:59 PM - Paged hospitalist.    9:11 PM - I discussed the patient's case and the above findings with Dr. Grajeda (hospitalist) who agreed to evaluate the patient for hospitalization.  Family is updated on plan of care and understand need for admission and further evaluation including echo and likely cardiac consultation    DISPOSITION:  Patient will be hospitalized by Dr. Grajeda in guarded condition.    FINAL IMPRESSION  1. KAUR (dyspnea on exertion)    2. Peripheral edema          Rosario JOSÉ (Scribe), am scribing for, and in the presence of, Obi Bhat M.D..    Electronically signed by: Rosario Baig (Navinibbrionna), 3/4/2020    Obi JOSÉ M.D.  personally performed the services described in this documentation, as scribed by Rosario Baig in my presence, and it is both accurate and complete.    C    The note accurately reflects work and decisions made by me.  Obi Bhat M.D.  3/4/2020  9:49 PM

## 2020-03-05 NOTE — ASSESSMENT & PLAN NOTE
Uncontrolled with hyperglycemia    Increase Lantus continue insulin sliding scale monitor CBGs

## 2020-03-05 NOTE — PROGRESS NOTES
Bedside report received from THERESA Hare. Assumed care of pt. Pt sitting up in bed. No signs of distress, no complaints of pain at this time. Tele box on.Call light and belongings within reach. Bed locked and in lowest position.

## 2020-03-05 NOTE — PROGRESS NOTES
Handoff report received from Cecily CARDENAS, patient is currently resting in bed with no pain. Patient is AAOx4, on 3L NC, on tele monitoring, and VSS. Patient had a cardioversion a few years ago for afib, but latest 12 lead EKG shows patient is back in afib. Patient educated on use of call light, call light and belongings within reach, bed in lowest and locked position. Will continue to monitor.

## 2020-03-05 NOTE — ED NOTES
Pt and family updated on poc and apologize for the wait times. Verbalize understanding and deny needs at this time.

## 2020-03-05 NOTE — PROGRESS NOTES
2 RN skin check complete.   Devices in place NA.  Skin assessed under devices NA.  Confirmed pressure ulcers found on NA.  New potential pressure ulcers noted on NA. Wound consult placed NA.  The following interventions in place pillows in place for respositioning, draw sheet in place for respositioning, moisturizer offered.     Patient has dusky and edematous bilateral lower legs and feet, right ear is red and blanching. All other skin is CDI.

## 2020-03-05 NOTE — CARE PLAN
Problem: Communication  Goal: The ability to communicate needs accurately and effectively will improve  Outcome: PROGRESSING AS EXPECTED     Problem: Safety  Goal: Will remain free from injury  Outcome: PROGRESSING AS EXPECTED  Goal: Will remain free from falls  Outcome: PROGRESSING AS EXPECTED     Problem: Infection  Goal: Will remain free from infection  Outcome: PROGRESSING AS EXPECTED     Problem: Venous Thromboembolism (VTW)/Deep Vein Thrombosis (DVT) Prevention:  Goal: Patient will participate in Venous Thrombosis (VTE)/Deep Vein Thrombosis (DVT)Prevention Measures  Outcome: PROGRESSING AS EXPECTED     Problem: Fluid Volume:  Goal: Will maintain balanced intake and output  Outcome: PROGRESSING SLOWER THAN EXPECTED   Patient BLE are edematous and pitting and patient is requiring more oxygen than at baseline.

## 2020-03-06 ENCOUNTER — APPOINTMENT (OUTPATIENT)
Dept: CARDIOLOGY | Facility: MEDICAL CENTER | Age: 85
DRG: 291 | End: 2020-03-06
Attending: INTERNAL MEDICINE
Payer: MEDICARE

## 2020-03-06 ENCOUNTER — APPOINTMENT (OUTPATIENT)
Dept: PULMONOLOGY | Facility: MEDICAL CENTER | Age: 85
End: 2020-03-06
Attending: NURSE PRACTITIONER
Payer: MEDICARE

## 2020-03-06 PROBLEM — I45.5 SINUS PAUSE: Status: ACTIVE | Noted: 2020-03-06

## 2020-03-06 PROBLEM — I50.21 ACUTE SYSTOLIC CONGESTIVE HEART FAILURE (HCC): Status: ACTIVE | Noted: 2020-03-04

## 2020-03-06 PROBLEM — E83.42 HYPOMAGNESEMIA: Status: ACTIVE | Noted: 2020-03-06

## 2020-03-06 LAB
ANION GAP SERPL CALC-SCNC: 9 MMOL/L (ref 0–11.9)
BASOPHILS # BLD AUTO: 0.9 % (ref 0–1.8)
BASOPHILS # BLD: 0.04 K/UL (ref 0–0.12)
BUN SERPL-MCNC: 23 MG/DL (ref 8–22)
CALCIUM SERPL-MCNC: 8.8 MG/DL (ref 8.5–10.5)
CHLORIDE SERPL-SCNC: 101 MMOL/L (ref 96–112)
CO2 SERPL-SCNC: 28 MMOL/L (ref 20–33)
CREAT SERPL-MCNC: 1.09 MG/DL (ref 0.5–1.4)
EOSINOPHIL # BLD AUTO: 0.02 K/UL (ref 0–0.51)
EOSINOPHIL NFR BLD: 0.4 % (ref 0–6.9)
ERYTHROCYTE [DISTWIDTH] IN BLOOD BY AUTOMATED COUNT: 56.5 FL (ref 35.9–50)
GLUCOSE BLD-MCNC: 106 MG/DL (ref 65–99)
GLUCOSE BLD-MCNC: 148 MG/DL (ref 65–99)
GLUCOSE BLD-MCNC: 187 MG/DL (ref 65–99)
GLUCOSE BLD-MCNC: 222 MG/DL (ref 65–99)
GLUCOSE SERPL-MCNC: 105 MG/DL (ref 65–99)
HCT VFR BLD AUTO: 47.7 % (ref 42–52)
HGB BLD-MCNC: 15.3 G/DL (ref 14–18)
IMM GRANULOCYTES # BLD AUTO: 0.01 K/UL (ref 0–0.11)
IMM GRANULOCYTES NFR BLD AUTO: 0.2 % (ref 0–0.9)
LV EJECT FRACT  99904: 45
LV EJECT FRACT MOD 2C 99903: 51.8
LV EJECT FRACT MOD 4C 99902: 72.59
LV EJECT FRACT MOD BP 99901: 63.02
LYMPHOCYTES # BLD AUTO: 1.06 K/UL (ref 1–4.8)
LYMPHOCYTES NFR BLD: 22.7 % (ref 22–41)
MAGNESIUM SERPL-MCNC: 1.3 MG/DL (ref 1.5–2.5)
MCH RBC QN AUTO: 30.9 PG (ref 27–33)
MCHC RBC AUTO-ENTMCNC: 32.1 G/DL (ref 33.7–35.3)
MCV RBC AUTO: 96.4 FL (ref 81.4–97.8)
MONOCYTES # BLD AUTO: 0.56 K/UL (ref 0–0.85)
MONOCYTES NFR BLD AUTO: 12 % (ref 0–13.4)
NEUTROPHILS # BLD AUTO: 2.98 K/UL (ref 1.82–7.42)
NEUTROPHILS NFR BLD: 63.8 % (ref 44–72)
NRBC # BLD AUTO: 0 K/UL
NRBC BLD-RTO: 0 /100 WBC
PHOSPHATE SERPL-MCNC: 4.5 MG/DL (ref 2.5–4.5)
PLATELET # BLD AUTO: 130 K/UL (ref 164–446)
PMV BLD AUTO: 10.5 FL (ref 9–12.9)
POTASSIUM SERPL-SCNC: 4.1 MMOL/L (ref 3.6–5.5)
RBC # BLD AUTO: 4.95 M/UL (ref 4.7–6.1)
SODIUM SERPL-SCNC: 138 MMOL/L (ref 135–145)
WBC # BLD AUTO: 4.7 K/UL (ref 4.8–10.8)

## 2020-03-06 PROCEDURE — 80048 BASIC METABOLIC PNL TOTAL CA: CPT

## 2020-03-06 PROCEDURE — 93306 TTE W/DOPPLER COMPLETE: CPT | Mod: 26 | Performed by: INTERNAL MEDICINE

## 2020-03-06 PROCEDURE — 770020 HCHG ROOM/CARE - TELE (206)

## 2020-03-06 PROCEDURE — 700105 HCHG RX REV CODE 258

## 2020-03-06 PROCEDURE — A9270 NON-COVERED ITEM OR SERVICE: HCPCS | Performed by: INTERNAL MEDICINE

## 2020-03-06 PROCEDURE — 99232 SBSQ HOSP IP/OBS MODERATE 35: CPT | Performed by: HOSPITALIST

## 2020-03-06 PROCEDURE — 83735 ASSAY OF MAGNESIUM: CPT

## 2020-03-06 PROCEDURE — 84100 ASSAY OF PHOSPHORUS: CPT

## 2020-03-06 PROCEDURE — 93306 TTE W/DOPPLER COMPLETE: CPT

## 2020-03-06 PROCEDURE — 36415 COLL VENOUS BLD VENIPUNCTURE: CPT

## 2020-03-06 PROCEDURE — 700111 HCHG RX REV CODE 636 W/ 250 OVERRIDE (IP): Performed by: HOSPITALIST

## 2020-03-06 PROCEDURE — 700102 HCHG RX REV CODE 250 W/ 637 OVERRIDE(OP): Performed by: HOSPITALIST

## 2020-03-06 PROCEDURE — 99223 1ST HOSP IP/OBS HIGH 75: CPT | Performed by: INTERNAL MEDICINE

## 2020-03-06 PROCEDURE — 700102 HCHG RX REV CODE 250 W/ 637 OVERRIDE(OP): Performed by: INTERNAL MEDICINE

## 2020-03-06 PROCEDURE — 700111 HCHG RX REV CODE 636 W/ 250 OVERRIDE (IP): Performed by: INTERNAL MEDICINE

## 2020-03-06 PROCEDURE — A9270 NON-COVERED ITEM OR SERVICE: HCPCS | Performed by: HOSPITALIST

## 2020-03-06 PROCEDURE — 82962 GLUCOSE BLOOD TEST: CPT | Mod: 91

## 2020-03-06 PROCEDURE — 85025 COMPLETE CBC W/AUTO DIFF WBC: CPT

## 2020-03-06 RX ORDER — MAGNESIUM SULFATE 1 G/100ML
1 INJECTION INTRAVENOUS ONCE
Status: COMPLETED | OUTPATIENT
Start: 2020-03-06 | End: 2020-03-06

## 2020-03-06 RX ORDER — LOSARTAN POTASSIUM 25 MG/1
25 TABLET ORAL
Status: DISCONTINUED | OUTPATIENT
Start: 2020-03-06 | End: 2020-03-07 | Stop reason: HOSPADM

## 2020-03-06 RX ORDER — MAGNESIUM SULFATE HEPTAHYDRATE 40 MG/ML
4 INJECTION, SOLUTION INTRAVENOUS ONCE
Status: COMPLETED | OUTPATIENT
Start: 2020-03-06 | End: 2020-03-06

## 2020-03-06 RX ORDER — METOPROLOL SUCCINATE 25 MG/1
25 TABLET, EXTENDED RELEASE ORAL
Status: DISCONTINUED | OUTPATIENT
Start: 2020-03-06 | End: 2020-03-07 | Stop reason: HOSPADM

## 2020-03-06 RX ORDER — SPIRONOLACTONE 25 MG/1
25 TABLET ORAL
Status: DISCONTINUED | OUTPATIENT
Start: 2020-03-06 | End: 2020-03-07 | Stop reason: HOSPADM

## 2020-03-06 RX ORDER — SODIUM CHLORIDE 9 MG/ML
INJECTION, SOLUTION INTRAVENOUS
Status: COMPLETED
Start: 2020-03-06 | End: 2020-03-06

## 2020-03-06 RX ORDER — TORSEMIDE 100 MG/1
100 TABLET ORAL
Status: DISCONTINUED | OUTPATIENT
Start: 2020-03-06 | End: 2020-03-07 | Stop reason: HOSPADM

## 2020-03-06 RX ADMIN — METOPROLOL SUCCINATE 25 MG: 25 TABLET, EXTENDED RELEASE ORAL at 15:34

## 2020-03-06 RX ADMIN — APIXABAN 5 MG: 5 TABLET, FILM COATED ORAL at 18:18

## 2020-03-06 RX ADMIN — ATORVASTATIN CALCIUM 80 MG: 20 TABLET, FILM COATED ORAL at 06:08

## 2020-03-06 RX ADMIN — INSULIN GLARGINE 10 UNITS: 100 INJECTION, SOLUTION SUBCUTANEOUS at 17:07

## 2020-03-06 RX ADMIN — FUROSEMIDE 20 MG: 10 INJECTION, SOLUTION INTRAMUSCULAR; INTRAVENOUS at 06:10

## 2020-03-06 RX ADMIN — LOSARTAN POTASSIUM 25 MG: 25 TABLET ORAL at 15:34

## 2020-03-06 RX ADMIN — INSULIN HUMAN 2 UNITS: 100 INJECTION, SOLUTION PARENTERAL at 17:05

## 2020-03-06 RX ADMIN — GLIPIZIDE 5 MG: 5 TABLET ORAL at 18:18

## 2020-03-06 RX ADMIN — SODIUM CHLORIDE 500 ML: 9 INJECTION, SOLUTION INTRAVENOUS at 07:29

## 2020-03-06 RX ADMIN — TORSEMIDE 100 MG: 100 TABLET ORAL at 18:18

## 2020-03-06 RX ADMIN — MAGNESIUM SULFATE 1 G: 1 INJECTION INTRAVENOUS at 07:29

## 2020-03-06 RX ADMIN — MAGNESIUM SULFATE IN WATER 4 G: 40 INJECTION, SOLUTION INTRAVENOUS at 18:17

## 2020-03-06 RX ADMIN — SENNOSIDES AND DOCUSATE SODIUM 2 TABLET: 8.6; 5 TABLET ORAL at 18:18

## 2020-03-06 RX ADMIN — INSULIN HUMAN 3 UNITS: 100 INJECTION, SOLUTION PARENTERAL at 12:08

## 2020-03-06 RX ADMIN — GLIPIZIDE 5 MG: 5 TABLET ORAL at 07:39

## 2020-03-06 RX ADMIN — SPIRONOLACTONE 25 MG: 25 TABLET ORAL at 15:34

## 2020-03-06 RX ADMIN — TAMSULOSIN HYDROCHLORIDE 0.4 MG: 0.4 CAPSULE ORAL at 07:39

## 2020-03-06 RX ADMIN — APIXABAN 5 MG: 5 TABLET, FILM COATED ORAL at 06:09

## 2020-03-06 RX ADMIN — POTASSIUM CHLORIDE 20 MEQ: 1500 TABLET, EXTENDED RELEASE ORAL at 06:09

## 2020-03-06 ASSESSMENT — ENCOUNTER SYMPTOMS
SHORTNESS OF BREATH: 1
FEVER: 0
CHILLS: 0
COUGH: 1
ORTHOPNEA: 0

## 2020-03-06 NOTE — PROGRESS NOTES
Paged hospitalist concerning patients multiple pauses throughout the night (not reported to this RN until monitor summaries). BMP, Mag and Phos ordered. Hold AM metoprolol.

## 2020-03-06 NOTE — RESPIRATORY CARE
COPD EDUCATION by COPD CLINICAL EDUCATOR  3/6/2020 at 6:34 AM by Nataliia Lipscomb, RRT     Patient reviewed by COPD education team. Patient does not have a history or diagnosis of COPD and is a non-smoker, therefore does not qualify for the COPD program.

## 2020-03-06 NOTE — PROGRESS NOTES
Handoff report received. Assumed patient care. PT is sitting up in bed, AAOx4, no complaints of pain or discomfort. Tele box is on.  POC discussed with PT and all questions addressed. Safety precautions in place. Call light and personal belongings within reach. Educated to call for assistance if needed.

## 2020-03-06 NOTE — PROGRESS NOTES
Monitor Summary:   A Fib 55-64  R. Pac, R. Pvc, R. Bigem, BBB   -/ .18/ -    Multiple 2 - 2.7 second pauses througout the night

## 2020-03-06 NOTE — CONSULTS
Cardiology Consult Note:    Jessi Baez M.D.  Date & Time note created:    3/6/2020   1:13 PM     Referring MD:  Dr. Conchita Keller    Patient ID:   Name:             Julio Azar   YOB: 1932  Age:                 87 y.o.  male   MRN:               [id number]                                                             Chief Complaint / Reason for consult:  Atrial fibrillation with pauses.    History of Present Illness:    This is an 87 years old man with prior history of coronary disease status post CABG, persistent atrial fibrillation, baseline right bundle branch block, hypertension, hyperlipidemia, obesity and type 2 diabetes along with tobacco abuse, presented to the hospital with shortness of breath for 3 weeks.  He was eventually diagnosed with volume overloaded secondary to heart failure exacerbation.  Patient was diuresed and now he is feeling better.  Cardiology is been consulted due to findings of pauses at night on telemetry monitoring.  I personally interpret the telemetry strip which showed all pauses were less than 4 seconds.  Patient was largely asymptomatic during these events.  He was sleeping at the time.  Patient also has a history of obstructive sleep apnea for which he is not using a CPAP machine.    No family history of sudden cardiac death.    Transthoracic echocardiogram showed LV function of 45% with at least moderate mitral regurgitation with eccentric jet.  I personally interpreted images of the transthoracic echocardiogram.    Of note, patient is followed by Dr. Hammond at [facility].    Review of Systems:      Constitutional: Denies fevers, Denies weight changes  Eyes: Denies changes in vision, no eye pain  Ears/Nose/Throat/Mouth: Denies nasal congestion or sore throat   Cardiovascular: no chest pain, no palpitations   Respiratory: no shortness of breath , Denies cough  Gastrointestinal/Hepatic: Denies abdominal pain, nausea, vomiting, diarrhea, constipation  or GI bleeding   Genitourinary: Denies dysuria or frequency  Musculoskeletal/Rheum: Denies  joint pain and swelling   Skin: Denies rash  Neurological: Denies headache, confusion, memory loss or focal weakness/parasthesias  Psychiatric: denies mood disorder   Endocrine: Jennifer thyroid problems  Heme/Oncology/Lymph Nodes: Denies enlarged lymph nodes, denies brusing or known bleeding disorder  All other systems were reviewed and are negative (AMA/CMS criteria)                Past Medical History:   Past Medical History:   Diagnosis Date   • Arrhythmia    • Bowel habit changes    • Breath shortness    • CAD (coronary artery disease)    • CAD (coronary artery disease), native coronary artery 3/31/2014   • Cancer (HCC) 2014    PROSTATE-RADIATION [place]   • Dental disorder     upper and lower partials   • Diabetes     Dr Latif, IDDM   • Encounter for long-term (current) use of insulin ([facility]) 9/18/2013   • Essential hypertension, benign 3/31/2014   • Heart burn    • Other and unspecified hyperlipidemia 9/18/2013   • Snoring     has had sleep study   • Type II or unspecified type diabetes mellitus without mention of complication, uncontrolled 9/18/2013   • Unspecified vitamin D deficiency 9/18/2013     Active Hospital Problems    Diagnosis   • Acute CHF (congestive heart failure) ([facility]) [I50.9]     Priority: High   • Elevated troponin [R79.89]     Priority: Medium   • Acute on chronic respiratory failure with hypoxia ([facility]) [J96.21]   • Paroxysmal atrial fibrillation ([facility]) [I48.0]   • Coronary arteriosclerosis [I25.10]   • Type 2 diabetes mellitus with circulatory disorder, without long-term current use of insulin ([facility]) [E11.59]       Past Surgical History:  Past Surgical History:   Procedure Laterality Date   • ECTROPIAN REPAIR  12/31/2014    Performed by Zen Fregoso M.D. at [facility]   • LACRIMAL DUCT PROBE  12/31/2014    Performed by Zen Fregoso M.D. at [facility]   • LORI BY LAPAROSCOPY   8/12/2013    Performed by Jez Galvan M.D. at [facility]   • LID TIGHTENING  12/7/2012    Performed by Vitor Son M.D. at [facility]   • CATARACT PHACO WITH IOL  6/30/2011    Performed by CRUZITO LITTLEJOHN at [facility]   • OTHER CARDIAC SURGERY  2004    CABG X3 at [facility]   • TONSILLECTOMY         Hospital Medications:    Current Facility-Administered Medications:   •  insulin regular (HUMULIN R) injection 2-9 Units, 2-9 Units, Subcutaneous, 4X/DAY ACHS, 3 Units at 03/06/20 1208 **AND** Accu-Chek ACHS, , , Q AC AND BEDTIME(S) **AND** NOTIFY MD and PharmD, , , Once **AND** glucose 4 g chewable tablet 16 g, 16 g, Oral, Q15 MIN PRN **AND** DEXTROSE 10% BOLUS 250 mL, 250 mL, Intravenous, Q15 MIN PRN, Dominic Trinh M.D.  •  apixaban (ELIQUIS) tablet 5 mg, 5 mg, Oral, BID, [place] Keller M.D., 5 mg at 03/06/20 0609  •  insulin glargine (LANTUS) injection 10 Units, 10 Units, Subcutaneous, Q EVENING, [place] Keller M.D., 10 Units at 03/05/20 1706  •  senna-docusate (PERICOLACE or SENOKOT S) 8.6-50 MG per tablet 2 Tab, 2 Tab, Oral, BID **AND** polyethylene glycol/lytes (MIRALAX) PACKET 1 Packet, 1 Packet, Oral, QDAY PRN **AND** magnesium hydroxide (MILK OF MAGNESIA) suspension 30 mL, 30 mL, Oral, QDAY PRN **AND** bisacodyl (DULCOLAX) suppository 10 mg, 10 mg, Rectal, QDAY PRN, Dominic Trinh M.D.  •  Respiratory Therapy Consult, , Nebulization, Continuous RT, Dominic Trinh M.D.  •  acetaminophen (TYLENOL) tablet 650 mg, 650 mg, Oral, Q6HRS PRN, Dominic Trinh M.D.  •  furosemide (LASIX) injection 20 mg, 20 mg, Intravenous, BID DIURETIC, Dominic Trinh M.D., 20 mg at 03/06/20 0610  •  atorvastatin (LIPITOR) tablet 80 mg, 80 mg, Oral, DAILY, Dominic Trinh M.D., 80 mg at 03/06/20 0608  •  glipiZIDE (GLUCOTROL) tablet 5 mg, 5 mg, Oral, BID WITH MEALS, Dominic Trinh M.D., 5 mg at 03/06/20 0739  •  tamsulosin (FLOMAX) capsule 0.4 mg, 0.4 mg, Oral, AFTER BREAKFAST, Dominic  LOLI Trinh, 0.4 mg at 03/06/20 0739  •  potassium chloride SA (Kdur) tablet 20 mEq, 20 mEq, Oral, DAILY, Dominic Trinh M.D., 20 mEq at 03/06/20 0609    Current Outpatient Medications:  Medications Prior to Admission   Medication Sig Dispense Refill Last Dose   • liraglutide (VICTOZA) 18 MG/3ML Solution Pen-injector Victoza 3-Paddy 0.6 mg/0.1 mL (18 mg/3 mL) subcutaneous pen injector   Taking   • glipiZIDE (GLUCOTROL) 5 MG Tab Take 1 Tab by mouth 2 times a day. 200 Tab 3    • metoprolol SR (TOPROL XL) 25 MG TABLET SR 24 HR    Taking   • hydroCHLOROthiazide (HYDRODIURIL) 12.5 MG tablet Take 1 mg by mouth every day.   Taking   • atorvastatin (LIPITOR) 80 MG tablet Take 80 mg by mouth every day.   Taking   • FREESTYLE LITE strip 1 Strip by Other route every day.   Taking   • tamsulosin (FLOMAX) 0.4 MG capsule TAKE ONE CAPSULE BY MOUTH TWO TIMES A  Cap 2 Taking   • lisinopril (PRINIVIL) 2.5 MG Tab Take 1 Tab by mouth every day. 90 Tab 3 Taking   • insulin glargine (LANTUS SOLOSTAR) 100 UNIT/ML Solution Pen-injector injection Inject 10 Units as instructed every evening. 30 mL 3 Taking   • vitamin D (CHOLECALCIFEROL) 1000 UNIT Tab Take 1,000 Units by mouth every day.   Taking   • potassium chloride SA (K-DUR) 20 MEQ Tab CR Take 20 mEq by mouth every day.   Taking   • metformin (GLUCOPHAGE) 500 MG Tab TAKE 2 TABLETS BY MOUTH TWICE DAILY WITHMEALS 360 Tab 3 Taking   • B-D ULTRAFINE III SHORT PEN 31G X 8 MM MISC USE 3 TIMES DAILY 100 Each 11 Taking   • Lancets MISC His new meter uses the Accu-Chek SoftClix lancets for 3 time daily testing.  Dx. Code 250.02 300 Each 3 Taking   • Blood Glucose Monitoring Suppl SUPPLIES MISC Accucheck Yaneth blood glucose test strips, checking blood sugar 2 daily  .02 insulin requiring. 200 Strip 3 Taking   • B Complex Vitamins (VITAMIN B COMPLEX PO) Take  by mouth every day.   Taking   • Cholecalciferol (VITAMIN D) 2000 UNITS CAPS Take  by mouth every day.   Taking       Medication  Allergy:  Allergies   Allergen Reactions   • Pcn [Penicillins] Swelling   • Latex Rash     Where latex has touched       Family History:  Family History   Problem Relation Age of Onset   • Heart Disease Mother    • Heart Attack Mother    • Asthma Mother    • Heart Attack Father    • Diabetes Father    • Psychiatric Illness Brother    • Heart Disease Brother         5 way bypass   • Diabetes Brother    • Hyperlipidemia Brother    • Heart Attack Brother    • Psychiatric Illness Brother    • Stroke Brother    • Heart Disease Brother         pacemaker       Social History:  Social History     Socioeconomic History   • Marital status:      Spouse name: Not on file   • Number of children: Not on file   • Years of education: Not on file   • Highest education level: Not on file   Occupational History   • Not on file   Social Needs   • Financial resource strain: Not on file   • Food insecurity     Worry: Not on file     Inability: Not on file   • Transportation needs     Medical: Not on file     Non-medical: Not on file   Tobacco Use   • Smoking status: Former Smoker     Packs/day: 2.00     Years: 34.00     Pack years: 68.00     Types: Cigarettes     Last attempt to quit: 1982     Years since quittin.2   • Smokeless tobacco: Never Used   Substance and Sexual Activity   • Alcohol use: Yes     Alcohol/week: 4.2 - 8.4 oz     Types: 7 - 14 Glasses of wine per week     Comment: 1 per day   • Drug use: No   • Sexual activity: Never     Partners: Female     Birth control/protection: Post-Menopausal   Lifestyle   • Physical activity     Days per week: Not on file     Minutes per session: Not on file   • Stress: Not on file   Relationships   • Social connections     Talks on phone: Not on file     Gets together: Not on file     Attends Scientologist service: Not on file     Active member of club or organization: Not on file     Attends meetings of clubs or organizations: Not on file     Relationship status: Not on file  "  • Intimate partner violence     Fear of current or ex partner: Not on file     Emotionally abused: Not on file     Physically abused: Not on file     Forced sexual activity: Not on file   Other Topics Concern   • Not on file   Social History Narrative   • Not on file         Physical Exam:  Vitals/ General Appearance:   Weight/BMI: Body mass index is 36.22 kg/m².  /50   Pulse 60   Temp 36.7 °C (98.1 °F) (Temporal)   Resp 18   Ht 1.626 m (5' 4\")   Wt 95.7 kg (211 lb)   SpO2 93%   Vitals:    03/06/20 0015 03/06/20 0410 03/06/20 0800 03/06/20 1230   BP: 130/62 130/54 115/58 106/50   Pulse: (!) 50 (!) 59 77 60   Resp: 17 15 18 18   Temp: 36.7 °C (98 °F) 36.5 °C (97.7 °F) 36.8 °C (98.2 °F) 36.7 °C (98.1 °F)   TempSrc: Temporal Temporal Temporal Temporal   SpO2: 95% 95% 90% 93%   Weight:       Height:         Oxygen Therapy:  Pulse Oximetry: 93 %, O2 (LPM): 2, O2 Delivery Device: Silicone Nasal Cannula    Constitutional:   No acute distress  HENMT:  Normocephalic, Atraumatic, Oropharynx moist mucous membranes, No oral exudates, Nose normal.  No thyromegaly.  Eyes:  EOMI, Conjunctiva normal, No discharge.  Neck:  Normal range of motion, No cervical tenderness,  no JVD.  Cardiovascular:  There is presence of an irregularly irregular heartbeats. No murmurs, No rubs, No gallops.   Lower extremitites with pitting edema.  Lungs:  Normal breath sounds, breath sounds clear to auscultation bilaterally,  no rales, no rhonchi, no wheezing.   Abdomen: Bowel sounds normal, Soft, No tenderness, No guarding, No rebound, No masses, No hepatosplenomegaly.  Skin: Warm, Dry, No erythema, No rash, no induration.  Neurologic: Alert & oriented x 3, No focal deficits noted, cranial nerves II through X are intact.  Psychiatric: Affect normal, Judgment normal, Mood normal.      MDM (Data Review):     Records reviewed and summarized in current documentation    Lab Data Review:  Recent Results (from the past 24 hour(s))   ACCU-CHEK " GLUCOSE    Collection Time: 03/05/20  4:55 PM   Result Value Ref Range    Glucose - Accu-Ck 184 (H) 65 - 99 mg/dL   ACCU-CHEK GLUCOSE    Collection Time: 03/05/20 10:11 PM   Result Value Ref Range    Glucose - Accu-Ck 126 (H) 65 - 99 mg/dL   Basic Metabolic Panel    Collection Time: 03/06/20  4:39 AM   Result Value Ref Range    Sodium 138 135 - 145 mmol/L    Potassium 4.1 3.6 - 5.5 mmol/L    Chloride 101 96 - 112 mmol/L    Co2 28 20 - 33 mmol/L    Glucose 105 (H) 65 - 99 mg/dL    Bun 23 (H) 8 - 22 mg/dL    Creatinine 1.09 0.50 - 1.40 mg/dL    Calcium 8.8 8.5 - 10.5 mg/dL    Anion Gap 9.0 0.0 - 11.9   CBC WITH DIFFERENTIAL    Collection Time: 03/06/20  4:39 AM   Result Value Ref Range    WBC 4.7 (L) 4.8 - 10.8 K/uL    RBC 4.95 4.70 - 6.10 M/uL    Hemoglobin 15.3 14.0 - 18.0 g/dL    Hematocrit 47.7 42.0 - 52.0 %    MCV 96.4 81.4 - 97.8 fL    MCH 30.9 27.0 - 33.0 pg    MCHC 32.1 (L) 33.7 - 35.3 g/dL    RDW 56.5 (H) 35.9 - 50.0 fL    Platelet Count 130 (L) 164 - 446 K/uL    MPV 10.5 9.0 - 12.9 fL    Neutrophils-Polys 63.80 44.00 - 72.00 %    Lymphocytes 22.70 22.00 - 41.00 %    Monocytes 12.00 0.00 - 13.40 %    Eosinophils 0.40 0.00 - 6.90 %    Basophils 0.90 0.00 - 1.80 %    Immature Granulocytes 0.20 0.00 - 0.90 %    Nucleated RBC 0.00 /100 WBC    Neutrophils (Absolute) 2.98 1.82 - 7.42 K/uL    Lymphs (Absolute) 1.06 1.00 - 4.80 K/uL    Monos (Absolute) 0.56 0.00 - 0.85 K/uL    Eos (Absolute) 0.02 0.00 - 0.51 K/uL    Baso (Absolute) 0.04 0.00 - 0.12 K/uL    Immature Granulocytes (abs) 0.01 0.00 - 0.11 K/uL    NRBC (Absolute) 0.00 K/uL   MAGNESIUM    Collection Time: 03/06/20  4:39 AM   Result Value Ref Range    Magnesium 1.3 (L) 1.5 - 2.5 mg/dL   PHOSPHORUS    Collection Time: 03/06/20  4:39 AM   Result Value Ref Range    Phosphorus 4.5 2.5 - 4.5 mg/dL   ESTIMATED GFR    Collection Time: 03/06/20  4:39 AM   Result Value Ref Range    GFR If African American >60 >60 mL/min/1.73 m 2    GFR If Non  >60 >60  mL/min/1.73 m 2   ACCU-CHEK GLUCOSE    Collection Time: 03/06/20  6:05 AM   Result Value Ref Range    Glucose - Accu-Ck 106 (H) 65 - 99 mg/dL   EC-ECHOCARDIOGRAM COMPLETE W/O CONT    Collection Time: 03/06/20  8:50 AM   Result Value Ref Range    Eject.Frac. MOD BP 63.02     Eject.Frac. MOD 4C 72.59     Eject.Frac. MOD 2C 51.8     Left Ventrical Ejection Fraction 45        Imaging/Procedures Review:    Chest Xray:  Reviewed    EKG:   As in HPI.     MDM (Assessment and Plan):     Active Hospital Problems    Diagnosis   • Acute CHF (congestive heart failure) ([facility]) [I50.9]     Priority: High   • Elevated troponin [R79.89]     Priority: Medium   • Acute on chronic respiratory failure with hypoxia (HCC) [J96.21]   • Paroxysmal atrial fibrillation (HCC) [I48.0]   • Coronary arteriosclerosis [I25.10]   • Type 2 diabetes mellitus with circulatory disorder, without long-term current use of insulin (HCC) [E11.59]         At this time, patient is approaching euvolemia.  Continue diuresis at this time.  No indication for permanent pacemaker placement.  Rate control strategy is the best option for him at this time.  Anticoagulation therapy for stroke risk reduction.  Patient will resume cardiac care with his primary cardiologist in the outpatient setting for further evaluation and treatment in the future.  No further invasive cardiac work-up during this hospital stay.    In the meantime, I will switch IV Lasix to torsemide 100 mg p.o. once a day.  I will start patient on losartan 25 mg once a day, spironolactone 25 mg p.o. once a day and Toprol-XL 25 mg p.o. once a day.      Thank you for referring this patient to our cardiology service.    Jessi Baez MD.   Cardiology Inpatient Service.  [facility].  [phone].  [place], [place].

## 2020-03-06 NOTE — CARE PLAN
Problem: Safety  Goal: Will remain free from injury  Outcome: PROGRESSING AS EXPECTED  Note: Patient's risk for injury and falls assessed. Appropriate safety precautions in place. Patient educated to utilize call light for needs. Patient verbalizes understanding.      Problem: Bowel/Gastric:  Goal: Will not experience complications related to bowel motility  Flowsheets (Taken 3/5/2020 0700 by Nelson Hester R.N.)  Last BM: 03/05/20  Note: Patient bowel function is assessed. Education provided on diet, fluid intake and activities that promote bowel function. Toileting at scheduled intervals in place for patient. Patient verbalizes understanding.

## 2020-03-07 VITALS
HEART RATE: 60 BPM | HEIGHT: 64 IN | RESPIRATION RATE: 16 BRPM | DIASTOLIC BLOOD PRESSURE: 44 MMHG | SYSTOLIC BLOOD PRESSURE: 93 MMHG | OXYGEN SATURATION: 90 % | WEIGHT: 211 LBS | BODY MASS INDEX: 36.02 KG/M2 | TEMPERATURE: 97.6 F

## 2020-03-07 PROBLEM — J96.21 ACUTE ON CHRONIC RESPIRATORY FAILURE WITH HYPOXIA (HCC): Status: RESOLVED | Noted: 2020-03-05 | Resolved: 2020-03-07

## 2020-03-07 PROBLEM — R79.89 ELEVATED TROPONIN: Status: RESOLVED | Noted: 2020-03-05 | Resolved: 2020-03-07

## 2020-03-07 LAB
ANION GAP SERPL CALC-SCNC: 10 MMOL/L (ref 0–11.9)
BUN SERPL-MCNC: 30 MG/DL (ref 8–22)
CALCIUM SERPL-MCNC: 10.1 MG/DL (ref 8.5–10.5)
CHLORIDE SERPL-SCNC: 95 MMOL/L (ref 96–112)
CO2 SERPL-SCNC: 35 MMOL/L (ref 20–33)
CREAT SERPL-MCNC: 1.29 MG/DL (ref 0.5–1.4)
GLUCOSE BLD-MCNC: 131 MG/DL (ref 65–99)
GLUCOSE BLD-MCNC: 222 MG/DL (ref 65–99)
GLUCOSE SERPL-MCNC: 129 MG/DL (ref 65–99)
MAGNESIUM SERPL-MCNC: 2.1 MG/DL (ref 1.5–2.5)
POTASSIUM SERPL-SCNC: 3.8 MMOL/L (ref 3.6–5.5)
SODIUM SERPL-SCNC: 140 MMOL/L (ref 135–145)

## 2020-03-07 PROCEDURE — 82962 GLUCOSE BLOOD TEST: CPT | Mod: 91

## 2020-03-07 PROCEDURE — 700102 HCHG RX REV CODE 250 W/ 637 OVERRIDE(OP): Performed by: INTERNAL MEDICINE

## 2020-03-07 PROCEDURE — A9270 NON-COVERED ITEM OR SERVICE: HCPCS | Performed by: INTERNAL MEDICINE

## 2020-03-07 PROCEDURE — 99232 SBSQ HOSP IP/OBS MODERATE 35: CPT | Performed by: INTERNAL MEDICINE

## 2020-03-07 PROCEDURE — 700102 HCHG RX REV CODE 250 W/ 637 OVERRIDE(OP): Performed by: HOSPITALIST

## 2020-03-07 PROCEDURE — 83735 ASSAY OF MAGNESIUM: CPT

## 2020-03-07 PROCEDURE — 36415 COLL VENOUS BLD VENIPUNCTURE: CPT

## 2020-03-07 PROCEDURE — 80048 BASIC METABOLIC PNL TOTAL CA: CPT

## 2020-03-07 PROCEDURE — 99239 HOSP IP/OBS DSCHRG MGMT >30: CPT | Performed by: HOSPITALIST

## 2020-03-07 PROCEDURE — A9270 NON-COVERED ITEM OR SERVICE: HCPCS | Performed by: HOSPITALIST

## 2020-03-07 RX ORDER — SPIRONOLACTONE 25 MG/1
25 TABLET ORAL DAILY
Qty: 30 TAB | Refills: 3 | Status: SHIPPED | OUTPATIENT
Start: 2020-03-08 | End: 2021-05-26

## 2020-03-07 RX ORDER — TORSEMIDE 100 MG/1
50 TABLET ORAL DAILY
Qty: 15 TAB | Refills: 0 | Status: SHIPPED | OUTPATIENT
Start: 2020-03-08 | End: 2020-07-14

## 2020-03-07 RX ADMIN — POTASSIUM CHLORIDE 20 MEQ: 1500 TABLET, EXTENDED RELEASE ORAL at 05:30

## 2020-03-07 RX ADMIN — APIXABAN 5 MG: 5 TABLET, FILM COATED ORAL at 05:30

## 2020-03-07 RX ADMIN — SENNOSIDES AND DOCUSATE SODIUM 2 TABLET: 8.6; 5 TABLET ORAL at 05:29

## 2020-03-07 RX ADMIN — ATORVASTATIN CALCIUM 80 MG: 20 TABLET, FILM COATED ORAL at 05:29

## 2020-03-07 RX ADMIN — TAMSULOSIN HYDROCHLORIDE 0.4 MG: 0.4 CAPSULE ORAL at 08:19

## 2020-03-07 RX ADMIN — TORSEMIDE 100 MG: 100 TABLET ORAL at 05:33

## 2020-03-07 RX ADMIN — METOPROLOL SUCCINATE 25 MG: 25 TABLET, EXTENDED RELEASE ORAL at 05:30

## 2020-03-07 RX ADMIN — SPIRONOLACTONE 25 MG: 25 TABLET ORAL at 05:30

## 2020-03-07 RX ADMIN — GLIPIZIDE 5 MG: 5 TABLET ORAL at 06:29

## 2020-03-07 RX ADMIN — LOSARTAN POTASSIUM 25 MG: 25 TABLET ORAL at 05:30

## 2020-03-07 RX ADMIN — INSULIN HUMAN 3 UNITS: 100 INJECTION, SOLUTION PARENTERAL at 12:37

## 2020-03-07 ASSESSMENT — ENCOUNTER SYMPTOMS
PALPITATIONS: 0
CHEST TIGHTNESS: 0
CHILLS: 0
SHORTNESS OF BREATH: 0
DIZZINESS: 0
VOMITING: 0
WHEEZING: 0
NAUSEA: 0
COUGH: 0
HEADACHES: 0
FEVER: 0

## 2020-03-07 NOTE — DISCHARGE PLANNING
Received Choice form at [phone]  Agency/Facility Name: [facility]  Referral sent per Choice form @ [phone]

## 2020-03-07 NOTE — PROGRESS NOTES
[facility] Medicine Daily Progress Note    Date of Service  3/6/2020    Chief Complaint  87 y.o. male admitted 3/4/2020 with dyspnea edema and suspected CHF    Hospital Course          Interval Problem Update    Dyspnea and edema improving  -3.4 L since admit  Echo EF 45%  Sinus pauses mainly at night up to 2.7-second    Consultants/Specialty  Cardiology    Code Status  Full code    Disposition  To be determined    Review of Systems  Review of Systems   Constitutional: Negative for chills and fever.   Respiratory: Positive for cough and shortness of breath.    Cardiovascular: Positive for leg swelling. Negative for orthopnea.   All other systems reviewed and are negative.       Physical Exam  Temp:  [36.4 °C (97.6 °F)-36.8 °C (98.2 °F)] 36.4 °C (97.6 °F)  Pulse:  [50-77] 64  Resp:  [15-18] 18  BP: (102-130)/(50-76) 102/57  SpO2:  [90 %-100 %] 92 %    Physical Exam  Vitals signs and nursing note reviewed.   Constitutional:       Appearance: He is well-developed. He is not diaphoretic.   HENT:      Head: Normocephalic and atraumatic.      Mouth/Throat:      Pharynx: No oropharyngeal exudate.   Eyes:      General: No scleral icterus.        Right eye: No discharge.         Left eye: No discharge.      Conjunctiva/sclera: Conjunctivae normal.      Pupils: Pupils are equal, round, and reactive to light.   Neck:      Musculoskeletal: Neck supple.      Vascular: No JVD.      Trachea: No tracheal deviation.   Cardiovascular:      Rate and Rhythm: Normal rate. Rhythm irregular.      Heart sounds: Murmur present. No friction rub. No gallop.    Pulmonary:      Effort: Pulmonary effort is normal. No respiratory distress.      Breath sounds: No stridor. Rales present. No wheezing.   Chest:      Chest wall: No tenderness.   Abdominal:      General: Bowel sounds are normal. There is no distension.      Palpations: Abdomen is soft.      Tenderness: There is no abdominal tenderness. There is no rebound.   Musculoskeletal:          General: Swelling present. No tenderness.   Skin:     General: Skin is warm and dry.      Nails: There is no clubbing.     Neurological:      Mental Status: He is alert and oriented to person, place, and time.      Cranial Nerves: No cranial nerve deficit.      Motor: No abnormal muscle tone.   Psychiatric:         Behavior: Behavior normal.         Fluids    Intake/Output Summary (Last 24 hours) at 3/6/2020 1656  Last data filed at 3/6/2020 1230  Gross per 24 hour   Intake 720 ml   Output 1405 ml   Net -685 ml       Laboratory  Recent Labs     03/04/20  1931 03/06/20  0439   WBC 4.4* 4.7*   RBC 4.92 4.95   HEMOGLOBIN 15.2 15.3   HEMATOCRIT 47.5 47.7   MCV 96.5 96.4   MCH 30.9 30.9   MCHC 32.0* 32.1*   RDW 58.4* 56.5*   PLATELETCT 123* 130*   MPV 10.2 10.5     Recent Labs     03/04/20 1931 03/06/20  0439   SODIUM 139 138   POTASSIUM 4.1 4.1   CHLORIDE 104 101   CO2 25 28   GLUCOSE 231* 105*   BUN 24* 23*   CREATININE 1.14 1.09   CALCIUM 9.0 8.8                   Imaging  EC-ECHOCARDIOGRAM COMPLETE W/O CONT   Final Result      CT-CTA CHEST PULMONARY ARTERY W/ RECONS   Final Result      1.  No CT evidence for pulmonary emboli.   2.  Findings suggest congestive heart failure.   3.  Mild emphysema.               DX-CHEST-PORTABLE (1 VIEW)   Final Result      1.  Findings consistent with congestive heart failure.   2.  Hypoinflation with patchy bibasilar atelectasis.  Superimposed pneumonia is difficult to exclude.   3.  Mild cardiomegaly.           Assessment/Plan  Acute systolic congestive heart failure (HCC)- (present on admission)  Assessment & Plan  Discussed with cardiology  Patient switch to Demadex and Aldactone  Losartan added    Elevated troponin- (present on admission)  Assessment & Plan  Likely demand ischemia    Hypomagnesemia  Assessment & Plan  Replete with IV magnesium sulfate and monitor    Sinus pause  Assessment & Plan  Asymptomatic cardiology recommended resuming Toprol    Acute on chronic  respiratory failure with hypoxia (HCC)- (present on admission)  Assessment & Plan  Diuresis as tolerated  Continue oxygen supplementation    Paroxysmal atrial fibrillation (HCC)- (present on admission)  Assessment & Plan    Continue metoprolol per cardiology recommendation  Continue Eliquis    Coronary arteriosclerosis- (present on admission)  Assessment & Plan  Continue medical therapy    Type 2 diabetes mellitus with circulatory disorder, without long-term current use of insulin (HCC)- (present on admission)  Assessment & Plan  Uncontrolled with hyperglycemia    Increase Lantus continue insulin sliding scale monitor CBGs           Plan of care reviewed with patient and discussed with nursing staff and pharmacist    VTE prophylaxis: Lovenox

## 2020-03-07 NOTE — PROGRESS NOTES
Cardiology Follow Up Progress Note    Date of Service  3/7/2020    Attending Physician  Lance Keller M.D.    Chief Complaint   Atrial fibrillation with pauses    HPI  Julio Azar is a 87 y.o. male admitted 3/4/2020 with CAD S/P CABG, persistent atrial fibrillation, baseline RBBB, HTN, HLD, obesity, DM 2, tobacco abuse.  Patient presents with shortness of breath for 3 weeks and was eventually diagnosed with volume overload secondary to heart failure exacerbation.  Patient was diuresed and is feeling better.  Cardiology is consulted for pauses at night on telemetry monitoring.  All pauses of been less than 4 seconds.  Patient was mostly asymptomatic and sleeping at the time.  Patient has a history of DANY but does not use his CPAP.    No family history of sudden cardiac death.    Echo shows EF 45% with at least moderate MR.    Patient is followed by Dr. Kc at [facility].    Interim Events  3/7/2020: Patient resting in bed in no distress.  No rhythm issues overnight.  No pauses.  Okay to discharge on current medications.    Telemetry: A. fib 50-80s, 2-second pause  Labs: Na 140, K 3.8, BUN 30, creatinine 1.29, GFR 53, mag 2.1    Review of Systems  Review of Systems   Constitutional: Negative for chills and fever.   Respiratory: Negative for cough, chest tightness, shortness of breath and wheezing.    Cardiovascular: Negative for chest pain, palpitations and leg swelling.   Gastrointestinal: Negative for nausea and vomiting.   Neurological: Negative for dizziness and headaches.   All other systems reviewed and are negative.      Vital signs in last 24 hours  Temp:  [36.2 °C (97.1 °F)-37.1 °C (98.7 °F)] 36.2 °C (97.1 °F)  Pulse:  [58-76] 65  Resp:  [17-18] 17  BP: (101-130)/(50-80) 101/51  SpO2:  [90 %-100 %] 90 %    Physical Exam  Physical Exam  Vitals signs and nursing note reviewed.   Constitutional:       Appearance: Normal appearance.   HENT:      Head: Normocephalic and atraumatic.       Mouth/Throat:      Mouth: Mucous membranes are moist.   Eyes:      Extraocular Movements: Extraocular movements intact.   Neck:      Musculoskeletal: Normal range of motion and neck supple.      Comments: No JVD  Cardiovascular:      Rate and Rhythm: Normal rate and regular rhythm.      Pulses: Normal pulses.      Heart sounds: Normal heart sounds. No murmur.   Pulmonary:      Effort: Pulmonary effort is normal.      Breath sounds: Normal breath sounds.   Abdominal:      Palpations: Abdomen is soft.   Skin:     General: Skin is warm and dry.   Neurological:      General: No focal deficit present.      Mental Status: He is alert and oriented to person, place, and time.   Psychiatric:         Mood and Affect: Mood normal.         Behavior: Behavior normal.         Lab Review  Lab Results   Component Value Date/Time    WBC 4.7 (L) 03/06/2020 04:39 AM    RBC 4.95 03/06/2020 04:39 AM    HEMOGLOBIN 15.3 03/06/2020 04:39 AM    HEMATOCRIT 47.7 03/06/2020 04:39 AM    MCV 96.4 03/06/2020 04:39 AM    MCH 30.9 03/06/2020 04:39 AM    MCHC 32.1 (L) 03/06/2020 04:39 AM    MPV 10.5 03/06/2020 04:39 AM      Lab Results   Component Value Date/Time    SODIUM 140 03/07/2020 05:46 AM    POTASSIUM 3.8 03/07/2020 05:46 AM    CHLORIDE 95 (L) 03/07/2020 05:46 AM    CO2 35 (H) 03/07/2020 05:46 AM    GLUCOSE 129 (H) 03/07/2020 05:46 AM    BUN 30 (H) 03/07/2020 05:46 AM    CREATININE 1.29 03/07/2020 05:46 AM    CREATININE 1.1 11/02/2006 10:30 AM      Lab Results   Component Value Date/Time    ASTSGOT 24 03/04/2020 07:31 PM    ALTSGPT 19 03/04/2020 07:31 PM     Lab Results   Component Value Date/Time    CHOLSTRLTOT 113 01/16/2020 07:55 AM    LDL 32 01/16/2020 07:55 AM    HDL 61 01/16/2020 07:55 AM    TRIGLYCERIDE 99 01/16/2020 07:55 AM    TROPONINT 31 (H) 03/04/2020 11:44 PM       Recent Labs     03/04/20  1931   NTPROBNP 940*       Cardiac Imaging and Procedures Review  EKG: 3/4/2020  Atrial fibrillation   Right bundle branch block    Anterolateral infarct, age indeterminate   Compared to ECG 07/13/2018 12:24:02   Myocardial infarct finding now present   Electronically Signed On 3-4-2020 20:58:00 PST by MELLO STEINER MD     Echocardiogram: 3/6/2020  CONCLUSIONS  Left ventricle is dilated.  Left ventricular systolic function is mildly reduced.  Left ventricular ejection fraction is visually estimated to be 45-50%.  Hypokinesis Inferior wall.  Grade II diastolic dysfunction.  Moderately dilated right ventricle.  Reduced right ventricular systolic function.  At least moderate and probably severe mitral regurgitation- highly   eccentic and poorly characterized on the present study.  Compared to the images of the prior study done  02/01/2017 LVEF has   declined, inferior wall is newly hypokinetic and severe MR is now   present        Imaging  Chest X-Ray: 3/4/2020  1.  Findings consistent with congestive heart failure.  2.  Hypoinflation with patchy bibasilar atelectasis.  Superimposed pneumonia is difficult to exclude.  3.  Mild cardiomegaly.        Assessment/Plan  1.  Acute heart failure  -EF 45 to 50%  -Continue losartan 25 mg daily, metoprolol SR 25 mg daily, Spironolactone 25 mg daily, and torsemide 100 mg daily  -Daily weights, I's and O's, 95.7 kg, -2.3 kg,, -3215 mL    2.  Paroxysmal atrial fibrillation  - Rate controlled  -Continue apixaban 5 mg twice daily and metoprolol SR 25 mg daily    3.  CAD  - Continue ASA 81 mg, atorvastatin 80 mg every evening, metoprolol SR 25 mg daily  -Chest pain-free  - New inferior wall hypokinesis  -Outpatient work-up    4.  New severe MR  - Consider further work-up as outpatient        Thank you for allowing me to participate in the care of this patient.      Please contact me with any questions.        ESTEE Hayden  [facility]  [phone]    Future Appointments   Date Time Provider Department Center   3/12/2020  1:15 PM Jessi Baez M.D. [facility] None    4/20/2020 10:40 AM MARCELO Del Rosario [facility]       Please note that this dictation was created using voice recognition software. I have worked with consultants from the vendor as well as technical experts from [facility] to optimize the interface. I have made every reasonable attempt to correct obvious errors, but I expect that there are errors of grammar and possibly content I did not discover before finalizing the note.

## 2020-03-07 NOTE — DISCHARGE PLANNING
Agency/Facility Name: [facility]  Spoke To: Renetta  Outcome: Patient approved onto service. Portable tank for discharge to be delivered within 2 hours.

## 2020-03-07 NOTE — FACE TO FACE
"Face to Face Note  -  Durable Medical Equipment    Lance Keller M.D. - NPI: [id number]  I certify that this patient is under my care and that they had a durable medical equipment(DME)face to face encounter by myself that meets the physician DME face-to-face encounter requirements with this patient on:    Date of encounter:   Patient:                    MRN:                       YOB: 2020  Julio Azar  [id number]  7/6/1932     The encounter with the patient was in whole, or in part, for the following medical condition, which is the primary reason for durable medical equipment:  CHF    I certify that, based on my findings, the following durable medical equipment is medically necessary:  Oxygen.    HOME O2 Saturation Measurements:(Values must be present for Home Oxygen orders)  Room air sat at rest: 85  Room air sat with amb: 73  With liters of O2: 1, O2 sat at rest with O2: 94  With Liters of O2: 1, O2 sat with amb with O2 : 91  Is the patient mobile?: Yes    My Clinical findings support the need for the above equipment due to:  Hypoxia    Supporting Symptoms: The patient requires supplemental oxygen, as the following interventions have been tried with limited or no improvement: \"Ambulation with oximetry and \"Incentive spirometry    "

## 2020-03-07 NOTE — PROGRESS NOTES
Bedside report received from THERESA Toyn. Assumed care of pt. Pt sitting up in bed. No signs of distress, no complaints of pain at this time. Tele box on.Call light and belongings within reach. Bed alarm on, bed locked and in lowest position.

## 2020-03-07 NOTE — CARE PLAN
Problem: Safety  Goal: Will remain free from injury  Outcome: PROGRESSING AS EXPECTED  Note: Pt mobility assessed at beginning of shift. Pt is standby assist. Fall precautions in place. Non-slip socks on. Bed in lowest locked position. Call light within reach. Pt educated to call for assistance and verbalizes understanding.       Problem: Infection  Goal: Will remain free from infection  Outcome: PROGRESSING AS EXPECTED  Note: Pt educated on importance of hand hygiene and oral care. Standard precautions in place.

## 2020-03-07 NOTE — DISCHARGE INSTRUCTIONS
Discharge Instructions    Discharged to home by car with relative. Discharged via wheelchair, hospital escort: Yes.  Special equipment needed: Not Applicable    Be sure to schedule a follow-up appointment with your primary care doctor or any specialists as instructed.     Discharge Plan:   Influenza Vaccine Indication: Not indicated: Previously immunized this influenza season and > 8 years of age    I understand that a diet low in cholesterol, fat, and sodium is recommended for good health. Unless I have been given specific instructions below for another diet, I accept this instruction as my diet prescription.   Other diet: cardiac    Special Instructions:   HF Patient Discharge Instructions  · Monitor your weight daily, and maintain a weight chart, to track your weight changes.   · Activity as tolerated, unless your Doctor has ordered otherwise. Other activity order: n/a.  · Follow a low fat, low cholesterol, low salt diet unless instructed otherwise by your Doctor. Read the labels on the back of food products and track your intake of fat, cholesterol and salt.   · Fluid Restriction Yes. If a Fluid Restriction has been ordered by your Doctor, measure fluids with a measuring cup to ensure that you are not exceeding the restriction.   · No smoking.  · Oxygen Yes. If your Doctor has ordered that you wear Oxygen at home, it is important to wear it as ordered.  · Did you receive an explanation from staff on the importance of taking each of your medications and why it is necessary to keep taking them unless your doctor says to stop? Yes  · Were all of your questions answered about how to manage your heart failure and what to do if you have increased signs and symptoms after you go home? Yes  · Do you feel like your heart failure care team involved you in the care treatment plan and allowed you to make decisions regarding your care while in the hospital and addressed any discharge needs you might have? Yes    See the  educational handout provided at discharge for more information on monitoring your daily weight, activity and diet. This also explains more about Heart Failure, symptoms of a flare-up and some of the tests that you have undergone.     Warning Signs of a Flare-Up include:  · Swelling in the ankles or lower legs.  · Shortness of breath, while at rest, or while doing normal activities.   · Shortness of breath at night when in bed, or coughing in bed.   · Requiring more pillows to sleep at night, or needing to sit up at night to sleep.  · Feeling weak, dizzy or fatigued.     When to call your Doctor:  · Call [facility] seven days a week from 8:00 a.m. to 8:00 p.m. for medical questions [phone].  · Call your Primary Care Physician or Cardiologist if:   1. You experience any pain radiating to your jaw or neck.  2. You have any difficulty breathing.  3. You experience weight gain of 3 lbs in a day or 5 lbs in a week.   4. You feel any palpitations or irregular heartbeats.  5. You become dizzy or lose consciousness.   If you have had an angiogram or had a pacemaker or AICD placed, and experience:  1. Bleeding, drainage or swelling at the surgical / puncture site.  2. Fever greater than 100.0 F  3. Shock from internal defibrillator.  4. Cool and / or numb extremities.      · Is patient discharged on Warfarin / Coumadin?   No     Depression / Suicide Risk    As you are discharged from this [facility], it is important to learn how to keep safe from harming yourself.    Recognize the warning signs:  · Abrupt changes in personality, positive or negative- including increase in energy   · Giving away possessions  · Change in eating patterns- significant weight changes-  positive or negative  · Change in sleeping patterns- unable to sleep or sleeping all the time   · Unwillingness or inability to communicate  · Depression  · Unusual sadness, discouragement and loneliness  · Talk of wanting to  die  · Neglect of personal appearance   · Rebelliousness- reckless behavior  · Withdrawal from people/activities they love  · Confusion- inability to concentrate     If you or a loved one observes any of these behaviors or has concerns about self-harm, here's what you can do:  · Talk about it- your feelings and reasons for harming yourself  · Remove any means that you might use to hurt yourself (examples: pills, rope, extension cords, firearm)  · Get professional help from the community (Mental Health, Substance Abuse, psychological counseling)  · Do not be alone:Call your Safe Contact- someone whom you trust who will be there for you.  · Call your local CRISIS HOTLINE 276-2025 or 894-534-5547  · Call your local Children's Mobile Crisis Response Team [place] (804) 875-9912 or www.[facility]  · Call the toll free National Suicide Prevention Hotlines   · National Suicide Prevention Lifeline 112-891-LRJC (5884)  · National Hope Line Network 800-SUICIDE (315-7305)

## 2020-03-07 NOTE — PROGRESS NOTES
Received bedside report from RN, pt care assumed, VSS, pt assessment complete. Pt AAOx4, no c/o 0/10 pain at this time. No signs of acute distress noted at this time. POC discussed with pt and verbalizes no questions. Pt denies any additional needs at this time. Bed in lowest position, pt educated on fall risk and verbalized understanding, call light within reach, hourly rounding initiated.

## 2020-03-07 NOTE — PROGRESS NOTES
Assumed care of PT A&O 4. Pt resting in bed with no signs of labored breathing. On 1L NC. Tele monitor in place, cardiac rhythm being monitored. Call light within reach, bed in lowest position, upper bed rails up. Pt was updated on plan of care for the day. Will continue to monitor. Family at bedside.

## 2020-03-08 NOTE — DISCHARGE SUMMARY
Discharge Summary    CHIEF COMPLAINT ON ADMISSION  Chief Complaint   Patient presents with   • Shortness of Breath   • Leg Swelling       Reason for Admission  Shortness Of Breath      Admission Date  3/4/2020    CODE STATUS  Full Code    HPI & HOSPITAL COURSE  This is a 87 y.o. male here with dyspnea and generalized edema CTA was negative for PE and consistent with CHF he was noted to have EF of 45% on echocardiogram atrial fibrillation and episodes of sinus pauses.  The patient was started on diuretics he was evaluated by cardiology there was no recommendation to discontinue his beta-blocker or evaluate for pacemaker placement as all his pauses were asymptomatic and his longest one was 2.7 seconds and occurred mostly during sleep .  The patient was restarted on apixaban and continued on Toprol-XL.  On examination this morning he feels significantly improved his lower extremity edema is improved and his oxygen requirements are down to 1 L.  His echocardiogram revealed wall motion abnormalities and new inferior wall hypokinesis is currently asymptomatic and cardiology recommended outpatient follow-up to discuss further work-up and continuing medical management he is clinically stable for discharge home I have completed his prescription for home O2.  Discussed with patient importance of follow-up with cardiology later this week after discharge for reevaluation and recheck on chemistry panel      Therefore, he is discharged in good and stable condition to home with close outpatient follow-up.    The patient met 2-midnight criteria for an inpatient stay at the time of discharge.    Discharge Date  3/7/2020    FOLLOW UP ITEMS POST DISCHARGE  Follow-up with cardiology with repeat BMP    DISCHARGE DIAGNOSES  Active Problems:    Acute systolic congestive heart failure (HCC) POA: Yes    Type 2 diabetes mellitus with circulatory disorder, without long-term current use of insulin (HCC) POA: Yes      Overview: Dr. Plecha eyes,  Dr. Baig    Coronary arteriosclerosis POA: Yes      Overview: Dr. Hammond    Paroxysmal atrial fibrillation (HCC) POA: Yes    Sinus pause POA: Unknown    Hypomagnesemia POA: Unknown  Resolved Problems:    Elevated troponin POA: Yes    Acute on chronic respiratory failure with hypoxia (HCC) POA: Yes      FOLLOW UP  Future Appointments   Date Time Provider Department Center   3/11/2020 10:00 AM [facility] [address]   3/12/2020  1:15 PM Jessi Baez M.D. [facility] None   4/20/2020 10:40 AM Jean Claude Webb [facility] Hammond M.D.  [address]  [address]  [place] [address]  [phone]      f/u on CHF     Jean Claude Webb A.P.R.NMinor  [address]  [place] [address]  [phone]            MEDICATIONS ON DISCHARGE     Medication List      START taking these medications      Instructions   apixaban 5mg Tabs  Commonly known as:  ELIQUIS   Take 1 Tab by mouth 2 Times a Day. Indications: Thromboembolism secondary to Atrial Fibrillation  Dose:  5 mg     spironolactone 25 MG Tabs  Start taking on:  March 8, 2020  Commonly known as:  ALDACTONE   Take 1 Tab by mouth every day.  Dose:  25 mg     torsemide 100 MG Tabs  Start taking on:  March 8, 2020  Commonly known as:  DEMADEX   Take 0.5 Tabs by mouth every day.  Dose:  50 mg        CONTINUE taking these medications      Instructions   atorvastatin 80 MG tablet  Commonly known as:  LIPITOR   Take 80 mg by mouth every day.  Dose:  80 mg     B-D ULTRAFINE III SHORT PEN  Generic drug:  Insulin Pen Needle   USE 3 TIMES DAILY     Blood Glucose Test Strips   Accucheck Yaneth blood glucose test strips, checking blood sugar 2 daily  .02 insulin requiring.     FREESTYLE LITE strip  Generic drug:  glucose blood   1 Strip by Other route every day.  Dose:  1 Strip     glipiZIDE 5 MG Tabs  Commonly known as:  GLUCOTROL   Take 1 Tab by mouth 2 times a day.  Dose:  5 mg     insulin glargine 100 UNIT/ML Sopn  injection  Commonly known as:  Dean Hinton   Doctor's comments:  10 pens  Inject 10 Units as instructed every evening.  Dose:  10 Units     Lancets   His new meter uses the Accu-Chek SoftClix lancets for 3 time daily testing.  Dx. Code 250.02     lisinopril 2.5 MG Tabs  Commonly known as:  PRINIVIL   Take 1 Tab by mouth every day.  Dose:  2.5 mg     metFORMIN 500 MG Tabs  Commonly known as:  GLUCOPHAGE   TAKE 2 TABLETS BY MOUTH TWICE DAILY WITHMEALS     metoprolol SR 25 MG Tb24  Commonly known as:  TOPROL XL      potassium chloride SA 20 MEQ Tbcr  Commonly known as:  Kdur   Take 20 mEq by mouth every day.  Dose:  20 mEq     tamsulosin 0.4 MG capsule  Commonly known as:  FLOMAX   TAKE ONE CAPSULE BY MOUTH TWO TIMES A DAY     VITAMIN B COMPLEX PO   Take  by mouth every day.     * vitamin D 1000 Unit (25 mcg) Tabs  Commonly known as:  cholecalciferol   Take 1,000 Units by mouth every day.  Dose:  1,000 Units     * Vitamin D 50 MCG (2000 UT) Caps   Take  by mouth every day.         * This list has 2 medication(s) that are the same as other medications prescribed for you. Read the directions carefully, and ask your doctor or other care provider to review them with you.            STOP taking these medications    hydroCHLOROthiazide 12.5 MG tablet  Commonly known as:  HYDRODIURIL     Victoza 18 MG/3ML Sopn  Generic drug:  liraglutide            Allergies  Allergies   Allergen Reactions   • Pcn [Penicillins] Swelling   • Latex Rash     Where latex has touched       DIET  Orders Placed This Encounter   Procedures   • Diet Order Cardiac, Diabetic, 2 Gram Sodium     Standing Status:   Standing     Number of Occurrences:   1     Order Specific Question:   Diet:     Answer:   Cardiac [6]     Order Specific Question:   Diet:     Answer:   Diabetic [3]     Order Specific Question:   Diet:     Answer:   2 Gram Sodium [7]       ACTIVITY  As tolerated.  Weight bearing as  tolerated    CONSULTATIONS  Cardiology    PROCEDURES  None    LABORATORY  Lab Results   Component Value Date    SODIUM 140 03/07/2020    POTASSIUM 3.8 03/07/2020    CHLORIDE 95 (L) 03/07/2020    CO2 35 (H) 03/07/2020    GLUCOSE 129 (H) 03/07/2020    BUN 30 (H) 03/07/2020    CREATININE 1.29 03/07/2020    CREATININE 1.1 11/02/2006        Lab Results   Component Value Date    WBC 4.7 (L) 03/06/2020    HEMOGLOBIN 15.3 03/06/2020    HEMATOCRIT 47.7 03/06/2020    PLATELETCT 130 (L) 03/06/2020        Total time of the discharge process exceeds 35 minutes.

## 2020-03-09 ENCOUNTER — TELEPHONE (OUTPATIENT)
Dept: CARDIOLOGY | Facility: MEDICAL CENTER | Age: 85
End: 2020-03-09

## 2020-03-09 ENCOUNTER — PATIENT OUTREACH (OUTPATIENT)
Dept: HEALTH INFORMATION MANAGEMENT | Facility: OTHER | Age: 85
End: 2020-03-09

## 2020-03-09 NOTE — TELEPHONE ENCOUNTER
----- Message -----   From: AMANDA Quiroga   Sent: 3/7/2020  10:38 AM PDT   To: Jennifer Arreaga R.N.     Can you please set up appointment with Dr. Hammond's office with [place] [facility].   Thank you   Sheba      Pt already has appt scheduled with Dr. Baez on 3/12/20. Called pt to clarify if he plans on following with [facility] or [place] [facility]. He explains that he has been following with Dr. Hammond for over 30 years and would like to continue following with him. He plans on calling them to schedule an appt. Explained to pt that he doesn't need to follow with 2 cardiologists, and that if he is going to see Dr. Hammond he doesn't need to see Dr. Baez as well. Pt would like to keep 3/12/20 TT appt however. Again tried to explain to pt that this isn't necessary, but he still would like to keep appt. Though he does understand that after this appt he should only follow with one cardiology office.

## 2020-03-10 NOTE — PROGRESS NOTES
Received email from Evelin to follow up with shanna regarding needing assistance to schedule an appt and schedule transportation for mbr.  Outcome: Left Message    Please transfer to Patient Outreach Team at [phone] when patient returns call.    Attempt # 1

## 2020-03-11 ENCOUNTER — TELEPHONE (OUTPATIENT)
Dept: CASE MANAGEMENT | Facility: MEDICAL CENTER | Age: 85
End: 2020-03-11

## 2020-03-11 ENCOUNTER — OFFICE VISIT (OUTPATIENT)
Dept: MEDICAL GROUP | Facility: PHYSICIAN GROUP | Age: 85
End: 2020-03-11
Payer: MEDICARE

## 2020-03-11 ENCOUNTER — TELEPHONE (OUTPATIENT)
Dept: INTERNAL MEDICINE | Facility: IMAGING CENTER | Age: 85
End: 2020-03-11

## 2020-03-11 ENCOUNTER — TELEPHONE (OUTPATIENT)
Dept: MEDICAL GROUP | Facility: PHYSICIAN GROUP | Age: 85
End: 2020-03-11

## 2020-03-11 VITALS
TEMPERATURE: 96.8 F | HEIGHT: 64 IN | HEART RATE: 67 BPM | DIASTOLIC BLOOD PRESSURE: 60 MMHG | BODY MASS INDEX: 34.48 KG/M2 | SYSTOLIC BLOOD PRESSURE: 100 MMHG | OXYGEN SATURATION: 90 % | WEIGHT: 201.94 LBS

## 2020-03-11 DIAGNOSIS — I48.19 PERSISTENT ATRIAL FIBRILLATION (HCC): ICD-10-CM

## 2020-03-11 DIAGNOSIS — I34.0 MODERATE MITRAL REGURGITATION: ICD-10-CM

## 2020-03-11 DIAGNOSIS — Z79.4 TYPE 2 DIABETES MELLITUS WITH OTHER CIRCULATORY COMPLICATION, WITH LONG-TERM CURRENT USE OF INSULIN (HCC): ICD-10-CM

## 2020-03-11 DIAGNOSIS — I50.21 ACUTE SYSTOLIC CONGESTIVE HEART FAILURE (HCC): ICD-10-CM

## 2020-03-11 DIAGNOSIS — J96.01 ACUTE RESPIRATORY FAILURE WITH HYPOXIA (HCC): ICD-10-CM

## 2020-03-11 DIAGNOSIS — E11.59 TYPE 2 DIABETES MELLITUS WITH OTHER CIRCULATORY COMPLICATION, WITH LONG-TERM CURRENT USE OF INSULIN (HCC): ICD-10-CM

## 2020-03-11 PROCEDURE — 99215 OFFICE O/P EST HI 40 MIN: CPT | Performed by: FAMILY MEDICINE

## 2020-03-11 ASSESSMENT — FIBROSIS 4 INDEX: FIB4 SCORE: 3.68

## 2020-03-11 NOTE — TELEPHONE ENCOUNTER
1. Caller Name: Julio                       Call Back Number: [phone]  [facility] PCP or Specialty Provider: Yes Jean Claude Webb / Dr. Rose        2.  Does patient have any active symptoms of respiratory illness (fever OR cough OR shortness of breath)? No.    3.  In the last 30 days, has the patient traveled outside of the country OR in a high risk area within the [place] OR have any known contact with someone who has?  No.    4.  Does patient have any comoribidities? COPD, CHF and DM    5. Disposition:  Advised to perform self care, monitor for worsening symptoms and to call back in 3 days if no improvement. and patient cleared by RN Triage. CHF r/t cough and SOB    Note routed to PCP: LACEY only.

## 2020-03-11 NOTE — TELEPHONE ENCOUNTER
1. Caller Name: Julio                        Call Back Number: [phone]  [facility] PCP or Specialty Provider: Yes         2.  Does patient have any active symptoms of respiratory illness (fever OR cough OR shortness of breath)? No.    3.  In the last 30 days, has the patient traveled outside of the country OR in a high risk area within the [place] OR have any known contact with someone who has?  No.    4.  Does patient have any comoribidities? CHF    5. Disposition:  Ok to go to f/u appt.     Note routed to PCP: FYI only. Cleared by RN triage.

## 2020-03-11 NOTE — TELEPHONE ENCOUNTER
Called Patient before his 9:20 appointment to have him call [phone] to speak with Nurse Triage Team

## 2020-03-11 NOTE — PROGRESS NOTES
Subjective:      Julio Azar is a 87 y.o. male who presents with Hospital Follow-up (CHF)      HPI:    Acute systolic congestive heart failure (HCC)  Acute respiratory failure with hypoxia (HCC)  Moderate mitral regurgitation    This is an 87-year-old white male who is a patient of ESTEE Motta who is here for follow-up after recent hospitalization.  Patient's cardiologist is Dr. Hammond with [facility].    Patient with a history of CAD s/p CABG x3 vessels in , paroxysmal A. Fib, RBBB, hypertension, and DM type II presents today for a hospital follow-up. He was recently admitted from 3/4/20-3/7/20 for dyspnea and lower extremity edema. He notes that the edema had been present for about 4 months, and the shortness of breath had been gradual over the past 1 month.  He states his O2 saturations were running in the high 70's prior to going to the hospital.  He was not on home oxygen before.  He was using his  wife's O2 for a few days prior to going to the hospital. Patient was recently seen by his PCP 1 month ago where he was complaining of shortness of breath. There was no edema reported or noted on physical exam.  PCP sent him for further evaluation with PFTs and he has an appointment at the end of the month.    Chest xray in the hoptal was consistent with CHF. BNP was 940 and troponin was initially at 26 and increased to 31. The elevated troponin was thought to likely be demand ischemia.  He was treated with diuretics with significant improvement of his symptoms. He had a CTA of his chest which was negative for PE but was consistent with CHF and mild emphysema. He also had an echocardiogram which noted an EF of 45-50%, low inferior wall hypokinesis, moderate and probably severe mitral regurgitation which was poorly characterized..  He was found to be in atrial fibrillation with pauses.  Pacemaker was not recommended for the pauses because they were asymptomatic, brief  "with the longest at 2.7, and mostly occurring while the patient was asleep. He was advised to continue Metoprolol. Patient's systolic CHF is a new diagnosis as the previous echocardiogram in 2017 showed an EF of 65% and grade II diastolic dysfunction right ventricular diastolic pressure of 40 mmHg. He recently had a nuclear cardiac stress test in 10/2019 which showed no MI, no ischemia, and normal EF and wall motion. He was discharged on O2 2 L/min due to his hypoxia.  He was started on Eliquis 5 mg 1 tablet twice a day, Spironolactone 25 mg, and Torsemide 100 mg 1/2 tablet daily.  His HCTZ, Lasix, and Victoza were discontinued; it is not clear at this time why the Victoza was stopped. Patient notes that he was on Eliquis before, but he was then cardioverted 7 months ago with resolution of his A. Fib and therefore taken off of the Eliquis.      He notes that his edema is greatly improved. He has lost 12 lbs since being discharged from the hospital. His blood pressure is low in the office today at 90/50, which could be related to his diuretics.  This is better after recheck at 100/60.  Denies any chest pain, palpitations, dizziness, headache, shortness of breath.    He states that he has tried to call Dr. Hammond's office yesterday to make an appointment, but they have not returned his call.  He does have an appointment with Dr. Baez ([facility]) tomorrow. Patient is followed by Dr. Baig (Endocrinology) in regards to his diabetes.      Past medical history, past surgical history, family history reviewed-no changes    Social history reviewed-no changes    Allergies reviewed-no changes    Medications reviewed-no changes       ROS:  As per the HPI as shown above, the rest are negative.       Objective:     BP (!) 90/50 (BP Location: Left arm, Patient Position: Sitting, BP Cuff Size: Adult)   Pulse 67   Temp 36 °C (96.8 °F) (Temporal)   Ht 1.626 m (5' 4\")   Wt 91.6 kg (201 lb 15.1 oz)   SpO2 90%   BMI 34.66 " kg/m²     Physical Exam    Examined alert, awake, oriented, not in distress    Neck-supple, no lymphadenopathy, no thyromegaly  Lungs-clear to auscultation, no rales, no wheezes  Heart-irregular regular rhythm, normal rate, distant heart sounds, no murmur appreciated.   Extremities-no edema, clubbing, cyanosis     I reviewed the hospital records.       Assessment/Plan:     1. Acute systolic congestive heart failure (HCC)  Patient was recently admitted to the hospital for acute CHF with dyspnea and edema. Patient's edema has resolved after diuretic treatment, and he has been taking his diuretics as prescribed on discharge. Patient's fairly recent nuclear stress test in 10/2019 showed no MI, no ischemia, and normal EF and wall motion.  His prior echocardiogram in 2017 showed an EF of 65% however, his echocardiogram in the hospital showed new findings including a lower EF of 45-50%, hypokinesis of the inferior wall, and moderate to severe mitral regurgitation. The mitral regurgitation was thought to be poorly characterized. It also showed grade II diastolic dysfunction which is an old finding. Patient has been seeing Dr. Hammond for the past 35 years, so he would prefer to follow-up with them. I spoke with the patient's cardiologist, Dr. Hammond, about the patient's condition and medications. He advised that the patient continue all of his medications and stop eating salty foods.  Dr. Hammond notes that he will have his office call the patient to schedule a follow-up as soon as possible.    2. Acute respiratory failure with hypoxia (HCC)  Since the patient has several upcoming appointments, he would like to hold-off on the PFT's at this time. I believe this is reasonable as his symptoms are likely related to his CHF. The CTA of his chest also showed evidence of emphysema which is consistent with his 68 pack-year history.  I advised him to continue on his current O2 supplementation.  DFTs can be done at a later time when his  cardiac issues have settled down.    3. Persistent atrial fibrillation  Patient's A. Fib previously appeared to be paroxysmal, but it now appears to be persistent.  This is currently rate controlled.  Advised patient to continue Eliquis anticoagulation and metoprolol. He will continue to follow-up with cardiology in regards to this.     4. Moderate mitral regurgitation  See above for further details. Patient has a new finding of moderate to severe mitral regurgitation seen on his recent echocardiogram. This was not previously present on prior scans. There is also no murmur heard upon physical examination. Discussed with patient that he will likely require a repeat echocardiogram in the future to evaluate the above issues at which time this will be checked.     5.  Type 2 diabetes mellitus with other circulatory complication, with long-term current use of insulin  His Victoza was discontinued in the hospital.  We could not find any reason why this medication needed to be discontinued.  Last hemoglobin A1c was still high at 10.7.  Advised to start back on Victoza.  Continue insulin, metformin, glipizide.  Keep follow-up with endocrinologist Dr. Baig.    Patient was seen for 40 minutes face to face of which > 50% of appointment time was spent on counseling and coordination of care regarding the above.    Jez JOSÉ (Bijan), am scribing for, and in the presence of, Shaylee Rose MD     Electronically signed by: Jez Gandhi (Bijan), 3/11/2020    Shaylee JOSÉ MD personally performed the services described in this documentation, as scribed by Jez Gandhi in my presence, and it is both accurate and complete.

## 2020-03-17 NOTE — DOCUMENTATION QUERY
[facility]                                                                       Query Response Note      PATIENT:               SHAYY ARCHIBALD  ACCT #:                  [id number]  MRN:                     [id number]  :                      [id number]  ADMIT DATE:       3/4/2020 7:19 PM  DISCH DATE:        3/7/2020 4:25 PM  RESPONDING  PROVIDER #:        [id number]           QUERY TEXT:    Please clarify documentation regarding the patient's elevated troponin levels.  If an appropriate response is not listed below, please respond with a new note.    The patient's Clinical Indicators include:  Patient is an 86 y/o male presenting with SOB.  Patient was diagnosed with acute systolic CHF and respiratory failure.  Patient also had elevated troponin levels.    H&P indicates type ll NSTEMI, but progress notes 3/5-3/6 indicate demand ischemia and the d/c summary states elevated troponin.    Troponin levels:  3/4 @ 04:39  26  3/4 @ 05:46  31  Options provided:   -- Elevated troponin   -- Demand ischemia   -- Type ll NSTEMI   -- Unable to determine      Query created by: Barbara Zambrano on 3/14/2020 4:58 AM    RESPONSE TEXT:    Demand ischemia          Electronically signed by:  LANDON SILVA MD 3/17/2020 7:24 AM

## 2020-04-06 ENCOUNTER — PATIENT OUTREACH (OUTPATIENT)
Dept: HEALTH INFORMATION MANAGEMENT | Facility: OTHER | Age: 85
End: 2020-04-06

## 2020-04-06 NOTE — PROGRESS NOTES
An 87-year-old male was an emergent admission to [facility] from 3/4/2020 to 3/7/2020 to treat Acute on chronic diastolic (congestive) heart failure. The Patient Navigator visited the patient bedside. The patient was discharged Home.     The patient was ordered to start/continue to take the following medications: Torsemide (Demadex), Spironolactone (Aldactone), and Apixaban (Eliquis). The patient successfully filled all medications.     The patient was ordered to follow-up with Specialist, DME, and PCP. The patient had the following appointments:     1) 3/11/2020 @ 10:00 Shaylee Rose, general/family practice - CONFIRMED AS KEPT     2) 3/12/2020 @ 1:15 Margareth Baez, cardiology - PATIENT CANCELLED     3) 3/13/2020 @ 11:30 Wyatt Hammond, cardiology - CONFIRMED AS KEPT     4) [facility] - DME Not Needed; Patient has existing DME      The patient has a future appointment scheduled for   4/20/2020 @ 10:40 Jean Claude Ponce, general/family practice - SCHEDULED.     The Patient Navigator followed the patient for a total of 32 days and patient was receptive to services.

## 2020-04-09 ENCOUNTER — HOSPITAL ENCOUNTER (OUTPATIENT)
Dept: LAB | Facility: MEDICAL CENTER | Age: 85
End: 2020-04-09
Attending: INTERNAL MEDICINE
Payer: MEDICARE

## 2020-04-09 LAB
ALBUMIN SERPL BCP-MCNC: 4.2 G/DL (ref 3.2–4.9)
ALBUMIN/GLOB SERPL: 1.3 G/DL
ALP SERPL-CCNC: 123 U/L (ref 30–99)
ALT SERPL-CCNC: 31 U/L (ref 2–50)
ANION GAP SERPL CALC-SCNC: 13 MMOL/L (ref 7–16)
AST SERPL-CCNC: 27 U/L (ref 12–45)
BILIRUB SERPL-MCNC: 1.1 MG/DL (ref 0.1–1.5)
BUN SERPL-MCNC: 41 MG/DL (ref 8–22)
CALCIUM SERPL-MCNC: 9.6 MG/DL (ref 8.5–10.5)
CHLORIDE SERPL-SCNC: 96 MMOL/L (ref 96–112)
CO2 SERPL-SCNC: 28 MMOL/L (ref 20–33)
CREAT SERPL-MCNC: 1.4 MG/DL (ref 0.5–1.4)
EST. AVERAGE GLUCOSE BLD GHB EST-MCNC: 249 MG/DL
FASTING STATUS PATIENT QL REPORTED: NORMAL
GLOBULIN SER CALC-MCNC: 3.2 G/DL (ref 1.9–3.5)
GLUCOSE SERPL-MCNC: 126 MG/DL (ref 65–99)
HBA1C MFR BLD: 10.3 % (ref 0–5.6)
POTASSIUM SERPL-SCNC: 4.4 MMOL/L (ref 3.6–5.5)
PROT SERPL-MCNC: 7.4 G/DL (ref 6–8.2)
SODIUM SERPL-SCNC: 137 MMOL/L (ref 135–145)

## 2020-04-09 PROCEDURE — 80053 COMPREHEN METABOLIC PANEL: CPT

## 2020-04-09 PROCEDURE — 36415 COLL VENOUS BLD VENIPUNCTURE: CPT

## 2020-04-09 PROCEDURE — 83036 HEMOGLOBIN GLYCOSYLATED A1C: CPT

## 2020-04-17 NOTE — PROGRESS NOTES
Called mbr in regards to IHD follow up. Pt stated he is doing better and is going to his appts. Mbr stated he does need assistance regarding why he hit the donut hole by end of March. Adv i would be able to look into that for him and he also stated he needs to  a medication Victoza and he stated pharmacy said copayement would be 200 something. Received assistance from Elizabeth and she did confirm mbr was in donut hole as of right now coverage is [phone]. Elizabeth also confirmed copayment of Victoza is 235.05. Called mbr back and he understood

## 2020-05-07 ENCOUNTER — APPOINTMENT (RX ONLY)
Dept: URBAN - METROPOLITAN AREA CLINIC 20 | Facility: CLINIC | Age: 85
Setting detail: DERMATOLOGY
End: 2020-05-07

## 2020-05-07 DIAGNOSIS — L57.0 ACTINIC KERATOSIS: ICD-10-CM

## 2020-05-07 PROBLEM — D48.5 NEOPLASM OF UNCERTAIN BEHAVIOR OF SKIN: Status: ACTIVE | Noted: 2020-05-07

## 2020-05-07 PROCEDURE — 17000 DESTRUCT PREMALG LESION: CPT | Mod: 59

## 2020-05-07 PROCEDURE — 17003 DESTRUCT PREMALG LES 2-14: CPT

## 2020-05-07 PROCEDURE — 11102 TANGNTL BX SKIN SINGLE LES: CPT

## 2020-05-07 PROCEDURE — 11103 TANGNTL BX SKIN EA SEP/ADDL: CPT

## 2020-05-07 PROCEDURE — ? LIQUID NITROGEN

## 2020-05-07 PROCEDURE — ? BIOPSY BY SHAVE METHOD

## 2020-05-07 ASSESSMENT — LOCATION SIMPLE DESCRIPTION DERM
LOCATION SIMPLE: RIGHT EAR
LOCATION SIMPLE: LEFT FOREHEAD
LOCATION SIMPLE: LEFT TEMPLE
LOCATION SIMPLE: RIGHT EYEBROW
LOCATION SIMPLE: LEFT EAR
LOCATION SIMPLE: RIGHT CHEEK
LOCATION SIMPLE: RIGHT SCALP
LOCATION SIMPLE: LEFT CHEEK

## 2020-05-07 ASSESSMENT — LOCATION ZONE DERM
LOCATION ZONE: SCALP
LOCATION ZONE: FACE
LOCATION ZONE: EAR

## 2020-05-07 ASSESSMENT — LOCATION DETAILED DESCRIPTION DERM
LOCATION DETAILED: LEFT FOREHEAD
LOCATION DETAILED: RIGHT SUPERIOR LATERAL MALAR CHEEK
LOCATION DETAILED: LEFT SUPERIOR CRUS OF ANTIHELIX
LOCATION DETAILED: RIGHT LATERAL EYEBROW
LOCATION DETAILED: RIGHT MEDIAL FRONTAL SCALP
LOCATION DETAILED: RIGHT LATERAL MALAR CHEEK
LOCATION DETAILED: LEFT SUPERIOR LATERAL MALAR CHEEK
LOCATION DETAILED: RIGHT SCAPHA
LOCATION DETAILED: LEFT INFERIOR LATERAL MALAR CHEEK
LOCATION DETAILED: LEFT MID TEMPLE
LOCATION DETAILED: RIGHT INFERIOR CENTRAL MALAR CHEEK

## 2020-05-07 NOTE — PROCEDURE: BIOPSY BY SHAVE METHOD
Detail Level: Detailed
Depth Of Biopsy: dermis
Was A Bandage Applied: Yes
Size Of Lesion In Cm: 0
Biopsy Type: H and E
Biopsy Method: Personna blade
Anesthesia Type: 1% lidocaine with 1:100,000 epinephrine and a 1:6 solution of 8.4% sodium bicarbonate
Anesthesia Volume In Cc: 0.2
Hemostasis: Drysol and Electrocautery
Wound Care: Aquaphor
Dressing: Band-Aid
Destruction After The Procedure: No
Type Of Destruction Used: Curettage
Curettage Text: The wound bed was treated with curettage after the biopsy was performed.
Cryotherapy Text: The wound bed was treated with cryotherapy after the biopsy was performed.
Electrodesiccation Text: The wound bed was treated with electrodesiccation after the biopsy was performed.
Electrodesiccation And Curettage Text: The wound bed was treated with electrodesiccation and curettage after the biopsy was performed.
Silver Nitrate Text: The wound bed was treated with silver nitrate after the biopsy was performed.
Lab: [id number]
Lab Facility: 
Consent: Written consent was obtained and risks were reviewed including but not limited to scarring, infection, bleeding, scabbing, incomplete removal, nerve damage and allergy to anesthesia.
Post-Care Instructions: I reviewed with the patient in detail post-care instructions. Patient is to keep the biopsy site dry overnight, and then apply bacitracin twice daily until healed. Patient may apply hydrogen peroxide soaks to remove any crusting.
Notification Instructions: Patient will be notified of biopsy results. However, patient instructed to call the office if not contacted within 2 weeks.
Billing Type: Third-Party Bill
Information: Selecting Yes will display possible errors in your note based on the variables you have selected. This validation is only offered as a suggestion for you. PLEASE NOTE THAT THE VALIDATION TEXT WILL BE REMOVED WHEN YOU FINALIZE YOUR NOTE. IF YOU WANT TO FAX A PRELIMINARY NOTE YOU WILL NEED TO TOGGLE THIS TO 'NO' IF YOU DO NOT WANT IT IN YOUR FAXED NOTE.

## 2020-06-08 ENCOUNTER — TELEPHONE (OUTPATIENT)
Dept: MEDICAL GROUP | Facility: PHYSICIAN GROUP | Age: 85
End: 2020-06-08

## 2020-06-08 RX ORDER — LIRAGLUTIDE 6 MG/ML
INJECTION SUBCUTANEOUS
COMMUNITY
Start: 2020-04-11 | End: 2021-05-26

## 2020-06-08 RX ORDER — TORSEMIDE 20 MG/1
20 TABLET ORAL
COMMUNITY
Start: 2020-03-13 | End: 2021-05-26

## 2020-06-08 NOTE — TELEPHONE ENCOUNTER
ESTABLISHED PATIENT PRE-VISIT PLANNING     Patient was NOT contacted to complete PVP.     Note: Patient will not be contacted if there is no indication to call.     1.  Reviewed notes from the last few office visits within the medical group: Yes    2.  If any orders were placed at last visit or intended to be done for this visit (i.e. 6 mos follow-up), do we have Results/Consult Notes?        •  Labs - Labs were not ordered at last office visit.   Note: If patient appointment is for lab review and patient did not complete labs, check with provider if OK to reschedule patient until labs completed.       •  Imaging - Imaging was not ordered at last office visit.       •  Referrals - No referrals were ordered at last office visit.    3. Is this appointment scheduled as a Hospital Follow-Up? No    4.  Immunizations were updated in Epic using WebIZ?: Epic matches WebIZ       •  Web Iz Recommendations: Patient is up to date on all vaccines    5.  Patient is due for the following Health Maintenance Topics:   Health Maintenance Due   Topic Date Due   • DIABETES MONOFILAMENT / LE EXAM  03/02/2019       - Patient is up-to-date on all Health Maintenance topics. No records have been requested at this time.    6. Orders for overdue Health Maintenance topics pended in Pre-Charting? N\A    7.  AHA (MDX) form printed for Provider? YES    8.  Patient was NOT informed to arrive 15 min prior to their scheduled appointment and bring in their medication bottles.

## 2020-06-09 ENCOUNTER — OFFICE VISIT (OUTPATIENT)
Dept: MEDICAL GROUP | Facility: PHYSICIAN GROUP | Age: 85
End: 2020-06-09
Payer: MEDICARE

## 2020-06-09 VITALS
SYSTOLIC BLOOD PRESSURE: 110 MMHG | HEART RATE: 56 BPM | DIASTOLIC BLOOD PRESSURE: 50 MMHG | HEIGHT: 64 IN | RESPIRATION RATE: 20 BRPM | WEIGHT: 206.8 LBS | TEMPERATURE: 97.6 F | OXYGEN SATURATION: 98 % | BODY MASS INDEX: 35.3 KG/M2

## 2020-06-09 DIAGNOSIS — E11.59 TYPE 2 DIABETES MELLITUS WITH OTHER CIRCULATORY COMPLICATION, WITHOUT LONG-TERM CURRENT USE OF INSULIN (HCC): Primary | ICD-10-CM

## 2020-06-09 DIAGNOSIS — I50.22 CHRONIC SYSTOLIC CONGESTIVE HEART FAILURE (HCC): ICD-10-CM

## 2020-06-09 DIAGNOSIS — E11.43 DIABETIC AUTONOMIC NEUROPATHY ASSOCIATED WITH TYPE 2 DIABETES MELLITUS (HCC): ICD-10-CM

## 2020-06-09 DIAGNOSIS — I10 ESSENTIAL HYPERTENSION, BENIGN: ICD-10-CM

## 2020-06-09 PROBLEM — R97.20 RAISED PROSTATE SPECIFIC ANTIGEN: Status: RESOLVED | Noted: 2018-06-06 | Resolved: 2020-06-09

## 2020-06-09 PROBLEM — C61 MALIGNANT NEOPLASM OF PROSTATE (HCC): Status: RESOLVED | Noted: 2020-02-19 | Resolved: 2020-06-09

## 2020-06-09 PROBLEM — R39.9 LOWER URINARY TRACT SYMPTOMS: Status: RESOLVED | Noted: 2020-02-19 | Resolved: 2020-06-09

## 2020-06-09 PROCEDURE — 99214 OFFICE O/P EST MOD 30 MIN: CPT | Performed by: FAMILY MEDICINE

## 2020-06-09 ASSESSMENT — FIBROSIS 4 INDEX: FIB4 SCORE: 3.25

## 2020-06-09 NOTE — ASSESSMENT & PLAN NOTE
This is a chronic condition.  Current Meds: spironolactone 25 mg daily, metoprolol SR 25 mg daily, lisinopril 2.5 mg daily  Side effects: none  Home BP Log: none  Associated symptoms: no cp, no sob

## 2020-06-09 NOTE — PROGRESS NOTES
Subjective:     CC: f/u chronic medical conditions    HPI:   Julio presents today with     Type 2 diabetes mellitus with circulatory disorder, without long-term current use of insulin ([facility])  This is a chronic condition.  Current medications: metformin 1000 mg bid, glipizide 5 mg bid, lantus 20 units qhs, victoza 18 mg daily,    Last A1c: 10.3% (4/2020)  Last Microalb/Cr ratio: elevated (1/2020)  Fasting sugars:  (this morning 67)  Last diabetic foot exam: today  Last retinal eye exam: 11/2019  ACEi/ARB: lisinopril 2.5 mg  Statin: atorvastatin 80 mg  Aspirin: no - on eliquis  Concomitant HTN: yes - at goal  Nightly foot checks: no      Essential hypertension, benign  This is a chronic condition.  Current Meds: spironolactone 25 mg daily, metoprolol SR 25 mg daily, lisinopril 2.5 mg daily  Side effects: none  Home BP Log: none  Associated symptoms: no cp, no sob      Chronic systolic congestive heart failure (HCC)  This is a chronic condition.  He was recently in hospital with an acute exacerbation of CHF but reports he is doing well since.  He is on the appropriate medical regimen and follows with cardiology regularly.      Past Medical History:   Diagnosis Date   • Arrhythmia    • Bowel habit changes    • Breath shortness    • CAD (coronary artery disease)    • CAD (coronary artery disease), native coronary artery 3/31/2014   • Cancer ([facility]) 2014    PROSTATE-RADIATION [place]   • Dental disorder     upper and lower partials   • Diabetes     Dr Laitf, IDDM   • Encounter for long-term (current) use of insulin ([facility]) 9/18/2013   • Essential hypertension, benign 3/31/2014   • Heart burn    • Other and unspecified hyperlipidemia 9/18/2013   • Snoring     has had sleep study   • Type II or unspecified type diabetes mellitus without mention of complication, uncontrolled 9/18/2013   • Unspecified vitamin D deficiency 9/18/2013       Social History     Tobacco Use   • Smoking status: Former Smoker     Packs/day: 2.00      Years: 34.00     Pack years: 68.00     Types: Cigarettes     Last attempt to quit: 1982     Years since quittin.4   • Smokeless tobacco: Never Used   Substance Use Topics   • Alcohol use: Yes     Alcohol/week: 4.2 - 8.4 oz     Types: 7 - 14 Glasses of wine per week     Comment: 1 per day   • Drug use: No       Current Outpatient Medications Ordered in Epic   Medication Sig Dispense Refill   • VICTOZA 18 MG/3ML Solution Pen-injector      • torsemide (DEMADEX) 20 MG Tab Take 20 mg by mouth every day.     • apixaban (ELIQUIS) 5mg Tab Take 1 Tab by mouth 2 Times a Day. Indications: Thromboembolism secondary to Atrial Fibrillation 60 Tab 0   • spironolactone (ALDACTONE) 25 MG Tab Take 1 Tab by mouth every day. 30 Tab 3   • torsemide (DEMADEX) 100 MG Tab Take 0.5 Tabs by mouth every day. 15 Tab 0   • glipiZIDE (GLUCOTROL) 5 MG Tab Take 1 Tab by mouth 2 times a day. 200 Tab 3   • metoprolol SR (TOPROL XL) 25 MG TABLET SR 24 HR      • atorvastatin (LIPITOR) 80 MG tablet Take 80 mg by mouth every day.     • FREESTYLE LITE strip 1 Strip by Other route every day.     • tamsulosin (FLOMAX) 0.4 MG capsule TAKE ONE CAPSULE BY MOUTH TWO TIMES A  Cap 2   • lisinopril (PRINIVIL) 2.5 MG Tab Take 1 Tab by mouth every day. 90 Tab 3   • insulin glargine (LANTUS SOLOSTAR) 100 UNIT/ML Solution Pen-injector injection Inject 10 Units as instructed every evening. 30 mL 3   • vitamin D (CHOLECALCIFEROL) 1000 UNIT Tab Take 1,000 Units by mouth every day.     • potassium chloride SA (K-DUR) 20 MEQ Tab CR Take 20 mEq by mouth every day.     • metformin (GLUCOPHAGE) 500 MG Tab TAKE 2 TABLETS BY MOUTH TWICE DAILY WITHMEALS 360 Tab 3   • B-D ULTRAFINE III SHORT PEN 31G X 8 MM MISC USE 3 TIMES DAILY 100 Each 11   • Lancets MISC His new meter uses the Accu-Chek SoftClix lancets for 3 time daily testing.  Dx. Code 250.02 300 Each 3   • Blood Glucose Monitoring Suppl SUPPLIES MISC Accucheck Yaneth blood glucose test strips, checking  "blood sugar 2 daily  .02 insulin requiring. 200 Strip 3   • B Complex Vitamins (VITAMIN B COMPLEX PO) Take  by mouth every day.     • Cholecalciferol (VITAMIN D) 2000 UNITS CAPS Take  by mouth every day.       No current [facility]-ordered facility-administered medications on file.        Allergies:  Pcn [penicillins] and Latex    Health Maintenance: Completed    ROS:  Gen: no fevers/chills  Pulm: no sob  CV: no chest pain    Objective:     Exam:  /50 (BP Location: Left arm, Patient Position: Sitting, BP Cuff Size: Adult)   Pulse (!) 56   Temp 36.4 °C (97.6 °F) (Temporal)   Resp 20   Ht 1.626 m (5' 4\")   Wt 93.8 kg (206 lb 12.8 oz)   SpO2 98% Comment: 2 L via NC Pulse  BMI 35.50 kg/m²  Body mass index is 35.5 kg/m².    Gen: Alert and oriented, No apparent distress. Wearing oxygen.  ENT: Canals clear bilaterally with normal TMs.  Neck: Neck is supple without lymphadenopathy.  Lungs: Normal effort, CTA bilaterally, no wheezes, rhonchi, or rales  CV: Regular rate and rhythm. No murmurs, rubs, or gallops.  Ext: No clubbing, cyanosis, edema.  Diabetic foot exam: No lesions or calluses noted. 2+ pedal pulses. Sensation intact with 10 out of 10 on monofilament test.      Assessment & Plan:     87 y.o. male with the following -     1. Type 2 diabetes mellitus with other circulatory complication, without long-term current use of insulin (HCC)  2. 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 (HCC)  This is a chronic condition, uncontrolled.  Labs in April, 2020 showed an elevated hemoglobin A1c of 10.3%.  He does follow with endocrinology regularly reports he has an appointment within the next month.  He is largely up-to-date with all of his diabetic screenings.  Diabetic foot exam performed today and normal.  -Continue metformin 1000 mg twice daily  -Continue glipizide 5 mg twice daily  -Continue Lantus 20 units daily  -Continue Victoza 18 mg daily  -Follow-up with endocrinology    3. " Chronic systolic congestive heart failure (HCC)  This is a chronic condition, stable.  He was recently hospital with an acute exacerbation reports he is doing well since discharge.  He follows with cardiology regularly reports he has an appointment with them within the next month.  He is on the appropriate medical regimen though torsemide is listed twice in her chart with 2 different doses but he could not clarify which dose he is actually taking.  -Continue atorvastatin 80 mg daily  -Continue lisinopril 2.5 mg daily  -Continue metoprolol SR 25 mg daily  -Continue spironolactone 25 mg daily  -Continue torsemide plus potassium (I did encourage him to discuss which dose he is actually taking with cardiology)    4. Essential hypertension, benign  This is a chronic condition, stable.  Blood pressure is very well controlled in the office today and he is tolerating his medications well without any side effect.  -Continue lisinopril, metoprolol SR, and spironolactone      Return in about 3 months (around 9/9/2020) for Med check.    Please note that this dictation was created using voice recognition software. I have made every reasonable attempt to correct obvious errors, but I expect that there are errors of grammar and possibly content that I did not discover before finalizing the note.

## 2020-06-09 NOTE — ASSESSMENT & PLAN NOTE
This is a chronic condition.  Current medications: metformin 1000 mg bid, glipizide 5 mg bid, lantus 20 units qhs, victoza 18 mg daily,    Last A1c: 10.3% (4/2020)  Last Microalb/Cr ratio: elevated (1/2020)  Fasting sugars:  (this morning 67)  Last diabetic foot exam: today  Last retinal eye exam: 11/2019  ACEi/ARB: lisinopril 2.5 mg  Statin: atorvastatin 80 mg  Aspirin: no - on eliquis  Concomitant HTN: yes - at goal  Nightly foot checks: no

## 2020-06-09 NOTE — ASSESSMENT & PLAN NOTE
This is a chronic condition.  He was recently in hospital with an acute exacerbation of CHF but reports he is doing well since.  He is on the appropriate medical regimen and follows with cardiology regularly.

## 2020-06-17 ENCOUNTER — RX ONLY (OUTPATIENT)
Age: 85
Setting detail: RX ONLY
End: 2020-06-17

## 2020-06-17 RX ORDER — FLUOROURACIL 5 MG/G
CREAM TOPICAL
Qty: 1 | Refills: 2 | Status: ERX | COMMUNITY
Start: 2020-06-17

## 2020-06-17 RX ORDER — FLUOROURACIL 2 G/40G
1 CREAM TOPICAL BID
Qty: 1 | Refills: 1 | Status: CANCELLED
Stop reason: CLARIF

## 2020-07-03 ENCOUNTER — HOSPITAL ENCOUNTER (OUTPATIENT)
Dept: LAB | Facility: MEDICAL CENTER | Age: 85
End: 2020-07-03
Attending: INTERNAL MEDICINE
Payer: MEDICARE

## 2020-07-03 LAB
ALBUMIN SERPL BCP-MCNC: 4.2 G/DL (ref 3.2–4.9)
ALBUMIN/GLOB SERPL: 1.4 G/DL
ALP SERPL-CCNC: 124 U/L (ref 30–99)
ALT SERPL-CCNC: 18 U/L (ref 2–50)
ANION GAP SERPL CALC-SCNC: 12 MMOL/L (ref 7–16)
AST SERPL-CCNC: 20 U/L (ref 12–45)
BILIRUB SERPL-MCNC: 1.2 MG/DL (ref 0.1–1.5)
BUN SERPL-MCNC: 39 MG/DL (ref 8–22)
CALCIUM SERPL-MCNC: 9.6 MG/DL (ref 8.5–10.5)
CHLORIDE SERPL-SCNC: 101 MMOL/L (ref 96–112)
CHOLEST SERPL-MCNC: 135 MG/DL (ref 100–199)
CO2 SERPL-SCNC: 28 MMOL/L (ref 20–33)
CREAT SERPL-MCNC: 1.29 MG/DL (ref 0.5–1.4)
FASTING STATUS PATIENT QL REPORTED: NORMAL
GLOBULIN SER CALC-MCNC: 3.1 G/DL (ref 1.9–3.5)
GLUCOSE SERPL-MCNC: 105 MG/DL (ref 65–99)
HDLC SERPL-MCNC: 70 MG/DL
LDLC SERPL CALC-MCNC: 41 MG/DL
POTASSIUM SERPL-SCNC: 4 MMOL/L (ref 3.6–5.5)
PROT SERPL-MCNC: 7.3 G/DL (ref 6–8.2)
SODIUM SERPL-SCNC: 141 MMOL/L (ref 135–145)
TRIGL SERPL-MCNC: 122 MG/DL (ref 0–149)

## 2020-07-03 PROCEDURE — 80061 LIPID PANEL: CPT

## 2020-07-03 PROCEDURE — 36415 COLL VENOUS BLD VENIPUNCTURE: CPT

## 2020-07-03 PROCEDURE — 80053 COMPREHEN METABOLIC PANEL: CPT

## 2020-07-08 ENCOUNTER — APPOINTMENT (RX ONLY)
Dept: URBAN - METROPOLITAN AREA CLINIC 36 | Facility: CLINIC | Age: 85
Setting detail: DERMATOLOGY
End: 2020-07-08

## 2020-07-08 PROBLEM — C44.329 SQUAMOUS CELL CARCINOMA OF SKIN OF OTHER PARTS OF FACE: Status: ACTIVE | Noted: 2020-07-08

## 2020-07-08 PROCEDURE — 17311 MOHS 1 STAGE H/N/HF/G: CPT

## 2020-07-08 PROCEDURE — 13132 CMPLX RPR F/C/C/M/N/AX/G/H/F: CPT

## 2020-07-08 PROCEDURE — ? MOHS SURGERY

## 2020-07-08 PROCEDURE — 17312 MOHS ADDL STAGE: CPT

## 2020-07-08 NOTE — PROCEDURE: MOHS SURGERY
Body Location Override (Optional - Billing Will Still Be Based On Selected Body Map Location If Applicable): left superior forehead
Mohs Case Number: [id number]
Previous Accession (Optional): [id number]
Biopsy Photograph Reviewed: Yes
Referring Physician (Optional): [email or web address]
Consent Type: Consent 1 (Standard)
Eye Shield Used: No
Surgeon Performing Repair (Optional): Nuzhat
Initial Size Of Lesion: 0.4
X Size Of Lesion In Cm (Optional): 0.3
Number Of Stages: 2
Primary Defect Length In Cm (Final Defect Size - Required For Flaps/Grafts): 3.5
Primary Defect Width In Cm (Final Defect Size - Required For Flaps/Grafts): 1.5
Repair Type: Complex Repair
Oculoplastic Surgeon (A): Kieran
Oculoplastic Surgeon Procedure Text (A): After obtaining clear surgical margins the patient was sent to oculoplastics for surgical repair.  The patient understands they will receive post-surgical care and follow-up from the referring physician's office.
Otolaryngologist Procedure Text (A): After obtaining clear surgical margins the patient was sent to otolaryngology for surgical repair.  The patient understands they will receive post-surgical care and follow-up from the referring physician's office.
Plastic Surgeon Procedure Text (A): After obtaining clear surgical margins the patient was sent to plastics for surgical repair.  The patient understands they will receive post-surgical care and follow-up from the referring physician's office.
Mid-Level Procedure Text (A): After obtaining clear surgical margins the patient was sent to a mid-level provider for surgical repair.  The patient understands they will receive post-surgical care and follow-up from the mid-level provider.
Provider Procedure Text (A): After obtaining clear surgical margins the defect was repaired by another provider.
Asc Procedure Text (A): After obtaining clear surgical margins the patient was sent to an ASC for surgical repair.  The patient understands they will receive post-surgical care and follow-up from the ASC physician.
Suturegard Retention Suture: 2-0 Nylon
Retention Suture Bite Size: 3 mm
Length To Time In Minutes Device Was In Place: 10
Number Of Hemigard Strips Per Side: 1
Simple / Intermediate / Complex Repair - Final Wound Length In Cm: 4.3
Complex/Intermediate Repair Variations: Crescentic
Undermining Type: Entire Wound
Debridement Text: The wound edges were debrided prior to proceeding with the closure to facilitate wound healing.
Helical Rim Text: The closure involved the helical rim.
Vermilion Border Text: The closure involved the vermilion border.
Nostril Rim Text: The closure involved the nostril rim.
Retention Suture Text: Retention sutures were placed to support the closure and prevent dehiscence.
Secondary Defect Length In Cm (Required For Flaps): 0
Area H Indication Text: Tumors in this location are included in Area H (eyelids, eyebrows, nose, lips, chin, ear, pre-auricular, post-auricular, temple, genitalia, hands, feet, ankles and areola).  Tissue conservation is critical in these anatomic locations.
Area M Indication Text: Tumors in this location are included in Area M (cheek, forehead, scalp, neck, jawline and pretibial skin).  Mohs surgery is indicated for tumors in these anatomic locations.
Area L Indication Text: Tumors in this location are included in Area L (trunk and extremities).  Mohs surgery is indicated for larger tumors, or tumors with aggressive histologic features, in these anatomic locations.
Surgical Defect Width In Cm (Optional): 1.2
Special Stains Stage 1 - Results: Base On Clearance Noted Above
Stage 2: Additional Anesthesia Type: 1% lidocaine with 1:100,000 epinephrine and 408mcg clindamycin/ml and a 1:10 solution of 8.4% sodium bicarbonate
Stage 4: Additional Anesthesia Type: 1% lidocaine with epinephrine
Include Tumor Staging In Mohs Note?: Please Select the Appropriate Response
Staging Info: By selecting yes to the question above you will include information on AJCC 8 tumor staging in your Mohs note. Information on tumor staging will be automatically added for SCCs on the head and neck. AJCC 8 includes tumor size, tumor depth, perineural involvement and bone invasion.
Tumor Depth: Less than 6mm from granular layer and no invasion beyond the subcutaneous fat
Medical Necessity Statement: Based on my medical judgement, Mohs surgery is the most appropriate treatment for this cancer compared to other treatments.
Alternatives Discussed Intro (Do Not Add Period): I discussed alternative treatments to Mohs surgery and specifically discussed the risks and benefits of
Consent 1/Introductory Paragraph: The rationale for Mohs was explained to the patient and consent was obtained. The risks, benefits and alternatives to therapy were discussed in detail. Specifically, the risks of infection, scarring, bleeding, prolonged wound healing, incomplete removal, allergy to anesthesia, nerve injury and recurrence were addressed. Prior to the procedure, the treatment site was clearly identified and confirmed by the patient. All components of Universal Protocol/PAUSE Rule completed.
Consent 2/Introductory Paragraph: Mohs surgery was explained to the patient and consent was obtained. The risks, benefits and alternatives to therapy were discussed in detail. Specifically, the risks of infection, scarring, bleeding, prolonged wound healing, incomplete removal, allergy to anesthesia, nerve injury and recurrence were addressed. Prior to the procedure, the treatment site was clearly identified and confirmed by the patient. All components of Universal Protocol/PAUSE Rule completed.
Consent 3/Introductory Paragraph: I gave the patient a chance to ask questions they had about the procedure.  Following this I explained the Mohs procedure and consent was obtained. The risks, benefits and alternatives to therapy were discussed in detail. Specifically, the risks of infection, scarring, bleeding, prolonged wound healing, incomplete removal, allergy to anesthesia, nerve injury and recurrence were addressed. Prior to the procedure, the treatment site was clearly identified and confirmed by the patient. All components of Universal Protocol/PAUSE Rule completed.
Consent (Temporal Branch)/Introductory Paragraph: The rationale for Mohs was explained to the patient and consent was obtained. The risks, benefits and alternatives to therapy were discussed in detail. Specifically, the risks of damage to the temporal branch of the facial nerve, infection, scarring, bleeding, prolonged wound healing, incomplete removal, allergy to anesthesia, and recurrence were addressed. Prior to the procedure, the treatment site was clearly identified and confirmed by the patient. All components of Universal Protocol/PAUSE Rule completed.
Consent (Marginal Mandibular)/Introductory Paragraph: The rationale for Mohs was explained to the patient and consent was obtained. The risks, benefits and alternatives to therapy were discussed in detail. Specifically, the risks of damage to the marginal mandibular branch of the facial nerve, infection, scarring, bleeding, prolonged wound healing, incomplete removal, allergy to anesthesia, and recurrence were addressed. Prior to the procedure, the treatment site was clearly identified and confirmed by the patient. All components of Universal Protocol/PAUSE Rule completed.
Consent (Spinal Accessory)/Introductory Paragraph: The rationale for Mohs was explained to the patient and consent was obtained. The risks, benefits and alternatives to therapy were discussed in detail. Specifically, the risks of damage to the spinal accessory nerve, infection, scarring, bleeding, prolonged wound healing, incomplete removal, allergy to anesthesia, and recurrence were addressed. Prior to the procedure, the treatment site was clearly identified and confirmed by the patient. All components of Universal Protocol/PAUSE Rule completed.
Consent (Near Eyelid Margin)/Introductory Paragraph: The rationale for Mohs was explained to the patient and consent was obtained. The risks, benefits and alternatives to therapy were discussed in detail. Specifically, the risks of ectropion or eyelid deformity, infection, scarring, bleeding, prolonged wound healing, incomplete removal, allergy to anesthesia, nerve injury and recurrence were addressed. Prior to the procedure, the treatment site was clearly identified and confirmed by the patient. All components of Universal Protocol/PAUSE Rule completed.
Consent (Ear)/Introductory Paragraph: The rationale for Mohs was explained to the patient and consent was obtained. The risks, benefits and alternatives to therapy were discussed in detail. Specifically, the risks of ear deformity, infection, scarring, bleeding, prolonged wound healing, incomplete removal, allergy to anesthesia, nerve injury and recurrence were addressed. Prior to the procedure, the treatment site was clearly identified and confirmed by the patient. All components of Universal Protocol/PAUSE Rule completed.
Consent (Nose)/Introductory Paragraph: The rationale for Mohs was explained to the patient and consent was obtained. The risks, benefits and alternatives to therapy were discussed in detail. Specifically, the risks of nasal deformity, changes in the flow of air through the nose, infection, scarring, bleeding, prolonged wound healing, incomplete removal, allergy to anesthesia, nerve injury and recurrence were addressed. Prior to the procedure, the treatment site was clearly identified and confirmed by the patient. All components of Universal Protocol/PAUSE Rule completed.
Consent (Lip)/Introductory Paragraph: The rationale for Mohs was explained to the patient and consent was obtained. The risks, benefits and alternatives to therapy were discussed in detail. Specifically, the risks of lip deformity, changes in the oral aperture, infection, scarring, bleeding, prolonged wound healing, incomplete removal, allergy to anesthesia, nerve injury and recurrence were addressed. Prior to the procedure, the treatment site was clearly identified and confirmed by the patient. All components of Universal Protocol/PAUSE Rule completed.
Consent (Scalp)/Introductory Paragraph: The rationale for Mohs was explained to the patient and consent was obtained. The risks, benefits and alternatives to therapy were discussed in detail. Specifically, the risks of changes in hair growth pattern secondary to repair, infection, scarring, bleeding, prolonged wound healing, incomplete removal, allergy to anesthesia, nerve injury and recurrence were addressed. Prior to the procedure, the treatment site was clearly identified and confirmed by the patient. All components of Universal Protocol/PAUSE Rule completed.
Detail Level: Detailed
Postop Diagnosis: same
Anesthesia Type: 0.5% lidocaine with 1:200,000 epinephrine and a 1:10 solution of 8.4% sodium bicarbonate and 408mcg clindamycin/ml
Anesthesia Volume In Cc: 6
Hemostasis: Electrocautery
Estimated Blood Loss (Cc): less than 5 cc
Repair Anesthesia Method: local infiltration
Deep Sutures: 5-0 Vicryl
Epidermal Sutures: 5-0 Ethilon
Epidermal Closure: running cuticular
Suturegard Intro: Intraoperative tissue expansion was performed, utilizing the SUTUREGARD device, in order to reduce wound tension.
Suturegard Body: The suture ends were repeatedly re-tightened and re-clamped to achieve the desired tissue expansion.
Hemigard Intro: Due to skin fragility and wound tension, it was decided to use HEMIGARD adhesive retention suture devices to permit a linear closure. The skin was cleaned and dried for a 6cm distance away from the wound. Excessive hair, if present, was removed to allow for adhesion.
Hemigard Postcare Instructions: The HEMIGARD strips are to remain completely dry for at least 5-7 days.
Donor Site Anesthesia Type: same as repair anesthesia
Graft Basting Suture (Optional): 5-0 Fast Absorbing Gut
Graft Donor Site Epidermal Sutures (Optional): 5-0 Ethibond
Epidermal Closure Graft Donor Site (Optional): simple interrupted
Graft Donor Site Bandage (Optional-Leave Blank If You Don't Want In Note): Aquaphor and telefa placed on wound. Pressure dressing applied to donor site
Closure 2 Information: This tab is for additional flaps and grafts, including complex repair and grafts and complex repair and flaps. You can also specify a different location for the additional defect, if the location is the same you do not need to select a new one. We will insert the automated text for the repair you select below just as we do for solitary flaps and grafts. Please note that at this time if you select a location with a different insurance zone you will need to override the ICD10 and CPT if appropriate.
Closure 3 Information: This tab is for additional flaps and grafts above and beyond our usual structured repairs.  Please note if you enter information here it will not currently bill and you will need to add the billing information manually.
Wound Care: Aquaphor
Dressing: dry sterile dressing
Wound Care (No Sutures): Petrolatum
Suture Removal: 7 days
Unna Boot Text: An Unna boot was placed to help immobilize the limb and facilitate more rapid healing.
Home Suture Removal Text: Patient was provided instructions on removing sutures and will remove their sutures at home.  If they have any questions or difficulties they will call the office.
Post-Care Instructions: I reviewed with the patient in detail post-care instructions. Patient is not to engage in any heavy lifting, exercise, or swimming for the next 14 days. Should the patient develop any fevers, chills, bleeding, severe pain patient will contact the office immediately.
Pain Refusal Text: I offered to prescribe pain medication but the patient refused to take this medication.
Mauc Instructions: By selecting yes to the question below the MAUC number will be added into the note.  This will be calculated automatically based on the diagnosis chosen, the size entered, the body zone selected (H,M,L) and the specific indications you chose. You will also have the option to override the Mohs AUC if you disagree with the automatically calculated number and this option is found in the Case Summary tab.
Where Do You Want The Question To Include Opioid Counseling Located?: Case Summary Tab
Eye Protection Verbiage: Before proceeding with the stage, a plastic scleral shield was inserted. The globe was anesthetized with a few drops of 1% lidocaine with 1:100,000 epinephrine. Then, an appropriate sized scleral shield was chosen and coated with lacrilube ointment. The shield was gently inserted and left in place for the duration of each stage. After the stage was completed, the shield was gently removed.
Mohs Method Verbiage: An incision at a 45 degree angle following the standard Mohs approach was done and the specimen was harvested as a microscopic controlled layer.
Surgeon/Pathologist Verbiage (Will Incorporate Name Of Surgeon From Intro If Not Blank): operated in two distinct and integrated capacities as the surgeon and pathologist.
Mohs Histo Method Verbiage: Each section was then chromacoded and processed in the Mohs lab using the Mohs protocol and submitted for frozen section.
Subsequent Stages Histo Method Verbiage: Using a similar technique to that described above, a thin layer of tissue was removed from all areas where tumor was visible on the previous stage.  The tissue was again oriented, mapped, dyed, and processed as above.
Mohs Rapid Report Verbiage: The area of clinically evident tumor was marked with skin marking ink and appropriately hatched.  The initial incision was made following the Mohs approach through the skin.  The specimen was taken to the lab, divided into the necessary number of pieces, chromacoded and processed according to the Mohs protocol.  This was repeated in successive stages until a tumor free defect was achieved.
Complex Repair Preamble Text (Leave Blank If You Do Not Want): Extensive wide undermining was performed at least 2 cm in all directions.
Intermediate Repair Preamble Text (Leave Blank If You Do Not Want): Undermining was performed with blunt dissection.
M-Plasty Complex Repair Preamble Text (Leave Blank If You Do Not Want): Extensive wide undermining was performed.
Non-Graft Cartilage Fenestration Text: The cartilage was fenestrated with a 2mm punch biopsy to help facilitate healing.
Graft Cartilage Fenestration Text: The cartilage was fenestrated with a 2mm punch biopsy to help facilitate graft survival and healing.
Secondary Intention Text (Leave Blank If You Do Not Want): The defect will heal with secondary intention.
No Repair - Repaired With Adjacent Surgical Defect Text (Leave Blank If You Do Not Want): After obtaining clear surgical margins the defect was repaired concurrently with another surgical defect which was in close approximation.
Advancement Flap (Single) Text: The defect edges were debeveled with a #15 scalpel blade.  Given the location of the defect and the proximity to free margins a single advancement flap was deemed most appropriate.  Using a sterile surgical marker, an appropriate advancement flap was drawn incorporating the defect and placing the expected incisions within the relaxed skin tension lines where possible.    The area thus outlined was incised deep to adipose tissue with a #15 scalpel blade.  The skin margins were undermined to an appropriate distance in all directions utilizing iris scissors.
Advancement Flap (Double) Text: The defect edges were debeveled with a #15 scalpel blade.  Given the location of the defect and the proximity to free margins a double advancement flap was deemed most appropriate.  Using a sterile surgical marker, the appropriate advancement flaps were drawn incorporating the defect and placing the expected incisions within the relaxed skin tension lines where possible.    The area thus outlined was incised deep to adipose tissue with a #15 scalpel blade.  The skin margins were undermined to an appropriate distance in all directions utilizing iris scissors.
Burow's Advancement Flap Text: The defect edges were debeveled with a #15 scalpel blade.  Given the location of the defect and the proximity to free margins a Burow's advancement flap was deemed most appropriate.  Using a sterile surgical marker, the appropriate advancement flap was drawn incorporating the defect and placing the expected incisions within the relaxed skin tension lines where possible.    The area thus outlined was incised deep to adipose tissue with a #15 scalpel blade.  The skin margins were undermined to an appropriate distance in all directions utilizing iris scissors.
Chonodrocutaneous Helical Advancement Flap Text: The defect edges were debeveled with a #15 scalpel blade.  Given the location of the defect and the proximity to free margins a chondrocutaneous helical advancement flap was deemed most appropriate.  Using a sterile surgical marker, the appropriate advancement flap was drawn incorporating the defect and placing the expected incisions within the relaxed skin tension lines where possible.    The area thus outlined was incised deep to adipose tissue with a #15 scalpel blade.  The skin margins were undermined to an appropriate distance in all directions utilizing iris scissors.
Crescentic Advancement Flap Text: The defect edges were debeveled with a #15 scalpel blade.  Given the location of the defect and the proximity to free margins a crescentic advancement flap was deemed most appropriate.  Using a sterile surgical marker, the appropriate advancement flap was drawn incorporating the defect and placing the expected incisions within the relaxed skin tension lines where possible.    The area thus outlined was incised deep to adipose tissue with a #15 scalpel blade.  The skin margins were undermined to an appropriate distance in all directions utilizing iris scissors.
A-T Advancement Flap Text: The defect edges were debeveled with a #15 scalpel blade.  Given the location of the defect, shape of the defect and the proximity to free margins an A-T advancement flap was deemed most appropriate.  Using a sterile surgical marker, an appropriate advancement flap was drawn incorporating the defect and placing the expected incisions within the relaxed skin tension lines where possible.    The area thus outlined was incised deep to adipose tissue with a #15 scalpel blade.  The skin margins were undermined to an appropriate distance in all directions utilizing iris scissors.
O-T Advancement Flap Text: The defect edges were debeveled with a #15 scalpel blade.  Given the location of the defect, shape of the defect and the proximity to free margins an O-T advancement flap was deemed most appropriate.  Using a sterile surgical marker, an appropriate advancement flap was drawn incorporating the defect and placing the expected incisions within the relaxed skin tension lines where possible.    The area thus outlined was incised deep to adipose tissue with a #15 scalpel blade.  The skin margins were undermined to an appropriate distance in all directions utilizing iris scissors.
O-L Flap Text: The defect edges were debeveled with a #15 scalpel blade.  Given the location of the defect, shape of the defect and the proximity to free margins an O-L flap was deemed most appropriate.  Using a sterile surgical marker, an appropriate advancement flap was drawn incorporating the defect and placing the expected incisions within the relaxed skin tension lines where possible.    The area thus outlined was incised deep to adipose tissue with a #15 scalpel blade.  The skin margins were undermined to an appropriate distance in all directions utilizing iris scissors.
O-Z Flap Text: The defect edges were debeveled with a #15 scalpel blade.  Given the location of the defect, shape of the defect and the proximity to free margins an O-Z flap was deemed most appropriate.  Using a sterile surgical marker, an appropriate transposition flap was drawn incorporating the defect and placing the expected incisions within the relaxed skin tension lines where possible. The area thus outlined was incised deep to adipose tissue with a #15 scalpel blade.  The skin margins were undermined to an appropriate distance in all directions utilizing iris scissors.
Double O-Z Flap Text: The defect edges were debeveled with a #15 scalpel blade.  Given the location of the defect, shape of the defect and the proximity to free margins a Double O-Z flap was deemed most appropriate.  Using a sterile surgical marker, an appropriate transposition flap was drawn incorporating the defect and placing the expected incisions within the relaxed skin tension lines where possible. The area thus outlined was incised deep to adipose tissue with a #15 scalpel blade.  The skin margins were undermined to an appropriate distance in all directions utilizing iris scissors.
V-Y Flap Text: The defect edges were debeveled with a #15 scalpel blade.  Given the location of the defect, shape of the defect and the proximity to free margins a V-Y flap was deemed most appropriate.  Using a sterile surgical marker, an appropriate advancement flap was drawn incorporating the defect and placing the expected incisions within the relaxed skin tension lines where possible.    The area thus outlined was incised deep to adipose tissue with a #15 scalpel blade.  The skin margins were undermined to an appropriate distance in all directions utilizing iris scissors.
Advancement-Rotation Flap Text: The defect edges were debeveled with a #15 scalpel blade.  Given the location of the defect, shape of the defect and the proximity to free margins an advancement-rotation flap was deemed most appropriate.  Using a sterile surgical marker, an appropriate flap was drawn incorporating the defect and placing the expected incisions within the relaxed skin tension lines where possible. The area thus outlined was incised deep to adipose tissue with a #15 scalpel blade.  The skin margins were undermined to an appropriate distance in all directions utilizing iris scissors.
Mercedes Flap Text: The defect edges were debeveled with a #15 scalpel blade.  Given the location of the defect, shape of the defect and the proximity to free margins a Mercedes flap was deemed most appropriate.  Using a sterile surgical marker, an appropriate advancement flap was drawn incorporating the defect and placing the expected incisions within the relaxed skin tension lines where possible. The area thus outlined was incised deep to adipose tissue with a #15 scalpel blade.  The skin margins were undermined to an appropriate distance in all directions utilizing iris scissors.
Modified Advancement Flap Text: The defect edges were debeveled with a #15 scalpel blade.  Given the location of the defect, shape of the defect and the proximity to free margins a modified advancement flap was deemed most appropriate.  Using a sterile surgical marker, an appropriate advancement flap was drawn incorporating the defect and placing the expected incisions within the relaxed skin tension lines where possible.    The area thus outlined was incised deep to adipose tissue with a #15 scalpel blade.  The skin margins were undermined to an appropriate distance in all directions utilizing iris scissors.
Mucosal Advancement Flap Text: Given the location of the defect, shape of the defect and the proximity to free margins a mucosal advancement flap was deemed most appropriate. Incisions were made with a 15 blade scalpel in the appropriate fashion along the cutaneous vermilion border and the mucosal lip. The remaining actinically damaged mucosal tissue was excised.  The mucosal advancement flap was then elevated to the gingival sulcus with care taken to preserve the neurovascular structures and advanced into the primary defect. Care was taken to ensure that precise realignment of the vermilion border was achieved.
Peng Advancement Flap Text: The defect edges were debeveled with a #15 scalpel blade.  Given the location of the defect, shape of the defect and the proximity to free margins a Peng advancement flap was deemed most appropriate.  Using a sterile surgical marker, an appropriate advancement flap was drawn incorporating the defect and placing the expected incisions within the relaxed skin tension lines where possible. The area thus outlined was incised deep to adipose tissue with a #15 scalpel blade.  The skin margins were undermined to an appropriate distance in all directions utilizing iris scissors.
Hatchet Flap Text: The defect edges were debeveled with a #15 scalpel blade.  Given the location of the defect, shape of the defect and the proximity to free margins a hatchet flap based from the glabella was deemed most appropriate.  Using a sterile surgical marker, an appropriate glabellar hatchet flap was drawn incorporating the defect and placing the expected incisions within the relaxed skin tension lines where possible.    The area thus outlined was incised deep to adipose tissue with a #15 scalpel blade.  The skin margins were undermined to an appropriate distance in all directions utilizing iris scissors.
Rotation Flap Text: The defect edges were debeveled with a #15 scalpel blade.  Given the location of the defect, shape of the defect and the proximity to free margins a rotation flap was deemed most appropriate.  Using a sterile surgical marker, an appropriate rotation flap was drawn incorporating the defect and placing the expected incisions within the relaxed skin tension lines where possible.    The area thus outlined was incised deep to adipose tissue with a #15 scalpel blade.  The skin margins were undermined to an appropriate distance in all directions utilizing iris scissors.
Spiral Flap Text: The defect edges were debeveled with a #15 scalpel blade.  Given the location of the defect, shape of the defect and the proximity to free margins a spiral flap was deemed most appropriate.  Using a sterile surgical marker, an appropriate rotation flap was drawn incorporating the defect and placing the expected incisions within the relaxed skin tension lines where possible. The area thus outlined was incised deep to adipose tissue with a #15 scalpel blade.  The skin margins were undermined to an appropriate distance in all directions utilizing iris scissors.
Star Wedge Flap Text: The defect edges were debeveled with a #15 scalpel blade.  Given the location of the defect, shape of the defect and the proximity to free margins a star wedge flap was deemed most appropriate.  Using a sterile surgical marker, an appropriate rotation flap was drawn incorporating the defect and placing the expected incisions within the relaxed skin tension lines where possible. The area thus outlined was incised deep to adipose tissue with a #15 scalpel blade.  The skin margins were undermined to an appropriate distance in all directions utilizing iris scissors.
Transposition Flap Text: The defect edges were debeveled with a #15 scalpel blade.  Given the location of the defect and the proximity to free margins a transposition flap was deemed most appropriate.  Using a sterile surgical marker, an appropriate transposition flap was drawn incorporating the defect.    The area thus outlined was incised deep to adipose tissue with a #15 scalpel blade.  The skin margins were undermined to an appropriate distance in all directions utilizing iris scissors.
Muscle Hinge Flap Text: The defect edges were debeveled with a #15 scalpel blade.  Given the size, depth and location of the defect and the proximity to free margins a muscle hinge flap was deemed most appropriate.  Using a sterile surgical marker, an appropriate hinge flap was drawn incorporating the defect. The area thus outlined was incised with a #15 scalpel blade.  The skin margins were undermined to an appropriate distance in all directions utilizing iris scissors.
Melolabial Transposition Flap Text: The defect edges were debeveled with a #15 scalpel blade.  Given the location of the defect and the proximity to free margins a melolabial flap was deemed most appropriate.  Using a sterile surgical marker, an appropriate melolabial transposition flap was drawn incorporating the defect.    The area thus outlined was incised deep to adipose tissue with a #15 scalpel blade.  The skin margins were undermined to an appropriate distance in all directions utilizing iris scissors.
Rhombic Flap Text: The defect edges were debeveled with a #15 scalpel blade.  Given the location of the defect and the proximity to free margins a rhombic flap was deemed most appropriate.  Using a sterile surgical marker, an appropriate rhombic flap was drawn incorporating the defect.    The area thus outlined was incised deep to adipose tissue with a #15 scalpel blade.  The skin margins were undermined to an appropriate distance in all directions utilizing iris scissors.
Rhomboid Transposition Flap Text: The defect edges were debeveled with a #15 scalpel blade.  Given the location of the defect and the proximity to free margins a rhomboid transposition flap was deemed most appropriate.  Using a sterile surgical marker, an appropriate rhomboid flap was drawn incorporating the defect.    The area thus outlined was incised deep to adipose tissue with a #15 scalpel blade.  The skin margins were undermined to an appropriate distance in all directions utilizing iris scissors.
Bi-Rhombic Flap Text: The defect edges were debeveled with a #15 scalpel blade.  Given the location of the defect and the proximity to free margins a bi-rhombic flap was deemed most appropriate.  Using a sterile surgical marker, an appropriate rhombic flap was drawn incorporating the defect. The area thus outlined was incised deep to adipose tissue with a #15 scalpel blade.  The skin margins were undermined to an appropriate distance in all directions utilizing iris scissors.
Helical Rim Advancement Flap Text: The defect edges were debeveled with a #15 blade scalpel.  Given the location of the defect and the proximity to free margins (helical rim) a double helical rim advancement flap was deemed most appropriate.  Using a sterile surgical marker, the appropriate advancement flaps were drawn incorporating the defect and placing the expected incisions between the helical rim and antihelix where possible.  The area thus outlined was incised through and through with a #15 scalpel blade.  With a skin hook and iris scissors, the flaps were gently and sharply undermined and freed up.
Bilateral Helical Rim Advancement Flap Text: The defect edges were debeveled with a #15 blade scalpel.  Given the location of the defect and the proximity to free margins (helical rim) a bilateral helical rim advancement flap was deemed most appropriate.  Using a sterile surgical marker, the appropriate advancement flaps were drawn incorporating the defect and placing the expected incisions between the helical rim and antihelix where possible.  The area thus outlined was incised through and through with a #15 scalpel blade.  With a skin hook and iris scissors, the flaps were gently and sharply undermined and freed up.
Ear Star Wedge Flap Text: The defect edges were debeveled with a #15 blade scalpel.  Given the location of the defect and the proximity to free margins (helical rim) an ear star wedge flap was deemed most appropriate.  Using a sterile surgical marker, the appropriate flap was drawn incorporating the defect and placing the expected incisions between the helical rim and antihelix where possible.  The area thus outlined was incised through and through with a #15 scalpel blade.
Banner Transposition Flap Text: The defect edges were debeveled with a #15 scalpel blade.  Given the location of the defect and the proximity to free margins a Banner transposition flap was deemed most appropriate.  Using a sterile surgical marker, an appropriate flap drawn around the defect. The area thus outlined was incised deep to adipose tissue with a #15 scalpel blade.  The skin margins were undermined to an appropriate distance in all directions utilizing iris scissors.
Bilobed Flap Text: The defect edges were debeveled with a #15 scalpel blade.  Given the location of the defect and the proximity to free margins a bilobe flap was deemed most appropriate.  Using a sterile surgical marker, an appropriate bilobe flap drawn around the defect.    The area thus outlined was incised deep to adipose tissue with a #15 scalpel blade.  The skin margins were undermined to an appropriate distance in all directions utilizing iris scissors.
Bilobed Transposition Flap Text: The defect edges were debeveled with a #15 scalpel blade.  Given the location of the defect and the proximity to free margins a bilobed transposition flap was deemed most appropriate.  Using a sterile surgical marker, an appropriate bilobe flap drawn around the defect.    The area thus outlined was incised deep to adipose tissue with a #15 scalpel blade.  The skin margins were undermined to an appropriate distance in all directions utilizing iris scissors.
Trilobed Flap Text: The defect edges were debeveled with a #15 scalpel blade.  Given the location of the defect and the proximity to free margins a trilobed flap was deemed most appropriate.  Using a sterile surgical marker, an appropriate trilobed flap drawn around the defect.    The area thus outlined was incised deep to adipose tissue with a #15 scalpel blade.  The skin margins were undermined to an appropriate distance in all directions utilizing iris scissors.
Dorsal Nasal Flap Text: The defect edges were debeveled with a #15 scalpel blade.  Given the location of the defect and the proximity to free margins a dorsal nasal flap,based upon the glabellar folds, was deemed most appropriate.  Using a sterile surgical marker, an appropriate dorsal nasal flap was drawn around the defect.    The area thus outlined was incised deep to adipose tissue with a #15 scalpel blade.  The skin margins were undermined to an appropriate distance in all directions utilizing iris scissors.
Island Pedicle Flap Text: The defect edges were debeveled with a #15 scalpel blade.  Given the location of the defect, shape of the defect and the proximity to free margins an island pedicle advancement flap was deemed most appropriate.  Using a sterile surgical marker, an appropriate advancement flap was drawn incorporating the defect, outlining the appropriate donor tissue and placing the expected incisions within the relaxed skin tension lines where possible.    The area thus outlined was incised deep to adipose tissue with a #15 scalpel blade.  The skin margins were undermined to an appropriate distance in all directions around the primary defect and laterally outward around the island pedicle utilizing iris scissors.  There was minimal undermining beneath the pedicle flap.
Island Pedicle Flap With Canthal Suspension Text: The defect edges were debeveled with a #15 scalpel blade.  Given the location of the defect, shape of the defect and the proximity to free margins an island pedicle advancement flap was deemed most appropriate.  Using a sterile surgical marker, an appropriate advancement flap was drawn incorporating the defect, outlining the appropriate donor tissue and placing the expected incisions within the relaxed skin tension lines where possible. The area thus outlined was incised deep to adipose tissue with a #15 scalpel blade.  The skin margins were undermined to an appropriate distance in all directions around the primary defect and laterally outward around the island pedicle utilizing iris scissors.  There was minimal undermining beneath the pedicle flap. A suspension suture was placed in the canthal tendon to prevent tension and prevent ectropion.
Alar Island Pedicle Flap Text: The defect edges were debeveled with a #15 scalpel blade.  Given the location of the defect, shape of the defect and the proximity to the alar rim an island pedicle advancement flap was deemed most appropriate.  Using a sterile surgical marker, an appropriate advancement flap was drawn incorporating the defect, outlining the appropriate donor tissue and placing the expected incisions within the nasal ala running parallel to the alar rim. The area thus outlined was incised with a #15 scalpel blade.  The skin margins were undermined minimally to an appropriate distance in all directions around the primary defect and laterally outward around the island pedicle utilizing iris scissors.  There was minimal undermining beneath the pedicle flap.
Double Island Pedicle Flap Text: The defect edges were debeveled with a #15 scalpel blade.  Given the location of the defect, shape of the defect and the proximity to free margins a double island pedicle advancement flap was deemed most appropriate.  Using a sterile surgical marker, an appropriate advancement flap was drawn incorporating the defect, outlining the appropriate donor tissue and placing the expected incisions within the relaxed skin tension lines where possible.    The area thus outlined was incised deep to adipose tissue with a #15 scalpel blade.  The skin margins were undermined to an appropriate distance in all directions around the primary defect and laterally outward around the island pedicle utilizing iris scissors.  There was minimal undermining beneath the pedicle flap.
Island Pedicle Flap-Requiring Vessel Identification Text: The defect edges were debeveled with a #15 scalpel blade.  Given the location of the defect, shape of the defect and the proximity to free margins an island pedicle advancement flap was deemed most appropriate.  Using a sterile surgical marker, an appropriate advancement flap was drawn, based on the axial vessel mentioned above, incorporating the defect, outlining the appropriate donor tissue and placing the expected incisions within the relaxed skin tension lines where possible.    The area thus outlined was incised deep to adipose tissue with a #15 scalpel blade.  The skin margins were undermined to an appropriate distance in all directions around the primary defect and laterally outward around the island pedicle utilizing iris scissors.  There was minimal undermining beneath the pedicle flap.
Keystone Flap Text: The defect edges were debeveled with a #15 scalpel blade.  Given the location of the defect, shape of the defect a keystone flap was deemed most appropriate.  Using a sterile surgical marker, an appropriate keystone flap was drawn incorporating the defect, outlining the appropriate donor tissue and placing the expected incisions within the relaxed skin tension lines where possible. The area thus outlined was incised deep to adipose tissue with a #15 scalpel blade.  The skin margins were undermined to an appropriate distance in all directions around the primary defect and laterally outward around the flap utilizing iris scissors.
O-T Plasty Text: The defect edges were debeveled with a #15 scalpel blade.  Given the location of the defect, shape of the defect and the proximity to free margins an O-T plasty was deemed most appropriate.  Using a sterile surgical marker, an appropriate O-T plasty was drawn incorporating the defect and placing the expected incisions within the relaxed skin tension lines where possible.    The area thus outlined was incised deep to adipose tissue with a #15 scalpel blade.  The skin margins were undermined to an appropriate distance in all directions utilizing iris scissors.
O-Z Plasty Text: The defect edges were debeveled with a #15 scalpel blade.  Given the location of the defect, shape of the defect and the proximity to free margins an O-Z plasty (double transposition flap) was deemed most appropriate.  Using a sterile surgical marker, the appropriate transposition flaps were drawn incorporating the defect and placing the expected incisions within the relaxed skin tension lines where possible.    The area thus outlined was incised deep to adipose tissue with a #15 scalpel blade.  The skin margins were undermined to an appropriate distance in all directions utilizing iris scissors.  Hemostasis was achieved with electrocautery.  The flaps were then transposed into place, one clockwise and the other counterclockwise, and anchored with interrupted buried subcutaneous sutures.
Double O-Z Plasty Text: The defect edges were debeveled with a #15 scalpel blade.  Given the location of the defect, shape of the defect and the proximity to free margins a Double O-Z plasty (double transposition flap) was deemed most appropriate.  Using a sterile surgical marker, the appropriate transposition flaps were drawn incorporating the defect and placing the expected incisions within the relaxed skin tension lines where possible. The area thus outlined was incised deep to adipose tissue with a #15 scalpel blade.  The skin margins were undermined to an appropriate distance in all directions utilizing iris scissors.  Hemostasis was achieved with electrocautery.  The flaps were then transposed into place, one clockwise and the other counterclockwise, and anchored with interrupted buried subcutaneous sutures.
V-Y Plasty Text: The defect edges were debeveled with a #15 scalpel blade.  Given the location of the defect, shape of the defect and the proximity to free margins an V-Y advancement flap was deemed most appropriate.  Using a sterile surgical marker, an appropriate advancement flap was drawn incorporating the defect and placing the expected incisions within the relaxed skin tension lines where possible.    The area thus outlined was incised deep to adipose tissue with a #15 scalpel blade.  The skin margins were undermined to an appropriate distance in all directions utilizing iris scissors.
H Plasty Text: Given the location of the defect, shape of the defect and the proximity to free margins a H-plasty was deemed most appropriate for repair.  Using a sterile surgical marker, the appropriate advancement arms of the H-plasty were drawn incorporating the defect and placing the expected incisions within the relaxed skin tension lines where possible. The area thus outlined was incised deep to adipose tissue with a #15 scalpel blade. The skin margins were undermined to an appropriate distance in all directions utilizing iris scissors.  The opposing advancement arms were then advanced into place in opposite direction and anchored with interrupted buried subcutaneous sutures.
W Plasty Text: The lesion was extirpated to the level of the fat with a #15 scalpel blade.  Given the location of the defect, shape of the defect and the proximity to free margins a W-plasty was deemed most appropriate for repair.  Using a sterile surgical marker, the appropriate transposition arms of the W-plasty were drawn incorporating the defect and placing the expected incisions within the relaxed skin tension lines where possible.    The area thus outlined was incised deep to adipose tissue with a #15 scalpel blade.  The skin margins were undermined to an appropriate distance in all directions utilizing iris scissors.  The opposing transposition arms were then transposed into place in opposite direction and anchored with interrupted buried subcutaneous sutures.
Z Plasty Text: The lesion was extirpated to the level of the fat with a #15 scalpel blade.  Given the location of the defect, shape of the defect and the proximity to free margins a Z-plasty was deemed most appropriate for repair.  Using a sterile surgical marker, the appropriate transposition arms of the Z-plasty were drawn incorporating the defect and placing the expected incisions within the relaxed skin tension lines where possible.    The area thus outlined was incised deep to adipose tissue with a #15 scalpel blade.  The skin margins were undermined to an appropriate distance in all directions utilizing iris scissors.  The opposing transposition arms were then transposed into place in opposite direction and anchored with interrupted buried subcutaneous sutures.
Cheek Interpolation Flap Text: A decision was made to reconstruct the defect utilizing an interpolation axial flap and a staged reconstruction.  A telfa template was made of the defect.  This telfa template was then used to outline the Cheek Interpolation flap.  The donor area for the pedicle flap was then injected with anesthesia.  The flap was excised through the skin and subcutaneous tissue down to the layer of the underlying musculature.  The interpolation flap was carefully excised within this deep plane to maintain its blood supply.  The edges of the donor site were undermined.   The donor site was closed in a primary fashion.  The pedicle was then rotated into position and sutured.  Once the tube was sutured into place, adequate blood supply was confirmed with blanching and refill.  The pedicle was then wrapped with xeroform gauze and dressed appropriately with a telfa and gauze bandage to ensure continued blood supply and protect the attached pedicle.
Cheek-To-Nose Interpolation Flap Text: A decision was made to reconstruct the defect utilizing an interpolation axial flap and a staged reconstruction.  A telfa template was made of the defect.  This telfa template was then used to outline the Cheek-To-Nose Interpolation flap.  The donor area for the pedicle flap was then injected with anesthesia.  The flap was excised through the skin and subcutaneous tissue down to the layer of the underlying musculature.  The interpolation flap was carefully excised within this deep plane to maintain its blood supply.  The edges of the donor site were undermined.   The donor site was closed in a primary fashion.  The pedicle was then rotated into position and sutured.  Once the tube was sutured into place, adequate blood supply was confirmed with blanching and refill.  The pedicle was then wrapped with xeroform gauze and dressed appropriately with a telfa and gauze bandage to ensure continued blood supply and protect the attached pedicle.
Interpolation Flap Text: A decision was made to reconstruct the defect utilizing an interpolation axial flap and a staged reconstruction.  A telfa template was made of the defect.  This telfa template was then used to outline the interpolation flap.  The donor area for the pedicle flap was then injected with anesthesia.  The flap was excised through the skin and subcutaneous tissue down to the layer of the underlying musculature.  The interpolation flap was carefully excised within this deep plane to maintain its blood supply.  The edges of the donor site were undermined.   The donor site was closed in a primary fashion.  The pedicle was then rotated into position and sutured.  Once the tube was sutured into place, adequate blood supply was confirmed with blanching and refill.  The pedicle was then wrapped with xeroform gauze and dressed appropriately with a telfa and gauze bandage to ensure continued blood supply and protect the attached pedicle.
Melolabial Interpolation Flap Text: A decision was made to reconstruct the defect utilizing an interpolation axial flap and a staged reconstruction.  A telfa template was made of the defect.  This telfa template was then used to outline the melolabial interpolation flap.  The donor area for the pedicle flap was then injected with anesthesia.  The flap was excised through the skin and subcutaneous tissue down to the layer of the underlying musculature.  The pedicle flap was carefully excised within this deep plane to maintain its blood supply.  The edges of the donor site were undermined.   The donor site was closed in a primary fashion.  The pedicle was then rotated into position and sutured.  Once the tube was sutured into place, adequate blood supply was confirmed with blanching and refill.  The pedicle was then wrapped with xeroform gauze and dressed appropriately with a telfa and gauze bandage to ensure continued blood supply and protect the attached pedicle.
Mastoid Interpolation Flap Text: A decision was made to reconstruct the defect utilizing an interpolation axial flap and a staged reconstruction.  A telfa template was made of the defect.  This telfa template was then used to outline the mastoid interpolation flap.  The donor area for the pedicle flap was then injected with anesthesia.  The flap was excised through the skin and subcutaneous tissue down to the layer of the underlying musculature.  The pedicle flap was carefully excised within this deep plane to maintain its blood supply.  The edges of the donor site were undermined.   The donor site was closed in a primary fashion.  The pedicle was then rotated into position and sutured.  Once the tube was sutured into place, adequate blood supply was confirmed with blanching and refill.  The pedicle was then wrapped with xeroform gauze and dressed appropriately with a telfa and gauze bandage to ensure continued blood supply and protect the attached pedicle.
Posterior Auricular Interpolation Flap Text: A decision was made to reconstruct the defect utilizing an interpolation axial flap and a staged reconstruction.  A telfa template was made of the defect.  This telfa template was then used to outline the posterior auricular interpolation flap.  The donor area for the pedicle flap was then injected with anesthesia.  The flap was excised through the skin and subcutaneous tissue down to the layer of the underlying musculature.  The pedicle flap was carefully excised within this deep plane to maintain its blood supply.  The edges of the donor site were undermined.   The donor site was closed in a primary fashion.  The pedicle was then rotated into position and sutured.  Once the tube was sutured into place, adequate blood supply was confirmed with blanching and refill.  The pedicle was then wrapped with xeroform gauze and dressed appropriately with a telfa and gauze bandage to ensure continued blood supply and protect the attached pedicle.
Paramedian Forehead Flap Text: A decision was made to reconstruct the defect utilizing an interpolation axial flap and a staged reconstruction.  A telfa template was made of the defect.  This telfa template was then used to outline the paramedian forehead pedicle flap.  The donor area for the pedicle flap was then injected with anesthesia.  The flap was excised through the skin and subcutaneous tissue down to the layer of the underlying musculature.  The pedicle flap was carefully excised within this deep plane to maintain its blood supply.  The edges of the donor site were undermined.   The donor site was closed in a primary fashion.  The pedicle was then rotated into position and sutured.  Once the tube was sutured into place, adequate blood supply was confirmed with blanching and refill.  The pedicle was then wrapped with xeroform gauze and dressed appropriately with a telfa and gauze bandage to ensure continued blood supply and protect the attached pedicle.
Cheiloplasty (Less Than 50%) Text: A decision was made to reconstruct the defect with a  cheiloplasty.  The defect was undermined extensively.  Additional obicularis oris muscle was excised with a 15 blade scalpel.  The defect was converted into a full thickness wedge, of less than 50% of the vertical height of the lip, to facilite a better cosmetic result.  Small vessels were then tied off with 5-0 monocyrl. The obicularis oris, superficial fascia, adipose and dermis were then reapproximated.  After the deeper layers were approximated the epidermis was reapproximated with particular care given to realign the vermilion border.
Cheiloplasty (Complex) Text: A decision was made to reconstruct the defect with a  cheiloplasty.  The defect was undermined extensively.  Additional obicularis oris muscle was excised with a 15 blade scalpel.  The defect was converted into a full thickness wedge to facilite a better cosmetic result.  Small vessels were then tied off with 5-0 monocyrl. The obicularis oris, superficial fascia, adipose and dermis were then reapproximated.  After the deeper layers were approximated the epidermis was reapproximated with particular care given to realign the vermilion border.
Ear Wedge Repair Text: A wedge excision was completed by carrying down an excision through the full thickness of the ear and cartilage with an inward facing Burow's triangle. The wound was then closed in a layered fashion.
Full Thickness Lip Wedge Repair (Flap) Text: Given the location of the defect and the proximity to free margins a full thickness wedge repair was deemed most appropriate.  Using a sterile surgical marker, the appropriate repair was drawn incorporating the defect and placing the expected incisions perpendicular to the vermilion border.  The vermilion border was also meticulously outlined to ensure appropriate reapproximation during the repair.  The area thus outlined was incised through and through with a #15 scalpel blade.  The muscularis and dermis were reaproximated with deep sutures following hemostasis. Care was taken to realign the vermilion border before proceeding with the superficial closure.  Once the vermilion was realigned the superfical and mucosal closure was finished.
Ftsg Text: The defect edges were debeveled with a #15 scalpel blade.  Given the location of the defect, shape of the defect and the proximity to free margins a full thickness skin graft was deemed most appropriate.  Using a sterile surgical marker, the primary defect shape was transferred to the donor site. The area thus outlined was incised deep to adipose tissue with a #15 scalpel blade.  The harvested graft was then trimmed of adipose tissue until only dermis and epidermis was left.  The skin margins of the secondary defect were undermined to an appropriate distance in all directions utilizing iris scissors.  The secondary defect was closed with interrupted buried subcutaneous sutures.  The skin edges were then re-apposed with running  sutures.  The skin graft was then placed in the primary defect and oriented appropriately.
Split-Thickness Skin Graft Text: The defect edges were debeveled with a #15 scalpel blade.  Given the location of the defect, shape of the defect and the proximity to free margins a split thickness skin graft was deemed most appropriate.  Using a sterile surgical marker, the primary defect shape was transferred to the donor site. The split thickness graft was then harvested.  The skin graft was then placed in the primary defect and oriented appropriately.
Burow's Graft Text: The defect edges were debeveled with a #15 scalpel blade.  Given the location of the defect, shape of the defect, the proximity to free margins and the presence of a standing cone deformity a Burow's skin graft was deemed most appropriate. The standing cone was removed and this tissue was then trimmed to the shape of the primary defect. The adipose tissue was also removed until only dermis and epidermis were left.  The skin margins of the secondary defect were undermined to an appropriate distance in all directions utilizing iris scissors.  The secondary defect was closed with interrupted buried subcutaneous sutures.  The skin edges were then re-apposed with running  sutures.  The skin graft was then placed in the primary defect and oriented appropriately.
Cartilage Graft Text: The defect edges were debeveled with a #15 scalpel blade.  Given the location of the defect, shape of the defect, the fact the defect involved a full thickness cartilage defect a cartilage graft was deemed most appropriate.  An appropriate donor site was identified, cleansed, and anesthetized. The cartilage graft was then harvested and transferred to the recipient site, oriented appropriately and then sutured into place.  The secondary defect was then repaired using a primary closure.
Composite Graft Text: The defect edges were debeveled with a #15 scalpel blade.  Given the location of the defect, shape of the defect, the proximity to free margins and the fact the defect was full thickness a composite graft was deemed most appropriate.  The defect was outline and then transferred to the donor site.  A full thickness graft was then excised from the donor site. The graft was then placed in the primary defect, oriented appropriately and then sutured into place.  The secondary defect was then repaired using a primary closure.
Epidermal Autograft Text: The defect edges were debeveled with a #15 scalpel blade.  Given the location of the defect, shape of the defect and the proximity to free margins an epidermal autograft was deemed most appropriate.  Using a sterile surgical marker, the primary defect shape was transferred to the donor site. The epidermal graft was then harvested.  The skin graft was then placed in the primary defect and oriented appropriately.
Dermal Autograft Text: The defect edges were debeveled with a #15 scalpel blade.  Given the location of the defect, shape of the defect and the proximity to free margins a dermal autograft was deemed most appropriate.  Using a sterile surgical marker, the primary defect shape was transferred to the donor site. The area thus outlined was incised deep to adipose tissue with a #15 scalpel blade.  The harvested graft was then trimmed of adipose and epidermal tissue until only dermis was left.  The skin graft was then placed in the primary defect and oriented appropriately.
Skin Substitute Text: The defect edges were debeveled with a #15 scalpel blade.  Given the location of the defect, shape of the defect and the proximity to free margins a skin substitute graft was deemed most appropriate.  The graft material was trimmed to fit the size of the defect. The graft was then placed in the primary defect and oriented appropriately.
Tissue Cultured Epidermal Autograft Text: The defect edges were debeveled with a #15 scalpel blade.  Given the location of the defect, shape of the defect and the proximity to free margins a tissue cultured epidermal autograft was deemed most appropriate.  The graft was then trimmed to fit the size of the defect.  The graft was then placed in the primary defect and oriented appropriately.
Xenograft Text: The defect edges were debeveled with a #15 scalpel blade.  Given the location of the defect, shape of the defect and the proximity to free margins a xenograft was deemed most appropriate.  The graft was then trimmed to fit the size of the defect.  The graft was then placed in the primary defect and oriented appropriately.
Purse String (Simple) Text: Given the location of the defect and the characteristics of the surrounding skin a purse string closure was deemed most appropriate.  Undermining was performed circumfirentially around the surgical defect.  A purse string suture was then placed and tightened.
Purse String (Intermediate) Text: Given the location of the defect and the characteristics of the surrounding skin a purse string intermediate closure was deemed most appropriate.  Undermining was performed circumfirentially around the surgical defect.  A purse string suture was then placed and tightened.
Partial Purse String (Simple) Text: Given the location of the defect and the characteristics of the surrounding skin a simple purse string closure was deemed most appropriate.  Undermining was performed circumfirentially around the surgical defect.  A purse string suture was then placed and tightened. Wound tension only allowed a partial closure of the circular defect.
Partial Purse String (Intermediate) Text: Given the location of the defect and the characteristics of the surrounding skin an intermediate purse string closure was deemed most appropriate.  Undermining was performed circumfirentially around the surgical defect.  A purse string suture was then placed and tightened. Wound tension only allowed a partial closure of the circular defect.
Localized Dermabrasion With Wire Brush Text: The patient was draped in routine manner.  Localized dermabrasion using 3 x 17 mm wire brush was performed in routine manner to papillary dermis. This spot dermabrasion is being performed to complete skin cancer reconstruction. It also will eliminate the other sun damaged precancerous cells that are known to be part of the regional effect of a lifetime's worth of sun exposure. This localized dermabrasion is therapeutic and should not be considered cosmetic in any regard.
Tarsorrhaphy Text: A tarsorrhaphy was performed using Frost sutures.
Complex Repair And Flap Additional Text (Will Appearing After The Standard Complex Repair Text): The complex repair was not sufficient to completely close the primary defect. The remaining additional defect was repaired with the flap mentioned below.
Complex Repair And Graft Additional Text (Will Appearing After The Standard Complex Repair Text): The complex repair was not sufficient to completely close the primary defect. The remaining additional defect was repaired with the graft mentioned below.
Unique Flap 1 Name: Myocutaneous Island pedicle Flap
Unique Flap 2 Name: Peng Flap
Unique Flap 3 Name: Mercedes Flap
Unique Flap 4 Name: Banner Flap
Unique Flap 5 Name: tunneled myocutaneous flap
Unique Flap 1 Text: A decision was made to reconstruct the defect utilizing a myocutaneous Island pedicle Flap based on the levator labii superioris muscle.  A telfa template was made of the defect.  This telfa template was then used to outline the myocutaneous flap, based along the meilolabial fold.  The donor area for the pedicle flap was then injected with anesthesia.  The flap was excised through the skin and subcutaneous tissue down to the layer of the underlying musculature.  The myocutaneous flap was carefully excised within this deep plane to maintain its blood supply. Based on the muscle. The edges of the donor site were undermined.   The donor site was closed in a primary fashion to the point of transposition.  The pedicle was then transposed into position and sutured.  Once the flap was sutured into place, adequate blood supply was confirmed with blanching and refill.
Unique Flap 2 Text: A decision was made to reconstruct the defect utilizing a Peng Flap (Bilateral Advancement Rotation Flap). Given the location of the defect and the proximity to free margins, this flap was deemed most appropriate.  Using a sterile surgical marker, the appropriate rotation flaps were drawn incorporating the defect and placing the expected incisions within the relaxed skin tension lines where possible.    The area thus outlined was incised deep to adipose tissue with a #15 scalpel blade.  The skin margins were undermined to an appropriate distance in all directions utilizing iris scissors.
Unique Flap 3 Text: The defect edges were debeveled with a #15 scalpel blade.  Given the location of the defect, shape of the defect and the proximity to free margins a Mercedes (double advancement flap) was deemed most appropriate.  Using a sterile surgical marker, the appropriate transposition flaps were drawn incorporating the defect and placing the expected incisions within the relaxed skin tension lines where possible.    The area thus outlined was incised deep to adipose tissue with a #15 scalpel blade.  The skin margins were undermined to an appropriate distance in all directions utilizing iris scissors.  Hemostasis was achieved with electrocautery.  The flaps were then advanced into the defect and anchored with interrupted buried subcutaneous sutures.
Unique Flap 4 Text: The defect edges were debeveled with a #15 scalpel blade.  Given the location of the defect and the proximity to free margins a Banner transposition flap was deemed most appropriate.  Using a sterile surgical marker, an appropriate Banner transposition flap was drawn incorporating the defect.    The area thus outlined was incised deep to adipose tissue with a #15 scalpel blade.  The skin margins were undermined to an appropriate distance in all directions utilizing iris scissors.
Unique Flap 5 Text: A decision was made to reconstruct the defect utilizing a tunneled myocutaneous Island pedicle Flap based on the anterior auricularis muscle.  A telfa template was made of the defect.  This telfa template was then used to outline the myocutaneous flap, based along the preauricular fold.  The donor area for the pedicle flap was then injected with anesthesia.  The flap was excised through the skin and subcutaneous tissue down to the layer of the underlying musculature.  The myocutaneous flap was carefully excised within this deep plane to maintain its blood supply based on the muscle. The edges of the donor site were undermined.   The donor site was closed in a primary fashion to the point of transposition.  The pedicle was then transposed through a tunnel into position and sutured.  Once the flap was sutured into place, adequate blood supply was confirmed with blanching and refill.
Manual Repair Warning Statement: We plan on removing the manually selected variable below in favor of our much easier automatic structured text blocks found in the previous tab. We decided to do this to help make the flow better and give you the full power of structured data. Manual selection is never going to be ideal in our platform and I would encourage you to avoid using manual selection from this point on, especially since I will be sunsetting this feature. It is important that you do one of two things with the customized text below. First, you can save all of the text in a word file so you can have it for future reference. Second, transfer the text to the appropriate area in the Library tab. Lastly, if there is a flap or graft type which we do not have you need to let us know right away so I can add it in before the variable is hidden. No need to panic, we plan to give you roughly 6 months to make the change.
Same Histology In Subsequent Stages Text: The pattern and morphology of the tumor is as described in the first stage.
No Residual Tumor Seen Histology Text: There were no malignant cells seen in the sections examined.
Inflammation Suggestive Of Cancer Camouflage Histology Text: There was a dense lymphocytic infiltrate which prevented adequate histologic evaluation of adjacent structures.
Bcc Histology Text: There were numerous aggregates of basaloid cells.
Bcc Infiltrative Histology Text: There were numerous aggregates of basaloid cells demonstrating an infiltrative pattern.
Mart-1 - Positive Histology Text: MART-1 staining demonstrates areas of higher density and clustering of melanocytes with Pagetoid spread upwards within the epidermis. The surgical margins are positive for tumor cells.
Mart-1 - Negative Histology Text: MART-1 staining demonstrates a normal density and pattern of melanocytes along the dermal-epidermal junction. The surgical margins are negative for tumor cells.
Information: Selecting Yes will display possible errors in your note based on the variables you have selected. This validation is only offered as a suggestion for you. PLEASE NOTE THAT THE VALIDATION TEXT WILL BE REMOVED WHEN YOU FINALIZE YOUR NOTE. IF YOU WANT TO FAX A PRELIMINARY NOTE YOU WILL NEED TO TOGGLE THIS TO 'NO' IF YOU DO NOT WANT IT IN YOUR FAXED NOTE.

## 2020-07-09 ENCOUNTER — HOSPITAL ENCOUNTER (OUTPATIENT)
Dept: LAB | Facility: MEDICAL CENTER | Age: 85
End: 2020-07-09
Attending: INTERNAL MEDICINE
Payer: MEDICARE

## 2020-07-09 LAB
EST. AVERAGE GLUCOSE BLD GHB EST-MCNC: 183 MG/DL
HBA1C MFR BLD: 8 % (ref 0–5.6)

## 2020-07-09 PROCEDURE — 83036 HEMOGLOBIN GLYCOSYLATED A1C: CPT

## 2020-07-09 PROCEDURE — 36415 COLL VENOUS BLD VENIPUNCTURE: CPT

## 2020-07-14 ENCOUNTER — OFFICE VISIT (OUTPATIENT)
Dept: MEDICAL GROUP | Facility: PHYSICIAN GROUP | Age: 85
End: 2020-07-14
Payer: MEDICARE

## 2020-07-14 VITALS
SYSTOLIC BLOOD PRESSURE: 110 MMHG | WEIGHT: 205.69 LBS | DIASTOLIC BLOOD PRESSURE: 60 MMHG | HEIGHT: 64 IN | HEART RATE: 57 BPM | TEMPERATURE: 97.6 F | BODY MASS INDEX: 35.12 KG/M2 | OXYGEN SATURATION: 92 %

## 2020-07-14 DIAGNOSIS — N61.0 NIPPLE INFECTION: ICD-10-CM

## 2020-07-14 PROCEDURE — 99214 OFFICE O/P EST MOD 30 MIN: CPT | Performed by: FAMILY MEDICINE

## 2020-07-14 RX ORDER — SULFAMETHOXAZOLE AND TRIMETHOPRIM 800; 160 MG/1; MG/1
1 TABLET ORAL 2 TIMES DAILY
Qty: 14 TAB | Refills: 0 | Status: SHIPPED | OUTPATIENT
Start: 2020-07-14 | End: 2020-09-22

## 2020-07-14 ASSESSMENT — FIBROSIS 4 INDEX: FIB4 SCORE: 3.19

## 2020-07-15 NOTE — PROGRESS NOTES
"Subjective:      Julio Azar is a 88 y.o. male who presents with Pain (left breast)            HPI     This is an 88-year-old white male who is a regular patient of [facility].  His PCP is not currently available.  He is here because of soreness of the nipple that has been ongoing for about 2 months now.  He has not felt any lumps in the breast.  He denies any trauma.  He said the soreness is only noted with pressure or when he pushes on the nipple.  Denies any discharge from the nipple.  Denies any breast enlargement or soreness of the breast itself.  Denies any fever or chills.    Past medical history, past surgical history, family history reviewed-no changes    Social history reviewed-no changes    Allergies reviewed-no changes    Medications reviewed-no changes    ROS     As per HPI, the rest are negative.       Objective:     /60 (BP Location: Left arm, Patient Position: Sitting, BP Cuff Size: Adult)   Pulse (!) 57   Temp 36.4 °C (97.6 °F) (Temporal)   Ht 1.626 m (5' 4\")   Wt 93.3 kg (205 lb 11 oz)   SpO2 92%   BMI 35.31 kg/m²      Physical Exam     Breasts-symmetrical, the left nipple is bigger than the right and is erythematous and tender to touch, there is some crusting on the surface of the nipple with small area that is raw and open and irritated but without any discharge, no palpable masses both breasts, no axillary nodes, no supraclavicular nodes, no tenderness on palpation of both breasts            Assessment/Plan:       1. Nipple infection  Physical exam consistent with infection of the skin of the nipple.  I applied antibiotic ointment to the nipple and covered it with Band-Aid.  He will start using Neosporin ointment and apply it on the nipple once daily.  He may cover the nipple with Band-Aid.  I will put him on Bactrim DS 1 tablet twice a day for 7 days.  Advised to keep the area clean and dry and may use soap and water to clean the area on a daily basis.  " Avoid harsh antiseptics.  I will reevaluate in a week.  - sulfamethoxazole-trimethoprim (BACTRIM DS) 800-160 MG tablet; Take 1 Tab by mouth 2 times a day.  Dispense: 14 Tab; Refill: 0      Please note that this dictation was created using voice recognition software. I have worked with consultants from the vendor as well as technical experts from [facility] to optimize the interface. I have made every reasonable attempt to correct obvious errors, but I expect that there are errors of grammar and possibly content I did not discover before finalizing the note.

## 2020-07-20 ENCOUNTER — APPOINTMENT (RX ONLY)
Dept: URBAN - METROPOLITAN AREA CLINIC 36 | Facility: CLINIC | Age: 85
Setting detail: DERMATOLOGY
End: 2020-07-20

## 2020-07-20 DIAGNOSIS — Z48.02 ENCOUNTER FOR REMOVAL OF SUTURES: ICD-10-CM

## 2020-07-20 PROCEDURE — 99024 POSTOP FOLLOW-UP VISIT: CPT

## 2020-07-20 PROCEDURE — ? SUTURE REMOVAL (GLOBAL PERIOD)

## 2020-07-20 ASSESSMENT — LOCATION SIMPLE DESCRIPTION DERM: LOCATION SIMPLE: LEFT FOREHEAD

## 2020-07-20 ASSESSMENT — LOCATION DETAILED DESCRIPTION DERM: LOCATION DETAILED: LEFT SUPERIOR FOREHEAD

## 2020-07-20 ASSESSMENT — LOCATION ZONE DERM: LOCATION ZONE: FACE

## 2020-07-20 NOTE — PROCEDURE: SUTURE REMOVAL (GLOBAL PERIOD)
Detail Level: Detailed
Add 02798 Cpt? (Important Note: In 2017 The Use Of 50939 Is Being Tracked By Cms To Determine Future Global Period Reimbursement For Global Periods): yes

## 2020-07-23 ENCOUNTER — OFFICE VISIT (OUTPATIENT)
Dept: MEDICAL GROUP | Facility: PHYSICIAN GROUP | Age: 85
End: 2020-07-23
Payer: MEDICARE

## 2020-07-23 VITALS
SYSTOLIC BLOOD PRESSURE: 100 MMHG | TEMPERATURE: 97.4 F | HEART RATE: 61 BPM | OXYGEN SATURATION: 96 % | BODY MASS INDEX: 34.59 KG/M2 | HEIGHT: 64 IN | DIASTOLIC BLOOD PRESSURE: 60 MMHG | WEIGHT: 202.6 LBS

## 2020-07-23 DIAGNOSIS — N61.0 NIPPLE INFECTION: ICD-10-CM

## 2020-07-23 PROCEDURE — 99213 OFFICE O/P EST LOW 20 MIN: CPT | Performed by: FAMILY MEDICINE

## 2020-07-23 ASSESSMENT — FIBROSIS 4 INDEX: FIB4 SCORE: 3.19

## 2020-07-27 ENCOUNTER — OFFICE VISIT (OUTPATIENT)
Dept: MEDICAL GROUP | Facility: PHYSICIAN GROUP | Age: 85
End: 2020-07-27
Payer: MEDICARE

## 2020-07-27 VITALS
TEMPERATURE: 97.6 F | WEIGHT: 205.03 LBS | DIASTOLIC BLOOD PRESSURE: 60 MMHG | BODY MASS INDEX: 35 KG/M2 | SYSTOLIC BLOOD PRESSURE: 100 MMHG | OXYGEN SATURATION: 90 % | HEART RATE: 63 BPM | HEIGHT: 64 IN

## 2020-07-27 DIAGNOSIS — N61.0 NIPPLE INFECTION: ICD-10-CM

## 2020-07-27 PROCEDURE — 99212 OFFICE O/P EST SF 10 MIN: CPT | Performed by: FAMILY MEDICINE

## 2020-07-27 ASSESSMENT — FIBROSIS 4 INDEX: FIB4 SCORE: 3.19

## 2020-07-28 NOTE — PROGRESS NOTES
"Subjective:      Julio Azar is a 88 y.o. male who presents with Breast Problem (infection)            HPI     Patient returns for follow-up of nipple infection.  I first saw him for this problem on 7/14/2020 and gave him Bactrim DS 1 tablet twice a day for 7 days.  I saw him for follow-up on 7/23/2020 with improvement of the problem but not yet 100% resolved.  I also had him apply Neosporin ointment to the nipple and to cover it with Band-Aid.  He is here for recheck.  He states that he has not felt any more pain when he presses on the nipple.  Denies any fever or chills.  He has finished the Bactrim completely.    Past medical history, past surgical history, family history reviewed-no changes    Social history reviewed-no changes    Allergies reviewed-no changes    Medications reviewed-no changes    ROS     As per HPI.       Objective:     /60 (BP Location: Left arm, Patient Position: Sitting, BP Cuff Size: Adult)   Pulse 63   Temp 36.4 °C (97.6 °F) (Temporal)   Ht 1.626 m (5' 4\")   Wt 93 kg (205 lb 0.4 oz)   SpO2 90%   BMI 35.19 kg/m²      Physical Exam     Exam alert, awake, oriented not in distress    Breasts- left nipple redness and swelling are already resolved and there is no more tenderness.  There is 1 residual pinpoint erythematous spot on the medial aspect of the nipple without any discharge or tenderness.            Assessment/Plan:       1. Nipple infection  This is resolved.  May continue to use Neosporin ointment and apply it on the nipple daily for another week and then discontinue.  No need to cover the nipple anymore with Band-Aid.  If he develops any recurrence or worsening he will return for reevaluation.      Please note that this dictation was created using voice recognition software. I have worked with consultants from the vendor as well as technical experts from [employer] to optimize the interface. I have made every reasonable attempt to correct obvious errors, but I " expect that there are errors of grammar and possibly content I did not discover before finalizing the note.

## 2020-08-06 NOTE — HPI: SKIN LESION
Patient doing well. No vaginal bleeding or cramping noted. Discussed the need for an anatomy scan between 18-20 weeks. Discussed quad screen;ordered. RTC in 4 weeks.     Coffective counseling sheet Get Ready discussed with mother. Reinforced avoiding induction of labor unless medically indicated as well as comfort measures during labor.  Encouraged mother to download Coffective mobile hilary if she has not already done so. Mother verbalizes understanding.      
Is This A New Presentation, Or A Follow-Up?: Skin Lesion
What Type Of Note Output Would You Prefer (Optional)?: Bullet Format
How Severe Is Your Skin Lesion?: mild
Has Your Skin Lesion Been Treated?: not been treated

## 2020-08-19 ENCOUNTER — APPOINTMENT (RX ONLY)
Dept: URBAN - METROPOLITAN AREA CLINIC 36 | Facility: CLINIC | Age: 85
Setting detail: DERMATOLOGY
End: 2020-08-19

## 2020-08-19 DIAGNOSIS — R22.9 LOCALIZED SWELLING, MASS AND LUMP, UNSPECIFIED: ICD-10-CM

## 2020-08-19 PROCEDURE — ? FRAXEL RESTORE DUAL

## 2020-08-19 ASSESSMENT — LOCATION DETAILED DESCRIPTION DERM: LOCATION DETAILED: LEFT SUPERIOR FOREHEAD

## 2020-08-19 ASSESSMENT — LOCATION ZONE DERM: LOCATION ZONE: FACE

## 2020-08-19 ASSESSMENT — LOCATION SIMPLE DESCRIPTION DERM: LOCATION SIMPLE: LEFT FOREHEAD

## 2020-08-19 NOTE — PROCEDURE: FRAXEL RESTORE DUAL
Price (Use Numbers Only, No Special Characters Or $): 0.00
Treatment Number (Optional): 1
Post-Care Instructions: I reviewed with the patient in detail post-care instructions. The patient was given written and verbal instructions for wound care. Reviewed skin hydration, soaks if needed, strict sun protection with a physical blocking sun screen. Pt is too avoid picking, excess picking can lead to scarring. Pt will be seen for follow up after treatment. Call with any concerns. Pt recommended to use biocorneum scar cream BID for up to three months.
Eye Shielding Text (Leave Blank If Unwanted- Will Be Inserted If Selecting Eye Shields): The intraocular eye shields were placed. 2 drops of intraocular tetracaine HCL ophthalmic 0.5% solution was administered. The eye shields were coated with ophthalmic bacitracin prior to insertion. After the shields were removed the eyes were flushed with normal saline.
Consent: Written consent obtained, risks reviewed including but not limited to crusting, scabbing, blistering, scarring, darker or lighter pigmentary change, and/or incomplete removal. Advised that more than one treatment may be needed to obtain results  and that the first two treatments are free but subsequent treatments will require a $50 fee per treatment.
Detail Level: Detailed
Treatment Level (Optional): 2
Passes (Optional): 4
Length Topical Anesthesia Applied (Optional): 60 minutes
Energy In Mj (Optional): 70mj
Pre-Procedure Text: Patient is  happy with result
WDL

## 2020-09-16 ENCOUNTER — APPOINTMENT (RX ONLY)
Dept: URBAN - METROPOLITAN AREA CLINIC 36 | Facility: CLINIC | Age: 85
Setting detail: DERMATOLOGY
End: 2020-09-16

## 2020-09-16 DIAGNOSIS — R22.9 LOCALIZED SWELLING, MASS AND LUMP, UNSPECIFIED: ICD-10-CM

## 2020-09-16 PROCEDURE — ? FRAXEL RESTORE DUAL

## 2020-09-16 ASSESSMENT — LOCATION DETAILED DESCRIPTION DERM: LOCATION DETAILED: LEFT SUPERIOR FRONTAL SCALP

## 2020-09-16 ASSESSMENT — LOCATION SIMPLE DESCRIPTION DERM: LOCATION SIMPLE: SCALP

## 2020-09-16 ASSESSMENT — LOCATION ZONE DERM: LOCATION ZONE: SCALP

## 2020-09-16 NOTE — PROCEDURE: FRAXEL RESTORE DUAL
Treatment Level (Optional): 4
Price (Use Numbers Only, No Special Characters Or $): 0.00
Length Topical Anesthesia Applied (Optional): 60 minutes
Eye Shielding Text (Leave Blank If Unwanted- Will Be Inserted If Selecting Eye Shields): The intraocular eye shields were placed. 2 drops of intraocular tetracaine HCL ophthalmic 0.5% solution was administered. The eye shields were coated with ophthalmic bacitracin prior to insertion. After the shields were removed the eyes were flushed with normal saline.
Energy In Mj (Optional): 70mj
Consent: Written consent obtained, risks reviewed including but not limited to crusting, scabbing, blistering, scarring, darker or lighter pigmentary change, and/or incomplete removal. Advised that more than one treatment may be needed to obtain results  and that the first two treatments are free but subsequent treatments will require a $50 fee per treatment.
Post-Care Instructions: I reviewed with the patient in detail post-care instructions. The patient was given written and verbal instructions for wound care. Reviewed skin hydration, soaks if needed, strict sun protection with a physical blocking sun screen. Pt is too avoid picking, excess picking can lead to scarring. Pt will be seen for follow up after treatment. Call with any concerns. Pt recommended to use biocorneum scar cream BID for up to three months.
Detail Level: Detailed
Treatment Number (Optional): 2
Pre-Procedure Text: Patient is happy with results

## 2020-09-22 ENCOUNTER — OFFICE VISIT (OUTPATIENT)
Dept: MEDICAL GROUP | Facility: PHYSICIAN GROUP | Age: 85
End: 2020-09-22
Payer: MEDICARE

## 2020-09-22 VITALS
BODY MASS INDEX: 34.78 KG/M2 | WEIGHT: 203.71 LBS | DIASTOLIC BLOOD PRESSURE: 50 MMHG | HEIGHT: 64 IN | TEMPERATURE: 97.3 F | SYSTOLIC BLOOD PRESSURE: 110 MMHG | HEART RATE: 69 BPM | OXYGEN SATURATION: 96 %

## 2020-09-22 DIAGNOSIS — Z23 NEED FOR IMMUNIZATION AGAINST INFLUENZA: ICD-10-CM

## 2020-09-22 DIAGNOSIS — N64.4 BREAST PAIN: ICD-10-CM

## 2020-09-22 PROCEDURE — 99213 OFFICE O/P EST LOW 20 MIN: CPT | Mod: 25 | Performed by: FAMILY MEDICINE

## 2020-09-22 PROCEDURE — 90662 IIV NO PRSV INCREASED AG IM: CPT | Performed by: FAMILY MEDICINE

## 2020-09-22 PROCEDURE — G0008 ADMIN INFLUENZA VIRUS VAC: HCPCS | Performed by: FAMILY MEDICINE

## 2020-09-22 ASSESSMENT — FIBROSIS 4 INDEX: FIB4 SCORE: 3.19

## 2020-09-22 NOTE — PROGRESS NOTES
"Subjective:      Julio Azar is a 88 y.o. male who presents with Follow-Up (both nipples)            HPI     I treated this patient for left nipple and areola infection in July with Bactrim DS.  The redness and swelling of the nipple and areola resolved said the pain never really went away.  He said the last 2 weeks he has noticed that the pain is now affecting the right nipple and areola as well.  I have reviewed his medications and he is on spironolactone 25 mg 1 tablet daily which is potential for gynecomastia which may be causing this problem.  Patient however denies increase in the breast size or having breast lumps/masses.  There is no family history of breast cancer.    Past medical history, past surgical history, family history reviewed-no changes    Social history reviewed-no changes    Allergies reviewed-no changes    Medications reviewed-no changes    ROS     As per HPI, the rest are negative.       Objective:     /50 (BP Location: Left arm, Patient Position: Sitting, BP Cuff Size: Adult)   Pulse 69   Temp 36.3 °C (97.3 °F) (Temporal)   Ht 1.626 m (5' 4\")   Wt 92.4 kg (203 lb 11.3 oz)   SpO2 96%   BMI 34.97 kg/m²      Physical Exam     Examined alert, awake, oriented, not in distress    Breasts-symmetrical, no redness noted in both nipples and areolae, there is tenderness on palpation of the nipple and areola bilaterally, no palpable masses, no axillary nodes, no supraclavicular nodes          Assessment/Plan:        1. Breast pain  Mainly in the nipple and areola bilaterally.  We will proceed with diagnostic mammogram to further evaluate.  Also made him aware that this could be due to spironolactone.  He will discuss this with his cardiologist Dr. Hammond so this medication can be switched to another one and see if the problem resolves without the medication.  - MA DIAGNOSTIC MAMMO BILAT W/CAD; Future    2. Need for immunization against influenza  He asked for a flu shot today and he was " given high-dose flu shot.  - INFLUENZA VACCINE, HIGH DOSE (65+ ONLY)      Please note that this dictation was created using voice recognition software. I have worked with consultants from the vendor as well as technical experts from [facility] to optimize the interface. I have made every reasonable attempt to correct obvious errors, but I expect that there are errors of grammar and possibly content I did not discover before finalizing the note.

## 2020-10-06 ENCOUNTER — HOSPITAL ENCOUNTER (OUTPATIENT)
Dept: RADIOLOGY | Facility: MEDICAL CENTER | Age: 85
End: 2020-10-06
Attending: FAMILY MEDICINE
Payer: MEDICARE

## 2020-10-06 DIAGNOSIS — N64.4 BREAST PAIN: ICD-10-CM

## 2020-10-06 PROCEDURE — G0279 TOMOSYNTHESIS, MAMMO: HCPCS

## 2020-10-06 PROCEDURE — 76642 ULTRASOUND BREAST LIMITED: CPT | Mod: RT

## 2021-01-11 DIAGNOSIS — Z23 NEED FOR VACCINATION: ICD-10-CM

## 2021-01-17 ENCOUNTER — IMMUNIZATION (OUTPATIENT)
Dept: FAMILY PLANNING/WOMEN'S HEALTH CLINIC | Facility: IMMUNIZATION CENTER | Age: 86
End: 2021-01-17
Attending: INTERNAL MEDICINE
Payer: MEDICARE

## 2021-01-17 DIAGNOSIS — Z23 NEED FOR VACCINATION: ICD-10-CM

## 2021-01-17 DIAGNOSIS — Z23 ENCOUNTER FOR VACCINATION: Primary | ICD-10-CM

## 2021-01-17 PROCEDURE — 91301 MODERNA SARS-COV-2 VACCINE: CPT

## 2021-01-17 PROCEDURE — 0011A MODERNA SARS-COV-2 VACCINE: CPT

## 2021-02-14 ENCOUNTER — IMMUNIZATION (OUTPATIENT)
Dept: FAMILY PLANNING/WOMEN'S HEALTH CLINIC | Facility: IMMUNIZATION CENTER | Age: 86
End: 2021-02-14
Attending: INTERNAL MEDICINE
Payer: MEDICARE

## 2021-02-14 DIAGNOSIS — Z23 ENCOUNTER FOR VACCINATION: Primary | ICD-10-CM

## 2021-02-14 PROCEDURE — 0012A MODERNA SARS-COV-2 VACCINE: CPT

## 2021-02-14 PROCEDURE — 91301 MODERNA SARS-COV-2 VACCINE: CPT

## 2021-02-23 ENCOUNTER — HOSPITAL ENCOUNTER (OUTPATIENT)
Dept: LAB | Facility: MEDICAL CENTER | Age: 86
End: 2021-02-23
Attending: INTERNAL MEDICINE
Payer: MEDICARE

## 2021-02-23 LAB
ALBUMIN SERPL BCP-MCNC: 4.3 G/DL (ref 3.2–4.9)
ALBUMIN/GLOB SERPL: 1.4 G/DL
ALP SERPL-CCNC: 135 U/L (ref 30–99)
ALT SERPL-CCNC: 22 U/L (ref 2–50)
ANION GAP SERPL CALC-SCNC: 13 MMOL/L (ref 7–16)
AST SERPL-CCNC: 22 U/L (ref 12–45)
BASOPHILS # BLD AUTO: 0.7 % (ref 0–1.8)
BASOPHILS # BLD: 0.04 K/UL (ref 0–0.12)
BILIRUB SERPL-MCNC: 0.9 MG/DL (ref 0.1–1.5)
BUN SERPL-MCNC: 40 MG/DL (ref 8–22)
CALCIUM SERPL-MCNC: 9.7 MG/DL (ref 8.5–10.5)
CHLORIDE SERPL-SCNC: 98 MMOL/L (ref 96–112)
CHOLEST SERPL-MCNC: 119 MG/DL (ref 100–199)
CO2 SERPL-SCNC: 26 MMOL/L (ref 20–33)
CREAT SERPL-MCNC: 1.46 MG/DL (ref 0.5–1.4)
EOSINOPHIL # BLD AUTO: 0.01 K/UL (ref 0–0.51)
EOSINOPHIL NFR BLD: 0.2 % (ref 0–6.9)
ERYTHROCYTE [DISTWIDTH] IN BLOOD BY AUTOMATED COUNT: 49.1 FL (ref 35.9–50)
EST. AVERAGE GLUCOSE BLD GHB EST-MCNC: 232 MG/DL
FASTING STATUS PATIENT QL REPORTED: NORMAL
GLOBULIN SER CALC-MCNC: 3.1 G/DL (ref 1.9–3.5)
GLUCOSE SERPL-MCNC: 141 MG/DL (ref 65–99)
HBA1C MFR BLD: 9.7 % (ref 4–5.6)
HCT VFR BLD AUTO: 40.2 % (ref 42–52)
HDLC SERPL-MCNC: 60 MG/DL
HGB BLD-MCNC: 12.8 G/DL (ref 14–18)
IMM GRANULOCYTES # BLD AUTO: 0.01 K/UL (ref 0–0.11)
IMM GRANULOCYTES NFR BLD AUTO: 0.2 % (ref 0–0.9)
LDLC SERPL CALC-MCNC: 30 MG/DL
LYMPHOCYTES # BLD AUTO: 2.68 K/UL (ref 1–4.8)
LYMPHOCYTES NFR BLD: 48.1 % (ref 22–41)
MCH RBC QN AUTO: 30.2 PG (ref 27–33)
MCHC RBC AUTO-ENTMCNC: 31.8 G/DL (ref 33.7–35.3)
MCV RBC AUTO: 94.8 FL (ref 81.4–97.8)
MONOCYTES # BLD AUTO: 0.46 K/UL (ref 0–0.85)
MONOCYTES NFR BLD AUTO: 8.3 % (ref 0–13.4)
NEUTROPHILS # BLD AUTO: 2.37 K/UL (ref 1.82–7.42)
NEUTROPHILS NFR BLD: 42.5 % (ref 44–72)
NRBC # BLD AUTO: 0 K/UL
NRBC BLD-RTO: 0 /100 WBC
PLATELET # BLD AUTO: 158 K/UL (ref 164–446)
PMV BLD AUTO: 11.7 FL (ref 9–12.9)
POTASSIUM SERPL-SCNC: 4.2 MMOL/L (ref 3.6–5.5)
PROT SERPL-MCNC: 7.4 G/DL (ref 6–8.2)
RBC # BLD AUTO: 4.24 M/UL (ref 4.7–6.1)
SODIUM SERPL-SCNC: 137 MMOL/L (ref 135–145)
TRIGL SERPL-MCNC: 147 MG/DL (ref 0–149)
WBC # BLD AUTO: 5.6 K/UL (ref 4.8–10.8)

## 2021-02-23 PROCEDURE — 83036 HEMOGLOBIN GLYCOSYLATED A1C: CPT

## 2021-02-23 PROCEDURE — 36415 COLL VENOUS BLD VENIPUNCTURE: CPT

## 2021-02-23 PROCEDURE — 85025 COMPLETE CBC W/AUTO DIFF WBC: CPT

## 2021-02-23 PROCEDURE — 80053 COMPREHEN METABOLIC PANEL: CPT

## 2021-02-23 PROCEDURE — 80061 LIPID PANEL: CPT

## 2021-04-06 ENCOUNTER — PATIENT MESSAGE (OUTPATIENT)
Dept: HEALTH INFORMATION MANAGEMENT | Facility: OTHER | Age: 86
End: 2021-04-06

## 2021-04-07 ENCOUNTER — HOSPITAL ENCOUNTER (OUTPATIENT)
Dept: CARDIOLOGY | Facility: MEDICAL CENTER | Age: 86
End: 2021-04-07
Attending: INTERNAL MEDICINE
Payer: MEDICARE

## 2021-04-07 DIAGNOSIS — I10 ESSENTIAL HYPERTENSION, MALIGNANT: ICD-10-CM

## 2021-04-07 DIAGNOSIS — I45.10 RIGHT BUNDLE BRANCH BLOCK: ICD-10-CM

## 2021-04-07 DIAGNOSIS — E11.9 DIABETES MELLITUS WITHOUT COMPLICATION (HCC): ICD-10-CM

## 2021-04-07 DIAGNOSIS — E78.5 HYPERLIPIDEMIA, UNSPECIFIED HYPERLIPIDEMIA TYPE: ICD-10-CM

## 2021-04-07 DIAGNOSIS — I50.9 HEART FAILURE, UNSPECIFIED HF CHRONICITY, UNSPECIFIED HEART FAILURE TYPE (HCC): ICD-10-CM

## 2021-04-07 DIAGNOSIS — I48.91 ATRIAL FIBRILLATION, UNSPECIFIED TYPE (HCC): ICD-10-CM

## 2021-04-07 DIAGNOSIS — Z98.61 POSTSURGICAL PERCUTANEOUS TRANSLUMINAL CORONARY ANGIOPLASTY STATUS: ICD-10-CM

## 2021-04-07 LAB
LV EJECT FRACT MOD 2C 99903: 60.67
LV EJECT FRACT MOD 4C 99902: 55.03
LV EJECT FRACT MOD BP 99901: 50.4

## 2021-04-07 PROCEDURE — 93306 TTE W/DOPPLER COMPLETE: CPT

## 2021-05-13 ENCOUNTER — PATIENT OUTREACH (OUTPATIENT)
Dept: HEALTH INFORMATION MANAGEMENT | Facility: OTHER | Age: 86
End: 2021-05-13

## 2021-05-13 NOTE — PROGRESS NOTES
Outcome: Left Message to schedule SHALA     Please transfer to Patient Outreach Team at [phone] when patient returns call.    [facility] Verified: yes    Attempt # 2

## 2021-05-26 ENCOUNTER — OFFICE VISIT (OUTPATIENT)
Dept: MEDICAL GROUP | Facility: PHYSICIAN GROUP | Age: 86
End: 2021-05-26
Payer: MEDICARE

## 2021-05-26 VITALS
BODY MASS INDEX: 34.28 KG/M2 | OXYGEN SATURATION: 96 % | HEART RATE: 64 BPM | TEMPERATURE: 97.9 F | WEIGHT: 200.8 LBS | HEIGHT: 64 IN | RESPIRATION RATE: 16 BRPM | DIASTOLIC BLOOD PRESSURE: 40 MMHG | SYSTOLIC BLOOD PRESSURE: 114 MMHG

## 2021-05-26 DIAGNOSIS — I10 ESSENTIAL HYPERTENSION, BENIGN: ICD-10-CM

## 2021-05-26 DIAGNOSIS — H93.8X3 EAR FULLNESS, BILATERAL: ICD-10-CM

## 2021-05-26 DIAGNOSIS — I50.22 CHRONIC SYSTOLIC CONGESTIVE HEART FAILURE (HCC): ICD-10-CM

## 2021-05-26 DIAGNOSIS — J30.89 ENVIRONMENTAL AND SEASONAL ALLERGIES: ICD-10-CM

## 2021-05-26 PROCEDURE — 99213 OFFICE O/P EST LOW 20 MIN: CPT | Performed by: PHYSICIAN ASSISTANT

## 2021-05-26 RX ORDER — INFLUENZA A VIRUS A/BRISBANE/02/2018 (H1N1) RECOMBINANT HEMAGGLUTININ ANTIGEN, INFLUENZA A VIRUS A/KANSAS/14/2017 (H3N2) RECOMBINANT HEMAGGLUTININ ANTIGEN, INFLUENZA B VIRUS B/PHUKET/3073/2013 RECOMBINANT HEMAGGLUTININ ANTIGEN, AND INFLUENZA B VIRUS B/MARYLAND/15/2016 RECOMBINANT HEMAGGLUTININ ANTIGEN 45; 45; 45; 45 UG/.5ML; UG/.5ML; UG/.5ML; UG/.5ML
INJECTION INTRAMUSCULAR
COMMUNITY
End: 2021-05-26

## 2021-05-26 RX ORDER — CEPHALEXIN 500 MG/1
CAPSULE ORAL
COMMUNITY
End: 2021-08-31

## 2021-05-26 RX ORDER — INSULIN GLARGINE 100 [IU]/ML
16 INJECTION, SOLUTION SUBCUTANEOUS EVERY EVENING
COMMUNITY

## 2021-05-26 ASSESSMENT — PATIENT HEALTH QUESTIONNAIRE - PHQ9: CLINICAL INTERPRETATION OF PHQ2 SCORE: 0

## 2021-05-26 ASSESSMENT — FIBROSIS 4 INDEX: FIB4 SCORE: 2.61

## 2021-05-26 NOTE — PROGRESS NOTES
Chief Complaint   Patient presents with   • Middle Ear Problem     plugged ears       HISTORY OF PRESENT ILLNESS: Julio Azar is an established 88 y.o. male here to discuss the evaluation and management of:    Patient is a pleasant 88-year-old male here today to discuss bilateral ear fullness.  He tells me it feels like his ears are plugged.  Symptoms developed 2 days ago.  He admits to having environmental seasonal allergies and states for the past 1+ months takes him about 1 hour every morning to clear his head.  States he experiences nasal congestion and intermittent episodes of rhinorrhea.  He does not use over-the-counter at home treatment options to alleviate symptoms.  Patient denies other associated symptoms.  He is inquiring about treatment options.    Patient has a positive medical history for chronic systolic congestive heart failure.  He tells me he follows up with his cardiologist regularly.  States he is currently taking metoprolol and chronic supplemental oxygen.  Patient's blood pressure is 114/40 mmHg during today's appointment.  He tells me this is normal for him.  He is asymptomatic.  States overall he is feeling well.    Patient Active Problem List    Diagnosis Date Noted   • Environmental and seasonal allergies 05/26/2021   • Sinus pause 03/06/2020   • Hypomagnesemia 03/06/2020   • Chronic systolic congestive heart failure (HCC) 03/04/2020   • Paroxysmal atrial fibrillation ([facility]) 02/26/2020   • Benign prostatic hyperplasia 02/19/2020   • 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   • Incomplete emptying of bladder 02/19/2020   • Urinary tract obstruction 02/19/2020   • Obesity (BMI 35.0-39.9 without comorbidity) ([facility]) 06/18/2019   • B12 deficiency 12/18/2018   • Other fatigue 06/06/2018   • History of prostate cancer 12/05/2017   • 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 ([facility]) 09/07/2017   • Senile ectropion 12/31/2014   • Coronary arteriosclerosis 03/31/2014  "  • Essential hypertension, benign 03/31/2014   • Type 2 diabetes mellitus with circulatory disorder, without long-term current use of insulin ([facility]) 09/18/2013   • Encounter for long-term (current) use of insulin ([facility]) 09/18/2013   • Vitamin D deficiency 09/18/2013   • Mixed hyperlipidemia 09/18/2013       Allergies:Pcn [penicillins] and Latex    Current Outpatient Medications   Medication Sig Dispense Refill   • Insulin Pen Needle BD Ultra-Fine Short Pen Needle 31 gauge x 5/16\"     • insulin glargine (LANTUS SOLOSTAR) 100 UNIT/ML Solution Pen-injector injection Lantus Solostar U-100 Insulin 100 unit/mL (3 mL) subcutaneous pen     • apixaban (ELIQUIS) 5mg Tab Take 1 Tab by mouth 2 Times a Day. Indications: Thromboembolism secondary to Atrial Fibrillation 60 Tab 0   • metoprolol SR (TOPROL XL) 25 MG TABLET SR 24 HR      • atorvastatin (LIPITOR) 80 MG tablet Take 80 mg by mouth every day.     • FREESTYLE LITE strip 1 Strip by Other route every day.     • tamsulosin (FLOMAX) 0.4 MG capsule TAKE ONE CAPSULE BY MOUTH TWO TIMES A  Cap 2   • insulin glargine (LANTUS SOLOSTAR) 100 UNIT/ML Solution Pen-injector injection Inject 10 Units as instructed every evening. 30 mL 3   • vitamin D (CHOLECALCIFEROL) 1000 UNIT Tab Take 1,000 Units by mouth every day.     • potassium chloride SA (K-DUR) 20 MEQ Tab CR Take 20 mEq by mouth every day.     • metformin (GLUCOPHAGE) 500 MG Tab TAKE 2 TABLETS BY MOUTH TWICE DAILY WITHMEALS 360 Tab 3   • B-D ULTRAFINE III SHORT PEN 31G X 8 MM MISC USE 3 TIMES DAILY 100 Each 11   • Lancets MISC His new meter uses the Accu-Chek SoftClix lancets for 3 time daily testing.  Dx. Code 250.02 300 Each 3   • Blood Glucose Monitoring Suppl SUPPLIES MISC Accucheck Yaneth blood glucose test strips, checking blood sugar 2 daily  .02 insulin requiring. 200 Strip 3   • B Complex Vitamins (VITAMIN B COMPLEX PO) Take  by mouth every day.     • Cholecalciferol (VITAMIN D) 2000 UNITS CAPS Take  by mouth " every day.     • cephALEXin (KEFLEX) 500 MG Cap cephalexin 500 mg capsule     • glipiZIDE (GLUCOTROL) 5 MG Tab Take 1 Tab by mouth 2 times a day. 200 Tab 3     No current facility-administered medications for this visit.       Social History     Tobacco Use   • Smoking status: Former Smoker     Packs/day: 2.00     Years: 34.00     Pack years: 68.00     Types: Cigarettes     Quit date: 1982     Years since quittin.4   • Smokeless tobacco: Never Used   Vaping Use   • Vaping Use: Never used   Substance Use Topics   • Alcohol use: Yes     Alcohol/week: 4.2 - 8.4 oz     Types: 7 - 14 Glasses of wine per week     Comment: 1 per day   • Drug use: No       Family Status   Relation Name Status   • Mo   at age 91   • Fa   at age 87   • Jose R Porter  at age 72   • Jose R Meraz  at age 50   • Son Satish 60 Alive   • Farooq Gomez 61 Alive   • MGMo     • MGFa     • PGMo     • PGFa       Family History   Problem Relation Age of Onset   • Heart Disease Mother    • Heart Attack Mother    • Asthma Mother    • Heart Attack Father    • Diabetes Father    • Psychiatric Illness Brother    • Heart Disease Brother         5 way bypass   • Diabetes Brother    • Hyperlipidemia Brother    • Heart Attack Brother    • Psychiatric Illness Brother    • Stroke Brother    • Heart Disease Brother         pacemaker       ROS:  Review of Systems   Constitutional: Negative for fever, chills, weight loss and malaise/fatigue.   HENT: Negative for ear pain, nosebleeds,sore throat and neck pain.    Eyes: Negative for blurred vision.   Respiratory: Negative for cough, sputum production, shortness of breath and wheezing.    Cardiovascular: Negative for chest pain, palpitations, orthopnea and leg swelling.   Gastrointestinal: Negative for heartburn, nausea, vomiting and abdominal pain.   Genitourinary: Negative for dysuria, urgency and frequency.   Musculoskeletal: Negative for myalgias, back pain  "and joint pain.   Skin: Negative for rash and itching.   Neurological: Negative for dizziness, tingling, tremors, sensory change, focal weakness and headaches.   Endo/Heme/Allergies: Does not bruise/bleed easily.   Psychiatric/Behavioral: Negative for depression, suicidal ideas and memory loss.  The patient is not nervous/anxious and does not have insomnia.    All other systems reviewed and are negative except as in HPI.    Exam: /40 (BP Location: Left arm, Patient Position: Sitting, BP Cuff Size: Large adult)   Pulse 64   Temp 36.6 °C (97.9 °F) (Temporal)   Resp 16   Ht 1.626 m (5' 4\")   Wt 91.1 kg (200 lb 12.8 oz)   SpO2 96%  Body mass index is 34.47 kg/m².  General: Normal appearing. No distress.  HEENT: Normocephalic. Eyes conjunctiva clear lids without ptosis, ears normal shape and contour, canals are clear bilaterally, tympanic membranes are benign.  Neck: Supple without JVD. Thyroid is not enlarged.  Pulmonary: Clear to ausculation.  Normal effort. No rales, ronchi, or wheezing.  Cardiovascular: Regular rate and rhythm without murmur.   Abdomen: Soft, nontender, nondistended.   Neurologic: Grossly nonfocal.  Cranial nerves are normal.  Skin: Warm and dry.  No rashes or suspicious skin lesions.  Musculoskeletal: Normal gait. No extremity cyanosis, clubbing, or edema.  Psych: Normal mood and affect. Alert and oriented x3. Judgment and insight is normal.    Medical decision-making and discussion:  1. Environmental and seasonal allergies  2. Ear fullness, bilateral  Advised patient to use saline rinse.  Discussed with patient he can use saline rinse up to 4 times a day.  After rinsing nasal cavities use Flonase once a day.  Patient does not want to take an over-the-counter antihistamine at this time.  States he would like to try saline rinse and Flonase first to see if this helps alleviate symptoms.    Follow-up for worsening symptoms,lack of expected recovery, or should new symptoms or problems " arise.    3. Chronic systolic congestive heart failure (HCC)  Chronic but stable problem.  Continue current medication regimen.  Continue following up with cardiology.  Continue supplemental oxygen.  Discussed emergency room precautions.        Follow-up for worsening symptoms,lack of expected recovery, or should new symptoms or problems arise.    4. Essential hypertension, benign  Well-controlled. Labs as indicated. Continue antihypertensive medications. Discussed decreasing salt intake. Emphasized benefits of exercise and diet. Continue to monitor.  Patient's blood pressure is 114/40 mmHg.  Patient states this is not abnormal for him.  He is asymptomatic.    Follow-up for worsening symptoms,lack of expected recovery, or should new symptoms or problems arise.      Patient has not seen his PCP in over a year.  During today's appointment an appointment has been made for patient to follow-up with his PCP on 8/31/2021.  Patient agreed to plan.      Please note that this dictation was created using voice recognition software. I have made every reasonable attempt to correct obvious errors, but I expect that there are errors of grammar and possibly content that I did not discover before finalizing the note.    Assessment/Plan:  1. Environmental and seasonal allergies     2. Ear fullness, bilateral         Return in about 3 months (around 8/26/2021) for Overall Health.

## 2021-06-23 ENCOUNTER — APPOINTMENT (RX ONLY)
Dept: URBAN - METROPOLITAN AREA CLINIC 20 | Facility: CLINIC | Age: 86
Setting detail: DERMATOLOGY
End: 2021-06-23

## 2021-06-23 DIAGNOSIS — L57.0 ACTINIC KERATOSIS: ICD-10-CM

## 2021-06-23 PROBLEM — C44.321 SQUAMOUS CELL CARCINOMA OF SKIN OF NOSE: Status: ACTIVE | Noted: 2021-06-23

## 2021-06-23 PROBLEM — D48.5 NEOPLASM OF UNCERTAIN BEHAVIOR OF SKIN: Status: ACTIVE | Noted: 2021-06-23

## 2021-06-23 PROCEDURE — 17000 DESTRUCT PREMALG LESION: CPT | Mod: 59

## 2021-06-23 PROCEDURE — 17003 DESTRUCT PREMALG LES 2-14: CPT

## 2021-06-23 PROCEDURE — ? COUNSELING

## 2021-06-23 PROCEDURE — ? DIAGNOSIS COMMENT

## 2021-06-23 PROCEDURE — 11102 TANGNTL BX SKIN SINGLE LES: CPT

## 2021-06-23 PROCEDURE — ? LIQUID NITROGEN

## 2021-06-23 PROCEDURE — ? ADDITIONAL NOTES

## 2021-06-23 PROCEDURE — ? BIOPSY BY SHAVE METHOD

## 2021-06-23 PROCEDURE — 99213 OFFICE O/P EST LOW 20 MIN: CPT | Mod: 25

## 2021-06-23 ASSESSMENT — LOCATION DETAILED DESCRIPTION DERM
LOCATION DETAILED: RIGHT SUPERIOR HELIX
LOCATION DETAILED: RIGHT MEDIAL FRONTAL SCALP

## 2021-06-23 ASSESSMENT — LOCATION SIMPLE DESCRIPTION DERM
LOCATION SIMPLE: RIGHT EAR
LOCATION SIMPLE: RIGHT SCALP

## 2021-06-23 ASSESSMENT — LOCATION ZONE DERM
LOCATION ZONE: SCALP
LOCATION ZONE: EAR

## 2021-06-23 NOTE — PROCEDURE: DIAGNOSIS COMMENT
Comment: Not biopsy proven. Dr. Noland evaluated w/ me
Render Risk Assessment In Note?: no
Detail Level: Simple

## 2021-06-23 NOTE — PROCEDURE: MIPS QUALITY
Quality 130: Documentation Of Current Medications In The Medical Record: Current Medications Documented
Quality 111:Pneumonia Vaccination Status For Older Adults: Pneumococcal Vaccination Previously Received
Quality 402: Tobacco Use And Help With Quitting Among Adolescents: Patient screened for tobacco and never smoked
Quality 226: Preventive Care And Screening: Tobacco Use: Screening And Cessation Intervention: Patient screened for tobacco use and is an ex/non-smoker
Detail Level: Detailed
Quality 431: Preventive Care And Screening: Unhealthy Alcohol Use - Screening: Patient screened for unhealthy alcohol use using a single question and scores less than 2 times per year

## 2021-06-23 NOTE — PROCEDURE: ADDITIONAL NOTES
Detail Level: Simple
Render Risk Assessment In Note?: no
Additional Notes: Plan\\n\\n1. Dr. Noland advised [place] BID for 6 weeks. \\n2. Reviewed instructions would like pt to RTC in 2 weeks to see how it is going\\n3. If [place] does not resolve area consider 5 fu [place]

## 2021-08-31 ENCOUNTER — OFFICE VISIT (OUTPATIENT)
Dept: MEDICAL GROUP | Facility: PHYSICIAN GROUP | Age: 86
End: 2021-08-31
Payer: MEDICARE

## 2021-08-31 VITALS
TEMPERATURE: 97.5 F | WEIGHT: 194 LBS | OXYGEN SATURATION: 100 % | HEART RATE: 48 BPM | RESPIRATION RATE: 20 BRPM | BODY MASS INDEX: 33.12 KG/M2 | HEIGHT: 64 IN | SYSTOLIC BLOOD PRESSURE: 116 MMHG | DIASTOLIC BLOOD PRESSURE: 58 MMHG

## 2021-08-31 DIAGNOSIS — E66.9 OBESITY (BMI 30.0-34.9): ICD-10-CM

## 2021-08-31 DIAGNOSIS — D68.69 SECONDARY HYPERCOAGULABLE STATE (HCC): ICD-10-CM

## 2021-08-31 DIAGNOSIS — Z23 NEED FOR VACCINATION: ICD-10-CM

## 2021-08-31 DIAGNOSIS — I48.0 PAROXYSMAL ATRIAL FIBRILLATION (HCC): ICD-10-CM

## 2021-08-31 DIAGNOSIS — E78.2 MIXED HYPERLIPIDEMIA: ICD-10-CM

## 2021-08-31 DIAGNOSIS — E11.59 TYPE 2 DIABETES MELLITUS WITH OTHER CIRCULATORY COMPLICATION, WITHOUT LONG-TERM CURRENT USE OF INSULIN (HCC): ICD-10-CM

## 2021-08-31 DIAGNOSIS — E11.43 DIABETIC AUTONOMIC NEUROPATHY ASSOCIATED WITH TYPE 2 DIABETES MELLITUS (HCC): ICD-10-CM

## 2021-08-31 DIAGNOSIS — J43.8 OTHER EMPHYSEMA (HCC): ICD-10-CM

## 2021-08-31 DIAGNOSIS — N18.31 CHRONIC KIDNEY DISEASE (CKD) STAGE G3A/A1, MODERATELY DECREASED GLOMERULAR FILTRATION RATE (GFR) BETWEEN 45-59 ML/MIN/1.73 SQUARE METER AND ALBUMINURIA CREATININE RATIO LESS THAN 30 MG/G: ICD-10-CM

## 2021-08-31 DIAGNOSIS — I10 ESSENTIAL HYPERTENSION, BENIGN: ICD-10-CM

## 2021-08-31 DIAGNOSIS — I25.10 CORONARY ARTERIOSCLEROSIS: ICD-10-CM

## 2021-08-31 DIAGNOSIS — I50.22 CHRONIC SYSTOLIC CONGESTIVE HEART FAILURE (HCC): ICD-10-CM

## 2021-08-31 DIAGNOSIS — I70.0 ATHEROSCLEROSIS OF AORTA (HCC): ICD-10-CM

## 2021-08-31 DIAGNOSIS — J96.11 CHRONIC HYPOXEMIC RESPIRATORY FAILURE (HCC): ICD-10-CM

## 2021-08-31 PROBLEM — R53.83 OTHER FATIGUE: Status: RESOLVED | Noted: 2018-06-06 | Resolved: 2021-08-31

## 2021-08-31 LAB
HBA1C MFR BLD: 8.1 % (ref 0–5.6)
INT CON NEG: NEGATIVE
INT CON POS: POSITIVE

## 2021-08-31 PROCEDURE — 90471 IMMUNIZATION ADMIN: CPT | Performed by: NURSE PRACTITIONER

## 2021-08-31 PROCEDURE — 99214 OFFICE O/P EST MOD 30 MIN: CPT | Mod: 25 | Performed by: NURSE PRACTITIONER

## 2021-08-31 PROCEDURE — 83036 HEMOGLOBIN GLYCOSYLATED A1C: CPT | Performed by: NURSE PRACTITIONER

## 2021-08-31 PROCEDURE — 90750 HZV VACC RECOMBINANT IM: CPT | Performed by: NURSE PRACTITIONER

## 2021-08-31 RX ORDER — TORSEMIDE 20 MG/1
20 TABLET ORAL
COMMUNITY
Start: 2021-07-12

## 2021-08-31 RX ORDER — MAGNESIUM OXIDE 420 MG/1
1 TABLET ORAL DAILY
COMMUNITY

## 2021-08-31 RX ORDER — ALENDRONATE SODIUM 70 MG/1
70 TABLET ORAL
COMMUNITY

## 2021-08-31 RX ORDER — EPLERENONE 25 MG/1
25 TABLET, FILM COATED ORAL 2 TIMES DAILY
COMMUNITY

## 2021-08-31 ASSESSMENT — FIBROSIS 4 INDEX: FIB4 SCORE: 2.64

## 2021-08-31 NOTE — ASSESSMENT & PLAN NOTE
Chronic in nature.  Stable.  [facility]  He is currently on oxygen 4 L continuously.  He said he has been doing well and does not report any increasing shortness of breath.  O2 saturation today is 100% on 4 L.

## 2021-08-31 NOTE — PROGRESS NOTES
Chief Complaint   Patient presents with   • Medication Management     Oxybutynin, urinary frequency        HISTORY OF PRESENT ILLNESS: Patient is a 89 y.o. male established patient who presents today to discuss multiple issues and follow-up.    Type 2 diabetes mellitus with circulatory disorder, without long-term current use of insulin (HCC)  Chronic in nature.  She continues Metformin 1000 mg twice daily, glipizide 5 mg twice daily, Lantus 20 units nightly, Victoza 18 mg  Last A1c was 9.7, A1c today is 8.1 which is excellent.  microalbumin creatinine ratio is reordered today  Patient states that fasting sugars have been approximately 120  Foot exam completed today  Patient states he has an appointment for retinal exam  Patient is taking lisinopril 2.5 mg  Continues atorvastatin 80 mg.  Currently on Eliquis related to A. Fib.  Hypertension is at goal    Monofilament testing with a 10 gram force: sensation intact: intact bilaterally  Visual Inspection: Feet without maceration, ulcers, fissures.  Pedal pulses: intact bilaterally      Mixed hyperlipidemia  Chronic in nature.  Stable.  Patient continues atorvastatin 80 mg without side effects.    Coronary arteriosclerosis  Chronic in nature.  Stable.  Patient continues to follow with Dr. Hammond and cardiology.    Essential hypertension, benign  Chronic in nature.  Blood pressure today is 115/58, patient denies any dizziness, fatigue.  Continues to take medications as prescribed by cardiology including torsemide 20 mg, eplerenone 25 mg, metoprolol 25 mg daily.  Has any side effects of medication.  Denies chest pain, palpitations, dizziness, blurry vision.     Obesity (BMI 30.0-34.9)  Chronic in nature.  Patient has been working on weight loss and has done an excellent job.  Is feeling well states that he has increased his intake of whole fruits and vegetables.  States that he is doing his best to walk daily.    Paroxysmal atrial fibrillation (HCC)  Chronic in nature.   Stable.  Patient continues to follow-up with cardiology regarding this condition.  Continues to take Eliquis 5 mg as recommended.    Chronic systolic congestive heart failure (HCC)  This is a chronic issue.  Patient was hospitalized with acute exacerbation but has been doing very well since.  Does report some mild intermittent swelling in his bilateral ankles, states this is worse when they are dependent.  Denies any cough or change in sputum.  Denies increasing shortness of breath or fatigue.  Continues close follow-up with cardiology.    Chronic kidney disease (CKD) stage G3a/A1, moderately decreased glomerular filtration rate (GFR) between 45-59 mL/min/1.73 square meter and albuminuria creatinine ratio less than 30 mg/g (HCC)  Chronic in nature.  Most recent GFR is 45.  Microalbumin creatinine ratio is ordered.    Atherosclerosis of aorta (HCC)  Chronic in nature.  Seen on CT scan.  Followed by cardiology.    Chronic hypoxemic respiratory failure (HCC)  Chronic in nature.  Stable.  [facility]  He is currently on oxygen 4 L continuously.  He said he has been doing well and does not report any increasing shortness of breath.  O2 saturation today is 100% on 4 L.     Other emphysema (HCC)  Chronic in nature.  [facility]  States he is stable on oxygen 4 L.  [facility]    Secondary hypercoagulable state (HCC)  Chronic in nature.  Related to atrial fibrillation.  Patient is anticoagulated with Eliquis.    Annual Health Assessment Questions:    1.  Are you currently engaging in any exercise or physical activity? Yes    2.  How would you describe your mood or emotional well-being today? good    3.  Have you had any falls in the last year? No    4.  Have you noticed any problems with your balance or had difficulty walking? No    5.  In the last six months have you experienced any leakage of urine? Yes    6. DPA/Advanced Directive:  Patient has Advanced Directive, but it is not on file. Instructed to bring in a copy to scan into their chart.    Patient Active Problem List    Diagnosis Date Noted   • Chronic kidney disease (CKD) stage G3a/A1, moderately decreased glomerular filtration rate (GFR) between 45-59 mL/min/1.73 square meter and albuminuria creatinine ratio less than 30 mg/g ([facility]) 08/31/2021   • Atherosclerosis of aorta ([facility]) 08/31/2021   • Chronic hypoxemic respiratory failure ([facility]) 08/31/2021   • Other emphysema ([facility]) 08/31/2021   • Secondary hypercoagulable state ([facility]) 08/31/2021   • Environmental and seasonal allergies 05/26/2021   • Sinus pause 03/06/2020   • Hypomagnesemia 03/06/2020   • Chronic systolic congestive heart failure ([facility]) 03/04/2020   • Paroxysmal atrial fibrillation ([facility]) 02/26/2020   • Benign prostatic hyperplasia 02/19/2020   • 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   • Incomplete emptying of bladder 02/19/2020   • Urinary tract obstruction 02/19/2020   • Obesity (BMI 30.0-34.9) 06/18/2019   • B12 deficiency 12/18/2018   • History of prostate cancer 12/05/2017   • 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 ([facility]) 09/07/2017   • Senile ectropion 12/31/2014   • Coronary arteriosclerosis 03/31/2014   • Essential hypertension, benign 03/31/2014   • Type 2 diabetes mellitus with circulatory disorder, without long-term current use of insulin ([facility]) 09/18/2013   • Encounter for long-term (current) use of insulin ([facility]) 09/18/2013   • Vitamin D deficiency 09/18/2013   • Mixed hyperlipidemia 09/18/2013       Allergies:Pcn [penicillins] and Latex    Current Outpatient Medications   Medication Sig Dispense Refill   • torsemide (DEMADEX) 20 MG Tab Take 20 mg by mouth every day.     • alendronate (FOSAMAX) 70 MG Tab Take 70 mg by mouth every 7 days.     • eplerenone (INSPRA) 25 MG Tab Take 25 mg by mouth every day.     • Dextrose, Diabetic Use, (GLUCOSE) 4 g chewable tablet Chew 15 g as needed for Low  "Blood Sugar.     • Magnesium Oxide 420 MG Tab Take 1 Tablet by mouth every day.     • Semaglutide (WEGOVY) 0.5 MG/0.5ML Solution Auto-injector Pen-injector Inject 0.5 mg under the skin every 7 days.     • Insulin Pen Needle BD Ultra-Fine Short Pen Needle 31 gauge x 16\"     • insulin glargine (LANTUS SOLOSTAR) 100 UNIT/ML Solution Pen-injector injection Lantus Solostar U-100 Insulin 100 unit/mL (3 mL) subcutaneous pen     • apixaban (ELIQUIS) 5mg Tab Take 1 Tab by mouth 2 Times a Day. Indications: Thromboembolism secondary to Atrial Fibrillation 60 Tab 0   • glipiZIDE (GLUCOTROL) 5 MG Tab Take 1 Tab by mouth 2 times a day. 200 Tab 3   • metoprolol SR (TOPROL XL) 25 MG TABLET SR 24 HR      • atorvastatin (LIPITOR) 80 MG tablet Take 80 mg by mouth every day.     • FREESTYLE LITE strip 1 Strip by Other route every day.     • tamsulosin (FLOMAX) 0.4 MG capsule TAKE ONE CAPSULE BY MOUTH TWO TIMES A  Cap 2   • vitamin D (CHOLECALCIFEROL) 1000 UNIT Tab Take 1,000 Units by mouth every day.     • potassium chloride SA (K-DUR) 20 MEQ Tab CR Take 20 mEq by mouth every day.     • metformin (GLUCOPHAGE) 500 MG Tab TAKE 2 TABLETS BY MOUTH TWICE DAILY WITHMEALS 360 Tab 3   • Lancets MISC His new meter uses the Accu-Chek SoftClix lancets for 3 time daily testing.  Dx. Code 250.02 300 Each 3   • Blood Glucose Monitoring Suppl SUPPLIES MISC Accucheck Yaneth blood glucose test strips, checking blood sugar 2 daily  .02 insulin requiring. 200 Strip 3   • B Complex Vitamins (VITAMIN B COMPLEX PO) Take  by mouth every day.       No current facility-administered medications for this visit.       Social History     Tobacco Use   • Smoking status: Former Smoker     Packs/day: 2.00     Years: 34.00     Pack years: 68.00     Types: Cigarettes     Quit date: 1982     Years since quittin.6   • Smokeless tobacco: Never Used   Vaping Use   • Vaping Use: Never used   Substance Use Topics   • Alcohol use: Yes     Alcohol/week: " "4.2 - 8.4 oz     Types: 7 - 14 Glasses of wine per week     Comment: 1 per day   • Drug use: No       Family Status   Relation Name Status   • Mo   at age 91   • Fa   at age 87   • Jose R Porter  at age 72   • Jose R Meraz  at age 50   • Son Satish 60 Alive   • Farooq Gomez 61 Alive   • MGMo     • MGFa     • PGMo     • PGFa       Family History   Problem Relation Age of Onset   • Heart Disease Mother    • Heart Attack Mother    • Asthma Mother    • Heart Attack Father    • Diabetes Father    • Psychiatric Illness Brother    • Heart Disease Brother         5 way bypass   • Diabetes Brother    • Hyperlipidemia Brother    • Heart Attack Brother    • Psychiatric Illness Brother    • Stroke Brother    • Heart Disease Brother         pacemaker       ROS:   Patient denies headache, blurry vision, dizziness, chest pain, shortness of breath, abdominal pain, nausea, vomiting, change in level of consciousness.  All other systems reviewed and are negative except as in HPI.    Exam:  /58   Pulse (!) 48   Temp 36.4 °C (97.5 °F) (Temporal)   Resp 20   Ht 1.626 m (5' 4\")   Wt 88 kg (194 lb)   SpO2 100%   General:  Normal appearing. No distress.  Pulmonary:  Clear to ausculation, creased in the bases.  Normal effort. No rales, ronchi, or wheezing.  Cardiovascular: irregular rate and rhythm without murmur. Carotid and radial pulses are intact and equal bilaterally.  Neurologic:  Grossly nonfocal  Skin:  Warm and dry.  No obvious lesions.  Musculoskeletal:  Normal gait. No extremity cyanosis, clubbing, or edema.  Psych:  Normal mood and affect. Alert and oriented x3. Judgment and insight is normal.      PLAN:    1. Type 2 diabetes mellitus with other circulatory complication, without long-term current use of insulin (HCC)  Continue current plan of care.  A1c is excellent at 8.1.  Discussed with patient that our goal for him would be less than 8.  - Diabetic Monofilament " Lower Extremity Exam  - Microalbumin Creat Ratio Urine - Lab Collect; Future  - POCT  A1C    2. Obesity (BMI 30.0-34.9)  - Patient identified as having weight management issue.  Appropriate orders and counseling given.    3. Need for vaccination  - Shingles Vaccine (Shingrix)    4. Mixed hyperlipidemia  Continue atorvastatin 80 mg    5. Coronary arteriosclerosis  Continue follow-up with cardiology    6. Essential hypertension, benign  Continue close follow-up with cardiology    7. 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 (HCC)  As above    8. Paroxysmal atrial fibrillation (HCC)  Continue close follow-up with cardiology    9. Chronic systolic congestive heart failure (HCC)  Continue close follow-up with cardiology    10. Chronic kidney disease (CKD) stage G3a/A1, moderately decreased glomerular filtration rate (GFR) between 45-59 mL/min/1.73 square meter and albuminuria creatinine ratio less than 30 mg/g (HCC)  Complete microalbumin creatinine ratio, will update other labs at next appointment    11. Atherosclerosis of aorta (HCC)  Continue follow-up with cardiology    12. Chronic hypoxemic respiratory failure (HCC)  13. Other emphysema (HCC)  [facility]  We will attempt to obtain records.    14. Secondary hypercoagulable state (HCC)  Continue follow-up with cardiology.    Return in about 6 months (around 2/28/2022), or if symptoms worsen or fail to improve.    Please note that this dictation was created using voice recognition software. I have made every reasonable attempt to correct obvious errors, but I expect that there are errors of grammar and possibly content that I did not discover before finalizing the note.    I have placed the below orders and discussed them with an approved delegating provider. The MA is performing the below orders under the direction of Dr. landa.

## 2021-08-31 NOTE — ASSESSMENT & PLAN NOTE
Chronic in nature.  [facility]  States he is stable on oxygen 4 L.  [facility].

## 2021-08-31 NOTE — ASSESSMENT & PLAN NOTE
Chronic in nature.  She continues Metformin 1000 mg twice daily, glipizide 5 mg twice daily, Lantus 20 units nightly, Victoza 18 mg  Last A1c was 9.7, A1c today is 8.1 which is excellent.  microalbumin creatinine ratio is reordered today  Patient states that fasting sugars have been approximately 120  Foot exam completed today  Patient states he has an appointment for retinal exam  Patient is taking lisinopril 2.5 mg  Continues atorvastatin 80 mg.  Currently on Eliquis related to A. Fib.  Hypertension is at goal    Monofilament testing with a 10 gram force: sensation intact: intact bilaterally  Visual Inspection: Feet without maceration, ulcers, fissures.  Pedal pulses: intact bilaterally

## 2021-08-31 NOTE — ASSESSMENT & PLAN NOTE
This is a chronic issue.  Patient was hospitalized with acute exacerbation but has been doing very well since.  Does report some mild intermittent swelling in his bilateral ankles, states this is worse when they are dependent.  Denies any cough or change in sputum.  Denies increasing shortness of breath or fatigue.  Continues close follow-up with cardiology.

## 2021-08-31 NOTE — ASSESSMENT & PLAN NOTE
Chronic in nature.  Stable.  Patient continues to follow-up with cardiology regarding this condition.  Continues to take Eliquis 5 mg as recommended.

## 2021-08-31 NOTE — ASSESSMENT & PLAN NOTE
Chronic in nature.  Blood pressure today is 115/58, patient denies any dizziness, fatigue.  Continues to take medications as prescribed by cardiology including torsemide 20 mg, eplerenone 25 mg, metoprolol 25 mg daily.  Has any side effects of medication.  Denies chest pain, palpitations, dizziness, blurry vision.

## 2021-08-31 NOTE — ASSESSMENT & PLAN NOTE
Chronic in nature.  Patient has been working on weight loss and has done an excellent job.  Is feeling well states that he has increased his intake of whole fruits and vegetables.  States that he is doing his best to walk daily.

## 2021-09-09 ENCOUNTER — APPOINTMENT (RX ONLY)
Dept: URBAN - METROPOLITAN AREA CLINIC 20 | Facility: CLINIC | Age: 86
Setting detail: DERMATOLOGY
End: 2021-09-09

## 2021-09-09 DIAGNOSIS — S31000A OPEN WOUND(S) (MULTIPLE) OF UNSPECIFIED SITE(S), WITHOUT MENTION OF COMPLICATION: ICD-10-CM

## 2021-09-09 PROBLEM — C44.321 SQUAMOUS CELL CARCINOMA OF SKIN OF NOSE: Status: ACTIVE | Noted: 2021-09-09

## 2021-09-09 PROBLEM — D04.5 CARCINOMA IN SITU OF SKIN OF TRUNK: Status: ACTIVE | Noted: 2021-09-09

## 2021-09-09 PROBLEM — D04.4 CARCINOMA IN SITU OF SKIN OF SCALP AND NECK: Status: ACTIVE | Noted: 2021-09-09

## 2021-09-09 PROBLEM — S51.812A LACERATION WITHOUT FOREIGN BODY OF LEFT FOREARM, INITIAL ENCOUNTER: Status: ACTIVE | Noted: 2021-09-09

## 2021-09-09 PROCEDURE — 99213 OFFICE O/P EST LOW 20 MIN: CPT

## 2021-09-09 PROCEDURE — ? DIAGNOSIS COMMENT

## 2021-09-09 PROCEDURE — ? COUNSELING

## 2021-09-09 PROCEDURE — ? ADDITIONAL NOTES

## 2021-09-09 ASSESSMENT — LOCATION DETAILED DESCRIPTION DERM: LOCATION DETAILED: LEFT DISTAL DORSAL FOREARM

## 2021-09-09 ASSESSMENT — LOCATION ZONE DERM: LOCATION ZONE: ARM

## 2021-09-09 ASSESSMENT — LOCATION SIMPLE DESCRIPTION DERM: LOCATION SIMPLE: LEFT FOREARM

## 2021-09-09 NOTE — HPI: PROCEDURE (ELECTRODESSICATION AND CURETTAGE)
Has The Growth Been Previously Biopsied?: has been previously biopsied
Additional History: Here to discuss treatment.

## 2021-09-09 NOTE — PROCEDURE: DIAGNOSIS COMMENT
Comment: Not biopsy proven.
Render Risk Assessment In Note?: no
Detail Level: Simple
Comment: Bx proven, not treated today, will closely monitor. Site has scar.  Pt overall health has declined, therefore will manage highest priority.

## 2021-09-09 NOTE — PROCEDURE: ADDITIONAL NOTES
Additional Notes: Not Bx proven\\n\\nPlan in 1 month:\\n1. Use tolak once a day for 1 month.
Render Risk Assessment In Note?: no
Detail Level: Simple
Additional Notes: Plan\\n1. Pt used Tolak BID for 6 weeks Dr. Noland reviewed with me. \\n2. Last dose was yesterday, recommended stopping medication f/u 5 -6 weeks. \\n3. Wound care reviewed.
Additional Notes: Plan\\n1. Wound care reviewed\\n2. Recommended going to PCP for Tdap

## 2021-09-09 NOTE — HPI: MEDICATION (5-FLUOROURACIL)
How Severe Are The Lesions?: moderate
Is This A New Presentation, Or A Follow-Up?: Follow Up 5-Fluorouracil Therapy
Additional History: Pt used Tolak BID for 6 weeks, last application yesterday.
How Many Weeks Of 5-Fu Have You Completed?: 6 weeks

## 2021-11-29 ENCOUNTER — APPOINTMENT (RX ONLY)
Dept: URBAN - METROPOLITAN AREA CLINIC 20 | Facility: CLINIC | Age: 86
Setting detail: DERMATOLOGY
End: 2021-11-29

## 2021-11-29 DIAGNOSIS — L57.0 ACTINIC KERATOSIS: ICD-10-CM

## 2021-11-29 PROBLEM — D04.5 CARCINOMA IN SITU OF SKIN OF TRUNK: Status: ACTIVE | Noted: 2021-11-29

## 2021-11-29 PROBLEM — D48.5 NEOPLASM OF UNCERTAIN BEHAVIOR OF SKIN: Status: ACTIVE | Noted: 2021-11-29

## 2021-11-29 PROBLEM — D04.4 CARCINOMA IN SITU OF SKIN OF SCALP AND NECK: Status: ACTIVE | Noted: 2021-11-29

## 2021-11-29 PROCEDURE — ? COUNSELING

## 2021-11-29 PROCEDURE — 17003 DESTRUCT PREMALG LES 2-14: CPT

## 2021-11-29 PROCEDURE — ? ADDITIONAL NOTES

## 2021-11-29 PROCEDURE — ? LIQUID NITROGEN

## 2021-11-29 PROCEDURE — 17000 DESTRUCT PREMALG LESION: CPT

## 2021-11-29 PROCEDURE — ? DIAGNOSIS COMMENT

## 2021-11-29 PROCEDURE — 99213 OFFICE O/P EST LOW 20 MIN: CPT | Mod: 25

## 2021-11-29 ASSESSMENT — LOCATION SIMPLE DESCRIPTION DERM
LOCATION SIMPLE: LEFT HAND
LOCATION SIMPLE: LEFT ELBOW
LOCATION SIMPLE: RIGHT SCALP
LOCATION SIMPLE: RIGHT HAND
LOCATION SIMPLE: RIGHT FOREARM
LOCATION SIMPLE: LEFT FOREHEAD
LOCATION SIMPLE: RIGHT EAR
LOCATION SIMPLE: SUPERIOR FOREHEAD
LOCATION SIMPLE: LEFT FOREARM

## 2021-11-29 ASSESSMENT — LOCATION DETAILED DESCRIPTION DERM
LOCATION DETAILED: RIGHT SUPERIOR HELIX
LOCATION DETAILED: LEFT INFERIOR FOREHEAD
LOCATION DETAILED: RIGHT CENTRAL FRONTAL SCALP
LOCATION DETAILED: LEFT DISTAL DORSAL FOREARM
LOCATION DETAILED: LEFT ELBOW
LOCATION DETAILED: LEFT PROXIMAL RADIAL DORSAL FOREARM
LOCATION DETAILED: SUPERIOR MID FOREHEAD
LOCATION DETAILED: RIGHT PROXIMAL DORSAL FOREARM
LOCATION DETAILED: LEFT RADIAL DORSAL HAND
LOCATION DETAILED: RIGHT RADIAL DORSAL HAND

## 2021-11-29 ASSESSMENT — LOCATION ZONE DERM
LOCATION ZONE: SCALP
LOCATION ZONE: ARM
LOCATION ZONE: HAND
LOCATION ZONE: FACE
LOCATION ZONE: EAR

## 2021-11-29 NOTE — PROCEDURE: ADDITIONAL NOTES
Additional Notes: Use Tolak appears resolved closely monitor
Render Risk Assessment In Note?: no
Detail Level: Simple
Additional Notes: Plan:\\n1. Use tolak 1x/day x 4 weeks

## 2021-11-29 NOTE — PROCEDURE: DIAGNOSIS COMMENT
Render Risk Assessment In Note?: no
Comment: Bx proven, not treated, appears resovled, will closely monitor. Pt is going to use tolak to surrounding area & will recheck at next OV.
Detail Level: Simple
Comment: Not biopsy proven, Dr. 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

## 2021-11-29 NOTE — HPI: SKIN LESION (SQUAMOUS CELL CARCINOMA)
How Severe Is Your Skin Cancer?: moderate
Is This A New Presentation, Or A Follow-Up?: Squamous Cell Carcinoma
Additional History: Not biopsy proven & not treated. Monitor.
Is This A New Presentation, Or A Follow-Up?: Follow Up Squamous Cell Carcinoma
Additional History: Not biopsy proven - treated with tolak for 6 weeks. At LOV advised pt to stop medication - monitor
Additional History: Patient advised to use tolak to area for 1 month - monitor today

## 2022-02-07 ENCOUNTER — APPOINTMENT (RX ONLY)
Dept: URBAN - METROPOLITAN AREA CLINIC 20 | Facility: CLINIC | Age: 87
Setting detail: DERMATOLOGY
End: 2022-02-07

## 2022-02-07 DIAGNOSIS — L57.0 ACTINIC KERATOSIS: ICD-10-CM

## 2022-02-07 PROBLEM — D04.4 CARCINOMA IN SITU OF SKIN OF SCALP AND NECK: Status: ACTIVE | Noted: 2022-02-07

## 2022-02-07 PROBLEM — D48.5 NEOPLASM OF UNCERTAIN BEHAVIOR OF SKIN: Status: ACTIVE | Noted: 2022-02-07

## 2022-02-07 PROBLEM — D04.5 CARCINOMA IN SITU OF SKIN OF TRUNK: Status: ACTIVE | Noted: 2022-02-07

## 2022-02-07 PROCEDURE — 17003 DESTRUCT PREMALG LES 2-14: CPT

## 2022-02-07 PROCEDURE — ? ADDITIONAL NOTES

## 2022-02-07 PROCEDURE — 99213 OFFICE O/P EST LOW 20 MIN: CPT | Mod: 25

## 2022-02-07 PROCEDURE — ? DIAGNOSIS COMMENT

## 2022-02-07 PROCEDURE — ? LIQUID NITROGEN

## 2022-02-07 PROCEDURE — 17000 DESTRUCT PREMALG LESION: CPT

## 2022-02-07 PROCEDURE — ? COUNSELING

## 2022-02-07 ASSESSMENT — LOCATION DETAILED DESCRIPTION DERM
LOCATION DETAILED: SUPERIOR MID FOREHEAD
LOCATION DETAILED: LEFT SUPERIOR MEDIAL MALAR CHEEK
LOCATION DETAILED: RIGHT SUPERIOR HELIX
LOCATION DETAILED: RIGHT CENTRAL FRONTAL SCALP
LOCATION DETAILED: LEFT SUPERIOR FOREHEAD
LOCATION DETAILED: RIGHT INFERIOR HELIX
LOCATION DETAILED: RIGHT SUPERIOR FOREHEAD
LOCATION DETAILED: LEFT INFERIOR PARIETAL SCALP
LOCATION DETAILED: LEFT CENTRAL POSTAURICULAR SKIN
LOCATION DETAILED: LEFT CENTRAL FRONTAL SCALP
LOCATION DETAILED: RIGHT SUPERIOR CENTRAL MALAR CHEEK
LOCATION DETAILED: RIGHT FOREHEAD

## 2022-02-07 ASSESSMENT — LOCATION ZONE DERM
LOCATION ZONE: FACE
LOCATION ZONE: SCALP
LOCATION ZONE: EAR

## 2022-02-07 ASSESSMENT — LOCATION SIMPLE DESCRIPTION DERM
LOCATION SIMPLE: LEFT SCALP
LOCATION SIMPLE: LEFT FOREHEAD
LOCATION SIMPLE: SUPERIOR FOREHEAD
LOCATION SIMPLE: RIGHT SCALP
LOCATION SIMPLE: SCALP
LOCATION SIMPLE: RIGHT CHEEK
LOCATION SIMPLE: LEFT CHEEK
LOCATION SIMPLE: RIGHT FOREHEAD
LOCATION SIMPLE: RIGHT EAR

## 2022-02-07 NOTE — PROCEDURE: ADDITIONAL NOTES
Additional Notes: Use Tolak appears resolved closely monitor
Render Risk Assessment In Note?: no
Detail Level: Simple

## 2022-02-28 ENCOUNTER — HOSPITAL ENCOUNTER (OUTPATIENT)
Dept: LAB | Facility: MEDICAL CENTER | Age: 87
End: 2022-02-28
Attending: NURSE PRACTITIONER
Payer: MEDICARE

## 2022-02-28 DIAGNOSIS — E11.59 TYPE 2 DIABETES MELLITUS WITH OTHER CIRCULATORY COMPLICATION, WITHOUT LONG-TERM CURRENT USE OF INSULIN (HCC): ICD-10-CM

## 2022-02-28 LAB
CREAT UR-MCNC: 22.55 MG/DL
MICROALBUMIN UR-MCNC: 1.8 MG/DL
MICROALBUMIN/CREAT UR: 80 MG/G (ref 0–30)

## 2022-02-28 PROCEDURE — 82043 UR ALBUMIN QUANTITATIVE: CPT

## 2022-02-28 PROCEDURE — 82570 ASSAY OF URINE CREATININE: CPT

## 2022-03-01 ENCOUNTER — OFFICE VISIT (OUTPATIENT)
Dept: MEDICAL GROUP | Facility: PHYSICIAN GROUP | Age: 87
End: 2022-03-01
Payer: MEDICARE

## 2022-03-01 VITALS
HEIGHT: 64 IN | HEART RATE: 60 BPM | DIASTOLIC BLOOD PRESSURE: 72 MMHG | TEMPERATURE: 98.6 F | OXYGEN SATURATION: 94 % | BODY MASS INDEX: 34.35 KG/M2 | WEIGHT: 201.2 LBS | SYSTOLIC BLOOD PRESSURE: 136 MMHG | RESPIRATION RATE: 16 BRPM

## 2022-03-01 DIAGNOSIS — I50.22 CHRONIC SYSTOLIC CONGESTIVE HEART FAILURE (HCC): ICD-10-CM

## 2022-03-01 DIAGNOSIS — D68.69 SECONDARY HYPERCOAGULABLE STATE (HCC): ICD-10-CM

## 2022-03-01 DIAGNOSIS — I48.0 PAROXYSMAL ATRIAL FIBRILLATION (HCC): ICD-10-CM

## 2022-03-01 DIAGNOSIS — E11.43 DIABETIC AUTONOMIC NEUROPATHY ASSOCIATED WITH TYPE 2 DIABETES MELLITUS (HCC): ICD-10-CM

## 2022-03-01 DIAGNOSIS — E11.59 TYPE 2 DIABETES MELLITUS WITH OTHER CIRCULATORY COMPLICATION, WITHOUT LONG-TERM CURRENT USE OF INSULIN (HCC): ICD-10-CM

## 2022-03-01 DIAGNOSIS — N18.31 CHRONIC KIDNEY DISEASE (CKD) STAGE G3A/A1, MODERATELY DECREASED GLOMERULAR FILTRATION RATE (GFR) BETWEEN 45-59 ML/MIN/1.73 SQUARE METER AND ALBUMINURIA CREATININE RATIO LESS THAN 30 MG/G: ICD-10-CM

## 2022-03-01 DIAGNOSIS — Z79.4 ENCOUNTER FOR LONG-TERM (CURRENT) USE OF INSULIN (HCC): ICD-10-CM

## 2022-03-01 DIAGNOSIS — J96.11 CHRONIC HYPOXEMIC RESPIRATORY FAILURE (HCC): ICD-10-CM

## 2022-03-01 DIAGNOSIS — J43.8 OTHER EMPHYSEMA (HCC): ICD-10-CM

## 2022-03-01 DIAGNOSIS — I70.0 ATHEROSCLEROSIS OF AORTA (HCC): ICD-10-CM

## 2022-03-01 PROCEDURE — 99214 OFFICE O/P EST MOD 30 MIN: CPT | Performed by: NURSE PRACTITIONER

## 2022-03-01 ASSESSMENT — ENCOUNTER SYMPTOMS
HEARTBURN: 0
FEVER: 0
CHILLS: 0
HEADACHES: 0
NAUSEA: 0
VOMITING: 0
PALPITATIONS: 0
DEPRESSION: 0
WHEEZING: 0
COUGH: 0
SHORTNESS OF BREATH: 1
DIZZINESS: 0

## 2022-03-01 ASSESSMENT — FIBROSIS 4 INDEX: FIB4 SCORE: 2.64

## 2022-03-01 ASSESSMENT — PATIENT HEALTH QUESTIONNAIRE - PHQ9: CLINICAL INTERPRETATION OF PHQ2 SCORE: 0

## 2022-03-01 NOTE — LETTER
[facility]  JUAN MANUEL Del RosarioP.RGILBERT  [address]  [place] [address]  Fax: [phone]   Authorization for Release/Disclosure of   Protected Health Information   Name: SHAYY ARCHIBALD : 1932 SSN: [id number]   Address:  [place] [address] Phone:    [phone] (home) [phone] (work)   I authorize the entity listed below to release/disclose the PHI below to:   [facility]/Jean Claude Webb, PACO.P.R.KAHLIL and [facility] [place].RTHAO.   Provider or Entity Name:     Address   City, State, Zip   Phone:      Fax:     Reason for request: continuity of care   Information to be released:    [  ] LAST COLONOSCOPY,  including any PATH REPORT and follow-up  [  ] LAST FIT/COLOGUARD RESULT [  ] LAST DEXA  [  ] LAST MAMMOGRAM  [  ] LAST PAP  [  ] LAST LABS [  ] RETINA EXAM REPORT  [  ] IMMUNIZATION RECORDS  [  ] Release all info      [  ] Check here and initial the line next to each item to release ALL health information INCLUDING  _____ Care and treatment for drug and / or alcohol abuse  _____ HIV testing, infection status, or AIDS  _____ Genetic Testing    DATES OF SERVICE OR TIME PERIOD TO BE DISCLOSED: _____________  I understand and acknowledge that:  * This Authorization may be revoked at any time by you in writing, except if your health information has already been used or disclosed.  * Your health information that will be used or disclosed as a result of you signing this authorization could be re-disclosed by the recipient. If this occurs, your re-disclosed health information may no longer be protected by State or Federal laws.  * You may refuse to sign this Authorization. Your refusal will not affect your ability to obtain treatment.  * This Authorization becomes effective upon signing and will  on (date) __________.      If no date is indicated, this Authorization will  one (1) year from the signature date.    Name: Shayy Charles  Doe    Signature:   Date:     3/1/2022       PLEASE FAX REQUESTED RECORDS BACK TO: [phone]

## 2022-03-01 NOTE — ASSESSMENT & PLAN NOTE
-Patient is not certain of his Metformin dose at this time  -He states he does continue to take weekly injectable medication  -Records are requested for an updated medication list as well as most recent labs

## 2022-03-01 NOTE — ASSESSMENT & PLAN NOTE
-Continue Eliquis 5 mg daily.  -On review of most recent note from cardiology it does indicate the patient is supposed to have a 4-month follow-up as such patient's daughter will contact cardiology for follow-up, will contact our office if they need labs ordered prior to that appointment.

## 2022-03-02 NOTE — PROGRESS NOTES
Subjective:     Chief Complaint   Patient presents with   • Follow-Up       HPI:   Julio presents today with     Problem   Chronic Kidney Disease (Ckd) Stage G3a/A1, Moderately Decreased Glomerular Filtration Rate (Gfr) Between 45-59 Ml/Min/1.73 Square Meter and Albuminuria Creatinine Ratio Less Than 30 Mg/G (Hcc)    Chronic in nature. Status unknown. [facility], recent microalbumin creatinine ratio does appear elevated, but this provider was unable to review her kidney function labs at this appointment. [facility] will [facility] from [facility].     Atherosclerosis of Aorta (Hcc)    Chronic in nature. Seen on CT scan. Patient continues to follow with Dr. Hammond in cardiology regarding this issue.     Chronic Hypoxemic Respiratory Failure (Hcc)    Chronic in nature. Controlled. Patient states that he is [facility]. Continues to be on 4 L continuous oxygen. States he has been overall doing well but has noted some increasing shortness of breath recent chest x-ray did indicate fluid, and patient has another follow-up with pulmonology for this.     Other Emphysema (Hcc)    Chronic in nature.  Stable.  Patient [facility] to [facility].  Most recent x-ray did indicate fluid and has bilateral lower lungs.  Continues to be stable on 4 L of oxygen.  States that overall breathing has been stable.  Does note that he has been fatiguing more easily with exercise.  States that he did have an episode a couple of weeks ago where he had very low exercise tolerance. he is overall stable on current treatment.  States that he has a follow-up with pulmonology on March 18.     Secondary Hypercoagulable State (Hcc)    Chronic in nature.  Patient continues to follow with cardiology regarding trolled fibrillation.  Patient continues to take Eliquis for anticoagulation.     Chronic Systolic Congestive Heart Failure (Hcc)    Chronic in nature.  Stable.  Patient continues to  follow-up with Dr. Hammond in cardiology.  Stable swelling is noted in his bilateral ankles.  He also has follow-up with pulmonology related to increased fluid in his lungs.  Patient to contact cardiology to determine 4-month follow-up is required.     Paroxysmal Atrial Fibrillation (Hcc)    Chronic in nature.  Stable.  Patient continues to follow-up with cardiology regarding this condition.  Per last note from cardiology in October rhythm was A. fib at that appointment.  Continues Eliquis 5 mg p.o. daily as recommended.     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 (HCC)    Chronic in nature.  Stable.  [facility] and does currently have someone coming to his home to take care of his feet.      Type 2 Diabetes Mellitus With Circulatory Disorder, Without Long-Term Current Use of Insulin ([facility])    Dr. Ruelas eyes, Dr. Baig    Chronic in nature.  Patient states that his last A1c was completed in [facility], States A1c was 8.4  Up to date on eye exam, as he completed this with ophthalmology a week or so ago.  Patient mentions that overall his fasting sugars are in the 150s but he does mention that this morning it was 185 after he had eaten a heavier dinner consisting of clam chowder.  Most recent microalbumin creatinine ratio was elevated at 80.  Patient does continue to follow-up with Dr. Baig, I do not have his most recent kidney function labs as [facility] I did discuss with patient and family that I would like to review these labs.  We discussed that the elevation in the microalbumin creatinine ratio can indicate manges in his kidneys and that is why it is important to evaluate this holistically with the rest of his kidney function labs.  Is also following up with the [facility] who is adjusting his diabetes medication.  He states that recently they had decreased his Metformin as such I  "have requested [facility] for updated medication list.     Encounter for long-term (current) use of insulin (HCC)    Patient continues to follow with Dr. Baig regarding diabetes, uses Lantus.         Current Outpatient Medications Ordered in Epic   Medication Sig Dispense Refill   • torsemide (DEMADEX) 20 MG Tab Take 20 mg by mouth every day.     • alendronate (FOSAMAX) 70 MG Tab Take 70 mg by mouth every 7 days.     • eplerenone (INSPRA) 25 MG Tab Take 25 mg by mouth every day.     • Dextrose, Diabetic Use, (GLUCOSE) 4 g chewable tablet Chew 15 g as needed for Low Blood Sugar.     • Magnesium Oxide 420 MG Tab Take 1 Tablet by mouth every day.     • Semaglutide (WEGOVY) 0.5 MG/0.5ML Solution Auto-injector Pen-injector Inject 0.5 mg under the skin every 7 days.     • Insulin Pen Needle BD Ultra-Fine Short Pen Needle 31 gauge x 5/16\"     • insulin glargine (LANTUS SOLOSTAR) 100 UNIT/ML Solution Pen-injector injection Lantus Solostar U-100 Insulin 100 unit/mL (3 mL) subcutaneous pen     • apixaban (ELIQUIS) 5mg Tab Take 1 Tab by mouth 2 Times a Day. Indications: Thromboembolism secondary to Atrial Fibrillation 60 Tab 0   • glipiZIDE (GLUCOTROL) 5 MG Tab Take 1 Tab by mouth 2 times a day. 200 Tab 3   • metoprolol SR (TOPROL XL) 25 MG TABLET SR 24 HR      • atorvastatin (LIPITOR) 80 MG tablet Take 80 mg by mouth every day.     • FREESTYLE LITE strip 1 Strip by Other route every day.     • tamsulosin (FLOMAX) 0.4 MG capsule TAKE ONE CAPSULE BY MOUTH TWO TIMES A  Cap 2   • vitamin D (CHOLECALCIFEROL) 1000 UNIT Tab Take 1,000 Units by mouth every day.     • potassium chloride SA (K-DUR) 20 MEQ Tab CR Take 20 mEq by mouth every day.     • metformin (GLUCOPHAGE) 500 MG Tab TAKE 2 TABLETS BY MOUTH TWICE DAILY WITHMEALS 360 Tab 3   • Lancets MISC His new meter uses the Accu-Chek SoftClix lancets for 3 time daily testing.  Dx. Code 250.02 300 Each 3   • Blood Glucose Monitoring Suppl SUPPLIES MISC Accucheck Yaneth " "blood glucose test strips, checking blood sugar 2 daily  .02 insulin requiring. 200 Strip 3   • B Complex Vitamins (VITAMIN B COMPLEX PO) Take  by mouth every day.       No current [facility]-ordered facility-administered medications on file.       Health Maintenance: Per patient most screenings are up-to-date but were [facility] will attempt to obtain records.    Review of Systems   Constitutional: Negative for chills, fever and malaise/fatigue.   Respiratory: Positive for shortness of breath. Negative for cough and wheezing.    Cardiovascular: Negative for chest pain and palpitations.   Gastrointestinal: Negative for heartburn, nausea and vomiting.   Neurological: Negative for dizziness and headaches.   Psychiatric/Behavioral: Negative for depression and suicidal ideas.         Objective:     Exam:  /72 (BP Location: Right arm, Patient Position: Sitting, BP Cuff Size: Adult)   Pulse 60   Temp 37 °C (98.6 °F) (Temporal)   Resp 16   Ht 1.626 m (5' 4\")   Wt 91.3 kg (201 lb 3.2 oz)   SpO2 94% Comment: 4L o2  BMI 34.54 kg/m²  Body mass index is 34.54 kg/m².    Physical Exam  Constitutional:       Appearance: Normal appearance.   HENT:      Head: Normocephalic and atraumatic.   Cardiovascular:      Rate and Rhythm: Normal rate. Rhythm irregular.      Heart sounds: Murmur heard.   Pulmonary:      Effort: Pulmonary effort is normal.      Breath sounds: Decreased air movement present. Examination of the right-lower field reveals rales. Examination of the left-lower field reveals rales. Rales present.   Neurological:      Mental Status: He is alert.       Assessment & Plan:     89 y.o. male with the following -     Problem List Items Addressed This Visit     Atherosclerosis of aorta (HCC)     -Continue follow-up with cardiology.         Chronic hypoxemic respiratory failure (HCC)     -To new pulmonology follow-up.         Chronic kidney disease (CKD) stage G3a/A1, moderately decreased glomerular " filtration rate (GFR) between 45-59 mL/min/1.73 square meter and albuminuria creatinine ratio less than 30 mg/g (HCC)     [facility]         Chronic systolic congestive heart failure (HCC)     -Continue follow-up with cardiology  -Continue follow-[facility] with [facility] the [facility].         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         Encounter for long-term (current) use of insulin ([facility])     -Continue follow-up with Dr. Baig         Other emphysema ([facility])     -Continue close follow-up with [facility] the [facility]         Paroxysmal atrial fibrillation ([facility])     -Continue Eliquis 5 mg p.o. daily.         Secondary hypercoagulable state (HCC)     -Continue Eliquis 5 mg daily.  -On review of most recent note from cardiology it does indicate the patient is supposed to have a 4-month follow-up as such patient's daughter will contact cardiology for follow-up, will contact our office if they need labs ordered prior to that appointment.         Type 2 diabetes mellitus with circulatory disorder, without long-term current use of insulin ([facility])     -Patient is not certain of his Metformin dose at this time  -He states he does continue to take weekly injectable medication  -Records are requested for an updated medication list as well as most recent labs               Plan to obtain updated [facility] the [facility], discussed with patient that we can order labs through our office, but that if he recently completed labs through the [facility] he does not necessarily need labs through our office as well.  Patient states that they will contact cardiology and find out if they are supposed to follow-up this month/will let me know if they would like labs ordered through our office.      Return in about 6 months (around 9/1/2022), or if symptoms worsen or fail to improve, for Annual Physical.    I have placed the below orders and discussed them with an approved delegating  provider. The MA is performing the below orders under the direction of Dr. Root.     Please note that this dictation was created using voice recognition software. I have made every reasonable attempt to correct obvious errors, but I expect that there are errors of grammar and possibly content that I did not discover before finalizing the note.

## 2022-03-28 ENCOUNTER — PATIENT MESSAGE (OUTPATIENT)
Dept: HEALTH INFORMATION MANAGEMENT | Facility: OTHER | Age: 87
End: 2022-03-28

## 2022-05-23 ENCOUNTER — APPOINTMENT (RX ONLY)
Dept: URBAN - METROPOLITAN AREA CLINIC 20 | Facility: CLINIC | Age: 87
Setting detail: DERMATOLOGY
End: 2022-05-23

## 2022-05-23 DIAGNOSIS — L57.0 ACTINIC KERATOSIS: ICD-10-CM

## 2022-05-23 PROBLEM — D04.5 CARCINOMA IN SITU OF SKIN OF TRUNK: Status: ACTIVE | Noted: 2022-05-23

## 2022-05-23 PROBLEM — D04.4 CARCINOMA IN SITU OF SKIN OF SCALP AND NECK: Status: ACTIVE | Noted: 2022-05-23

## 2022-05-23 PROBLEM — D48.5 NEOPLASM OF UNCERTAIN BEHAVIOR OF SKIN: Status: ACTIVE | Noted: 2022-05-23

## 2022-05-23 PROCEDURE — ? ADDITIONAL NOTES

## 2022-05-23 PROCEDURE — ? LIQUID NITROGEN

## 2022-05-23 PROCEDURE — 17003 DESTRUCT PREMALG LES 2-14: CPT

## 2022-05-23 PROCEDURE — ? BIOPSY BY SHAVE METHOD

## 2022-05-23 PROCEDURE — 11103 TANGNTL BX SKIN EA SEP/ADDL: CPT

## 2022-05-23 PROCEDURE — ? COUNSELING

## 2022-05-23 PROCEDURE — ? DIAGNOSIS COMMENT

## 2022-05-23 PROCEDURE — 11102 TANGNTL BX SKIN SINGLE LES: CPT

## 2022-05-23 PROCEDURE — 17000 DESTRUCT PREMALG LESION: CPT | Mod: 59

## 2022-05-23 PROCEDURE — 99213 OFFICE O/P EST LOW 20 MIN: CPT | Mod: 25

## 2022-05-23 ASSESSMENT — LOCATION DETAILED DESCRIPTION DERM
LOCATION DETAILED: RIGHT DISTAL DORSAL FOREARM
LOCATION DETAILED: LEFT CENTRAL TEMPLE
LOCATION DETAILED: SUPERIOR MID FOREHEAD
LOCATION DETAILED: LEFT SUPERIOR PREAURICULAR CHEEK
LOCATION DETAILED: MID-FRONTAL SCALP
LOCATION DETAILED: RIGHT SUPERIOR FOREHEAD
LOCATION DETAILED: LEFT SUPERIOR FOREHEAD
LOCATION DETAILED: LEFT SUPERIOR MEDIAL FOREHEAD
LOCATION DETAILED: NASAL DORSUM
LOCATION DETAILED: LEFT CENTRAL MALAR CHEEK
LOCATION DETAILED: LEFT SUPERIOR HELIX
LOCATION DETAILED: RIGHT CENTRAL MALAR CHEEK
LOCATION DETAILED: RIGHT CENTRAL FRONTAL SCALP
LOCATION DETAILED: RIGHT CENTRAL ZYGOMA

## 2022-05-23 ASSESSMENT — LOCATION SIMPLE DESCRIPTION DERM
LOCATION SIMPLE: SUPERIOR FOREHEAD
LOCATION SIMPLE: LEFT EAR
LOCATION SIMPLE: RIGHT CHEEK
LOCATION SIMPLE: RIGHT FOREARM
LOCATION SIMPLE: RIGHT SCALP
LOCATION SIMPLE: NOSE
LOCATION SIMPLE: RIGHT FOREHEAD
LOCATION SIMPLE: LEFT TEMPLE
LOCATION SIMPLE: RIGHT ZYGOMA
LOCATION SIMPLE: ANTERIOR SCALP
LOCATION SIMPLE: LEFT FOREHEAD
LOCATION SIMPLE: LEFT CHEEK

## 2022-05-23 ASSESSMENT — LOCATION ZONE DERM
LOCATION ZONE: SCALP
LOCATION ZONE: EAR
LOCATION ZONE: ARM
LOCATION ZONE: FACE
LOCATION ZONE: NOSE

## 2022-05-23 NOTE — PROCEDURE: DIAGNOSIS COMMENT
Render Risk Assessment In Note?: no
Comment: Bx proven, not treated, appears resovled, will closely monitor. Pt is going to use [place] to surrounding area & will recheck at next OV.
Detail Level: Simple

## 2022-05-25 ENCOUNTER — RX ONLY (OUTPATIENT)
Age: 87
Setting detail: RX ONLY
End: 2022-05-25

## 2022-05-25 RX ORDER — FLUOROURACIL 5 MG/G
CREAM TOPICAL
Qty: 40 | Refills: 3 | Status: ERX

## 2022-08-11 ENCOUNTER — APPOINTMENT (RX ONLY)
Dept: URBAN - METROPOLITAN AREA CLINIC 20 | Facility: CLINIC | Age: 87
Setting detail: DERMATOLOGY
End: 2022-08-11

## 2022-08-11 PROBLEM — C44.41 BASAL CELL CARCINOMA OF SKIN OF SCALP AND NECK: Status: ACTIVE | Noted: 2022-08-11

## 2022-08-11 PROBLEM — D04.62 CARCINOMA IN SITU OF SKIN OF LEFT UPPER LIMB, INCLUDING SHOULDER: Status: ACTIVE | Noted: 2022-08-11

## 2022-08-11 PROCEDURE — 99213 OFFICE O/P EST LOW 20 MIN: CPT | Mod: 25

## 2022-08-11 PROCEDURE — ? DIAGNOSIS COMMENT

## 2022-08-11 PROCEDURE — ? CURETTAGE AND DESTRUCTION

## 2022-08-11 PROCEDURE — 17272 DSTR MAL LES S/N/H/F/G 1.1-2: CPT

## 2022-08-11 PROCEDURE — ? COUNSELING

## 2022-08-11 NOTE — PROCEDURE: CURETTAGE AND DESTRUCTION
Detail Level: Detailed
Biopsy Photograph Reviewed: Yes
Number Of Curettages: 3
Size Of Lesion In Cm: 0.8
Size Of Lesion After Curettage: 1.4
Add Intralesional Injection: No
Total Volume (Ccs): 1
Anesthesia Type: 1% lidocaine with 1:100,000 epinephrine and a 1:12 solution of 8.4% sodium bicarbonate
Cautery Type: electrodesiccation
What Was Performed First?: Curettage
Final Size Statement: The size of the lesion after curettage was
Additional Information: (Optional): The wound was cleaned, and a pressure dressing was applied.  The patient received detailed post-op instructions.
Consent was obtained from the patient. The risks, benefits and alternatives to therapy were discussed in detail. Specifically, the risks of infection, scarring, bleeding, prolonged wound healing, nerve injury, incomplete removal, allergy to anesthesia and recurrence were addressed. Alternatives to ED&C, such as: surgical removal and XRT were also discussed.  Prior to the procedure, the treatment site was clearly identified and confirmed by the patient. All components of Universal Protocol/PAUSE Rule completed.
Post-Care Instructions: I reviewed with the patient in detail post-care instructions. Patient is to keep the area dry for 48 hours, and not to engage in any swimming until the area is healed. Should the patient develop any fevers, chills, bleeding, severe pain patient will contact the office immediately.
Bill As A Line Item Or As Units: Line Item

## 2022-08-11 NOTE — PROCEDURE: MIPS QUALITY
Quality 111:Pneumonia Vaccination Status For Older Adults: Pneumococcal Vaccination Previously Received
Detail Level: Detailed
Quality 402: Tobacco Use And Help With Quitting Among Adolescents: Patient screened for tobacco and never smoked
Quality 431: Preventive Care And Screening: Unhealthy Alcohol Use - Screening: Patient screened for unhealthy alcohol use using a single question and scores less than 2 times per year
Quality 130: Documentation Of Current Medications In The Medical Record: Current Medications Documented
Quality 226: Preventive Care And Screening: Tobacco Use: Screening And Cessation Intervention: Patient screened for tobacco use and is an ex/non-smoker

## 2022-08-13 NOTE — HPI: SKIN LESION (ACTINIC KERATOSES)
How Severe Are They?: mild
Is This A New Presentation, Or A Follow-Up?: Follow Up Actinic Keratoses
No

## 2022-09-06 ENCOUNTER — APPOINTMENT (OUTPATIENT)
Dept: MEDICAL GROUP | Facility: PHYSICIAN GROUP | Age: 87
End: 2022-09-06
Payer: MEDICARE

## 2022-09-16 ENCOUNTER — TELEPHONE (OUTPATIENT)
Dept: HEALTH INFORMATION MANAGEMENT | Facility: OTHER | Age: 87
End: 2022-09-16
Payer: MEDICARE

## 2022-11-11 ENCOUNTER — APPOINTMENT (RX ONLY)
Dept: URBAN - METROPOLITAN AREA CLINIC 20 | Facility: CLINIC | Age: 87
Setting detail: DERMATOLOGY
End: 2022-11-11

## 2022-11-11 DIAGNOSIS — L57.0 ACTINIC KERATOSIS: ICD-10-CM

## 2022-11-11 DIAGNOSIS — Z85.828 PERSONAL HISTORY OF OTHER MALIGNANT NEOPLASM OF SKIN: ICD-10-CM

## 2022-11-11 DIAGNOSIS — L57.8 OTHER SKIN CHANGES DUE TO CHRONIC EXPOSURE TO NONIONIZING RADIATION: ICD-10-CM

## 2022-11-11 PROBLEM — D48.5 NEOPLASM OF UNCERTAIN BEHAVIOR OF SKIN: Status: ACTIVE | Noted: 2022-11-11

## 2022-11-11 PROCEDURE — ? COUNSELING

## 2022-11-11 PROCEDURE — 99212 OFFICE O/P EST SF 10 MIN: CPT | Mod: 25

## 2022-11-11 PROCEDURE — 11102 TANGNTL BX SKIN SINGLE LES: CPT

## 2022-11-11 PROCEDURE — ? LIQUID NITROGEN

## 2022-11-11 PROCEDURE — ? BIOPSY BY SHAVE METHOD

## 2022-11-11 PROCEDURE — 17003 DESTRUCT PREMALG LES 2-14: CPT

## 2022-11-11 PROCEDURE — 17000 DESTRUCT PREMALG LESION: CPT | Mod: 59

## 2022-11-11 ASSESSMENT — LOCATION SIMPLE DESCRIPTION DERM
LOCATION SIMPLE: LEFT ANTERIOR NECK
LOCATION SIMPLE: RIGHT EAR
LOCATION SIMPLE: RIGHT CHEEK
LOCATION SIMPLE: LEFT SCALP
LOCATION SIMPLE: NECK
LOCATION SIMPLE: RIGHT TEMPLE
LOCATION SIMPLE: SCALP
LOCATION SIMPLE: LEFT FOREHEAD
LOCATION SIMPLE: LEFT CHEEK

## 2022-11-11 ASSESSMENT — LOCATION DETAILED DESCRIPTION DERM
LOCATION DETAILED: LEFT SUPERIOR LATERAL NECK
LOCATION DETAILED: LEFT LATERAL MANDIBULAR CHEEK
LOCATION DETAILED: RIGHT SUPERIOR CRUS OF ANTIHELIX
LOCATION DETAILED: LEFT CENTRAL FRONTAL SCALP
LOCATION DETAILED: LEFT FOREHEAD
LOCATION DETAILED: LEFT CLAVICULAR NECK
LOCATION DETAILED: LEFT SUPERIOR MEDIAL MALAR CHEEK
LOCATION DETAILED: RIGHT SUPERIOR CENTRAL MALAR CHEEK
LOCATION DETAILED: LEFT CENTRAL MALAR CHEEK
LOCATION DETAILED: LEFT SUPERIOR PARIETAL SCALP
LOCATION DETAILED: RIGHT INFERIOR CENTRAL MALAR CHEEK
LOCATION DETAILED: RIGHT CENTRAL TEMPLE

## 2022-11-11 ASSESSMENT — LOCATION ZONE DERM
LOCATION ZONE: SCALP
LOCATION ZONE: EAR
LOCATION ZONE: FACE
LOCATION ZONE: NECK

## 2022-12-13 PROCEDURE — 93005 ELECTROCARDIOGRAM TRACING: CPT

## 2022-12-13 PROCEDURE — 84484 ASSAY OF TROPONIN QUANT: CPT

## 2022-12-13 PROCEDURE — 36415 COLL VENOUS BLD VENIPUNCTURE: CPT

## 2022-12-13 PROCEDURE — 99285 EMERGENCY DEPT VISIT HI MDM: CPT

## 2022-12-13 PROCEDURE — 93005 ELECTROCARDIOGRAM TRACING: CPT | Performed by: EMERGENCY MEDICINE

## 2022-12-13 PROCEDURE — 84443 ASSAY THYROID STIM HORMONE: CPT

## 2022-12-13 PROCEDURE — 85025 COMPLETE CBC W/AUTO DIFF WBC: CPT

## 2022-12-13 PROCEDURE — 80053 COMPREHEN METABOLIC PANEL: CPT

## 2022-12-13 ASSESSMENT — FIBROSIS 4 INDEX: FIB4 SCORE: 2.67

## 2022-12-14 ENCOUNTER — HOSPITAL ENCOUNTER (INPATIENT)
Facility: MEDICAL CENTER | Age: 87
LOS: 2 days | DRG: 229 | End: 2022-12-16
Attending: EMERGENCY MEDICINE | Admitting: STUDENT IN AN ORGANIZED HEALTH CARE EDUCATION/TRAINING PROGRAM
Payer: MEDICARE

## 2022-12-14 ENCOUNTER — APPOINTMENT (OUTPATIENT)
Dept: RADIOLOGY | Facility: MEDICAL CENTER | Age: 87
DRG: 229 | End: 2022-12-14
Attending: EMERGENCY MEDICINE
Payer: MEDICARE

## 2022-12-14 DIAGNOSIS — I44.7 LBBB (LEFT BUNDLE BRANCH BLOCK): ICD-10-CM

## 2022-12-14 DIAGNOSIS — N17.9 AKI (ACUTE KIDNEY INJURY) (HCC): ICD-10-CM

## 2022-12-14 DIAGNOSIS — R00.1 BRADYCARDIA: Primary | ICD-10-CM

## 2022-12-14 PROBLEM — I48.91 ATRIAL FIBRILLATION WITH SLOW VENTRICULAR RESPONSE (HCC): Status: ACTIVE | Noted: 2022-12-14

## 2022-12-14 PROBLEM — N18.9 CKD (CHRONIC KIDNEY DISEASE): Status: ACTIVE | Noted: 2021-08-31

## 2022-12-14 PROBLEM — Z71.89 ACP (ADVANCE CARE PLANNING): Status: ACTIVE | Noted: 2022-12-14

## 2022-12-14 PROBLEM — D69.6 THROMBOCYTOPENIA (HCC): Status: ACTIVE | Noted: 2022-12-14

## 2022-12-14 LAB
ALBUMIN SERPL BCP-MCNC: 4.1 G/DL (ref 3.2–4.9)
ALBUMIN/GLOB SERPL: 1.3 G/DL
ALP SERPL-CCNC: 169 U/L (ref 30–99)
ALT SERPL-CCNC: 16 U/L (ref 2–50)
ANION GAP SERPL CALC-SCNC: 12 MMOL/L (ref 7–16)
APTT PPP: 45.4 SEC (ref 24.7–36)
AST SERPL-CCNC: 21 U/L (ref 12–45)
BASOPHILS # BLD AUTO: 0.6 % (ref 0–1.8)
BASOPHILS # BLD: 0.03 K/UL (ref 0–0.12)
BILIRUB SERPL-MCNC: 1.1 MG/DL (ref 0.1–1.5)
BUN SERPL-MCNC: 31 MG/DL (ref 8–22)
CALCIUM ALBUM COR SERPL-MCNC: 9.3 MG/DL (ref 8.5–10.5)
CALCIUM SERPL-MCNC: 9.4 MG/DL (ref 8.5–10.5)
CHLORIDE SERPL-SCNC: 96 MMOL/L (ref 96–112)
CO2 SERPL-SCNC: 27 MMOL/L (ref 20–33)
CREAT SERPL-MCNC: 1.44 MG/DL (ref 0.5–1.4)
EKG IMPRESSION: NORMAL
EOSINOPHIL # BLD AUTO: 0.02 K/UL (ref 0–0.51)
EOSINOPHIL NFR BLD: 0.4 % (ref 0–6.9)
ERYTHROCYTE [DISTWIDTH] IN BLOOD BY AUTOMATED COUNT: 54 FL (ref 35.9–50)
GFR SERPLBLD CREATININE-BSD FMLA CKD-EPI: 46 ML/MIN/1.73 M 2
GLOBULIN SER CALC-MCNC: 3.2 G/DL (ref 1.9–3.5)
GLUCOSE BLD STRIP.AUTO-MCNC: 120 MG/DL (ref 65–99)
GLUCOSE BLD STRIP.AUTO-MCNC: 170 MG/DL (ref 65–99)
GLUCOSE SERPL-MCNC: 137 MG/DL (ref 65–99)
HCT VFR BLD AUTO: 29.4 % (ref 42–52)
HGB BLD-MCNC: 9.3 G/DL (ref 14–18)
IMM GRANULOCYTES # BLD AUTO: 0.01 K/UL (ref 0–0.11)
IMM GRANULOCYTES NFR BLD AUTO: 0.2 % (ref 0–0.9)
INR PPP: 2.08 (ref 0.87–1.13)
LYMPHOCYTES # BLD AUTO: 0.62 K/UL (ref 1–4.8)
LYMPHOCYTES NFR BLD: 12.4 % (ref 22–41)
MAGNESIUM SERPL-MCNC: 1.6 MG/DL (ref 1.5–2.5)
MCH RBC QN AUTO: 28.8 PG (ref 27–33)
MCHC RBC AUTO-ENTMCNC: 31.6 G/DL (ref 33.7–35.3)
MCV RBC AUTO: 91 FL (ref 81.4–97.8)
MONOCYTES # BLD AUTO: 0.51 K/UL (ref 0–0.85)
MONOCYTES NFR BLD AUTO: 10.2 % (ref 0–13.4)
NEUTROPHILS # BLD AUTO: 3.83 K/UL (ref 1.82–7.42)
NEUTROPHILS NFR BLD: 76.2 % (ref 44–72)
NRBC # BLD AUTO: 0 K/UL
NRBC BLD-RTO: 0 /100 WBC
PLATELET # BLD AUTO: 130 K/UL (ref 164–446)
PMV BLD AUTO: 10.2 FL (ref 9–12.9)
POTASSIUM SERPL-SCNC: 4 MMOL/L (ref 3.6–5.5)
PROT SERPL-MCNC: 7.3 G/DL (ref 6–8.2)
PROTHROMBIN TIME: 22.8 SEC (ref 12–14.6)
RBC # BLD AUTO: 3.23 M/UL (ref 4.7–6.1)
SODIUM SERPL-SCNC: 135 MMOL/L (ref 135–145)
TROPONIN T SERPL-MCNC: 38 NG/L (ref 6–19)
TROPONIN T SERPL-MCNC: 41 NG/L (ref 6–19)
TROPONIN T SERPL-MCNC: 42 NG/L (ref 6–19)
TSH SERPL DL<=0.005 MIU/L-ACNC: 1.88 UIU/ML (ref 0.38–5.33)
WBC # BLD AUTO: 5 K/UL (ref 4.8–10.8)

## 2022-12-14 PROCEDURE — 770020 HCHG ROOM/CARE - TELE (206)

## 2022-12-14 PROCEDURE — 83735 ASSAY OF MAGNESIUM: CPT

## 2022-12-14 PROCEDURE — 82962 GLUCOSE BLOOD TEST: CPT

## 2022-12-14 PROCEDURE — 71045 X-RAY EXAM CHEST 1 VIEW: CPT

## 2022-12-14 PROCEDURE — 99223 1ST HOSP IP/OBS HIGH 75: CPT | Mod: AI | Performed by: STUDENT IN AN ORGANIZED HEALTH CARE EDUCATION/TRAINING PROGRAM

## 2022-12-14 PROCEDURE — 84484 ASSAY OF TROPONIN QUANT: CPT

## 2022-12-14 PROCEDURE — 85730 THROMBOPLASTIN TIME PARTIAL: CPT

## 2022-12-14 PROCEDURE — 99223 1ST HOSP IP/OBS HIGH 75: CPT | Mod: 57 | Performed by: INTERNAL MEDICINE

## 2022-12-14 PROCEDURE — 85610 PROTHROMBIN TIME: CPT

## 2022-12-14 PROCEDURE — 99285 EMERGENCY DEPT VISIT HI MDM: CPT | Performed by: INTERNAL MEDICINE

## 2022-12-14 PROCEDURE — 36415 COLL VENOUS BLD VENIPUNCTURE: CPT

## 2022-12-14 PROCEDURE — 700102 HCHG RX REV CODE 250 W/ 637 OVERRIDE(OP): Performed by: STUDENT IN AN ORGANIZED HEALTH CARE EDUCATION/TRAINING PROGRAM

## 2022-12-14 RX ORDER — FERROUS SULFATE 325(65) MG
325 TABLET ORAL DAILY
COMMUNITY

## 2022-12-14 RX ORDER — TIOTROPIUM BROMIDE AND OLODATEROL 3.124; 2.736 UG/1; UG/1
2 SPRAY, METERED RESPIRATORY (INHALATION) EVERY MORNING
COMMUNITY

## 2022-12-14 RX ADMIN — INSULIN HUMAN 2 UNITS: 100 INJECTION, SOLUTION PARENTERAL at 20:48

## 2022-12-14 ASSESSMENT — COGNITIVE AND FUNCTIONAL STATUS - GENERAL
SUGGESTED CMS G CODE MODIFIER MOBILITY: CH
MOBILITY SCORE: 24
DAILY ACTIVITIY SCORE: 24
SUGGESTED CMS G CODE MODIFIER DAILY ACTIVITY: CH

## 2022-12-14 ASSESSMENT — LIFESTYLE VARIABLES
HOW MANY TIMES IN THE PAST YEAR HAVE YOU HAD 5 OR MORE DRINKS IN A DAY: 0
HAVE PEOPLE ANNOYED YOU BY CRITICIZING YOUR DRINKING: NO
TOTAL SCORE: 0
DOES PATIENT WANT TO STOP DRINKING: NO
TOTAL SCORE: 0
CONSUMPTION TOTAL: NEGATIVE
TOTAL SCORE: 0
ALCOHOL_USE: NO
AVERAGE NUMBER OF DAYS PER WEEK YOU HAVE A DRINK CONTAINING ALCOHOL: 7
EVER FELT BAD OR GUILTY ABOUT YOUR DRINKING: NO
ON A TYPICAL DAY WHEN YOU DRINK ALCOHOL HOW MANY DRINKS DO YOU HAVE: 1
EVER HAD A DRINK FIRST THING IN THE MORNING TO STEADY YOUR NERVES TO GET RID OF A HANGOVER: NO
HAVE YOU EVER FELT YOU SHOULD CUT DOWN ON YOUR DRINKING: NO

## 2022-12-14 ASSESSMENT — FIBROSIS 4 INDEX: FIB4 SCORE: 3.63

## 2022-12-14 ASSESSMENT — ENCOUNTER SYMPTOMS
HEADACHES: 0
PALPITATIONS: 0
WEAKNESS: 1
COUGH: 0
DOUBLE VISION: 0
HEARTBURN: 0
HEMOPTYSIS: 0
MYALGIAS: 0
BLURRED VISION: 0
CHILLS: 0
FEVER: 0
BRUISES/BLEEDS EASILY: 0
NECK PAIN: 0
NAUSEA: 0
DEPRESSION: 0
DIZZINESS: 0

## 2022-12-14 ASSESSMENT — PATIENT HEALTH QUESTIONNAIRE - PHQ9
1. LITTLE INTEREST OR PLEASURE IN DOING THINGS: NOT AT ALL
2. FEELING DOWN, DEPRESSED, IRRITABLE, OR HOPELESS: NOT AT ALL
SUM OF ALL RESPONSES TO PHQ9 QUESTIONS 1 AND 2: 0

## 2022-12-14 NOTE — DISCHARGE PLANNING
"SENIOR CARE PLUS/HTH [facility] PREFERRED   Is listed as Primary Coverage    -------------------------------  HTH/SCP TCN chart review completed. Collaborated with Primary KANA Lopez prior to meeting with the pt. The most current review of medical record, knowledge of pt's PLOF and social support, LACE+ score of 72, 6 clicks scores of ( not entered)  mobility were considered.      Pt seen at bedside, glasses and O2 via NC are in place.  Patient is very pleasant, oriented to self, day, year and was able to explain what brought him to the hospital.Introduced TCN program. Provided education regarding post acute levels of care. Discussed HTH/SCP plan benefits (Meds to Beds, medical uber and [facility] transitional care). Pt verbalizes understanding.      Pt states \" I'm a [place] and a Diabetic over 35 years. I take 9 pills in the morning and 8 pills at night.[facility] to $15, I save a lot with them and they have been so good to me. I think I will only be with Senior care through the ends of the year..\"    Pt endorses living in a single tolu house with 8 to 9 steps leading into the front door. Pt states that his 24yo Great Grandson Rafal Lorenzo who is a Paramedic also lives with him. Pt endorses being able to perform ADLs IADLS ( cooking, bathing, feeding ). Pt uses Home Oxygen 4 Liters via  NC ( provided by [facility] according to patient). Pt also endorses baseline mobility \" I walk around good without using a cane -except when walking in the cold\".  Pt states his Daughter and Son live in [place] and he can ask for assistance from them when needed.    Choice proactively obtained for SNF, HH and given to Primary KANA Lopez. CHAPARRITAn sending referral to [facility]. TCN will continue to follow and collaborate with discharge planning team as additional post acute needs arise. Thank you.     Completed today:  Choice obtained: SNF, HH 12/14/2022  [facility] referral sent 12/14/2022        "

## 2022-12-14 NOTE — ASSESSMENT & PLAN NOTE
Resume home medications  Follows up with Dr. Hammond outpatient cardiology   Resume Inspra, torsemide

## 2022-12-14 NOTE — ED TRIAGE NOTES
Chief Complaint   Patient presents with    Irregular Heart Beat     Pt states his heartbeat feels erratic; dx with low HR going down into 30's when sleeping, pt has been told he will ultimately need a pacemaker    Weakness     Sudden onset of weakness with shaking and diaphoresis about 1 hour ago, symptoms improved now; pt denies chest pain at any time     Pt to triage via wheelchair with daughter and son-in-law for above complaint. At time of incident pt's HR was 34 but has since come back up. EKG done prior to triage.     Pt is alert/oriented and follows commands. Pt speaking in full sentences and responds appropriately to questions. No acute distress noted in triage and respirations are even and unlabored.     Pt placed in lobby and educated on triage process. Pt and family encouraged to alert staff for any changes in condition.

## 2022-12-14 NOTE — ED NOTES
Phone report given to [place] [id number] THERESA Garcia. Patient transported with T8 ACLS transport RN on Z[employer] monitor. All patient belongings and chart sent with patient. Patient care transferred. Receiving RN assuming care.

## 2022-12-14 NOTE — ED PROVIDER NOTES
ED Provider Note    Scribed for Nnamdi Rodriguez by Vasu Snell. 12/14/2022  1:25 AM    Primary care provider: MARCELO Del Rosario  Means of arrival: Walk-In  History obtained from: Patient  History limited by: None    CHIEF COMPLAINT  Chief Complaint   Patient presents with    Irregular Heart Beat     Pt states his heartbeat feels erratic; dx with low HR going down into 30's when sleeping, pt has been told he will ultimately need a pacemaker    Weakness     Sudden onset of weakness with shaking and diaphoresis about 1 hour ago, symptoms improved now; pt denies chest pain at any time     HPI  Julio Azar is a 89 y.o. male with a history of COPD and CHF who presents to the Emergency Department for evaluation of irregular heart beat onset earlier today. The patient states he also has generalized weakness, dizziness, shaking, and increased sweating, but denies any chest pain, nausea, or vomiting. He notes that his heart rate was as low as 33 earlier today. He was evaluated by a Cardiologist Dr. Hammond last week, where he had a heart rate of 40, and they suggested monitoring his heart rate while sleeping. They are expecting to hear back regarding pacemaker placement this week. He describes not eating earlier today, resulting in a low blood sugar of 67 at home. He typically requires 2 LPM supplemental oxygen at home. He denies using any recent recreational drugs, cigarettes, or alcohol. He is anticoagulated using Eliquis out of concern for strokes. He denies any history of blood clots.  Quality: Weak  Duration: 1 day  Severity: Moderate  Associated sx: generalized weakness, dizziness, shaking, and increased sweating    REVIEW OF SYSTEMS  As above, all other systems reviewed and are negative.   See HPI for further details.     PAST MEDICAL HISTORY   has a past medical history of Arrhythmia, Bowel habit changes, Breath shortness, CAD (coronary artery disease), native coronary artery  "(2014), Cancer ([facility]) (), Chronic obstructive pulmonary disease ([facility]), Congestive heart failure ([facility]), Dental disorder, Diabetes, Encounter for long-term (current) use of insulin ([facility]) (2013), Essential hypertension, benign (2014), Heart burn, Other and unspecified hyperlipidemia (2013), Snoring, Type II or unspecified type diabetes mellitus without mention of complication, uncontrolled (2013), and Unspecified vitamin D deficiency (2013).  SURGICAL HISTORY   has a past surgical history that includes cataract phaco with iol (2011); lid tightening (2012); other cardiac surgery (); humberto by laparoscopy (2013); ectropian repair (2014); lacrimal duct probe (2014); and tonsillectomy.  SOCIAL HISTORY  Social History     Tobacco Use    Smoking status: Former     Packs/day: 2.00     Years: 34.00     Pack years: 68.00     Types: Cigarettes     Quit date: 1982     Years since quittin.9    Smokeless tobacco: Never   Vaping Use    Vaping Use: Never used   Substance Use Topics    Alcohol use: Yes     Alcohol/week: 4.2 - 8.4 oz     Types: 7 - 14 Glasses of wine per week     Comment: \"a glass of wine or less a day\"    Drug use: No      Social History     Substance and Sexual Activity   Drug Use No     FAMILY HISTORY  Family History   Problem Relation Age of Onset    Heart Disease Mother     Heart Attack Mother     Asthma Mother     Heart Attack Father     Diabetes Father     Psychiatric Illness Brother     Heart Disease Brother         5 way bypass    Diabetes Brother     Hyperlipidemia Brother     Heart Attack Brother     Psychiatric Illness Brother     Stroke Brother     Heart Disease Brother         pacemaker     CURRENT MEDICATIONS  Home Medications       Reviewed by Monica Rivera R.N. (Registered Nurse) on 22 at 2319  Med List Status: Not Addressed     Medication Last Dose Status   alendronate (FOSAMAX) 70 MG Tab  Active   apixaban " (ELIQUIS) 5mg Tab  Active   atorvastatin (LIPITOR) 80 MG tablet  Active   B Complex Vitamins (VITAMIN B COMPLEX PO)  Active   Blood Glucose Monitoring Suppl SUPPLIES MISC  Active   Dextrose, Diabetic Use, (GLUCOSE) 4 g chewable tablet  Active   eplerenone (INSPRA) 25 MG Tab  Active   FREESTYLE LITE strip  Active   glipiZIDE (GLUCOTROL) 5 MG Tab  Active   insulin glargine (LANTUS SOLOSTAR) 100 UNIT/ML Solution Pen-injector injection  Active   Insulin Pen Needle  Active   Lancets MISC  Active   Magnesium Oxide 420 MG Tab  Active   metformin (GLUCOPHAGE) 500 MG Tab  Active   metoprolol SR (TOPROL XL) 25 MG TABLET SR 24 HR  Active   potassium chloride SA (K-DUR) 20 MEQ Tab CR  Active   Semaglutide (WEGOVY) 0.5 MG/0.5ML Solution Auto-injector Pen-injector  Active   tamsulosin (FLOMAX) 0.4 MG capsule  Active   torsemide (DEMADEX) 20 MG Tab  Active   vitamin D (CHOLECALCIFEROL) 1000 UNIT Tab  Active                  ALLERGIES  Allergies   Allergen Reactions    Pcn [Penicillins] Swelling    Latex Rash     Where latex has touched       PHYSICAL EXAM    VITAL SIGNS:   Vitals:    12/14/22 0223 12/14/22 0255 12/14/22 0259 12/14/22 0310   BP:       Pulse: 61 (!) 47 (!) 55 69   Resp: (!) 25 17 18 (!) 21   Temp:       TempSrc:       SpO2: 98% 100% 100% 100%   Weight:       Height:         Vitals: My interpretation: normotensive, Bradycardic, afebrile, not hypoxic    Reinterpretation of vitals: Unchanged    Cardiac Monitor Interpretation: The cardiac monitor revealed normal Sinus Rhythm bradycardia as interpreted by me. The cardiac monitor was ordered secondary to the patient's history of bradycardia and to monitor for dysrhythmia and/or tachycardia.    PE:   Constitutional: Well developed, Well nourished, No acute distress, Non-toxic appearance.   HENT: Normocephalic, Atraumatic, Bilateral external ears normal, Oropharynx is clear mucous membranes are moist. No oral exudates or nasal discharge.   Eyes: Pupils are equal round and  reactive, EOMI, Conjunctiva normal, No discharge.   Neck: Normal range of motion, No tenderness, Supple, No stridor. No meningismus.  Lymphatic: No lymphadenopathy noted.   Cardiovascular: Bradycardic, Regular rhythm without murmur rub or gallop.  Thorax & Lungs: Clear breath sounds bilaterally without wheezes, rhonchi or rales. There is no chest wall tenderness.   Abdomen: Soft non-tender non-distended. There is no rebound or guarding. No organomegaly is appreciated. Bowel sounds are normal.  Skin: Normal without rash.   Back: No CVA or spinal tenderness.   Extremities: Intact distal pulses, No edema, No tenderness, No cyanosis, No clubbing. Capillary refill is less than 2 seconds.  Musculoskeletal: Good range of motion in all major joints. No tenderness to palpation or major deformities noted.   Neurologic: Alert & oriented x 3, Normal motor function, Normal sensory function, No focal deficits noted. Reflexes are normal.  Psychiatric: Affect normal, Judgment normal, Mood normal. There is no suicidal ideation or patient reported hallucinations.     DIAGNOSTIC STUDIES / PROCEDURES    LABS  Results for orders placed or performed during the hospital encounter of 12/14/22   CBC with Differential   Result Value Ref Range    WBC 5.0 4.8 - 10.8 K/uL    RBC 3.23 (L) 4.70 - 6.10 M/uL    Hemoglobin 9.3 (L) 14.0 - 18.0 g/dL    Hematocrit 29.4 (L) 42.0 - 52.0 %    MCV 91.0 81.4 - 97.8 fL    MCH 28.8 27.0 - 33.0 pg    MCHC 31.6 (L) 33.7 - 35.3 g/dL    RDW 54.0 (H) 35.9 - 50.0 fL    Platelet Count 130 (L) 164 - 446 K/uL    MPV 10.2 9.0 - 12.9 fL    Neutrophils-Polys 76.20 (H) 44.00 - 72.00 %    Lymphocytes 12.40 (L) 22.00 - 41.00 %    Monocytes 10.20 0.00 - 13.40 %    Eosinophils 0.40 0.00 - 6.90 %    Basophils 0.60 0.00 - 1.80 %    Immature Granulocytes 0.20 0.00 - 0.90 %    Nucleated RBC 0.00 /100 WBC    Neutrophils (Absolute) 3.83 1.82 - 7.42 K/uL    Lymphs (Absolute) 0.62 (L) 1.00 - 4.80 K/uL    Monos (Absolute) 0.51 0.00 -  0.85 K/uL    Eos (Absolute) 0.02 0.00 - 0.51 K/uL    Baso (Absolute) 0.03 0.00 - 0.12 K/uL    Immature Granulocytes (abs) 0.01 0.00 - 0.11 K/uL    NRBC (Absolute) 0.00 K/uL   Complete Metabolic Panel (CMP)   Result Value Ref Range    Sodium 135 135 - 145 mmol/L    Potassium 4.0 3.6 - 5.5 mmol/L    Chloride 96 96 - 112 mmol/L    Co2 27 20 - 33 mmol/L    Anion Gap 12.0 7.0 - 16.0    Glucose 137 (H) 65 - 99 mg/dL    Bun 31 (H) 8 - 22 mg/dL    Creatinine 1.44 (H) 0.50 - 1.40 mg/dL    Calcium 9.4 8.5 - 10.5 mg/dL    AST(SGOT) 21 12 - 45 U/L    ALT(SGPT) 16 2 - 50 U/L    Alkaline Phosphatase 169 (H) 30 - 99 U/L    Total Bilirubin 1.1 0.1 - 1.5 mg/dL    Albumin 4.1 3.2 - 4.9 g/dL    Total Protein 7.3 6.0 - 8.2 g/dL    Globulin 3.2 1.9 - 3.5 g/dL    A-G Ratio 1.3 g/dL   Troponins NOW   Result Value Ref Range    Troponin T 42 (H) 6 - 19 ng/L   Troponins in two (2) hours   Result Value Ref Range    Troponin T 41 (H) 6 - 19 ng/L   CORRECTED CALCIUM   Result Value Ref Range    Correct Calcium 9.3 8.5 - 10.5 mg/dL   ESTIMATED GFR   Result Value Ref Range    GFR (CKD-EPI) 46 (A) >60 mL/min/1.73 m 2   APTT   Result Value Ref Range    APTT 45.4 (H) 24.7 - 36.0 sec   PROTHROMBIN TIME (INR)   Result Value Ref Range    PT 22.8 (H) 12.0 - 14.6 sec    INR 2.08 (H) 0.87 - 1.13   MAGNESIUM   Result Value Ref Range    Magnesium 1.6 1.5 - 2.5 mg/dL   EKG   Result Value Ref Range    Report       [facility]    Test Date:  2022  Pt Name:    SHAYY ARCHIBALD               Department: ER  MRN:        [id number]                      Room:  Gender:     Male                         Technician: [id number]  :        [id number]                   Requested By:ER TRIAGE PROTOCOL  Order #:    [id number]                    Reading MD: Nnamdi Rodriguez    Measurements  Intervals                                Axis  Rate:       59                           P:  DE:                                      QRS:        -74  QRSD:        172                          T:          56  QT:         498  QTc:        494    Interpretive Statements  Atrial fibrillation  Right bundle branch block  Compared to ECG 03/04/2020 19:27:38  Myocardial infarct finding no longer present  Electronically Signed On 12- 1:48:05 PST by Nnamdi Rodriguez        All labs reviewed by me. Labs were compared to prior labs if they were available. Significant for Nolex ptosis, mild anemia, normal electrolytes, mild BAO, normal liver enzymes, normal bilirubin, troponin of 42    RADIOLOGY  DX-CHEST-PORTABLE (1 VIEW)   Final Result         1.  Left basilar atelectasis or early infiltrate.   2.  Cardiomegaly   3.  Atherosclerosis        The radiologist's interpretation of all radiological studies have been reviewed by me. I compared imaging to previous images, if they were available.     COURSE & MEDICAL DECISION MAKING  Nursing notes, VS, PMSFHx, labs, imaging, EKG reviewed in chart.    Heart Score: High    MDM: 1:25 AM Julio Azar is a 89 y.o. male who presented with symptomatic bradycardia.  He has a history of bradycardia and normally has a heart rate around mid 40s to mid 50s.  He follows very closely with Dr. Kc of cardiology and they have been planning to consider him for pacemaker placement.  However today patient became symptomatic, bradycardic down to 33, and was lightheaded and dizzy.  He was brought in here for evaluation.  At this time he feels asymptomatic, no chest pain or shortness of breath, but no lightheadedness although he does have a left bundle branch block which is unchanged no ischemic changes, he is bradycardic however.  His work-up here was fairly unremarkable including CBC, CMP and troponin with minimally elevated troponin and a mild BAO.  Chest x-ray shows questionable early infiltrate of the left versus atelectasis.  Repeat troponin shows essentially no change.  Discussed with cardiologist on-call, Dr. Orellana, who agrees for  consultation and likely pacemaker placement in the morning.  Patient will be admitted to the hospitalist for continued monitoring.    1:41 AM - Paged Cardiology.    1:44 AM - I discussed the patient's case and the above findings with Dr. Orellana (Cardiology) who agrees to see the patient for consultation in the morning and consideration of a pacemaker. Paged Hospitalist.    2:03 AM I discussed the patient's case and the above findings with Dr. Felipe (Hospitalist) who agrees to evaluate the patient for hospitalization.     FINAL IMPRESSION  1. Bradycardia Acute   2. BAO (acute kidney injury) (HCC) Acute   3. LBBB (left bundle branch block) Acute      IVasu (Scribe), am scribing for, and in the presence of, Nnamdi Rodriguez.    Electronically signed by: Vasu Snell (Scribe), 12/14/2022    INnamdi personally performed the services described in this documentation, as scribed by Vasu Snell in my presence, and it is both accurate and complete.    The note accurately reflects work and decisions made by me.  Nnamdi Rodriguez  12/14/2022  1:48 AM

## 2022-12-14 NOTE — ASSESSMENT & PLAN NOTE
Chronic history of A. fib on Eliquis  Eliquis to be restarted in the morning  Telemetry monitoring

## 2022-12-14 NOTE — PROGRESS NOTES
4 Eyes Skin Assessment Completed by THERESA Garcia and THERESA Wynne.    Head WDL  Ears Redness and Blanching  Scab on the right ear  Nose WDL  Mouth Redness, Dry   Neck WDL  Breast/Chest WDL  Shoulder Blades WDL  Spine WDL  (R) Arm/Elbow/Hand WDL  (L) Arm/Elbow/Hand WDL  Abdomen WDL  Groin WDL  Scrotum/Coccyx/Buttocks Redness, Blanching, and Excoriation  (R) Leg Edema  (L) Leg Edema  (R) Heel/Foot/Toe Redness, Blanching, and Boggy  (L) Heel/Foot/Toe Redness, Blanching, and Boggy    Devices In Places Tele Box, Pulse Ox, and Nasal Cannula    Interventions In Place Gray Ear Foams, NC W/Ear Foams, Pillows, and Heels Loaded W/Pillows    Possible Skin Injury No    Pictures Uploaded Into Epic Yes  Wound Consult Placed N/A  RN Wound Prevention Protocol Ordered Yes    [place]

## 2022-12-14 NOTE — PROGRESS NOTES
[place] bed. Report received from Eliazar MARTINEZ RN. Updated on POC.  Assumed care of patient upon arrival to unit. Patient currently A & O x 4; on 4 L O2 silicone nasal cannula; up without complaints of acute pain. Pt placed on monitor, monitor room notified, Afib 53. Patient oriented to unit and to call light system. Call light within reach. Pt educated to fall risk. Fall precautions in place. Pt provided with personal grooming items. Bed locked and in lowest position. All questions answered. No other needs indicated at this time.

## 2022-12-14 NOTE — ASSESSMENT & PLAN NOTE
Telemetry monitoring  Hold AV ian blockers  Status post PPM placement with Dr. Avalos.  He will be watched overnight and will need device interrogation prior to hospital discharge, will need follow-up in device clinic.

## 2022-12-14 NOTE — ED NOTES
Med Rec complete per Pt, family, and list provided at bedside.  Allergies reviewed.  Home Pharmacy:  [facility]

## 2022-12-14 NOTE — CARE PLAN
Problem: Knowledge Deficit - Standard  Goal: Patient and family/care givers will demonstrate understanding of plan of care, disease process/condition, diagnostic tests and medications  Outcome: Progressing  Note: Call light within reach. Educated pt to use call light as needed. Reorient and re-educate as needed. Continue to monitor.       Problem: Acute Care of the PPM/AICD Patient  Goal: Pre Procedure Optimal Care of the PPM/AICD Patient  Outcome: Progressing  Intervention: EKG, Chest XRay  Note: Ekg and CXR completed   Intervention: Labs: CBC, BMP, PT, INR, PTT  Note: Labs drawn   Intervention: Start IV in Left Arm  Note: 20 gauge in the left forearm   Intervention: Consent for Procedure  Note: Will be completed in cath lab  Intervention: NPO prior to procedure  Note: NPO for procedure      The patient is Watcher - Medium risk of patient condition declining or worsening    Shift Goals: monitor heart rate and rhythm   Clinical Goals: NPO for procedure  Patient Goals: Pacemaker procedure    Progress made toward(s) clinical / shift goals:  NPO for procedure     Patient is not progressing towards the following goals: Bradycardia, Dr. Dodge notified

## 2022-12-14 NOTE — H&P
Hospital Medicine History & Physical Note    Date of Service  12/14/2022    Primary Care Physician  Jean Claude Bajwa [facility], [facility].    Consultants  cardiology    Specialist Names: Dr. Orellana     Code Status  Full Code    Chief Complaint  Chief Complaint   Patient presents with    Irregular Heart Beat     Pt states his heartbeat feels erratic; dx with low HR going down into 30's when sleeping, pt has been told he will ultimately need a pacemaker    Weakness     Sudden onset of weakness with shaking and diaphoresis about 1 hour ago, symptoms improved now; pt denies chest pain at any time       History of Presenting Illness  Julio Azar is a 89 y.o. male past medical history CAD status post CABG, atrial fibrillation on anticoagulation, diabetes mellitus who presented 12/14/2022 with weakness and slow heart rate.  Patient reports he has been following up with cardiologist outpatient Dr. Hammond recently and had a heart monitor placed.  Daughter reports they sent in monitor for interpretation but have not received results yet.  His cardiologist told him he would likely need a pacemaker.  He denies any chest pain but does report feeling woozy today while going to the bathroom.  Denies any fevers or chills.  He does report wearing oxygen 2 L nightly.  Remote smoker.  Drinks occasional wine.  Daughter reports her son noticed that he had a low heart rate in 30s so was brought to ER.    In the ER, EKG revealed atrial fibrillation with slow ventricular response heart rate 40-60's.  Cardiology was consulted and plans for pacemaker implantation in a.m.  He will be hospitalized for above.    I discussed the plan of care with patient.    Review of Systems  Review of Systems   Constitutional:  Positive for malaise/fatigue. Negative for chills and fever.   HENT:  Negative for hearing loss and tinnitus.    Eyes:  Negative for blurred vision and double vision.   Respiratory:  Negative for cough and hemoptysis.     Cardiovascular:  Negative for chest pain and palpitations.   Gastrointestinal:  Negative for heartburn and nausea.   Genitourinary:  Negative for dysuria and urgency.   Musculoskeletal:  Negative for myalgias and neck pain.   Skin:  Negative for rash.   Neurological:  Positive for weakness. Negative for dizziness and headaches.   Endo/Heme/Allergies:  Does not bruise/bleed easily.   Psychiatric/Behavioral:  Negative for depression and suicidal ideas.      Past Medical History   has a past medical history of Arrhythmia, Bowel habit changes, Breath shortness, CAD (coronary artery disease), native coronary artery (03/31/2014), Cancer (Formerly [facility]) (2014), Chronic obstructive pulmonary disease (Formerly [facility]), Congestive heart failure (Formerly [facility]), Dental disorder, Diabetes, Encounter for long-term (current) use of insulin (Formerly [facility]) (09/18/2013), Essential hypertension, benign (03/31/2014), Heart burn, Other and unspecified hyperlipidemia (09/18/2013), Snoring, Type II or unspecified type diabetes mellitus without mention of complication, uncontrolled (09/18/2013), and Unspecified vitamin D deficiency (09/18/2013).    Surgical History   has a past surgical history that includes cataract phaco with iol (6/30/2011); lid tightening (12/7/2012); other cardiac surgery (2004); humberto by laparoscopy (8/12/2013); ectropian repair (12/31/2014); lacrimal duct probe (12/31/2014); and tonsillectomy.     Family History  family history includes Asthma in his mother; Diabetes in his brother and father; Heart Attack in his brother, father, and mother; Heart Disease in his brother, brother, and mother; Hyperlipidemia in his brother; Psychiatric Illness in his brother and brother; Stroke in his brother.   Family history reviewed with patient. There is no family history that is pertinent to the chief complaint.     Social History   reports that he quit smoking about 40 years ago. His smoking use included cigarettes. He has a 68.00 pack-year smoking history. He has  "never used smokeless tobacco. He reports current alcohol use of about 4.2 - 8.4 oz per week. He reports that he does not use drugs.    Allergies  Allergies   Allergen Reactions    Pcn [Penicillins] Swelling    Latex Rash     Where latex has touched       Medications  Prior to Admission Medications   Prescriptions Last Dose Informant Patient Reported? Taking?   B Complex Vitamins (VITAMIN B COMPLEX PO)   Yes No   Sig: Take  by mouth every day.   Blood Glucose Monitoring Suppl SUPPLIES MISC   No No   Sig: Accucheck Yaneth blood glucose test strips, checking blood sugar 2 daily  .02 insulin requiring.   Dextrose, Diabetic Use, (GLUCOSE) 4 g chewable tablet   Yes No   Sig: Chew 15 g as needed for Low Blood Sugar.   FREESTYLE LITE strip   Yes No   Si Strip by Other route every day.   Insulin Pen Needle   Yes No   Sig: BD Ultra-Fine Short Pen Needle 31 gauge x 5/16\"   Lancets MISC   No No   Sig: His new meter uses the Accu-Chek SoftClix lancets for 3 time daily testing.  Dx. Code 250.02   Magnesium Oxide 420 MG Tab   Yes No   Sig: Take 1 Tablet by mouth every day.   Semaglutide (WEGOVY) 0.5 MG/0.5ML Solution Auto-injector Pen-injector   Yes No   Sig: Inject 0.5 mg under the skin every 7 days.   alendronate (FOSAMAX) 70 MG Tab   Yes No   Sig: Take 70 mg by mouth every 7 days.   apixaban (ELIQUIS) 5mg Tab   No No   Sig: Take 1 Tab by mouth 2 Times a Day. Indications: Thromboembolism secondary to Atrial Fibrillation   atorvastatin (LIPITOR) 80 MG tablet   Yes No   Sig: Take 80 mg by mouth every day.   eplerenone (INSPRA) 25 MG Tab   Yes No   Sig: Take 25 mg by mouth every day.   glipiZIDE (GLUCOTROL) 5 MG Tab   No No   Sig: Take 1 Tab by mouth 2 times a day.   insulin glargine (LANTUS SOLOSTAR) 100 UNIT/ML Solution Pen-injector injection   Yes No   Sig: Lantus Solostar U-100 Insulin 100 unit/mL (3 mL) subcutaneous pen   metformin (GLUCOPHAGE) 500 MG Tab   No No   Sig: TAKE 2 TABLETS BY MOUTH TWICE DAILY WITHMEALS "   metoprolol SR (TOPROL XL) 25 MG TABLET SR 24 HR   Yes No   potassium chloride SA (K-DUR) 20 MEQ Tab CR   Yes No   Sig: Take 20 mEq by mouth every day.   tamsulosin (FLOMAX) 0.4 MG capsule   No No   Sig: TAKE ONE CAPSULE BY MOUTH TWO TIMES A DAY   torsemide (DEMADEX) 20 MG Tab   Yes No   Sig: Take 20 mg by mouth every day.   vitamin D (CHOLECALCIFEROL) 1000 UNIT Tab   Yes No   Sig: Take 1,000 Units by mouth every day.      Facility-Administered Medications: None       Physical Exam  Temp:  [36.1 °C (97 °F)] 36.1 °C (97 °F)  Pulse:  [48-62] 61  Resp:  [15-25] 25  BP: (115-132)/(46-60) 132/60  SpO2:  [98 %-100 %] 98 %  Blood Pressure : 132/60   Temperature: 36.1 °C (97 °F)   Pulse: 61   Respiration: (!) 25   Pulse Oximetry: 98 %       Physical Exam  Vitals and nursing note reviewed.   Constitutional:       General: He is not in acute distress.     Appearance: Normal appearance. He is not ill-appearing.   HENT:      Head: Normocephalic and atraumatic.      Right Ear: Tympanic membrane normal.      Left Ear: Tympanic membrane normal.      Nose: Nose normal.      Comments: NC     Mouth/Throat:      Mouth: Mucous membranes are moist.      Pharynx: Oropharynx is clear.   Eyes:      Extraocular Movements: Extraocular movements intact.      Pupils: Pupils are equal, round, and reactive to light.   Cardiovascular:      Rate and Rhythm: Bradycardia present. Rhythm irregular.      Pulses: Normal pulses.      Heart sounds: Normal heart sounds.   Pulmonary:      Effort: Pulmonary effort is normal. No respiratory distress.      Breath sounds: Normal breath sounds. No stridor.   Abdominal:      General: Bowel sounds are normal. There is no distension.      Palpations: Abdomen is soft. There is no mass.   Musculoskeletal:         General: Normal range of motion.      Cervical back: Neck supple.      Right lower leg: Edema present.      Left lower leg: Edema present.   Skin:     General: Skin is warm.      Capillary Refill:  Capillary refill takes less than 2 seconds.      Coloration: Skin is not jaundiced or pale.   Neurological:      General: No focal deficit present.      Mental Status: He is alert and oriented to person, place, and time. Mental status is at baseline.      Cranial Nerves: No cranial nerve deficit.      Sensory: No sensory deficit.   Psychiatric:         Mood and Affect: Mood normal.         Behavior: Behavior normal.       Laboratory:  Recent Labs     12/13/22  2334   WBC 5.0   RBC 3.23*   HEMOGLOBIN 9.3*   HEMATOCRIT 29.4*   MCV 91.0   MCH 28.8   MCHC 31.6*   RDW 54.0*   PLATELETCT 130*   MPV 10.2     Recent Labs     12/13/22  2334   SODIUM 135   POTASSIUM 4.0   CHLORIDE 96   CO2 27   GLUCOSE 137*   BUN 31*   CREATININE 1.44*   CALCIUM 9.4     Recent Labs     12/13/22  2334   ALTSGPT 16   ASTSGOT 21   ALKPHOSPHAT 169*   TBILIRUBIN 1.1   GLUCOSE 137*         No results for input(s): NTPROBNP in the last 72 hours.      Recent Labs     12/13/22 2334   TROPONINT 42*         Assessment/Plan:  Justification for Admission Status  I anticipate this patient will require at least two midnights for appropriate medical management, necessitating inpatient admission because symptomatic bradycardia requiring telemetry monitoring and implantation of pacemaker.     Patient will need a Telemetry bed on MEDICAL service .  The need is secondary to see above.    * Symptomatic bradycardia  Assessment & Plan  Likely sick sinus syndrome and A. fib with slow ventricular response.   Telemetry monitoring  Hold AV ian blockers  N.p.o. for PPM in a.m.  Check TSH with reflex T4  Check magnesium  Obtain records from cardiologist, Dr. Hammond if no recent echo. Obtain Echo 2D in am     ACP (advance care planning)  Assessment & Plan  Pt. Wishes to be full code. Discussed in front of daughter, son in law.     Thrombocytopenia (HCC)  Assessment & Plan  Chronic  No bleeding noted. Monitor     Atrial fibrillation with slow ventricular response (HCC)-  (present on admission)  Assessment & Plan  Chronic history of A. fib on Eliquis  Hold anticoagulation as patient to go for pacemaker implantation in a.m.  Telemetry monitoring    Chronic hypoxemic respiratory failure (HCC)- (present on admission)  Assessment & Plan  On chronic home oxygen.  Continue oxygen supplementation to obtain SPO2 above 90%    CKD (chronic kidney disease)  Assessment & Plan  CKD   Cr in 2021 1.46, today 1.44  Avoid nephrotoxic agents  Renally dose all medications    Chronic systolic congestive heart failure (HCC)- (present on admission)  Assessment & Plan  Resume home medications  Follows up with Dr. Hammond outpatient cardiology   Resume Inspra, torsemide       Benign prostatic hyperplasia- (present on admission)  Assessment & Plan  Flomax    Obesity (BMI 30.0-34.9)- (present on admission)  Assessment & Plan  BMI 33.47    Mixed hyperlipidemia- (present on admission)  Assessment & Plan  Resume atorvastatin 80 mg nightly     DM (diabetes mellitus) (HCC)  Assessment & Plan  Patient was hypoglycemic at home.  Hold oral and insulin subcutaneous at this time.  Frequent Accu-Cheks and hypoglycemia protocol        VTE prophylaxis: SCDs/TEDs

## 2022-12-14 NOTE — CONSULTS
Cardiology Initial Consultation    Date of Service  12/14/2022    Referring Physician  Nnamdi Rodriguez    Reason for Consultation  Sick sinus syndrome, atrial fibrillation with nya response, right bundle branch block.    History of Presenting Illness  Julio Azar is a 89 y.o. male with a past medical history of atrial fibrillation on chronic anticoagulation therapy (Eliquis), unspecified coronary artery disease, dilated cardiomyopathy, anemia, chronic kidney disease who presented 12/14/2022 with bradycardia. He is followed by Wyatt Calle who was evaluating him for bradycardia. He wore a 3 day monitor, however,  he has not obtained the results. He was well well until tonight as he was going to the bathroom, he felt weak and odd. He waqs brought to emergency room and was found to be bradycardic in the 30 bpm range. He denies dizziness or syncope.    Review of Systems  Review of Systems   Unable to perform ROS: Other     Past Medical History   has a past medical history of Arrhythmia, Bowel habit changes, Breath shortness, CAD (coronary artery disease), native coronary artery (03/31/2014), Cancer (Formerly [facility]) (2014), Chronic obstructive pulmonary disease ([facility]), Congestive heart failure (Former[facility]), Dental disorder, Diabetes, Encounter for long-term (current) use of insulin ([facility]ly [facility]) (09/18/2013), Essential hypertension, benign (03/31/2014), Heart burn, Other and unspecified hyperlipidemia (09/18/2013), Snoring, Type II or unspecified type diabetes mellitus without mention of complication, uncontrolled (09/18/2013), and Unspecified vitamin D deficiency (09/18/2013).    Surgical History   has a past surgical history that includes cataract phaco with iol (6/30/2011); lid tightening (12/7/2012); other cardiac surgery (2004); humberto by laparoscopy (8/12/2013); ectropian repair (12/31/2014); lacrimal duct probe (12/31/2014); and tonsillectomy.    Family History  family history includes Asthma in his mother; Diabetes in  "his brother and father; Heart Attack in his brother, father, and mother; Heart Disease in his brother, brother, and mother; Hyperlipidemia in his brother; Psychiatric Illness in his brother and brother; Stroke in his brother.    Social History   reports that he quit smoking about 40 years ago. His smoking use included cigarettes. He has a 68.00 pack-year smoking history. He has never used smokeless tobacco. He reports current alcohol use of about 4.2 - 8.4 oz per week. He reports that he does not use drugs.    Medications  Prior to Admission Medications   Prescriptions Last Dose Informant Patient Reported? Taking?   B Complex Vitamins (VITAMIN B COMPLEX PO)   Yes No   Sig: Take  by mouth every day.   Blood Glucose Monitoring Suppl SUPPLIES MISC   No No   Sig: Accucheck Yaneth blood glucose test strips, checking blood sugar 2 daily  .02 insulin requiring.   Dextrose, Diabetic Use, (GLUCOSE) 4 g chewable tablet   Yes No   Sig: Chew 15 g as needed for Low Blood Sugar.   FREESTYLE LITE strip   Yes No   Si Strip by Other route every day.   Insulin Pen Needle   Yes No   Sig: BD Ultra-Fine Short Pen Needle 31 gauge x 5/16\"   Lancets MISC   No No   Sig: His new meter uses the Accu-Chek SoftClix lancets for 3 time daily testing.  Dx. Code 250.02   Magnesium Oxide 420 MG Tab   Yes No   Sig: Take 1 Tablet by mouth every day.   Semaglutide (WEGOVY) 0.5 MG/0.5ML Solution Auto-injector Pen-injector   Yes No   Sig: Inject 0.5 mg under the skin every 7 days.   alendronate (FOSAMAX) 70 MG Tab   Yes No   Sig: Take 70 mg by mouth every 7 days.   apixaban (ELIQUIS) 5mg Tab   No No   Sig: Take 1 Tab by mouth 2 Times a Day. Indications: Thromboembolism secondary to Atrial Fibrillation   atorvastatin (LIPITOR) 80 MG tablet   Yes No   Sig: Take 80 mg by mouth every day.   eplerenone (INSPRA) 25 MG Tab   Yes No   Sig: Take 25 mg by mouth every day.   glipiZIDE (GLUCOTROL) 5 MG Tab   No No   Sig: Take 1 Tab by mouth 2 times a day. "   insulin glargine (LANTUS SOLOSTAR) 100 UNIT/ML Solution Pen-injector injection   Yes No   Sig: Lantus Solostar U-100 Insulin 100 unit/mL (3 mL) subcutaneous pen   metformin (GLUCOPHAGE) 500 MG Tab   No No   Sig: TAKE 2 TABLETS BY MOUTH TWICE DAILY WITHMEALS   metoprolol SR (TOPROL XL) 25 MG TABLET SR 24 HR   Yes No   potassium chloride SA (K-DUR) 20 MEQ Tab CR   Yes No   Sig: Take 20 mEq by mouth every day.   tamsulosin (FLOMAX) 0.4 MG capsule   No No   Sig: TAKE ONE CAPSULE BY MOUTH TWO TIMES A DAY   torsemide (DEMADEX) 20 MG Tab   Yes No   Sig: Take 20 mg by mouth every day.   vitamin D (CHOLECALCIFEROL) 1000 UNIT Tab   Yes No   Sig: Take 1,000 Units by mouth every day.      Facility-Administered Medications: None   (Medications reviewed.)    Allergies  Allergies   Allergen Reactions    Pcn [Penicillins] Swelling    Latex Rash     Where latex has touched       Vital signs in last 24 hours  Temp:  [36.1 °C (97 °F)] 36.1 °C (97 °F)  Pulse:  [48-62] 57  Resp:  [15-25] 20  BP: (115-132)/(46-60) 132/60  SpO2:  [98 %-100 %] 100 %    Physical Exam  Physical Exam  Constitutional:       Appearance: Normal appearance. He is well-developed and normal weight.   HENT:      Head: Normocephalic and atraumatic.      Mouth/Throat:      Mouth: Mucous membranes are moist.   Eyes:      Extraocular Movements: Extraocular movements intact.      Conjunctiva/sclera: Conjunctivae normal.   Cardiovascular:      Rate and Rhythm: Bradycardia present. Rhythm irregular.      Pulses: Normal pulses.      Heart sounds: Normal heart sounds.   Pulmonary:      Effort: Pulmonary effort is normal.      Breath sounds: Normal breath sounds.   Abdominal:      General: Bowel sounds are normal.      Palpations: Abdomen is soft.   Musculoskeletal:         General: Normal range of motion.      Cervical back: Normal range of motion and neck supple.   Skin:     General: Skin is warm and dry.   Neurological:      General: No focal deficit present.      Mental  Status: He is alert and oriented to person, place, and time. Mental status is at baseline.   Psychiatric:         Mood and Affect: Mood normal.         Behavior: Behavior normal.         Thought Content: Thought content normal.         Judgment: Judgment normal.       Lab Review  Lab Results   Component Value Date/Time    WBC 5.0 12/13/2022 11:34 PM    RBC 3.23 (L) 12/13/2022 11:34 PM    HEMOGLOBIN 9.3 (L) 12/13/2022 11:34 PM    HEMATOCRIT 29.4 (L) 12/13/2022 11:34 PM    MCV 91.0 12/13/2022 11:34 PM    MCH 28.8 12/13/2022 11:34 PM    MCHC 31.6 (L) 12/13/2022 11:34 PM    MPV 10.2 12/13/2022 11:34 PM      Lab Results   Component Value Date/Time    SODIUM 135 12/13/2022 11:34 PM    POTASSIUM 4.0 12/13/2022 11:34 PM    CHLORIDE 96 12/13/2022 11:34 PM    CO2 27 12/13/2022 11:34 PM    GLUCOSE 137 (H) 12/13/2022 11:34 PM    BUN 31 (H) 12/13/2022 11:34 PM    CREATININE 1.44 (H) 12/13/2022 11:34 PM    CREATININE 1.1 11/02/2006 10:30 AM      Lab Results   Component Value Date/Time    ASTSGOT 21 12/13/2022 11:34 PM    ALTSGPT 16 12/13/2022 11:34 PM     Lab Results   Component Value Date/Time    CHOLSTRLTOT 119 02/23/2021 07:13 AM    LDL 30 02/23/2021 07:13 AM    HDL 60 02/23/2021 07:13 AM    TRIGLYCERIDE 147 02/23/2021 07:13 AM    TROPONINT 42 (H) 12/13/2022 11:34 PM       No results for input(s): NTPROBNP in the last 72 hours.  (Above labs reviewed.)     Cardiac Imaging and Procedures Review  CARDIAC STUDIES/PROCEDURES:    ECHOCARDIOGRAM CONCLUSIONS (04/07/21)  Technically difficult study - adequate information is obtained.   Left ventricle is mildly dilated.    Borderline depressed left ventricular systolic function.    Left ventricular ejection fraction is visually estimated to be 50%.    Mild global hypokinesis.  Moderate biatrial enlargement.  Moderately dilated right ventricle.    Mildly reduced right ventricular systolic function.  Mild to moderate eccentric  mitral regurgitation.  Moderate tricuspid  regurgitation.  Right ventricular systolic pressure is estimated to be 56 mmHg.  (study result reviewed)    EKG performed on (12/14/22) was reviewed: EKG personally interpreted shows atrial fibrillation with right bundle branch block..    MYOCARDIAL PERFUSION STUDY CONCLUSIONS (10/25/19)  NUCLEAR IMAGING INTERPRETATION  No evidence of significant jeopardized viable myocardium or prior myocardial infarction.  Normal left ventricular size, ejection fraction, and wall motion.  ECG INTERPRETATION  Negative stress ECG for ischemia.  (study result reviewed)    Assessment/Plan  Sick sinus syndrome, atrial fibrillation with nya response, right bundle branch block: He.is a 89 y.o. male with a past medical history of atrial fibrillation on chronic anticoagulation therapy (Eliquis), unspecified coronary artery disease, dilated cardiomyopathy, anemia, chronic kidney disease who presents with bradycardia. He is symptomatic with weakness. He will be scheduled for pacemaker implantation. He understands the risks and benefits and agrees with plan.   The risks, benefits, and alternatives to permanent pacemaker placement were discussed in great detail, specific risks mentioned include bleeding infection, cardiac perforation with possible tamponade possibly requiring fadi-cardiocentesis or open heart surgery. In addition the possibility of lead dislodgement (2-3%), pneumothorax (3%), hemothorax were discussed. Also mentioned were the possibilities of death, stroke, and myocardial infarction. The patient verbalized understanding of these potential complications and wishes to proceed with the procedure.      Thank you for allowing me to participate in the care of this patient.    I will continue to follow this patient    Please contact me with any questions.    Vasu Orellana M.D.   Cardiologist, [facility]  [phone]

## 2022-12-14 NOTE — CONSULTS
Electrophysiology Initial Consult Note    DOS: 12/14/2022    Referring physician: Dr Orellana    Chief complaint/Reason for consult: Bradycardia    HPI: 89 y/o M with permanent afib and chronic bifascicular block. Follows with Wyatt Hammond for many years. Recently was seen and prescribed 2 week Zio for concerns for bradycardia. He had not yet received results, but last night was acutely dizzy and weak, was found to have HR in the 30s, upon ED presentation noted to have AF with SVR and intermittent pauses. He has not had LOC. No chest pain, no palpitations. He is hard of hearing. I discussed his care with Dr Hammond in addition to his daughter.    ROS (+ highlighted in bold):  Constitutional: Fevers/chills/fatigue/weightloss  HEENT: Blurry vision/eye pain/sore throat/hearing loss  Respiratory: Shortness of breath/cough  Cardiovascular: Chest pain/palpitations/edema/orthopnea/syncope  GI: Nausea/vomitting/diarrhea  MSK: Arthralgias/myagias/muscle weakness  Skin: Rash/sores  Neurological: Numbness/tremors/vertigo  Endocrine: Excessive thirst/polyuria/cold intolerance/heat intolerance  Psych: Depression/anxiety    Past Medical History:   Diagnosis Date    Arrhythmia     Bowel habit changes     Breath shortness     CAD (coronary artery disease), native coronary artery 03/31/2014    Cancer (HCC) 2014    PROSTATE-RADIATION [place]    Chronic obstructive pulmonary disease (HCC)     4L O2 24/7    Congestive heart failure (HCC)     Dental disorder     upper and lower partials    Diabetes     Dr Latif, IDDM    Encounter for long-term (current) use of insulin ([facility]) 09/18/2013    Essential hypertension, benign 03/31/2014    Heart burn     Other and unspecified hyperlipidemia 09/18/2013    Snoring     has had sleep study    Type II or unspecified type diabetes mellitus without mention of complication, uncontrolled 09/18/2013    Unspecified vitamin D deficiency 09/18/2013       Past Surgical History:   Procedure Laterality Date     "ECTROPIAN REPAIR  2014    Performed by Zen Fregoso M.D. at SURGERY [facility] ORS    LACRIMAL DUCT PROBE  2014    Performed by Zen Fregoso M.D. at SURGERY [facility] ORS    LORI BY LAPAROSCOPY  2013    Performed by Jez Galvan M.D. at SURGERY [facility] ORS    LID TIGHTENING  2012    Performed by Vitor Son M.D. at SURGERY [facility] ORS    CATARACT PHACO WITH IOL  2011    Performed by CRUZITO LITTLEJOHN at SURGERY [facility] ORS    OTHER CARDIAC SURGERY  2004    CABG X3 at [facility]    TONSILLECTOMY         Social History     Socioeconomic History    Marital status:      Spouse name: Not on file    Number of children: Not on file    Years of education: Not on file    Highest education level: Not on file   Occupational History    Not on file   Tobacco Use    Smoking status: Former     Packs/day: 2.00     Years: 34.00     Pack years: 68.00     Types: Cigarettes     Quit date: 1982     Years since quittin.9    Smokeless tobacco: Never   Vaping Use    Vaping Use: Never used   Substance and Sexual Activity    Alcohol use: Yes     Alcohol/week: 4.2 - 8.4 oz     Types: 7 - 14 Glasses of wine per week     Comment: \"a glass of wine or less a day\"    Drug use: No    Sexual activity: Never     Partners: Female     Birth control/protection: Post-Menopausal   Other Topics Concern    Not on file   Social History Narrative    Not on file     Social Determinants of Health     Financial Resource Strain: Not on file   Food Insecurity: Not on file   Transportation Needs: Not on file   Physical Activity: Not on file   Stress: Not on file   Social Connections: Not on file   Intimate Partner Violence: Not on file   Housing Stability: Not on file       Family History   Problem Relation Age of Onset    Heart Disease Mother     Heart Attack Mother     Asthma Mother     Heart Attack Father     Diabetes Father     Psychiatric Illness Brother     Heart Disease Brother      "    5 way bypass    Diabetes Brother     Hyperlipidemia Brother     Heart Attack Brother     Psychiatric Illness Brother     Stroke Brother     Heart Disease Brother         pacemaker       Allergies   Allergen Reactions    Pcn [Penicillins] Swelling    Latex Rash     Where latex has touched       Current Facility-Administered Medications   Medication Dose Route Frequency Provider Last Rate Last Admin    dextrose 10 % BOLUS 25 g  25 g Intravenous Q15 MIN JEREMIAHN Leonardo Felipe M.D.           Physical Exam:  Vitals:    12/14/22 0802 12/14/22 0902 12/14/22 1002 12/14/22 1024   BP: (!) 145/63 (!) 145/66 (!) 143/60 122/69   Pulse: (!) 57 (!) 57 (!) 51 (!) 57   Resp: 16 17 17 18   Temp:    36.6 °C (97.9 °F)   TempSrc:    Temporal   SpO2: 99% 100% 99% 97%   Weight:    88.5 kg (195 lb 1.7 oz)   Height:         General appearance: NAD, conversant   Eyes: anicteric sclerae, moist conjunctivae; no lid-lag; PERRLA  HENT: Atraumatic; oropharynx clear with moist mucous membranes and no mucosal ulcerations; normal hard and soft palate  Neck: Trachea midline; FROM, supple, no thyromegaly or lymphadenopathy  Lungs: CTA, with normal respiratory effort and no intercostal retractions  CV: Irregular, no MRGs, no JVD   Abdomen: Soft, non-tender; no masses or HSM  Extremities: No peripheral edema or extremity lymphadenopathy  Skin: Normal temperature, turgor and texture; no rash, ulcers or subcutaneous nodules  Psych: Appropriate affect, alert and oriented to person, place and time    Data:  Lipids:   Lab Results   Component Value Date/Time    CHOLSTRLTOT 119 02/23/2021 07:13 AM    TRIGLYCERIDE 147 02/23/2021 07:13 AM    HDL 60 02/23/2021 07:13 AM    LDL 30 02/23/2021 07:13 AM        BMP:  Lab Results   Component Value Date/Time    SODIUM 135 12/13/2022 2334    POTASSIUM 4.0 12/13/2022 2334    CHLORIDE 96 12/13/2022 2334    CO2 27 12/13/2022 2334    GLUCOSE 137 (H) 12/13/2022 2334    BUN 31 (H) 12/13/2022 2334    CREATININE 1.44 (H)  12/13/2022 2334    CALCIUM 9.4 12/13/2022 2334    ANION 12.0 12/13/2022 2334        TSH:   Lab Results   Component Value Date/Time    TSHULTRASEN 1.880 12/13/2022 2334        THYROXINE (T4):   No results found for: BELTRANIR     CBC:   Lab Results   Component Value Date/Time    WBC 5.0 12/13/2022 11:34 PM    RBC 3.23 (L) 12/13/2022 11:34 PM    HEMOGLOBIN 9.3 (L) 12/13/2022 11:34 PM    HEMATOCRIT 29.4 (L) 12/13/2022 11:34 PM    MCV 91.0 12/13/2022 11:34 PM    MCH 28.8 12/13/2022 11:34 PM    MCHC 31.6 (L) 12/13/2022 11:34 PM    RDW 54.0 (H) 12/13/2022 11:34 PM    PLATELETCT 130 (L) 12/13/2022 11:34 PM    MPV 10.2 12/13/2022 11:34 PM    NEUTSPOLYS 76.20 (H) 12/13/2022 11:34 PM    LYMPHOCYTES 12.40 (L) 12/13/2022 11:34 PM    MONOCYTES 10.20 12/13/2022 11:34 PM    EOSINOPHILS 0.40 12/13/2022 11:34 PM    BASOPHILS 0.60 12/13/2022 11:34 PM    IMMGRAN 0.20 12/13/2022 11:34 PM    NRBC 0.00 12/13/2022 11:34 PM    NEUTS 3.83 12/13/2022 11:34 PM    LYMPHS 0.62 (L) 12/13/2022 11:34 PM    MONOS 0.51 12/13/2022 11:34 PM    EOS 0.02 12/13/2022 11:34 PM    BASO 0.03 12/13/2022 11:34 PM    IMMGRANAB 0.01 12/13/2022 11:34 PM    NRBCAB 0.00 12/13/2022 11:34 PM        CBC w/o DIFF  Lab Results   Component Value Date/Time    WBC 5.0 12/13/2022 11:34 PM    RBC 3.23 (L) 12/13/2022 11:34 PM    HEMOGLOBIN 9.3 (L) 12/13/2022 11:34 PM    MCV 91.0 12/13/2022 11:34 PM    MCH 28.8 12/13/2022 11:34 PM    MCHC 31.6 (L) 12/13/2022 11:34 PM    RDW 54.0 (H) 12/13/2022 11:34 PM    MPV 10.2 12/13/2022 11:34 PM       Prior echo/stress results reviewed: EF 50%    EKG interpreted by me: Afib with Bifascicular block    Impression/Plan:  Heart block, high degree  Atrial fibrillation, permanent  Pre-syncope    - I discussed his case with Dr Hammond who agrees with recommendation of pacing. Apparently the Zio did show pauses. We discussed the possibility of discharge from [facility] and presentation to Dr Hammond's office to arrange PPM. However in speaking with the  patient's daughter she is in favor of inpatient pacemaker at [facility] noting that [facility] is out of network anyway.   - We discussed pacemaker options. Given permanent afib he only needs a ventricular lead, as such a good candidate for Micra implant  - The risk, benefits, and alternatives to pacemaker placement were discussed in great detail, specific risks mentioned including bleeding, infection, cardiac perforation with possible tamponade requiring pericardiocentesis or open heart surgery.  In addition the possibility of lead dislodgment, pneumothorax, hemothorax were discussed. Also mentioned were the possibility of death, stroke, and myocardial infarction. The patient verbalized understanding of these potential complications and wishes to proceed with this procedure.     We will plan Micra tomorrow afternoon, NPO p 6 am.          Nnamdi Avalos MD  Cardiac Electrophysiology

## 2022-12-14 NOTE — DISCHARGE PLANNING
Last documented oxygen company is [employer].    Choice forms scanned to media tab for future dc planning use.

## 2022-12-15 ENCOUNTER — APPOINTMENT (OUTPATIENT)
Dept: CARDIOLOGY | Facility: MEDICAL CENTER | Age: 87
DRG: 229 | End: 2022-12-15
Attending: NURSE PRACTITIONER
Payer: MEDICARE

## 2022-12-15 LAB
GLUCOSE BLD STRIP.AUTO-MCNC: 136 MG/DL (ref 65–99)
GLUCOSE BLD STRIP.AUTO-MCNC: 137 MG/DL (ref 65–99)
GLUCOSE BLD STRIP.AUTO-MCNC: 154 MG/DL (ref 65–99)
GLUCOSE BLD STRIP.AUTO-MCNC: 159 MG/DL (ref 65–99)

## 2022-12-15 PROCEDURE — 33274 TCAT INSJ/RPL PERM LDLS PM: CPT | Mod: Q0 | Performed by: INTERNAL MEDICINE

## 2022-12-15 PROCEDURE — 700111 HCHG RX REV CODE 636 W/ 250 OVERRIDE (IP)

## 2022-12-15 PROCEDURE — 82962 GLUCOSE BLOOD TEST: CPT

## 2022-12-15 PROCEDURE — 700101 HCHG RX REV CODE 250

## 2022-12-15 PROCEDURE — 99153 MOD SED SAME PHYS/QHP EA: CPT

## 2022-12-15 PROCEDURE — 99232 SBSQ HOSP IP/OBS MODERATE 35: CPT | Performed by: STUDENT IN AN ORGANIZED HEALTH CARE EDUCATION/TRAINING PROGRAM

## 2022-12-15 PROCEDURE — 700111 HCHG RX REV CODE 636 W/ 250 OVERRIDE (IP): Performed by: STUDENT IN AN ORGANIZED HEALTH CARE EDUCATION/TRAINING PROGRAM

## 2022-12-15 PROCEDURE — 99152 MOD SED SAME PHYS/QHP 5/>YRS: CPT | Performed by: INTERNAL MEDICINE

## 2022-12-15 PROCEDURE — 770020 HCHG ROOM/CARE - TELE (206)

## 2022-12-15 PROCEDURE — 02HK3NZ INSERTION OF INTRACARDIAC PACEMAKER INTO RIGHT VENTRICLE, PERCUTANEOUS APPROACH: ICD-10-PCS | Performed by: INTERNAL MEDICINE

## 2022-12-15 PROCEDURE — 93005 ELECTROCARDIOGRAM TRACING: CPT | Performed by: INTERNAL MEDICINE

## 2022-12-15 PROCEDURE — 94664 DEMO&/EVAL PT USE INHALER: CPT

## 2022-12-15 RX ORDER — MIDAZOLAM HYDROCHLORIDE 1 MG/ML
INJECTION INTRAMUSCULAR; INTRAVENOUS
Status: COMPLETED
Start: 2022-12-15 | End: 2022-12-15

## 2022-12-15 RX ORDER — LIDOCAINE HYDROCHLORIDE 20 MG/ML
INJECTION, SOLUTION INFILTRATION; PERINEURAL
Status: COMPLETED
Start: 2022-12-15 | End: 2022-12-15

## 2022-12-15 RX ORDER — HEPARIN SODIUM 200 [USP'U]/100ML
INJECTION, SOLUTION INTRAVENOUS
Status: COMPLETED
Start: 2022-12-15 | End: 2022-12-15

## 2022-12-15 RX ORDER — HEPARIN SODIUM 1000 [USP'U]/ML
INJECTION, SOLUTION INTRAVENOUS; SUBCUTANEOUS
Status: COMPLETED
Start: 2022-12-15 | End: 2022-12-15

## 2022-12-15 RX ORDER — MAGNESIUM SULFATE HEPTAHYDRATE 40 MG/ML
2 INJECTION, SOLUTION INTRAVENOUS ONCE
Status: COMPLETED | OUTPATIENT
Start: 2022-12-15 | End: 2022-12-15

## 2022-12-15 RX ORDER — CEFAZOLIN SODIUM 1 G/3ML
INJECTION, POWDER, FOR SOLUTION INTRAMUSCULAR; INTRAVENOUS
Status: COMPLETED
Start: 2022-12-15 | End: 2022-12-15

## 2022-12-15 RX ORDER — BUPIVACAINE HYDROCHLORIDE 2.5 MG/ML
INJECTION, SOLUTION EPIDURAL; INFILTRATION; INTRACAUDAL
Status: COMPLETED
Start: 2022-12-15 | End: 2022-12-15

## 2022-12-15 RX ADMIN — BUPIVACAINE HYDROCHLORIDE: 2.5 INJECTION, SOLUTION EPIDURAL; INFILTRATION; INTRACAUDAL; PERINEURAL at 15:41

## 2022-12-15 RX ADMIN — MIDAZOLAM HYDROCHLORIDE 0.5 MG: 1 INJECTION, SOLUTION INTRAMUSCULAR; INTRAVENOUS at 15:58

## 2022-12-15 RX ADMIN — LIDOCAINE HYDROCHLORIDE: 20 INJECTION, SOLUTION INFILTRATION; PERINEURAL at 15:41

## 2022-12-15 RX ADMIN — MIDAZOLAM HYDROCHLORIDE 2 MG: 1 INJECTION, SOLUTION INTRAMUSCULAR; INTRAVENOUS at 15:42

## 2022-12-15 RX ADMIN — MAGNESIUM SULFATE HEPTAHYDRATE 2 G: 40 INJECTION, SOLUTION INTRAVENOUS at 08:55

## 2022-12-15 RX ADMIN — HEPARIN SODIUM 2000 UNITS: 1000 INJECTION, SOLUTION INTRAVENOUS; SUBCUTANEOUS at 15:46

## 2022-12-15 RX ADMIN — FENTANYL CITRATE 100 MCG: 50 INJECTION, SOLUTION INTRAMUSCULAR; INTRAVENOUS at 15:42

## 2022-12-15 RX ADMIN — INSULIN HUMAN 2 UNITS: 100 INJECTION, SOLUTION PARENTERAL at 21:24

## 2022-12-15 RX ADMIN — CEFAZOLIN 2000 MG: 330 INJECTION, POWDER, FOR SOLUTION INTRAMUSCULAR; INTRAVENOUS at 15:41

## 2022-12-15 RX ADMIN — HEPARIN SODIUM 2000 UNITS: 200 INJECTION, SOLUTION INTRAVENOUS at 15:41

## 2022-12-15 RX ADMIN — FENTANYL CITRATE 75 MCG: 50 INJECTION, SOLUTION INTRAMUSCULAR; INTRAVENOUS at 15:58

## 2022-12-15 ASSESSMENT — CHA2DS2 SCORE
CHF OR LEFT VENTRICULAR DYSFUNCTION: YES
CHA2DS2 VASC SCORE: 5
SEX: MALE
VASCULAR DISEASE: NO
PRIOR STROKE OR TIA OR THROMBOEMBOLISM: NO
HYPERTENSION: YES
DIABETES: YES
AGE 65 TO 74: NO
AGE 75 OR GREATER: YES

## 2022-12-15 NOTE — PROGRESS NOTES
Monitor Summary  Rhythm: Afib  Rate: 50-52  Ectopy: (O) PVC, bradycardia down to 38 non sustaining, Bundle Branch Block   - / .14 / -

## 2022-12-15 NOTE — CARE PLAN
Problem: Knowledge Deficit - Standard  Goal: Patient and family/care givers will demonstrate understanding of plan of care, disease process/condition, diagnostic tests and medications  Outcome: Progressing  Note: Call light within reach. Educated pt to use call light as needed. Reorient and re-educate as needed. Continue to monitor.       Problem: Acute Care of the PPM/AICD Patient  Goal: Pre Procedure Optimal Care of the PPM/AICD Patient  Outcome: Progressing  Note: NPO for procedure      The patient is Watcher - Medium risk of patient condition declining or worsening    Shift Goals: monitor heart rate   Clinical Goals: NPO for procedure  Patient Goals: Pacemaker placement    Progress made toward(s) clinical / shift goals:  NPO for procedure     Patient is not progressing towards the following goals: Incontinence

## 2022-12-15 NOTE — PROGRESS NOTES
Bedside report received from Jewel MENJIVAR RN. Pt A&Ox4. Afib on the monitor. On 4 liters silicone nasal cannula. No current complaints of pain. POC discussed with pt. Pt verbalizes understanding. Call light and belongings within reach. Bed locked and in lowest position. Alarm and fall precautions in place.

## 2022-12-15 NOTE — DISCHARGE PLANNING
Care Transition Team Assessment    Chart review completed to obtain the information used in this assessment. Pt lives with his adult grandson in a single story house with 8-9 steps to enter. Pt is independent with all ADLs and IADLs at baseline. Pt has a cane and uses 4L O2 at baseline supplied by [employer]. Pt has good support including his grandson, daughter, and son who all live nearby. No financial, SA, or MH concerns.    Information Source  Information Given By: Other (Comments) (Chart review)  Who is responsible for making decisions for patient? : Patient    Readmission Evaluation  Is this a readmission?: No    Elopement Risk  Legal Hold: No  Ambulatory or Self Mobile in Wheelchair: Yes  Disoriented: No  Psychiatric Symptoms: None  History of Wandering: No  Elopement this Admit: No  Vocalizing Wanting to Leave: No  Displays Behaviors, Body Language Wanting to Leave: No-Not at Risk for Elopement  Elopement Risk: Not at Risk for Elopement    Interdisciplinary Discharge Planning  Lives with - Patient's Self Care Capacity: Adult Children  Patient or legal guardian wants to designate a caregiver: No  Support Systems: Family Member(s), Friends / Neighbors  Housing / Facility: [address]  Durable Medical Equipment: Home Oxygen, Other - Specify (cane)    Discharge Preparedness  What is your plan after discharge?: Home with help  What are your discharge supports?: Child, Other (comment) (grandson)  Prior Functional Level: Ambulatory, Independent with Activities of Daily Living, Independent with Medication Management, Uses Cane  Difficulity with ADLs: None  Difficulity with IADLs: None    Functional Assesment  Prior Functional Level: Ambulatory, Independent with Activities of Daily Living, Independent with Medication Management, Uses Cane    Finances  Financial Barriers to Discharge: No  Prescription Coverage: Yes    Vision / Hearing Impairment  Vision Impairment : Yes  Right Eye Vision: Impaired, Wears Glasses  Left Eye  Vision: Impaired, Wears Glasses  Hearing Impairment : Yes  Hearing Impairment: Both Ears, Hearing Device Not Available  Does Pt Need Special Equipment for the Hearing Impaired?: No    Domestic Abuse  Have you ever been the victim of abuse or violence?: No  Physical Abuse or Sexual Abuse: No  Verbal Abuse or Emotional Abuse: No  Possible Abuse/Neglect Reported to:: Not Applicable    Psychological Assessment  History of Substance Abuse: None  History of Psychiatric Problems: No  Non-compliant with Treatment: No  Newly Diagnosed Illness: No    Discharge Risks or Barriers  Discharge risks or barriers?: No    Anticipated Discharge Information  Discharge Disposition: Discharged to home/self care (01)

## 2022-12-15 NOTE — HOSPITAL COURSE
89-year-old male with extensive past medical history to include CAD status post CABG, A. fib on anticoagulation, diabetes mellitus type 2, COPD on 4L home oxygen therapy, CHF, GERD, hyperlipidemia, CKD, had episode of dizziness, lightheadedness, noted to have heart rate in the 30s.  He is currently being worked up by outpatient cardiologist for need of pacemaker, he was informed he would likely need one.  Patient was evaluated by Dr. Avalos and went for pacemaker placement on 12/15/2022 without complications.  He will be watched overnight, will need device interrogation prior to hospital discharge.  Will need follow-up in device clinic.

## 2022-12-15 NOTE — RESPIRATORY CARE
"COPD EDUCATION by COPD CLINICAL EDUCATOR  12/15/2022  at  11:02 AM by Vanita Lopez, RRT     Patient interviewed by COPD education team.  Patient unable to participate in full program.  A short intervention has been conducted.  A comprehensive packet including information about COPD, types of treatments to manage their disease and safe home Oxygen usage was provided and reviewed with patient at the bedside.        COPD Assessment  COPD Clinical Specialists ONLY  COPD Education Initiated: Yes--Short Intervention  DME Company: [employer]  DME Equipment Type: 02 4Lpm  Physician Name: CULLEN TAPIA  Pulmonologist Name: [facility]  Referrals Initiated: Yes  Pulmonary Rehab: Declined  Smoking Cessation: N/A  Hospice: N/A  Home Health Care:  (TBD)  [facility] Community Outreach: N/A  Geriatric Specialty Group: Yes  [facility]: N/A  Private In-Home Care Agency: N/A  Is this a COPD exacerbation patient?: No  $ Demo/Eval of SVN's, MDI's and Aerosols: Yes (spacer request albuterol rescue inhaler on discharge)  (OP) Pulmonary Function Testing:  (rcommended)    PFT Results    No results found for: PFT    Meds to Beds  Would the patient like to opt in for Bedside Medication Delivery at Discharge?: No     MY COPD ACTION PLAN     It is recommended that patients and physicians /healthcare providers complete this action plan together. This plan should be discussed at each physician visit and updated as needed.    The green, yellow and red zones show groups of symptoms of COPD. This list of symptoms is not comprehensive, and you may experience other symptoms. In the \"Actions\" column, your healthcare provider has recommended actions for you to take based on your symptoms.    Patient Name: Julio Azar   YOB: 1932   Last Updated on: 12/15/2022 11:02 AM   Green Zone:  I am doing well today Actions     Usual activitiy and exercise level   Take daily medications     Usual amounts of cough and phlegm/mucus   Use oxygen " "as prescribed     Sleep well at night   Continue regular exercise/diet plan     Appetite is good   At all times avoid cigarette smoke, inhaled irritants     Daily Medications (these medications are taken every day):   Tiotropium and Olodaterol (Stiolto) 2 Puffs Once daily        Yellow Zone:  I am having a bad day or a COPD flare Actions     More breathless than usual   Continue daily medications     I have less energy for my daily activities   Use quick relief inhaler as ordered     Increased or thicker phlegm/mucus   Use oxygen as prescribed     Using quick relief inhaler/nebulizer more often   Get plenty of rest     Swelling of ankles more than usual   Use pursed lip breathing     More coughing than usual   At all times avoid cigarette smoke, inhaled irritants     I feel like I have a \"chest cold\"     Poor sleep and my symptoms woke me up     My appetite is not good     My medicine is not helping      Call provider immediately if symptoms don’t improve     Continue daily medications, add rescue medications:   Albuterol 2 Puffs         Medications to be used during a flare up, (as Discussed with Provider):           Additional Information:  Use spacer with each use     Red Zone:  I need urgent medical care Actions     Severe shortness of breath even at rest   Call 911 or seek medical care immediately     Not able to do any activity because of breathing      Fever or shaking chills      Feeling confused or very drowsy       Chest pains      Coughing up blood                  "

## 2022-12-15 NOTE — CARE PLAN
The patient is Stable - Low risk of patient condition declining or worsening    Shift Goals  Clinical Goals: NPO for procedure  Patient Goals: Pacemaker procedure    Progress made toward(s) clinical / shift goals:  Progressing      Problem: Knowledge Deficit - Standard  Goal: Patient and family/care givers will demonstrate understanding of plan of care, disease process/condition, diagnostic tests and medications  Outcome: Progressing     Problem: Acute Care of the PPM/AICD Patient  Goal: Pre Procedure Optimal Care of the PPM/AICD Patient  Outcome: Progressing

## 2022-12-15 NOTE — DISCHARGE PLANNING
[facility] chart review completed. Noted patient 6 click scores of 24 ADL and 24 mobility now present. Collaborated with discharge planning team, JERI Newman.     TCN met with patient at bedside. Discussed current discharge considerations, with patient acknowledging he is largely at his baseline level of function and has assistance from his great grandson who lives with him as needed.      Collaborated with JERI following visit with patient at bedside. As noted by patient, [facility] TCN advised of previous obtainment of choice stored in media for SNF and HH should it be necessary; however, given patient current 6 click scores, patient reports of baseline level of function, and good family support no choice referrals indicated at this time.    TCN will continue to follow and collaborate with discharge planning team as additional post acute needs arise. Thank you.    Completed  Choice obtained: SNF, HH 12/14/2022  [facility] referral sent 12/14/2022

## 2022-12-15 NOTE — PROGRESS NOTES
Hospital Medicine Daily Progress Note    Date of Service  12/14/2022    Chief Complaint  Julio Azar is a 89 y.o. male admitted 12/14/2022 with symptomatic bradycardia    Hospital Course  89-year-old male with extensive past medical history to include CAD status post CABG, A. fib on anticoagulation, diabetes mellitus type 2, COPD, CHF, GERD, hyperlipidemia, CKD, had episode of dizziness, lightheadedness, noted to have heart rate in the 30s.  He is currently being worked up by outpatient cardiologist for need of pacemaker, he was informed he would likely need one.  Heart rate in the hospital has ranged from 30s to 90s.  Blood pressure been in the 120s to 140s.    Interval Problem Update  Heart rate in the hospital has ranged from 30s to 90s.  Blood pressure been in the 120s to 140s.  He has normal respiratory rate and saturations have ranged from 96 100% on 4 L nasal cannula.  I consulted cardiology, Dr. Avalos from EP evaluated the patient and decision was made to proceed with PPM.  He has no acute complaints and otherwise feels well.  Case discussed with patient and family at bedside.    I have discussed this patient's plan of care and discharge plan at IDT rounds today with Case Management, Nursing, Nursing leadership, and other members of the IDT team.    Consultants/Specialty  cardiology    Code Status  Full Code    Disposition  Patient is not medically cleared for discharge.   Anticipate discharge to to home with close outpatient follow-up.  I have placed the appropriate orders for post-discharge needs.    Review of Systems  Review of Systems   Constitutional:  Positive for malaise/fatigue. Negative for chills and fever.   HENT:  Negative for hearing loss and tinnitus.    Eyes:  Negative for blurred vision and double vision.   Respiratory:  Negative for cough and hemoptysis.    Cardiovascular:  Negative for chest pain and palpitations.   Gastrointestinal:  Negative for heartburn and nausea.    Genitourinary:  Negative for dysuria and urgency.   Musculoskeletal:  Negative for myalgias and neck pain.   Skin:  Negative for rash.   Neurological:  Positive for weakness. Negative for dizziness and headaches.   Endo/Heme/Allergies:  Does not bruise/bleed easily.   Psychiatric/Behavioral:  Negative for depression and suicidal ideas.     Physical Exam  Temp:  [36.1 °C (97 °F)-37.3 °C (99.1 °F)] 37.3 °C (99.1 °F)  Pulse:  [46-96] 62  Resp:  [13-28] 17  BP: (115-156)/(46-74) 125/49  SpO2:  [94 %-100 %] 96 %    Physical Exam  Vitals and nursing note reviewed.   Constitutional:       General: He is not in acute distress.     Appearance: Normal appearance. He is not ill-appearing.   HENT:      Head: Normocephalic and atraumatic.      Mouth: Mucous membranes are moist.      Pharynx: Oropharynx is clear.   Eyes:      Extraocular Movements: Extraocular movements intact.      Pupils: Pupils are equal, round, and reactive to light.   Cardiovascular:      Rate and Rhythm: Bradycardia present. Rhythm irregular.      Pulses: Normal pulses.   Pulmonary:      Effort: Pulmonary effort is normal. No respiratory distress.      Breath sounds: Normal breath sounds. No stridor.   Abdominal:      General:  There is no distension or tenderness.      Palpations: Abdomen is soft. There is no mass.   Musculoskeletal:         General: Normal range of motion.      Cervical back: Neck supple.      Right lower leg: Edema present.      Left lower leg: Edema present.   Skin:     General: Skin is warm.      Capillary Refill: Capillary refill takes less than 2 seconds.      Coloration: Skin is not jaundiced or pale.   Neurological:      General: No focal deficit present.      Mental Status: He is alert and oriented to person, place, and time. Mental status is at baseline.      Cranial Nerves: No cranial nerve deficit.      Sensory: No sensory deficit.   Psychiatric:         Mood and Affect: Mood normal.         Behavior: Behavior normal.      Fluids    Intake/Output Summary (Last 24 hours) at 12/14/2022 1912  Last data filed at 12/14/2022 1800  Gross per 24 hour   Intake 240 ml   Output --   Net 240 ml       Laboratory  Recent Labs     12/13/22  2334   WBC 5.0   RBC 3.23*   HEMOGLOBIN 9.3*   HEMATOCRIT 29.4*   MCV 91.0   MCH 28.8   MCHC 31.6*   RDW 54.0*   PLATELETCT 130*   MPV 10.2     Recent Labs     12/13/22  2334   SODIUM 135   POTASSIUM 4.0   CHLORIDE 96   CO2 27   GLUCOSE 137*   BUN 31*   CREATININE 1.44*   CALCIUM 9.4     Recent Labs     12/14/22  0200   APTT 45.4*   INR 2.08*               Imaging  DX-CHEST-PORTABLE (1 VIEW)   Final Result         1.  Left basilar atelectasis or early infiltrate.   2.  Cardiomegaly   3.  Atherosclerosis      CL-PACEMAKER LEADLESS PACING SYSTEM    (Results Pending)        Assessment/Plan  * Symptomatic bradycardia  Assessment & Plan  Telemetry monitoring  Hold AV ian blockers  N.p.o. at midnight for PPM in a.m. with Dr. Avalos      ACP (advance care planning)  Assessment & Plan  Pt. Wishes to be full code. Discussed in front of daughter, son in law.     Thrombocytopenia (HCC)  Assessment & Plan  Chronic  No bleeding noted. Monitor     Atrial fibrillation with slow ventricular response (HCC)- (present on admission)  Assessment & Plan  Chronic history of A. fib on Eliquis  Hold anticoagulation as patient to go for pacemaker implantation in a.m.  Telemetry monitoring    Chronic hypoxemic respiratory failure (HCC)- (present on admission)  Assessment & Plan  On chronic home oxygen.  Continue oxygen supplementation to obtain SPO2 above 90%    CKD (chronic kidney disease)  Assessment & Plan  CKD   Cr in 2021 1.46, today 1.44  Avoid nephrotoxic agents  Renally dose all medications    Chronic systolic congestive heart failure (HCC)- (present on admission)  Assessment & Plan  Resume home medications  Follows up with Dr. Hammond outpatient cardiology   Resume Inspra, torsemide       Benign prostatic hyperplasia- (present  on admission)  Assessment & Plan  Flomax    Obesity (BMI 30.0-34.9)- (present on admission)  Assessment & Plan  BMI 33.47    Mixed hyperlipidemia- (present on admission)  Assessment & Plan  Resume atorvastatin 80 mg nightly     DM (diabetes mellitus) (HCC)  Assessment & Plan  Patient was hypoglycemic at home.  Hold oral and insulin subcutaneous at this time.  Frequent Accu-Cheks and hypoglycemia protocol         VTE prophylaxis: SCDs/TEDs    I have performed a physical exam and reviewed and updated ROS and Plan today (12/14/2022). In review of yesterday's note (12/13/2022), there are no changes except as documented above.

## 2022-12-16 ENCOUNTER — APPOINTMENT (OUTPATIENT)
Dept: RADIOLOGY | Facility: MEDICAL CENTER | Age: 87
DRG: 229 | End: 2022-12-16
Attending: STUDENT IN AN ORGANIZED HEALTH CARE EDUCATION/TRAINING PROGRAM
Payer: MEDICARE

## 2022-12-16 VITALS
WEIGHT: 195.11 LBS | HEART RATE: 70 BPM | SYSTOLIC BLOOD PRESSURE: 147 MMHG | TEMPERATURE: 98.8 F | OXYGEN SATURATION: 89 % | DIASTOLIC BLOOD PRESSURE: 60 MMHG | BODY MASS INDEX: 33.31 KG/M2 | RESPIRATION RATE: 16 BRPM | HEIGHT: 64 IN

## 2022-12-16 PROBLEM — N18.31 CHRONIC KIDNEY DISEASE, STAGE 3A: Status: ACTIVE | Noted: 2022-12-16

## 2022-12-16 LAB
ANION GAP SERPL CALC-SCNC: 8 MMOL/L (ref 7–16)
BASOPHILS # BLD AUTO: 0.6 % (ref 0–1.8)
BASOPHILS # BLD: 0.03 K/UL (ref 0–0.12)
BUN SERPL-MCNC: 17 MG/DL (ref 8–22)
CALCIUM SERPL-MCNC: 9.1 MG/DL (ref 8.5–10.5)
CHLORIDE SERPL-SCNC: 103 MMOL/L (ref 96–112)
CO2 SERPL-SCNC: 28 MMOL/L (ref 20–33)
CREAT SERPL-MCNC: 1.05 MG/DL (ref 0.5–1.4)
EKG IMPRESSION: NORMAL
EKG IMPRESSION: NORMAL
EOSINOPHIL # BLD AUTO: 0.01 K/UL (ref 0–0.51)
EOSINOPHIL NFR BLD: 0.2 % (ref 0–6.9)
ERYTHROCYTE [DISTWIDTH] IN BLOOD BY AUTOMATED COUNT: 54.5 FL (ref 35.9–50)
GFR SERPLBLD CREATININE-BSD FMLA CKD-EPI: 67 ML/MIN/1.73 M 2
GLUCOSE BLD STRIP.AUTO-MCNC: 142 MG/DL (ref 65–99)
GLUCOSE SERPL-MCNC: 164 MG/DL (ref 65–99)
HCT VFR BLD AUTO: 30 % (ref 42–52)
HGB BLD-MCNC: 9.5 G/DL (ref 14–18)
IMM GRANULOCYTES # BLD AUTO: 0.01 K/UL (ref 0–0.11)
IMM GRANULOCYTES NFR BLD AUTO: 0.2 % (ref 0–0.9)
LYMPHOCYTES # BLD AUTO: 0.77 K/UL (ref 1–4.8)
LYMPHOCYTES NFR BLD: 15.1 % (ref 22–41)
MAGNESIUM SERPL-MCNC: 2.1 MG/DL (ref 1.5–2.5)
MCH RBC QN AUTO: 29.1 PG (ref 27–33)
MCHC RBC AUTO-ENTMCNC: 31.7 G/DL (ref 33.7–35.3)
MCV RBC AUTO: 91.7 FL (ref 81.4–97.8)
MONOCYTES # BLD AUTO: 0.52 K/UL (ref 0–0.85)
MONOCYTES NFR BLD AUTO: 10.2 % (ref 0–13.4)
NEUTROPHILS # BLD AUTO: 3.77 K/UL (ref 1.82–7.42)
NEUTROPHILS NFR BLD: 73.7 % (ref 44–72)
NRBC # BLD AUTO: 0 K/UL
NRBC BLD-RTO: 0 /100 WBC
PLATELET # BLD AUTO: 128 K/UL (ref 164–446)
PMV BLD AUTO: 10.8 FL (ref 9–12.9)
POTASSIUM SERPL-SCNC: 3.9 MMOL/L (ref 3.6–5.5)
RBC # BLD AUTO: 3.27 M/UL (ref 4.7–6.1)
SODIUM SERPL-SCNC: 139 MMOL/L (ref 135–145)
WBC # BLD AUTO: 5.1 K/UL (ref 4.8–10.8)

## 2022-12-16 PROCEDURE — 93010 ELECTROCARDIOGRAM REPORT: CPT | Performed by: INTERNAL MEDICINE

## 2022-12-16 PROCEDURE — 93005 ELECTROCARDIOGRAM TRACING: CPT | Performed by: INTERNAL MEDICINE

## 2022-12-16 PROCEDURE — 85025 COMPLETE CBC W/AUTO DIFF WBC: CPT

## 2022-12-16 PROCEDURE — 700102 HCHG RX REV CODE 250 W/ 637 OVERRIDE(OP): Performed by: STUDENT IN AN ORGANIZED HEALTH CARE EDUCATION/TRAINING PROGRAM

## 2022-12-16 PROCEDURE — A9270 NON-COVERED ITEM OR SERVICE: HCPCS | Performed by: STUDENT IN AN ORGANIZED HEALTH CARE EDUCATION/TRAINING PROGRAM

## 2022-12-16 PROCEDURE — 99239 HOSP IP/OBS DSCHRG MGMT >30: CPT | Performed by: STUDENT IN AN ORGANIZED HEALTH CARE EDUCATION/TRAINING PROGRAM

## 2022-12-16 PROCEDURE — 71046 X-RAY EXAM CHEST 2 VIEWS: CPT

## 2022-12-16 PROCEDURE — 83735 ASSAY OF MAGNESIUM: CPT

## 2022-12-16 PROCEDURE — 80048 BASIC METABOLIC PNL TOTAL CA: CPT

## 2022-12-16 PROCEDURE — 82962 GLUCOSE BLOOD TEST: CPT

## 2022-12-16 RX ADMIN — APIXABAN 5 MG: 5 TABLET, FILM COATED ORAL at 05:05

## 2022-12-16 ASSESSMENT — ENCOUNTER SYMPTOMS
PALPITATIONS: 0
CHEST TIGHTNESS: 0
ABDOMINAL PAIN: 0
BLOOD IN STOOL: 0
SHORTNESS OF BREATH: 0
DIZZINESS: 0
FEVER: 0

## 2022-12-16 NOTE — PROGRESS NOTES
Hospital Medicine Daily Progress Note    Date of Service  12/15/2022    Chief Complaint  Julio Azar is a 89 y.o. male admitted 12/14/2022 with symptomatic bradycardia    Hospital Course  89-year-old male with extensive past medical history to include CAD status post CABG, A. fib on anticoagulation, diabetes mellitus type 2, COPD on 4L home oxygen therapy, CHF, GERD, hyperlipidemia, CKD, had episode of dizziness, lightheadedness, noted to have heart rate in the 30s.  He is currently being worked up by outpatient cardiologist for need of pacemaker, he was informed he would likely need one.  Patient was evaluated by Dr. Avalos and went for pacemaker placement on 12/15/2022 without complications.  He will be watched overnight, will need device interrogation prior to hospital discharge.  Will need follow-up in device clinic.    Interval Problem Update  Patient went for pacemaker placement today with Dr. Avalos, completed without complication.  Patient remains in good spirits, stable labs, he is afebrile, systolic blood pressure 102-151.  I have ordered PA and lateral chest x-ray per Dr. Avalos's recommendations  I have discussed this patient's plan of care and discharge plan at IDT rounds today with Case Management, Nursing, Nursing leadership, and other members of the IDT team.    Consultants/Specialty  cardiology    Code Status  Full Code    Disposition  Patient is not medically cleared for discharge.   Anticipate discharge to to home with close outpatient follow-up.  I have placed the appropriate orders for post-discharge needs.    Review of Systems  Review of Systems   Constitutional:  Positive for malaise/fatigue. Negative for chills and fever.   HENT:  Negative for hearing loss and tinnitus.    Eyes:  Negative for blurred vision and double vision.   Respiratory:  Negative for cough and hemoptysis.    Cardiovascular:  Negative for chest pain and palpitations.   Gastrointestinal:  Negative for heartburn and  nausea.   Genitourinary:  Negative for dysuria and urgency.   Musculoskeletal:  Negative for myalgias and neck pain.   Skin:  Negative for rash.   Neurological:  Positive for weakness. Negative for dizziness and headaches.   Endo/Heme/Allergies:  Does not bruise/bleed easily.   Psychiatric/Behavioral:  Negative for depression and suicidal ideas.     Physical Exam  Temp:  [36 °C (96.8 °F)-37 °C (98.6 °F)] 36.9 °C (98.4 °F)  Pulse:  [48-70] 70  Resp:  [16-20] 18  BP: (102-151)/(52-64) 132/60  SpO2:  [92 %-98 %] 92 %    Physical Exam  Vitals and nursing note reviewed.   Constitutional:       General: He is not in acute distress.     Appearance: Normal appearance. He is not ill-appearing.   HENT:      Head: Normocephalic and atraumatic.      Mouth: Mucous membranes are moist.      Pharynx: Oropharynx is clear.   Eyes:      Extraocular Movements: Extraocular movements intact.      Pupils: Pupils are equal, round, and reactive to light.   Cardiovascular:      Rate and Rhythm: Bradycardia present. Rhythm irregular.      Pulses: Normal pulses.   Pulmonary:      Effort: Pulmonary effort is normal. No respiratory distress.      Breath sounds: Normal breath sounds. No stridor.   Abdominal:      General:  There is no distension or tenderness.      Palpations: Abdomen is soft. There is no mass.   Musculoskeletal:         General: Normal range of motion.      Cervical back: Neck supple.      Right lower leg: Edema present.      Left lower leg: Edema present.   Skin:     General: Skin is warm.      Capillary Refill: Capillary refill takes less than 2 seconds.      Coloration: Skin is not jaundiced or pale.   Neurological:      General: No focal deficit present.      Mental Status: He is alert and oriented to person, place, and time. Mental status is at baseline.      Cranial Nerves: No cranial nerve deficit.      Sensory: No sensory deficit.   Psychiatric:         Mood and Affect: Mood normal.         Behavior: Behavior normal.      Fluids    Intake/Output Summary (Last 24 hours) at 12/15/2022 1924  Last data filed at 12/15/2022 0730  Gross per 24 hour   Intake 0 ml   Output --   Net 0 ml         Laboratory  Recent Labs     12/13/22  2334   WBC 5.0   RBC 3.23*   HEMOGLOBIN 9.3*   HEMATOCRIT 29.4*   MCV 91.0   MCH 28.8   MCHC 31.6*   RDW 54.0*   PLATELETCT 130*   MPV 10.2       Recent Labs     12/13/22  2334   SODIUM 135   POTASSIUM 4.0   CHLORIDE 96   CO2 27   GLUCOSE 137*   BUN 31*   CREATININE 1.44*   CALCIUM 9.4       Recent Labs     12/14/22  0200   APTT 45.4*   INR 2.08*                 Imaging  DX-CHEST-PORTABLE (1 VIEW)   Final Result         1.  Left basilar atelectasis or early infiltrate.   2.  Cardiomegaly   3.  Atherosclerosis      CL-PACEMAKER LEADLESS PACING SYSTEM    (Results Pending)          Assessment/Plan  * Symptomatic bradycardia  Assessment & Plan  Telemetry monitoring  Hold AV ian blockers  Status post PPM placement with Dr. Avalos.  He will be watched overnight and will need device interrogation prior to hospital discharge, will need follow-up in device clinic.        ACP (advance care planning)  Assessment & Plan  Pt. Wishes to be full code. Discussed in front of daughter, son in law.     Thrombocytopenia (HCC)  Assessment & Plan  Chronic  No bleeding noted. Monitor     Atrial fibrillation with slow ventricular response (HCC)- (present on admission)  Assessment & Plan  Chronic history of A. fib on Eliquis  Eliquis to be restarted in the morning  Telemetry monitoring    Chronic hypoxemic respiratory failure (HCC)- (present on admission)  Assessment & Plan  On chronic home oxygen.  Continue oxygen supplementation to obtain SPO2 above 90%    CKD (chronic kidney disease)  Assessment & Plan  CKD   Cr in 2021 1.46, today 1.44  Avoid nephrotoxic agents  Renally dose all medications    Chronic systolic congestive heart failure (HCC)- (present on admission)  Assessment & Plan  Resume home medications  Follows up with   [facility] outpatient cardiology   Resume Inspra, torsemide       Benign prostatic hyperplasia- (present on admission)  Assessment & Plan  Flomax    Obesity (BMI 30.0-34.9)- (present on admission)  Assessment & Plan  BMI 33.47    Mixed hyperlipidemia- (present on admission)  Assessment & Plan  Resume atorvastatin 80 mg nightly     DM (diabetes mellitus) ([facility])  Assessment & Plan  Insulin sliding scale, diabetic diet       VTE prophylaxis: SCDs/TEDs    I have performed a physical exam and reviewed and updated ROS and Plan today (12/15/2022). In review of yesterday's note (12/14/2022), there are no changes except as documented above.

## 2022-12-16 NOTE — DISCHARGE INSTRUCTIONS
ACTIVITY/SPECIFIC OUTPATIENT DISCHARGE INSTRUCTIONS  Micra Leadless Pacemaker Discharge Instructions/[facility]  1.  No baths or hot tubs x 1 week.  May shower on 12/17/2022 and take off groin dressing.  Do not remove steri-strips, allow to fall off gradually. Continue to monitor sites daily for warmth, redness, discolored drainage  2.  No lifting over 10 lbs for one week.  3.  Call our office ([phone]) if you notice any increased swelling, redness, warmth, or drainage at the catheter site.  4.  Call our office if you develop fever > 101F or uncontrolled pain.  5.  No MRI's for 6 weeks if you have a MRI compatible device.  6. Do not place cell phones or mobile devices directly over implanted device.   7. Your follow-up appointments will be done at approximately 1 week, 6 weeks, then as determined by your cardiology provider.

## 2022-12-16 NOTE — DISCHARGE PLANNING
Case Management Discharge Planning    Admission Date: 12/14/2022  GMLOS: 2.3  ALOS: 2    6-Clicks ADL Score: 24  6-Clicks Mobility Score: 24      Anticipated Discharge Dispo: Discharge Disposition: Discharged to home/self care (01)    DME Needed: No    Action(s) Taken: LSW met with pt at bedside to deliver second IMM. Pt's family at bedside and confirmed that they will be providing transportation and have brought pt's portable O2 to get home with.    Escalations Completed: None    Medically Clear: Yes    Next Steps: No DC needs identified at this time    Barriers to Discharge: None    Is the patient up for discharge tomorrow: No-DC home today

## 2022-12-16 NOTE — PROGRESS NOTES
Report received from Ana Verde RN. Updated on POC.  Assumed care of patient upon arrival to unit. Patient currently A & O x 4, drowsy; on 4 L O2 silicone nasal cannula; up without complaints of acute pain. Pt placed on monitor, monitor room notified, Paced 63. Sign and held orders released. Post op vitals initiated. Site assessed. Patient oriented to unit and to call light system. Call light within reach. Pt educated to fall risk. Fall precautions in place. Pt provided with personal grooming items. Bed locked and in lowest position. All questions answered. No other needs indicated at this time.

## 2022-12-16 NOTE — PROGRESS NOTES
Bedside report received. Pt A&Ox4. Complains of no pain at this time. POC discussed with pt. Pt verbalizes understanding. Call light and belongings within reach. Bed locked and in lowest position. Alarm and fall precautions in place.

## 2022-12-16 NOTE — PROGRESS NOTES
Per Cards APRN pacer has been interrogated and pt is clear per Cards pending DC cath stitch.  Reported to MD.  Per MD, pt OK to DC once stitch out and pt ambulating without dizziness.

## 2022-12-16 NOTE — PROGRESS NOTES
Monitor Summary  Rhythm: Paced  Rate: 53-69  Ectopy: (O) PVC, (R) PVC, (R) Coup, (R) Trip

## 2022-12-16 NOTE — OP REPORT
Electrophysiology Procedure Note  [facility]    PROCEDURE PERFORMED: Micra pacemaker implantation, moderate sedation administered by RN and supervised by physician    : Nnamdi Avalos MD    ASSISTANT: none    ANESTHESIA: Moderate sedation,  start time 1533, stop time 1600  the moderate sedation document has been reviewed, signed and scanned into media     EBL: 30 cc    SPECIMENS: None    INDICATION: Heart block    PRE PROCEDURE ECG: Afib with LBBB    POST PROCEDURE ECG: V pacing     COMPLICATIONS:  None     DESCRIPTION OF PROCEDURE:  After informed written consent, the patient was brought to the electrophysiology lab in the fasting, unsedated state. The patient was prepped and draped in the usual sterile fashion. The procedure was performed under moderate sedation with local anesthetic.     Using the right femoral vein, a 6 [place]ic sheath was placed using modified Seldinger technique.  Next a stiff Amplatz wire was advanced to the high right atrium.  Next the 6 [place] sheath was removed and a small incision was made over the insertion site using a #11 blade.  The site was dilated sequentially with 12 Green[place]ic, then a 16 Greenlandic, than 20 Greenlandic dilator.  Next the Micra introducer system was placed over the Amplatz wire to the mid right atrium.  The patient received 2000 units of heparin.  The Micra introducer was constantly flushed.  Next we prepared the MIcra delivery sheath and flushed it until all air bubbles were eliminated.  The side arm on the Micra delivery sheath was hooked up to constant irrigation.  Next the Micra delivery system was placed in the mid right atrium and the Micra-introducer sheath was brought back into the inferior vena cava.  Next under fluoroscopy the Micra delivery system was advanced across the tricuspid valve and the device was advanced to the high right ventricular septum using clockwise rotation.  Location was confirmed with WILEY and [place] views.  Next the Micra  pacemaker was delivered to the high right ventricular septum in the normal fashion. Initial placement revealed poor numbers, so the device was recaptured and redeployed at a lower location. Testing showed adequate pacemaker function.  Next using a tug test on the tether unders cine fluoroscopy showed at least 2 of the tines attached to the trabeculae.  Next the tether was flossed.  Next the tether was cut and removed.  Fluoroscopy showed stable location of Micra pacemaker.  Following this, the introducer sheath and the delivery sheath were removed in its entirety.  Hemostasis was obtained with a figure of 8 suture.  I personally supervised the administration of moderate sedation by the RN and observed the level of consciousness and physiologic status throughout the procedure.   Following recovery from sedation, the patient was transferred to a monitored bed in good condition.     IMPLANTED DEVICE INFORMATION:    Right ventricular pacemaker is a Medtronic model # LT6YC01 , serial # [id number] ,R wave 5.6 millivolts, threshold 0.38 Volts, pacing impedance 721 Ohms.    DEVICE PROGRAMMING:  VVI 60    FLUOROSCOPY TIME: 4.8 min    IMPRESSIONS:  1. Successful Micra pacemaker implantation    RECOMMENDATIONS:  1. Transfer to monitored bed  2. PA and lateral chest x-ray  3. Device interrogation prior to hospital discharge  4. Followup in device clinic

## 2022-12-16 NOTE — CARE PLAN
Problem: Knowledge Deficit - Standard  Goal: Patient and family/care givers will demonstrate understanding of plan of care, disease process/condition, diagnostic tests and medications  Outcome: Progressing  Note: Pt's whiteboard updated. POC discussed. Call light within reach.     Problem: Acute Care of the PPM/AICD Patient  Goal: Pre Procedure Optimal Care of the PPM/AICD Patient  Outcome: Progressing  Note: Femoral site assessed. Pt educated on AICD   The patient is Watcher - Medium risk of patient condition declining or worsening    Shift Goals  Clinical Goals: Bed rest  Patient Goals: Rest    Progress made toward(s) clinical / shift goals:  q hourly vitals     Patient is not progressing towards the following goals:

## 2022-12-16 NOTE — PROGRESS NOTES
Cardiology Follow Up Progress Note    Date of Service  12/16/2022    Attending Physician  No att. providers found    Chief Complaint   Dizziness and weakness    EP consulted due to symptomatic bradycardia.     HPI  Julio Azar is a 89 y.o. male admitted on 12/14/2022 with symptomatic bradycardia, AF with SVR rate in the 30's.    Patient underwent successful     Other past medical history pertinent for permanent AF, DMT2, HTN, CAD and CHF.     Interim Events  Figure 8 suture removed without difficulty from right femoral access site.   Device interrogation this am showed normal sensing and function.  Ambulating around room without difficulty.     Review of Systems  Review of Systems   Constitutional:  Negative for fever.   Respiratory:  Negative for chest tightness and shortness of breath.    Cardiovascular:  Negative for chest pain, palpitations and leg swelling.   Gastrointestinal:  Negative for abdominal pain and blood in stool.   Genitourinary:  Negative for hematuria.   Musculoskeletal:  Negative for gait problem.   Neurological:  Negative for dizziness and syncope.   All other systems reviewed and are negative.    Vital signs in last 24 hours  Temp:  [36.5 °C (97.7 °F)-37.1 °C (98.8 °F)] 37.1 °C (98.8 °F)  Pulse:  [60-70] 70  Resp:  [15-18] 16  BP: (102-151)/(53-64) 147/60  SpO2:  [89 %-95 %] 89 %    Physical Exam  Physical Exam  Constitutional:       General: He is not in acute distress.     Appearance: Normal appearance.   HENT:      Head: Normocephalic.   Eyes:      Extraocular Movements: Extraocular movements intact.   Cardiovascular:      Rate and Rhythm: Normal rate and regular rhythm.      Pulses: Normal pulses.      Heart sounds: Normal heart sounds. No murmur heard.  Pulmonary:      Effort: Pulmonary effort is normal.      Breath sounds: Normal breath sounds.   Abdominal:      General: There is no distension.      Palpations: Abdomen is soft.      Tenderness: There is no abdominal tenderness.    Musculoskeletal:         General: No swelling.      Cervical back: Normal range of motion.      Right lower leg: No edema.      Left lower leg: No edema.   Skin:     General: Skin is warm and dry.   Neurological:      Mental Status: He is alert and oriented to person, place, and time.   Psychiatric:         Mood and Affect: Mood normal.         Thought Content: Thought content normal.         Judgment: Judgment normal.       Lab Review  Lab Results   Component Value Date/Time    WBC 5.1 12/16/2022 12:13 AM    RBC 3.27 (L) 12/16/2022 12:13 AM    HEMOGLOBIN 9.5 (L) 12/16/2022 12:13 AM    HEMATOCRIT 30.0 (L) 12/16/2022 12:13 AM    MCV 91.7 12/16/2022 12:13 AM    MCH 29.1 12/16/2022 12:13 AM    MCHC 31.7 (L) 12/16/2022 12:13 AM    MPV 10.8 12/16/2022 12:13 AM      Lab Results   Component Value Date/Time    SODIUM 139 12/16/2022 12:38 AM    POTASSIUM 3.9 12/16/2022 12:38 AM    CHLORIDE 103 12/16/2022 12:38 AM    CO2 28 12/16/2022 12:38 AM    GLUCOSE 164 (H) 12/16/2022 12:38 AM    BUN 17 12/16/2022 12:38 AM    CREATININE 1.05 12/16/2022 12:38 AM    CREATININE 1.1 11/02/2006 10:30 AM      Lab Results   Component Value Date/Time    ASTSGOT 21 12/13/2022 11:34 PM    ALTSGPT 16 12/13/2022 11:34 PM     Lab Results   Component Value Date/Time    CHOLSTRLTOT 119 02/23/2021 07:13 AM    LDL 30 02/23/2021 07:13 AM    HDL 60 02/23/2021 07:13 AM    TRIGLYCERIDE 147 02/23/2021 07:13 AM    TROPONINT 38 (H) 12/14/2022 08:00 AM       No results for input(s): NTPROBNP in the last 72 hours.    Cardiac Imaging and Procedures Review  TTE 4/7/2021:  CONCLUSIONS     Technically difficult study - adequate information is obtained.   Left ventricle is mildly dilated.  Borderline depressed left   ventricular systolic function.  Left ventricular ejection fraction is   visually estimated to be 50%.  Mild global hypokinesis.  Moderate biatrial enlargement.  Moderately dilated right ventricle.  Mildly reduced right ventricular   systolic  function.  Mild to moderate eccentric  mitral regurgitation.  Moderate tricuspid regurgitation.  Right ventricular systolic pressure is estimated to be 56 mmHg.    Assessment/Plan  1. S/P Micra  - S/P successful Micra PPM by Dr. Avalos on 12/15/2022 due to symptomatic AF with SVR.   - CXR shows no late signs of PTX, leads appear to be in correct placement.   - EKG and telemetry monitor shows AF/V paced rhythm, no acute findings.   - Device interrogation today showed normal sensing and function.  - Right femoral access site is soft with CDI dressing in place.   - Patient has ambulated in hallway without difficulties.  - Discussed pacemaker discharge education and care with patient and his daughter at bedside per below. All pt questions/concerns were answered, patient verbalized understanding.   - Patient will follow up with pacemaker clinic in 1 week for pacemaker check.        Thank you for allowing me to participate in the care of this patient.    EP will sign off. Close follow up arranged as below:  Future Appointments   Date Time Provider Department Center   12/22/2022 10:00 AM PACER CHECK-[facility] None     Please contact me with any questions.    LEA Healy.   Cardiology, [facility]  [phone]    ACTIVITY/SPECIFIC OUTPATIENT DISCHARGE INSTRUCTIONS  Micra Leadless Pacemaker Discharge Instructions/[facility] Card[facility]  1.  No baths or hot tubs x 1 week.  May shower on 12/17/2022 and take off groin dressing.  Do not remove steri-strips, allow to fall off gradually. Continue to monitor sites daily for warmth, redness, discolored drainage  2.  No lifting over 10 lbs for one week.  3.  Call our office ([phone]) if you notice any increased swelling, redness, warmth, or drainage at the catheter site.  4.  Call our office if you develop fever > 101F or uncontrolled pain.  5.  No MRI's for 6 weeks if you have a MRI compatible device.  6. Do not place cell phones  or mobile devices directly over implanted device.   7. Your follow-up appointments will be done at approximately 1 week, 6 weeks, then as determined by your cardiology provider.

## 2022-12-21 NOTE — DISCHARGE SUMMARY
Discharge Summary    CHIEF COMPLAINT ON ADMISSION  Chief Complaint   Patient presents with    Irregular Heart Beat     Pt states his heartbeat feels erratic; dx with low HR going down into 30's when sleeping, pt has been told he will ultimately need a pacemaker    Weakness     Sudden onset of weakness with shaking and diaphoresis about 1 hour ago, symptoms improved now; pt denies chest pain at any time       Reason for Admission  Low Heart Rate     Admission Date  12/14/2022    CODE STATUS  Prior    HPI & HOSPITAL COURSE  89-year-old male with extensive past medical history to include CAD status post CABG, A. fib on anticoagulation, diabetes mellitus type 2, COPD on 4L home oxygen therapy, CHF, GERD, hyperlipidemia, CKD, had episode of dizziness, lightheadedness, noted to have heart rate in the 30s.  He is currently being worked up by outpatient cardiologist for need of pacemaker, he was informed he would likely need one.  Patient was evaluated by Dr. Avalos and went for pacemaker placement on 12/15/2022 without complications.  He will be watched overnight, will need device interrogation prior to hospital discharge.  Will need follow-up in device clinic. No complications overnight and device working normally.    Therefore, he is discharged in fair and stable condition to home with close outpatient follow-up.    The patient met 2-midnight criteria for an inpatient stay at the time of discharge.    Discharge Date  12/16/2022    FOLLOW UP ITEMS POST DISCHARGE  Cardiology and device clinic, PCP    DISCHARGE DIAGNOSES  Principal Problem:    Symptomatic bradycardia POA: Unknown  Active Problems:    DM (diabetes mellitus) ([facility]) POA: Unknown      Overview: Dr. Ruelas eyes, Dr. Baig            Chronic in nature.  Patient states that his last A1[facility] was [facility] in       [facility], States A1c was 8.4      Up to date on eye exam, as he completed this with ophthalmology a week or       so ago.  Patient  mentions that overall his fasting sugars are in the 150s       but he does mention that this morning it was 185 after he had eaten a       heavier dinner consisting of clam chowder.  Most recent microalbumin       creatinine ratio was elevated at 80.  Patient does continue to follow-up       with Dr. Baig, I do not have his most recent kidney function labs as       these [facility] I did discuss with patient and family that       I would like to review these labs.  We discussed that the elevation in the       microalbumin creatinine ratio can indicate manges in his kidneys and that       is why it is important to evaluate this holistically with the rest of his       kidney function labs.  Is also following up with the [facility]       [facility] who is adjusting his diabetes medication.  He states that recently       they had decreased his Metformin as such I have requested records from [facility]       for updated medication list.    Mixed hyperlipidemia POA: Yes    Obesity (BMI 30.0-34.9) POA: Yes    Benign prostatic hyperplasia POA: Yes    Chronic systolic congestive heart failure (HCC) POA: Yes      Overview: Chronic in nature.  Stable.  Patient continues to follow-up with Dr. Hammond       in cardiology.  Stable swelling is noted in his bilateral ankles.  He also       has follow-up with pulmonology related to increased fluid in his lungs.        Patient to contact cardiology to determine 4-month follow-up is required.    CKD (chronic kidney disease) POA: Unknown      Overview: Chronic in nature. Status unknown. [facility], recent microalbumin creatinine ratio does appear elevated, but this       provider was unable to review her kidney function labs at this       appointment. [facility] will [facility].    Chronic hypoxemic respiratory failure (HCC) POA: Yes      Overview: Chronic in nature. Controlled. Patient states that he is following with        [facility]. Continues to be on 4 L continuous oxygen. States he       has been overall doing well but has noted some increasing shortness of       breath recent chest x-ray did indicate fluid, and patient has another       follow-up with pulmonology for this.    Atrial fibrillation with slow ventricular response (HCC) POA: Yes    Thrombocytopenia (HCC) POA: Unknown    ACP (advance care planning) POA: Unknown  Resolved Problems:    * No resolved hospital problems. *      FOLLOW UP  Future Appointments   Date Time Provider Department Center   12/22/2022 10:00 AM PACER CHECK-[facility] None     Jean Claude Webb, A.P.R.N.  [address]  [facility] [address] [phone]    Call in 1 week(s)  As needed      MEDICATIONS ON DISCHARGE     Medication List        CHANGE how you take these medications        Instructions   metFORMIN 500 MG Tabs  What changed: See the new instructions.  Commonly known as: GLUCOPHAGE   TAKE 2 TABLETS BY MOUTH TWICE DAILY WITHMEALS            CONTINUE taking these medications        Instructions   alendronate 70 MG Tabs  Commonly known as: FOSAMAX   Take 70 mg by mouth every 7 days. SUNDAY  Dose: 70 mg     apixaban 5mg Tabs  Commonly known as: ELIQUIS   Take 1 Tab by mouth 2 Times a Day. Indications: Thromboembolism secondary to Atrial Fibrillation  Dose: 5 mg     atorvastatin 80 MG tablet  Commonly known as: LIPITOR   Take 80 mg by mouth every day.  Dose: 80 mg     Blood Glucose Test Strips   Accucheck Yaneht blood glucose test strips, checking blood sugar 2 daily  .02 insulin requiring.     eplerenone 25 MG Tabs  Commonly known as: INSPRA   Take 25 mg by mouth 2 times a day.  Dose: 25 mg     ferrous sulfate 325 (65 Fe) MG tablet   Take 325 mg by mouth every day.  Dose: 325 mg     glucose 4 g chewable tablet   Chew 15 g as needed for Low Blood Sugar.  Dose: 15 g     Lancets   His new meter uses the Accu-Chek [employer] lancets for 3 time daily testing.  Dx. Code  250.02     Lantus SoloStar 100 UNIT/ML Sopn injection  Generic drug: insulin glargine   Inject 16 Units under the skin every evening.  Dose: 16 Units     Magnesium Oxide 420 MG Tabs   Take 1 Tablet by mouth every day.  Dose: 1 Tablet     Semaglutide 0.5 MG/0.5ML Soaj Pen-injector  Commonly known as: WEGOVY   Inject 0.5 mg under the skin every 7 days. MONDAY  Dose: 0.5 mg     Stiolto Respimat 2.5-2.5 MCG/ACT Aers  Generic drug: Tiotropium Bromide-Olodaterol   Inhale 2 Puffs every morning.  Dose: 2 Puff     tamsulosin 0.4 MG capsule  Commonly known as: FLOMAX   TAKE ONE CAPSULE BY MOUTH TWO TIMES A DAY     torsemide 20 MG Tabs  Commonly known as: DEMADEX   Take 20 mg by mouth every day.  Dose: 20 mg     VITAMIN B COMPLEX PO   Take  by mouth every day.     vitamin D 1000 Unit (25 mcg) Tabs  Commonly known as: cholecalciferol   Take 1,000 Units by mouth every day.  Dose: 1,000 Units            STOP taking these medications      metoprolol SR 25 MG Tb24  Commonly known as: TOPROL XL              Allergies  Allergies   Allergen Reactions    Pcn [Penicillins] Swelling    Latex Rash     Where latex has touched       DIET  No orders of the defined types were placed in this encounter.      ACTIVITY  As tolerated.  Weight bearing as tolerated    CONSULTATIONS  Cardiology    PROCEDURES  Pacemaker placement 12/16/22 Dr. Avalos    LABORATORY  Lab Results   Component Value Date    SODIUM 139 12/16/2022    POTASSIUM 3.9 12/16/2022    CHLORIDE 103 12/16/2022    CO2 28 12/16/2022    GLUCOSE 164 (H) 12/16/2022    BUN 17 12/16/2022    CREATININE 1.05 12/16/2022    CREATININE 1.1 11/02/2006        Lab Results   Component Value Date    WBC 5.1 12/16/2022    HEMOGLOBIN 9.5 (L) 12/16/2022    HEMATOCRIT 30.0 (L) 12/16/2022    PLATELETCT 128 (L) 12/16/2022        Total time of the discharge process exceeds 35 minutes.

## 2022-12-22 ENCOUNTER — NON-PROVIDER VISIT (OUTPATIENT)
Dept: CARDIOLOGY | Facility: MEDICAL CENTER | Age: 87
End: 2022-12-22
Payer: MEDICARE

## 2022-12-22 DIAGNOSIS — Z95.0 CARDIAC PACEMAKER IN SITU: ICD-10-CM

## 2022-12-22 DIAGNOSIS — I48.91 ATRIAL FIBRILLATION WITH SLOW VENTRICULAR RESPONSE (HCC): ICD-10-CM

## 2022-12-22 DIAGNOSIS — I49.5 SICK SINUS SYNDROME (HCC): ICD-10-CM

## 2022-12-22 PROCEDURE — 93279 PRGRMG DEV EVAL PM/LDLS PM: CPT | Performed by: INTERNAL MEDICINE

## 2022-12-22 NOTE — NON-PROVIDER
Wound site (groin) has healed well.  Patient advised to watch for increased redness, swelling, oozing or fever--verbalized understanding.

## 2023-01-03 ENCOUNTER — NON-PROVIDER VISIT (OUTPATIENT)
Dept: CARDIOLOGY | Facility: MEDICAL CENTER | Age: 88
End: 2023-01-03
Payer: MEDICARE

## 2023-01-03 ENCOUNTER — TELEPHONE (OUTPATIENT)
Dept: CARDIOLOGY | Facility: MEDICAL CENTER | Age: 88
End: 2023-01-03

## 2023-01-04 NOTE — TELEPHONE ENCOUNTER
MINA    Caller: Patricia    Topic/issue: Patients daughter called stating the patient has a [employer] monitor placed my David Avalos in December and would like to have to information sent to ....    Stephany Tariq  [address] [place] [id number] [phone]      She states Stephany had sent a release of information , will have her ref fax it .    Callback Number: [phone]    Thank you    Kate ZAMBRANO

## 2023-01-04 NOTE — CARDIAC REMOTE MONITOR - SCAN
Device transmission reviewed. Device demonstrated appropriate function.       Electronically Signed by: Leonel Voss M.D.    1/11/2023  2:35 PM

## 2023-01-06 NOTE — TELEPHONE ENCOUNTER
Unfortunately his insurance is a Medicare Advantage plan we do not take here at [facility]. I initiated a transfer to Dr Hammond's office with [facility].

## 2023-01-06 NOTE — TELEPHONE ENCOUNTER
Called and spoke to Patricia, we are working together to figure out if  will take his insurance and if not I will be transferring his remote monitoring to Dr. Hammond.

## 2023-02-22 ENCOUNTER — APPOINTMENT (RX ONLY)
Dept: URBAN - METROPOLITAN AREA CLINIC 36 | Facility: CLINIC | Age: 88
Setting detail: DERMATOLOGY
End: 2023-02-22

## 2023-02-22 PROBLEM — C44.41 BASAL CELL CARCINOMA OF SKIN OF SCALP AND NECK: Status: ACTIVE | Noted: 2023-02-22

## 2023-02-22 PROCEDURE — 13121 CMPLX RPR S/A/L 2.6-7.5 CM: CPT

## 2023-02-22 PROCEDURE — ? MOHS SURGERY

## 2023-02-22 PROCEDURE — 17311 MOHS 1 STAGE H/N/HF/G: CPT

## 2023-02-22 PROCEDURE — 17312 MOHS ADDL STAGE: CPT

## 2023-02-22 NOTE — PROCEDURE: MOHS SURGERY
Mohs Case Number: [id number]
Previous Accession (Optional): [id number]
Biopsy Photograph Reviewed: Yes
Referring Physician (Optional): Sim
Consent Type: Consent 1 (Standard)
Eye Shield Used: No
Surgeon Performing Repair (Optional): Nuzhat
Initial Size Of Lesion: 1.4
X Size Of Lesion In Cm (Optional): 1
Number Of Stages: 2
Primary Defect Length In Cm (Final Defect Size - Required For Flaps/Grafts): 3
Primary Defect Width In Cm (Final Defect Size - Required For Flaps/Grafts): 1.3
Repair Type: Complex Repair
Oculoplastic Surgeon (A): iKeran
Oculoplastic Surgeon Procedure Text (A): After obtaining clear surgical margins the patient was sent to oculoplastics for surgical repair.  The patient understands they will receive post-surgical care and follow-up from the referring physician's office.
Otolaryngologist Procedure Text (A): After obtaining clear surgical margins the patient was sent to otolaryngology for surgical repair.  The patient understands they will receive post-surgical care and follow-up from the referring physician's office.
Plastic Surgeon Procedure Text (A): After obtaining clear surgical margins the patient was sent to plastics for surgical repair.  The patient understands they will receive post-surgical care and follow-up from the referring physician's office.
Mid-Level (A): did
Mid-Level Procedure Text (A): After obtaining clear surgical margins the patient was sent to a mid-level provider for surgical repair.  The patient understands they will receive post-surgical care and follow-up from the mid-level provider.
Provider Procedure Text (A): After obtaining clear surgical margins the defect was repaired by another provider.
Asc Procedure Text (A): After obtaining clear surgical margins the patient was sent to an ASC for surgical repair.  The patient understands they will receive post-surgical care and follow-up from the ASC physician.
Simple / Intermediate / Complex Repair - Final Wound Length In Cm: 3.5
Suturegard Retention Suture: 2-0 Nylon
Retention Suture Bite Size: 3 mm
Length To Time In Minutes Device Was In Place: 10
Distance Of Undermining In Cm (Required): 1.5
Undermining Type: Entire Wound
Debridement Text: The wound edges were debrided prior to proceeding with the closure to facilitate wound healing.
Helical Rim Text: The closure involved the helical rim.
Vermilion Border Text: The closure involved the vermilion border.
Nostril Rim Text: The closure involved the nostril rim.
Retention Suture Text: Retention sutures were placed to support the closure and prevent dehiscence.
Secondary Defect Length In Cm (Required For Flaps): 0
Area H Indication Text: Tumors in this location are included in Area H (eyelids, eyebrows, nose, lips, chin, ear, pre-auricular, post-auricular, temple, genitalia, hands, feet, ankles and areola).  Tissue conservation is critical in these anatomic locations.
Area M Indication Text: Tumors in this location are included in Area M (cheek, forehead, scalp, neck, jawline and pretibial skin).  Mohs surgery is indicated for tumors in these anatomic locations.
Area L Indication Text: Tumors in this location are included in Area L (trunk and extremities).  Mohs surgery is indicated for larger tumors, or tumors with aggressive histologic features, in these anatomic locations.
Depth Of Tumor Invasion (For Histology): adipose tissue
Perineural Invasion (For Histology - Be Specific If Possible): absent
Presence Of Scar Tissue (For Histology): present
Surgical Defect Width In Cm (Optional): 1.0
Special Stains Stage 1 - Results: Base On Clearance Noted Above
Stage 2: Additional Anesthesia Type: 1% lidocaine with 1:100,000 epinephrine and 408mcg clindamycin/ml and a 1:10 solution of 8.4% sodium bicarbonate
Surgical Defect Length In Cm (Optional): 3.0
Stage 4: Additional Anesthesia Type: 1% lidocaine with epinephrine
Staging Info: By selecting yes to the question above you will include information on AJCC 8 tumor staging in your Mohs note. Information on tumor staging will be automatically added for SCCs on the head and neck. AJCC 8 includes tumor size, tumor depth, perineural involvement and bone invasion.
Tumor Depth: Less than 6mm from granular layer and no invasion beyond the subcutaneous fat
Was The Patient On Physician Recommended Anticoagulation Therapy?: Please Select the Appropriate Response
Medical Necessity Statement: Based on my medical judgement, Mohs surgery is the most appropriate treatment for this cancer compared to other treatments.
Alternatives Discussed Intro (Do Not Add Period): I discussed alternative treatments to Mohs surgery and specifically discussed the risks and benefits of
Consent 1/Introductory Paragraph: The rationale for Mohs was explained to the patient and consent was obtained. The risks, benefits and alternatives to therapy were discussed in detail. Specifically, the risks of infection, scarring, bleeding, prolonged wound healing, incomplete removal, allergy to anesthesia, nerve injury and recurrence were addressed. Prior to the procedure, the treatment site was clearly identified and confirmed by the patient. All components of Universal Protocol/PAUSE Rule completed.
Consent 2/Introductory Paragraph: Mohs surgery was explained to the patient and consent was obtained. The risks, benefits and alternatives to therapy were discussed in detail. Specifically, the risks of infection, scarring, bleeding, prolonged wound healing, incomplete removal, allergy to anesthesia, nerve injury and recurrence were addressed. Prior to the procedure, the treatment site was clearly identified and confirmed by the patient. All components of Universal Protocol/PAUSE Rule completed.
Consent 3/Introductory Paragraph: I gave the patient a chance to ask questions they had about the procedure.  Following this I explained the Mohs procedure and consent was obtained. The risks, benefits and alternatives to therapy were discussed in detail. Specifically, the risks of infection, scarring, bleeding, prolonged wound healing, incomplete removal, allergy to anesthesia, nerve injury and recurrence were addressed. Prior to the procedure, the treatment site was clearly identified and confirmed by the patient. All components of Universal Protocol/PAUSE Rule completed.
Consent (Temporal Branch)/Introductory Paragraph: The rationale for Mohs was explained to the patient and consent was obtained. The risks, benefits and alternatives to therapy were discussed in detail. Specifically, the risks of damage to the temporal branch of the facial nerve, infection, scarring, bleeding, prolonged wound healing, incomplete removal, allergy to anesthesia, and recurrence were addressed. Prior to the procedure, the treatment site was clearly identified and confirmed by the patient. All components of Universal Protocol/PAUSE Rule completed.
Consent (Marginal Mandibular)/Introductory Paragraph: The rationale for Mohs was explained to the patient and consent was obtained. The risks, benefits and alternatives to therapy were discussed in detail. Specifically, the risks of damage to the marginal mandibular branch of the facial nerve, infection, scarring, bleeding, prolonged wound healing, incomplete removal, allergy to anesthesia, and recurrence were addressed. Prior to the procedure, the treatment site was clearly identified and confirmed by the patient. All components of Universal Protocol/PAUSE Rule completed.
Consent (Spinal Accessory)/Introductory Paragraph: The rationale for Mohs was explained to the patient and consent was obtained. The risks, benefits and alternatives to therapy were discussed in detail. Specifically, the risks of damage to the spinal accessory nerve, infection, scarring, bleeding, prolonged wound healing, incomplete removal, allergy to anesthesia, and recurrence were addressed. Prior to the procedure, the treatment site was clearly identified and confirmed by the patient. All components of Universal Protocol/PAUSE Rule completed.
Consent (Near Eyelid Margin)/Introductory Paragraph: The rationale for Mohs was explained to the patient and consent was obtained. The risks, benefits and alternatives to therapy were discussed in detail. Specifically, the risks of ectropion or eyelid deformity, infection, scarring, bleeding, prolonged wound healing, incomplete removal, allergy to anesthesia, nerve injury and recurrence were addressed. Prior to the procedure, the treatment site was clearly identified and confirmed by the patient. All components of Universal Protocol/PAUSE Rule completed.
Consent (Ear)/Introductory Paragraph: The rationale for Mohs was explained to the patient and consent was obtained. The risks, benefits and alternatives to therapy were discussed in detail. Specifically, the risks of ear deformity, infection, scarring, bleeding, prolonged wound healing, incomplete removal, allergy to anesthesia, nerve injury and recurrence were addressed. Prior to the procedure, the treatment site was clearly identified and confirmed by the patient. All components of Universal Protocol/PAUSE Rule completed.
Consent (Nose)/Introductory Paragraph: The rationale for Mohs was explained to the patient and consent was obtained. The risks, benefits and alternatives to therapy were discussed in detail. Specifically, the risks of nasal deformity, changes in the flow of air through the nose, infection, scarring, bleeding, prolonged wound healing, incomplete removal, allergy to anesthesia, nerve injury and recurrence were addressed. Prior to the procedure, the treatment site was clearly identified and confirmed by the patient. All components of Universal Protocol/PAUSE Rule completed.
Consent (Lip)/Introductory Paragraph: The rationale for Mohs was explained to the patient and consent was obtained. The risks, benefits and alternatives to therapy were discussed in detail. Specifically, the risks of lip deformity, changes in the oral aperture, infection, scarring, bleeding, prolonged wound healing, incomplete removal, allergy to anesthesia, nerve injury and recurrence were addressed. Prior to the procedure, the treatment site was clearly identified and confirmed by the patient. All components of Universal Protocol/PAUSE Rule completed.
Consent (Scalp)/Introductory Paragraph: The rationale for Mohs was explained to the patient and consent was obtained. The risks, benefits and alternatives to therapy were discussed in detail. Specifically, the risks of changes in hair growth pattern secondary to repair, infection, scarring, bleeding, prolonged wound healing, incomplete removal, allergy to anesthesia, nerve injury and recurrence were addressed. Prior to the procedure, the treatment site was clearly identified and confirmed by the patient. All components of Universal Protocol/PAUSE Rule completed.
Detail Level: Detailed
Postop Diagnosis: same
Anesthesia Type: 0.5% lidocaine with 1:200,000 epinephrine and a 1:10 solution of 8.4% sodium bicarbonate and 408mcg clindamycin/ml
Anesthesia Volume In Cc: 6
Additional Anesthesia Type: 0.5% bupivacaine with 1:200,000 epinephrine
Hemostasis: Electrocautery
Estimated Blood Loss (Cc): less than 5 cc
Repair Anesthesia Method: local infiltration
Brow Lift Text: A midfrontal incision was made medially to the defect to allow access to the tissues just superior to the left eyebrow. Following careful dissection inferiorly in a supraperiosteal plane to the level of the left eyebrow, several 3-0 monocryl sutures were used to resuspend the eyebrow orbicularis oculi muscular unit to the superior frontal bone periosteum. This resulted in an appropriate reapproximation of static eyebrow symmetry and correction of the left brow ptosis.
Deep Sutures: 4-0 Polysorb
Epidermal Sutures: 4-0 Vicryl Rapide
Epidermal Closure: running cuticular
Suturegard Intro: Intraoperative tissue expansion was performed, utilizing the SUTUREGARD device, in order to reduce wound tension.
Suturegard Body: The suture ends were repeatedly re-tightened and re-clamped to achieve the desired tissue expansion.
Hemigard Intro: Due to skin fragility and wound tension, it was decided to use HEMIGARD adhesive retention suture devices to permit a linear closure. The skin was cleaned and dried for a 6cm distance away from the wound. Excessive hair, if present, was removed to allow for adhesion.
Hemigard Postcare Instructions: The HEMIGARD strips are to remain completely dry for at least 5-7 days.
Donor Site Anesthesia Type: same as repair anesthesia
Graft Basting Suture (Optional): 5-0 Fast Absorbing Gut
Graft Donor Site Epidermal Sutures (Optional): 5-0 Ethibond
Epidermal Closure Graft Donor Site (Optional): simple interrupted
Graft Donor Site Bandage (Optional-Leave Blank If You Don't Want In Note): Aquaphor and telefa placed on wound. Pressure dressing applied to donor site
Closure 2 Information: This tab is for additional flaps and grafts, including complex repair and grafts and complex repair and flaps. You can also specify a different location for the additional defect, if the location is the same you do not need to select a new one. We will insert the automated text for the repair you select below just as we do for solitary flaps and grafts. Please note that at this time if you select a location with a different insurance zone you will need to override the ICD10 and CPT if appropriate.
Closure 3 Information: This tab is for additional flaps and grafts above and beyond our usual structured repairs.  Please note if you enter information here it will not currently bill and you will need to add the billing information manually.
Wound Care: Aquaphor
Dressing: dry sterile dressing
Wound Care (No Sutures): Petrolatum
Suture Removal: 7 days
Unna Boot Text: An Unna boot was placed to help immobilize the limb and facilitate more rapid healing.
Home Suture Removal Text: Patient was provided instructions on removing sutures and will remove their sutures at home.  If they have any questions or difficulties they will call the office.
Post-Care Instructions: I reviewed with the patient in detail post-care instructions. Patient is not to engage in any heavy lifting, exercise, or swimming for the next 14 days. Should the patient develop any fevers, chills, bleeding, severe pain patient will contact the office immediately.
Pain Refusal Text: I offered to prescribe pain medication but the patient refused to take this medication.
Mauc Instructions: By selecting yes to the question below the MAUC number will be added into the note.  This will be calculated automatically based on the diagnosis chosen, the size entered, the body zone selected (H,M,L) and the specific indications you chose. You will also have the option to override the Mohs AUC if you disagree with the automatically calculated number and this option is found in the Case Summary tab.
Where Do You Want The Question To Include Opioid Counseling Located?: Case Summary Tab
Eye Protection Verbiage: Before proceeding with the stage, a plastic scleral shield was inserted. The globe was anesthetized with a few drops of 1% lidocaine with 1:100,000 epinephrine. Then, an appropriate sized scleral shield was chosen and coated with lacrilube ointment. The shield was gently inserted and left in place for the duration of each stage. After the stage was completed, the shield was gently removed.
Mohs Method Verbiage: An incision at a 45 degree angle following the standard Mohs approach was done and the specimen was harvested as a microscopic controlled layer.
Surgeon/Pathologist Verbiage (Will Incorporate Name Of Surgeon From Intro If Not Blank): operated in two distinct and integrated capacities as the surgeon and pathologist.
Mohs Histo Method Verbiage: Each section was then chromacoded and processed in the Mohs lab using the Mohs protocol and submitted for frozen section.
Subsequent Stages Histo Method Verbiage: Using a similar technique to that described above, a thin layer of tissue was removed from all areas where tumor was visible on the previous stage.  The tissue was again oriented, mapped, dyed, and processed as above.
Mohs Rapid Report Verbiage: The area of clinically evident tumor was marked with skin marking ink and appropriately hatched.  The initial incision was made following the Mohs approach through the skin.  The specimen was taken to the lab, divided into the necessary number of pieces, chromacoded and processed according to the Mohs protocol.  This was repeated in successive stages until a tumor free defect was achieved.
Complex Repair Preamble Text (Leave Blank If You Do Not Want): Extensive wide undermining was performed at least 2 cm in all directions.
Intermediate Repair Preamble Text (Leave Blank If You Do Not Want): Undermining was performed with blunt dissection.
M-Plasty Complex Repair Preamble Text (Leave Blank If You Do Not Want): Extensive wide undermining was performed.
Non-Graft Cartilage Fenestration Text: The cartilage was fenestrated with a 2mm punch biopsy to help facilitate healing.
Graft Cartilage Fenestration Text: The cartilage was fenestrated with a 2mm punch biopsy to help facilitate graft survival and healing.
Secondary Intention Text (Leave Blank If You Do Not Want): The defect will heal with secondary intention.
No Repair - Repaired With Adjacent Surgical Defect Text (Leave Blank If You Do Not Want): After obtaining clear surgical margins the defect was repaired concurrently with another surgical defect which was in close approximation.
Adjacent Tissue Transfer Text: The defect edges were debeveled with a #15 scalpel blade.  Given the location of the defect and the proximity to free margins an adjacent tissue transfer was deemed most appropriate.  Using a sterile surgical marker, an appropriate flap was drawn incorporating the defect and placing the expected incisions within the relaxed skin tension lines where possible.    The area thus outlined was incised deep to adipose tissue with a #15 scalpel blade.  The skin margins were undermined to an appropriate distance in all directions utilizing iris scissors.
Advancement Flap (Single) Text: The defect edges were debeveled with a #15 scalpel blade.  Given the location of the defect and the proximity to free margins a single advancement flap was deemed most appropriate.  Using a sterile surgical marker, an appropriate advancement flap was drawn incorporating the defect and placing the expected incisions within the relaxed skin tension lines where possible.    The area thus outlined was incised deep to adipose tissue with a #15 scalpel blade.  The skin margins were undermined to an appropriate distance in all directions utilizing iris scissors.
Advancement Flap (Double) Text: The defect edges were debeveled with a #15 scalpel blade.  Given the location of the defect and the proximity to free margins a double advancement flap was deemed most appropriate.  Using a sterile surgical marker, the appropriate advancement flaps were drawn incorporating the defect and placing the expected incisions within the relaxed skin tension lines where possible.    The area thus outlined was incised deep to adipose tissue with a #15 scalpel blade.  The skin margins were undermined to an appropriate distance in all directions utilizing iris scissors.
Burow's Advancement Flap Text: The defect edges were debeveled with a #15 scalpel blade.  Given the location of the defect and the proximity to free margins a Burow's advancement flap was deemed most appropriate.  Using a sterile surgical marker, the appropriate advancement flap was drawn incorporating the defect and placing the expected incisions within the relaxed skin tension lines where possible.    The area thus outlined was incised deep to adipose tissue with a #15 scalpel blade.  The skin margins were undermined to an appropriate distance in all directions utilizing iris scissors.
Chonodrocutaneous Helical Advancement Flap Text: The defect edges were debeveled with a #15 scalpel blade.  Given the location of the defect and the proximity to free margins a chondrocutaneous helical advancement flap was deemed most appropriate.  Using a sterile surgical marker, the appropriate advancement flap was drawn incorporating the defect and placing the expected incisions within the relaxed skin tension lines where possible.    The area thus outlined was incised deep to adipose tissue with a #15 scalpel blade.  The skin margins were undermined to an appropriate distance in all directions utilizing iris scissors.
Crescentic Advancement Flap Text: The defect edges were debeveled with a #15 scalpel blade.  Given the location of the defect and the proximity to free margins a crescentic advancement flap was deemed most appropriate.  Using a sterile surgical marker, the appropriate advancement flap was drawn incorporating the defect and placing the expected incisions within the relaxed skin tension lines where possible.    The area thus outlined was incised deep to adipose tissue with a #15 scalpel blade.  The skin margins were undermined to an appropriate distance in all directions utilizing iris scissors.
A-T Advancement Flap Text: The defect edges were debeveled with a #15 scalpel blade.  Given the location of the defect, shape of the defect and the proximity to free margins an A-T advancement flap was deemed most appropriate.  Using a sterile surgical marker, an appropriate advancement flap was drawn incorporating the defect and placing the expected incisions within the relaxed skin tension lines where possible.    The area thus outlined was incised deep to adipose tissue with a #15 scalpel blade.  The skin margins were undermined to an appropriate distance in all directions utilizing iris scissors.
O-T Advancement Flap Text: The defect edges were debeveled with a #15 scalpel blade.  Given the location of the defect, shape of the defect and the proximity to free margins an O-T advancement flap was deemed most appropriate.  Using a sterile surgical marker, an appropriate advancement flap was drawn incorporating the defect and placing the expected incisions within the relaxed skin tension lines where possible.    The area thus outlined was incised deep to adipose tissue with a #15 scalpel blade.  The skin margins were undermined to an appropriate distance in all directions utilizing iris scissors.
O-L Flap Text: The defect edges were debeveled with a #15 scalpel blade.  Given the location of the defect, shape of the defect and the proximity to free margins an O-L flap was deemed most appropriate.  Using a sterile surgical marker, an appropriate advancement flap was drawn incorporating the defect and placing the expected incisions within the relaxed skin tension lines where possible.    The area thus outlined was incised deep to adipose tissue with a #15 scalpel blade.  The skin margins were undermined to an appropriate distance in all directions utilizing iris scissors.
O-Z Flap Text: The defect edges were debeveled with a #15 scalpel blade.  Given the location of the defect, shape of the defect and the proximity to free margins an O-Z flap was deemed most appropriate.  Using a sterile surgical marker, an appropriate transposition flap was drawn incorporating the defect and placing the expected incisions within the relaxed skin tension lines where possible. The area thus outlined was incised deep to adipose tissue with a #15 scalpel blade.  The skin margins were undermined to an appropriate distance in all directions utilizing iris scissors.
Double O-Z Flap Text: The defect edges were debeveled with a #15 scalpel blade.  Given the location of the defect, shape of the defect and the proximity to free margins a Double O-Z flap was deemed most appropriate.  Using a sterile surgical marker, an appropriate transposition flap was drawn incorporating the defect and placing the expected incisions within the relaxed skin tension lines where possible. The area thus outlined was incised deep to adipose tissue with a #15 scalpel blade.  The skin margins were undermined to an appropriate distance in all directions utilizing iris scissors.
V-Y Flap Text: The defect edges were debeveled with a #15 scalpel blade.  Given the location of the defect, shape of the defect and the proximity to free margins a V-Y flap was deemed most appropriate.  Using a sterile surgical marker, an appropriate advancement flap was drawn incorporating the defect and placing the expected incisions within the relaxed skin tension lines where possible.    The area thus outlined was incised deep to adipose tissue with a #15 scalpel blade.  The skin margins were undermined to an appropriate distance in all directions utilizing iris scissors.
Advancement-Rotation Flap Text: The defect edges were debeveled with a #15 scalpel blade.  Given the location of the defect, shape of the defect and the proximity to free margins an advancement-rotation flap was deemed most appropriate.  Using a sterile surgical marker, an appropriate flap was drawn incorporating the defect and placing the expected incisions within the relaxed skin tension lines where possible. The area thus outlined was incised deep to adipose tissue with a #15 scalpel blade.  The skin margins were undermined to an appropriate distance in all directions utilizing iris scissors.
Mercedes Flap Text: The defect edges were debeveled with a #15 scalpel blade.  Given the location of the defect, shape of the defect and the proximity to free margins a Mercedes flap was deemed most appropriate.  Using a sterile surgical marker, an appropriate advancement flap was drawn incorporating the defect and placing the expected incisions within the relaxed skin tension lines where possible. The area thus outlined was incised deep to adipose tissue with a #15 scalpel blade.  The skin margins were undermined to an appropriate distance in all directions utilizing iris scissors.
Modified Advancement Flap Text: The defect edges were debeveled with a #15 scalpel blade.  Given the location of the defect, shape of the defect and the proximity to free margins a modified advancement flap was deemed most appropriate.  Using a sterile surgical marker, an appropriate advancement flap was drawn incorporating the defect and placing the expected incisions within the relaxed skin tension lines where possible.    The area thus outlined was incised deep to adipose tissue with a #15 scalpel blade.  The skin margins were undermined to an appropriate distance in all directions utilizing iris scissors.
Mucosal Advancement Flap Text: Given the location of the defect, shape of the defect and the proximity to free margins a mucosal advancement flap was deemed most appropriate. Incisions were made with a 15 blade scalpel in the appropriate fashion along the cutaneous vermilion border and the mucosal lip. The remaining actinically damaged mucosal tissue was excised.  The mucosal advancement flap was then elevated to the gingival sulcus with care taken to preserve the neurovascular structures and advanced into the primary defect. Care was taken to ensure that precise realignment of the vermilion border was achieved.
Peng Advancement Flap Text: The defect edges were debeveled with a #15 scalpel blade.  Given the location of the defect, shape of the defect and the proximity to free margins a Peng advancement flap was deemed most appropriate.  Using a sterile surgical marker, an appropriate advancement flap was drawn incorporating the defect and placing the expected incisions within the relaxed skin tension lines where possible. The area thus outlined was incised deep to adipose tissue with a #15 scalpel blade.  The skin margins were undermined to an appropriate distance in all directions utilizing iris scissors.
Hatchet Flap Text: The defect edges were debeveled with a #15 scalpel blade.  Given the location of the defect, shape of the defect and the proximity to free margins a hatchet flap based from the glabella was deemed most appropriate.  Using a sterile surgical marker, an appropriate glabellar hatchet flap was drawn incorporating the defect and placing the expected incisions within the relaxed skin tension lines where possible.    The area thus outlined was incised deep to adipose tissue with a #15 scalpel blade.  The skin margins were undermined to an appropriate distance in all directions utilizing iris scissors.
Rotation Flap Text: The defect edges were debeveled with a #15 scalpel blade.  Given the location of the defect, shape of the defect and the proximity to free margins a rotation flap was deemed most appropriate.  Using a sterile surgical marker, an appropriate rotation flap was drawn incorporating the defect and placing the expected incisions within the relaxed skin tension lines where possible.    The area thus outlined was incised deep to adipose tissue with a #15 scalpel blade.  The skin margins were undermined to an appropriate distance in all directions utilizing iris scissors.
Spiral Flap Text: The defect edges were debeveled with a #15 scalpel blade.  Given the location of the defect, shape of the defect and the proximity to free margins a spiral flap was deemed most appropriate.  Using a sterile surgical marker, an appropriate rotation flap was drawn incorporating the defect and placing the expected incisions within the relaxed skin tension lines where possible. The area thus outlined was incised deep to adipose tissue with a #15 scalpel blade.  The skin margins were undermined to an appropriate distance in all directions utilizing iris scissors.
Staged Advancement Flap Text: The defect edges were debeveled with a #15 scalpel blade.  Given the location of the defect, shape of the defect and the proximity to free margins a staged advancement flap was deemed most appropriate.  Using a sterile surgical marker, an appropriate advancement flap was drawn incorporating the defect and placing the expected incisions within the relaxed skin tension lines where possible. The area thus outlined was incised deep to adipose tissue with a #15 scalpel blade.  The skin margins were undermined to an appropriate distance in all directions utilizing iris scissors.
Star Wedge Flap Text: The defect edges were debeveled with a #15 scalpel blade.  Given the location of the defect, shape of the defect and the proximity to free margins a star wedge flap was deemed most appropriate.  Using a sterile surgical marker, an appropriate rotation flap was drawn incorporating the defect and placing the expected incisions within the relaxed skin tension lines where possible. The area thus outlined was incised deep to adipose tissue with a #15 scalpel blade.  The skin margins were undermined to an appropriate distance in all directions utilizing iris scissors.
Transposition Flap Text: The defect edges were debeveled with a #15 scalpel blade.  Given the location of the defect and the proximity to free margins a transposition flap was deemed most appropriate.  Using a sterile surgical marker, an appropriate transposition flap was drawn incorporating the defect.    The area thus outlined was incised deep to adipose tissue with a #15 scalpel blade.  The skin margins were undermined to an appropriate distance in all directions utilizing iris scissors.
Muscle Hinge Flap Text: The defect edges were debeveled with a #15 scalpel blade.  Given the size, depth and location of the defect and the proximity to free margins a muscle hinge flap was deemed most appropriate.  Using a sterile surgical marker, an appropriate hinge flap was drawn incorporating the defect. The area thus outlined was incised with a #15 scalpel blade.  The skin margins were undermined to an appropriate distance in all directions utilizing iris scissors.
Mustarde Flap Text: The defect edges were debeveled with a #15 scalpel blade.  Given the size, depth and location of the defect and the proximity to free margins a Mustarde flap was deemed most appropriate.  Using a sterile surgical marker, an appropriate flap was drawn incorporating the defect. The area thus outlined was incised with a #15 scalpel blade.  The skin margins were undermined to an appropriate distance in all directions utilizing iris scissors.
Nasal Turnover Hinge Flap Text: The defect edges were debeveled with a #15 scalpel blade.  Given the size, depth, location of the defect and the defect being full thickness a nasal turnover hinge flap was deemed most appropriate.  Using a sterile surgical marker, an appropriate hinge flap was drawn incorporating the defect. The area thus outlined was incised with a #15 scalpel blade. The flap was designed to recreate the nasal mucosal lining and the alar rim. The skin margins were undermined to an appropriate distance in all directions utilizing iris scissors.
Nasalis-Muscle-Based Myocutaneous Island Pedicle Flap Text: Using a #15 blade, an incision was made around the donor flap to the level of the nasalis muscle. Wide lateral undermining was then performed in both the subcutaneous plane above the nasalis muscle, and in a submuscular plane just above periosteum. This allowed the formation of a free nasalis muscle axial pedicle (based on the angular artery) which was still attached to the actual cutaneous flap, increasing its mobility and vascular viability. Hemostasis was obtained with pinpoint electrocoagulation. The flap was mobilized into position and the pivotal anchor points positioned and stabilized with buried interrupted sutures. Subcutaneous and dermal tissues were closed in a multilayered fashion with sutures. Tissue redundancies were excised, and the epidermal edges were apposed without significant tension and sutured with sutures.
Orbicularis Oris Muscle Flap Text: The defect edges were debeveled with a #15 scalpel blade.  Given that the defect affected the competency of the oral sphincter an orbicularis oris muscle flap was deemed most appropriate to restore this competency and normal muscle function.  Using a sterile surgical marker, an appropriate flap was drawn incorporating the defect. The area thus outlined was incised with a #15 scalpel blade.
Melolabial Transposition Flap Text: The defect edges were debeveled with a #15 scalpel blade.  Given the location of the defect and the proximity to free margins a melolabial flap was deemed most appropriate.  Using a sterile surgical marker, an appropriate melolabial transposition flap was drawn incorporating the defect.    The area thus outlined was incised deep to adipose tissue with a #15 scalpel blade.  The skin margins were undermined to an appropriate distance in all directions utilizing iris scissors.
Rhombic Flap Text: The defect edges were debeveled with a #15 scalpel blade.  Given the location of the defect and the proximity to free margins a rhombic flap was deemed most appropriate.  Using a sterile surgical marker, an appropriate rhombic flap was drawn incorporating the defect.    The area thus outlined was incised deep to adipose tissue with a #15 scalpel blade.  The skin margins were undermined to an appropriate distance in all directions utilizing iris scissors.
Rhomboid Transposition Flap Text: The defect edges were debeveled with a #15 scalpel blade.  Given the location of the defect and the proximity to free margins a rhomboid transposition flap was deemed most appropriate.  Using a sterile surgical marker, an appropriate rhomboid flap was drawn incorporating the defect.    The area thus outlined was incised deep to adipose tissue with a #15 scalpel blade.  The skin margins were undermined to an appropriate distance in all directions utilizing iris scissors.
Bi-Rhombic Flap Text: The defect edges were debeveled with a #15 scalpel blade.  Given the location of the defect and the proximity to free margins a bi-rhombic flap was deemed most appropriate.  Using a sterile surgical marker, an appropriate rhombic flap was drawn incorporating the defect. The area thus outlined was incised deep to adipose tissue with a #15 scalpel blade.  The skin margins were undermined to an appropriate distance in all directions utilizing iris scissors.
Helical Rim Advancement Flap Text: The defect edges were debeveled with a #15 blade scalpel.  Given the location of the defect and the proximity to free margins (helical rim) a double helical rim advancement flap was deemed most appropriate.  Using a sterile surgical marker, the appropriate advancement flaps were drawn incorporating the defect and placing the expected incisions between the helical rim and antihelix where possible.  The area thus outlined was incised through and through with a #15 scalpel blade.  With a skin hook and iris scissors, the flaps were gently and sharply undermined and freed up.
Bilateral Helical Rim Advancement Flap Text: The defect edges were debeveled with a #15 blade scalpel.  Given the location of the defect and the proximity to free margins (helical rim) a bilateral helical rim advancement flap was deemed most appropriate.  Using a sterile surgical marker, the appropriate advancement flaps were drawn incorporating the defect and placing the expected incisions between the helical rim and antihelix where possible.  The area thus outlined was incised through and through with a #15 scalpel blade.  With a skin hook and iris scissors, the flaps were gently and sharply undermined and freed up.
Ear Star Wedge Flap Text: The defect edges were debeveled with a #15 blade scalpel.  Given the location of the defect and the proximity to free margins (helical rim) an ear star wedge flap was deemed most appropriate.  Using a sterile surgical marker, the appropriate flap was drawn incorporating the defect and placing the expected incisions between the helical rim and antihelix where possible.  The area thus outlined was incised through and through with a #15 scalpel blade.
Banner Transposition Flap Text: The defect edges were debeveled with a #15 scalpel blade.  Given the location of the defect and the proximity to free margins a Banner transposition flap was deemed most appropriate.  Using a sterile surgical marker, an appropriate flap drawn around the defect. The area thus outlined was incised deep to adipose tissue with a #15 scalpel blade.  The skin margins were undermined to an appropriate distance in all directions utilizing iris scissors.
Bilobed Flap Text: The defect edges were debeveled with a #15 scalpel blade.  Given the location of the defect and the proximity to free margins a bilobe flap was deemed most appropriate.  Using a sterile surgical marker, an appropriate bilobe flap drawn around the defect.    The area thus outlined was incised deep to adipose tissue with a #15 scalpel blade.  The skin margins were undermined to an appropriate distance in all directions utilizing iris scissors.
Bilobed Transposition Flap Text: The defect edges were debeveled with a #15 scalpel blade.  Given the location of the defect and the proximity to free margins a bilobed transposition flap was deemed most appropriate.  Using a sterile surgical marker, an appropriate bilobe flap drawn around the defect.    The area thus outlined was incised deep to adipose tissue with a #15 scalpel blade.  The skin margins were undermined to an appropriate distance in all directions utilizing iris scissors.
Trilobed Flap Text: The defect edges were debeveled with a #15 scalpel blade.  Given the location of the defect and the proximity to free margins a trilobed flap was deemed most appropriate.  Using a sterile surgical marker, an appropriate trilobed flap drawn around the defect.    The area thus outlined was incised deep to adipose tissue with a #15 scalpel blade.  The skin margins were undermined to an appropriate distance in all directions utilizing iris scissors.
Dorsal Nasal Flap Text: The defect edges were debeveled with a #15 scalpel blade.  Given the location of the defect and the proximity to free margins a dorsal nasal flap,based upon the glabellar folds, was deemed most appropriate.  Using a sterile surgical marker, an appropriate dorsal nasal flap was drawn around the defect.    The area thus outlined was incised deep to adipose tissue with a #15 scalpel blade.  The skin margins were undermined to an appropriate distance in all directions utilizing iris scissors.
Island Pedicle Flap Text: The defect edges were debeveled with a #15 scalpel blade.  Given the location of the defect, shape of the defect and the proximity to free margins an island pedicle advancement flap was deemed most appropriate.  Using a sterile surgical marker, an appropriate advancement flap was drawn incorporating the defect, outlining the appropriate donor tissue and placing the expected incisions within the relaxed skin tension lines where possible.    The area thus outlined was incised deep to adipose tissue with a #15 scalpel blade.  The skin margins were undermined to an appropriate distance in all directions around the primary defect and laterally outward around the island pedicle utilizing iris scissors.  There was minimal undermining beneath the pedicle flap.
Island Pedicle Flap With Canthal Suspension Text: The defect edges were debeveled with a #15 scalpel blade.  Given the location of the defect, shape of the defect and the proximity to free margins an island pedicle advancement flap was deemed most appropriate.  Using a sterile surgical marker, an appropriate advancement flap was drawn incorporating the defect, outlining the appropriate donor tissue and placing the expected incisions within the relaxed skin tension lines where possible. The area thus outlined was incised deep to adipose tissue with a #15 scalpel blade.  The skin margins were undermined to an appropriate distance in all directions around the primary defect and laterally outward around the island pedicle utilizing iris scissors.  There was minimal undermining beneath the pedicle flap. A suspension suture was placed in the canthal tendon to prevent tension and prevent ectropion.
Alar Island Pedicle Flap Text: The defect edges were debeveled with a #15 scalpel blade.  Given the location of the defect, shape of the defect and the proximity to the alar rim an island pedicle advancement flap was deemed most appropriate.  Using a sterile surgical marker, an appropriate advancement flap was drawn incorporating the defect, outlining the appropriate donor tissue and placing the expected incisions within the nasal ala running parallel to the alar rim. The area thus outlined was incised with a #15 scalpel blade.  The skin margins were undermined minimally to an appropriate distance in all directions around the primary defect and laterally outward around the island pedicle utilizing iris scissors.  There was minimal undermining beneath the pedicle flap.
Double Island Pedicle Flap Text: The defect edges were debeveled with a #15 scalpel blade.  Given the location of the defect, shape of the defect and the proximity to free margins a double island pedicle advancement flap was deemed most appropriate.  Using a sterile surgical marker, an appropriate advancement flap was drawn incorporating the defect, outlining the appropriate donor tissue and placing the expected incisions within the relaxed skin tension lines where possible.    The area thus outlined was incised deep to adipose tissue with a #15 scalpel blade.  The skin margins were undermined to an appropriate distance in all directions around the primary defect and laterally outward around the island pedicle utilizing iris scissors.  There was minimal undermining beneath the pedicle flap.
Island Pedicle Flap-Requiring Vessel Identification Text: The defect edges were debeveled with a #15 scalpel blade.  Given the location of the defect, shape of the defect and the proximity to free margins an island pedicle advancement flap was deemed most appropriate.  Using a sterile surgical marker, an appropriate advancement flap was drawn, based on the axial vessel mentioned above, incorporating the defect, outlining the appropriate donor tissue and placing the expected incisions within the relaxed skin tension lines where possible.    The area thus outlined was incised deep to adipose tissue with a #15 scalpel blade.  The skin margins were undermined to an appropriate distance in all directions around the primary defect and laterally outward around the island pedicle utilizing iris scissors.  There was minimal undermining beneath the pedicle flap.
Keystone Flap Text: The defect edges were debeveled with a #15 scalpel blade.  Given the location of the defect, shape of the defect a keystone flap was deemed most appropriate.  Using a sterile surgical marker, an appropriate keystone flap was drawn incorporating the defect, outlining the appropriate donor tissue and placing the expected incisions within the relaxed skin tension lines where possible. The area thus outlined was incised deep to adipose tissue with a #15 scalpel blade.  The skin margins were undermined to an appropriate distance in all directions around the primary defect and laterally outward around the flap utilizing iris scissors.
O-T Plasty Text: The defect edges were debeveled with a #15 scalpel blade.  Given the location of the defect, shape of the defect and the proximity to free margins an O-T plasty was deemed most appropriate.  Using a sterile surgical marker, an appropriate O-T plasty was drawn incorporating the defect and placing the expected incisions within the relaxed skin tension lines where possible.    The area thus outlined was incised deep to adipose tissue with a #15 scalpel blade.  The skin margins were undermined to an appropriate distance in all directions utilizing iris scissors.
O-Z Plasty Text: The defect edges were debeveled with a #15 scalpel blade.  Given the location of the defect, shape of the defect and the proximity to free margins an O-Z plasty (double transposition flap) was deemed most appropriate.  Using a sterile surgical marker, the appropriate transposition flaps were drawn incorporating the defect and placing the expected incisions within the relaxed skin tension lines where possible.    The area thus outlined was incised deep to adipose tissue with a #15 scalpel blade.  The skin margins were undermined to an appropriate distance in all directions utilizing iris scissors.  Hemostasis was achieved with electrocautery.  The flaps were then transposed into place, one clockwise and the other counterclockwise, and anchored with interrupted buried subcutaneous sutures.
Double O-Z Plasty Text: The defect edges were debeveled with a #15 scalpel blade.  Given the location of the defect, shape of the defect and the proximity to free margins a Double O-Z plasty (double transposition flap) was deemed most appropriate.  Using a sterile surgical marker, the appropriate transposition flaps were drawn incorporating the defect and placing the expected incisions within the relaxed skin tension lines where possible. The area thus outlined was incised deep to adipose tissue with a #15 scalpel blade.  The skin margins were undermined to an appropriate distance in all directions utilizing iris scissors.  Hemostasis was achieved with electrocautery.  The flaps were then transposed into place, one clockwise and the other counterclockwise, and anchored with interrupted buried subcutaneous sutures.
V-Y Plasty Text: The defect edges were debeveled with a #15 scalpel blade.  Given the location of the defect, shape of the defect and the proximity to free margins an V-Y advancement flap was deemed most appropriate.  Using a sterile surgical marker, an appropriate advancement flap was drawn incorporating the defect and placing the expected incisions within the relaxed skin tension lines where possible.    The area thus outlined was incised deep to adipose tissue with a #15 scalpel blade.  The skin margins were undermined to an appropriate distance in all directions utilizing iris scissors.
H Plasty Text: Given the location of the defect, shape of the defect and the proximity to free margins a H-plasty was deemed most appropriate for repair.  Using a sterile surgical marker, the appropriate advancement arms of the H-plasty were drawn incorporating the defect and placing the expected incisions within the relaxed skin tension lines where possible. The area thus outlined was incised deep to adipose tissue with a #15 scalpel blade. The skin margins were undermined to an appropriate distance in all directions utilizing iris scissors.  The opposing advancement arms were then advanced into place in opposite direction and anchored with interrupted buried subcutaneous sutures.
W Plasty Text: The lesion was extirpated to the level of the fat with a #15 scalpel blade.  Given the location of the defect, shape of the defect and the proximity to free margins a W-plasty was deemed most appropriate for repair.  Using a sterile surgical marker, the appropriate transposition arms of the W-plasty were drawn incorporating the defect and placing the expected incisions within the relaxed skin tension lines where possible.    The area thus outlined was incised deep to adipose tissue with a #15 scalpel blade.  The skin margins were undermined to an appropriate distance in all directions utilizing iris scissors.  The opposing transposition arms were then transposed into place in opposite direction and anchored with interrupted buried subcutaneous sutures.
Z Plasty Text: The lesion was extirpated to the level of the fat with a #15 scalpel blade.  Given the location of the defect, shape of the defect and the proximity to free margins a Z-plasty was deemed most appropriate for repair.  Using a sterile surgical marker, the appropriate transposition arms of the Z-plasty were drawn incorporating the defect and placing the expected incisions within the relaxed skin tension lines where possible.    The area thus outlined was incised deep to adipose tissue with a #15 scalpel blade.  The skin margins were undermined to an appropriate distance in all directions utilizing iris scissors.  The opposing transposition arms were then transposed into place in opposite direction and anchored with interrupted buried subcutaneous sutures.
Zygomaticofacial Flap Text: Given the location of the defect, shape of the defect and the proximity to free margins a zygomaticofacial flap was deemed most appropriate for repair.  Using a sterile surgical marker, the appropriate flap was drawn incorporating the defect and placing the expected incisions within the relaxed skin tension lines where possible. The area thus outlined was incised deep to adipose tissue with a #15 scalpel blade with preservation of a vascular pedicle.  The skin margins were undermined to an appropriate distance in all directions utilizing iris scissors.  The flap was then placed into the defect and anchored with interrupted buried subcutaneous sutures.
Cheek Interpolation Flap Text: A decision was made to reconstruct the defect utilizing an interpolation axial flap and a staged reconstruction.  A telfa template was made of the defect.  This telfa template was then used to outline the Cheek Interpolation flap.  The donor area for the pedicle flap was then injected with anesthesia.  The flap was excised through the skin and subcutaneous tissue down to the layer of the underlying musculature.  The interpolation flap was carefully excised within this deep plane to maintain its blood supply.  The edges of the donor site were undermined.   The donor site was closed in a primary fashion.  The pedicle was then rotated into position and sutured.  Once the tube was sutured into place, adequate blood supply was confirmed with blanching and refill.  The pedicle was then wrapped with xeroform gauze and dressed appropriately with a telfa and gauze bandage to ensure continued blood supply and protect the attached pedicle.
Cheek-To-Nose Interpolation Flap Text: A decision was made to reconstruct the defect utilizing an interpolation axial flap and a staged reconstruction.  A telfa template was made of the defect.  This telfa template was then used to outline the Cheek-To-Nose Interpolation flap.  The donor area for the pedicle flap was then injected with anesthesia.  The flap was excised through the skin and subcutaneous tissue down to the layer of the underlying musculature.  The interpolation flap was carefully excised within this deep plane to maintain its blood supply.  The edges of the donor site were undermined.   The donor site was closed in a primary fashion.  The pedicle was then rotated into position and sutured.  Once the tube was sutured into place, adequate blood supply was confirmed with blanching and refill.  The pedicle was then wrapped with xeroform gauze and dressed appropriately with a telfa and gauze bandage to ensure continued blood supply and protect the attached pedicle.
Interpolation Flap Text: A decision was made to reconstruct the defect utilizing an interpolation axial flap and a staged reconstruction.  A telfa template was made of the defect.  This telfa template was then used to outline the interpolation flap.  The donor area for the pedicle flap was then injected with anesthesia.  The flap was excised through the skin and subcutaneous tissue down to the layer of the underlying musculature.  The interpolation flap was carefully excised within this deep plane to maintain its blood supply.  The edges of the donor site were undermined.   The donor site was closed in a primary fashion.  The pedicle was then rotated into position and sutured.  Once the tube was sutured into place, adequate blood supply was confirmed with blanching and refill.  The pedicle was then wrapped with xeroform gauze and dressed appropriately with a telfa and gauze bandage to ensure continued blood supply and protect the attached pedicle.
Melolabial Interpolation Flap Text: A decision was made to reconstruct the defect utilizing an interpolation axial flap and a staged reconstruction.  A telfa template was made of the defect.  This telfa template was then used to outline the melolabial interpolation flap.  The donor area for the pedicle flap was then injected with anesthesia.  The flap was excised through the skin and subcutaneous tissue down to the layer of the underlying musculature.  The pedicle flap was carefully excised within this deep plane to maintain its blood supply.  The edges of the donor site were undermined.   The donor site was closed in a primary fashion.  The pedicle was then rotated into position and sutured.  Once the tube was sutured into place, adequate blood supply was confirmed with blanching and refill.  The pedicle was then wrapped with xeroform gauze and dressed appropriately with a telfa and gauze bandage to ensure continued blood supply and protect the attached pedicle.
Mastoid Interpolation Flap Text: A decision was made to reconstruct the defect utilizing an interpolation axial flap and a staged reconstruction.  A telfa template was made of the defect.  This telfa template was then used to outline the mastoid interpolation flap.  The donor area for the pedicle flap was then injected with anesthesia.  The flap was excised through the skin and subcutaneous tissue down to the layer of the underlying musculature.  The pedicle flap was carefully excised within this deep plane to maintain its blood supply.  The edges of the donor site were undermined.   The donor site was closed in a primary fashion.  The pedicle was then rotated into position and sutured.  Once the tube was sutured into place, adequate blood supply was confirmed with blanching and refill.  The pedicle was then wrapped with xeroform gauze and dressed appropriately with a telfa and gauze bandage to ensure continued blood supply and protect the attached pedicle.
Posterior Auricular Interpolation Flap Text: A decision was made to reconstruct the defect utilizing an interpolation axial flap and a staged reconstruction.  A telfa template was made of the defect.  This telfa template was then used to outline the posterior auricular interpolation flap.  The donor area for the pedicle flap was then injected with anesthesia.  The flap was excised through the skin and subcutaneous tissue down to the layer of the underlying musculature.  The pedicle flap was carefully excised within this deep plane to maintain its blood supply.  The edges of the donor site were undermined.   The donor site was closed in a primary fashion.  The pedicle was then rotated into position and sutured.  Once the tube was sutured into place, adequate blood supply was confirmed with blanching and refill.  The pedicle was then wrapped with xeroform gauze and dressed appropriately with a telfa and gauze bandage to ensure continued blood supply and protect the attached pedicle.
Paramedian Forehead Flap Text: A decision was made to reconstruct the defect utilizing an interpolation axial flap and a staged reconstruction.  A telfa template was made of the defect.  This telfa template was then used to outline the paramedian forehead pedicle flap.  The donor area for the pedicle flap was then injected with anesthesia.  The flap was excised through the skin and subcutaneous tissue down to the layer of the underlying musculature.  The pedicle flap was carefully excised within this deep plane to maintain its blood supply.  The edges of the donor site were undermined.   The donor site was closed in a primary fashion.  The pedicle was then rotated into position and sutured.  Once the tube was sutured into place, adequate blood supply was confirmed with blanching and refill.  The pedicle was then wrapped with xeroform gauze and dressed appropriately with a telfa and gauze bandage to ensure continued blood supply and protect the attached pedicle.
Abbe Flap (Upper To Lower Lip) Text: The defect of the lower lip was assessed and measured.  Given the location and size of the defect, an Abbe flap was deemed most appropriate.  Using a sterile surgical marker, an appropriate Abbe flap was measured and drawn on the upper lip. Local anesthesia was then infiltrated.  A scalpel was then used to incise the upper lip through and through the skin, vermilion, muscle and mucosa, leaving the flap pedicled on the opposite side.  The flap was then rotated and transferred to the lower lip defect.  The flap was then sutured into place with a three layer technique, closing the orbicularis oris muscle layer with subcutaneous buried sutures, followed by a mucosal layer and an epidermal layer.
Abbe Flap (Lower To Upper Lip) Text: The defect of the upper lip was assessed and measured.  Given the location and size of the defect, an Abbe flap was deemed most appropriate.  Using a sterile surgical marker, an appropriate Abbe flap was measured and drawn on the lower lip. Local anesthesia was then infiltrated. A scalpel was then used to incise the upper lip through and through the skin, vermilion, muscle and mucosa, leaving the flap pedicled on the opposite side.  The flap was then rotated and transferred to the lower lip defect.  The flap was then sutured into place with a three layer technique, closing the orbicularis oris muscle layer with subcutaneous buried sutures, followed by a mucosal layer and an epidermal layer.
Estlander Flap (Upper To Lower Lip) Text: The defect of the lower lip was assessed and measured.  Given the location and size of the defect, an Estlander flap was deemed most appropriate.  Using a sterile surgical marker, an appropriate Estlander flap was measured and drawn on the upper lip. Local anesthesia was then infiltrated. A scalpel was then used to incise the lateral aspect of the flap, through skin, muscle and mucosa, leaving the flap pedicled medially.  The flap was then rotated and positioned to fill the lower lip defect.  The flap was then sutured into place with a three layer technique, closing the orbicularis oris muscle layer with subcutaneous buried sutures, followed by a mucosal layer and an epidermal layer.
Cheiloplasty (Less Than 50%) Text: A decision was made to reconstruct the defect with a  cheiloplasty.  The defect was undermined extensively.  Additional obicularis oris muscle was excised with a 15 blade scalpel.  The defect was converted into a full thickness wedge, of less than 50% of the vertical height of the lip, to facilite a better cosmetic result.  Small vessels were then tied off with 5-0 monocyrl. The obicularis oris, superficial fascia, adipose and dermis were then reapproximated.  After the deeper layers were approximated the epidermis was reapproximated with particular care given to realign the vermilion border.
Cheiloplasty (Complex) Text: A decision was made to reconstruct the defect with a  cheiloplasty.  The defect was undermined extensively.  Additional obicularis oris muscle was excised with a 15 blade scalpel.  The defect was converted into a full thickness wedge to facilite a better cosmetic result.  Small vessels were then tied off with 5-0 monocyrl. The obicularis oris, superficial fascia, adipose and dermis were then reapproximated.  After the deeper layers were approximated the epidermis was reapproximated with particular care given to realign the vermilion border.
Ear Wedge Repair Text: A wedge excision was completed by carrying down an excision through the full thickness of the ear and cartilage with an inward facing Burow's triangle. The wound was then closed in a layered fashion.
Full Thickness Lip Wedge Repair (Flap) Text: Given the location of the defect and the proximity to free margins a full thickness wedge repair was deemed most appropriate.  Using a sterile surgical marker, the appropriate repair was drawn incorporating the defect and placing the expected incisions perpendicular to the vermilion border.  The vermilion border was also meticulously outlined to ensure appropriate reapproximation during the repair.  The area thus outlined was incised through and through with a #15 scalpel blade.  The muscularis and dermis were reaproximated with deep sutures following hemostasis. Care was taken to realign the vermilion border before proceeding with the superficial closure.  Once the vermilion was realigned the superfical and mucosal closure was finished.
Ftsg Text: The defect edges were debeveled with a #15 scalpel blade.  Given the location of the defect, shape of the defect and the proximity to free margins a full thickness skin graft was deemed most appropriate.  Using a sterile surgical marker, the primary defect shape was transferred to the donor site. The area thus outlined was incised deep to adipose tissue with a #15 scalpel blade.  The harvested graft was then trimmed of adipose tissue until only dermis and epidermis was left.  The skin margins of the secondary defect were undermined to an appropriate distance in all directions utilizing iris scissors.  The secondary defect was closed with interrupted buried subcutaneous sutures.  The skin edges were then re-apposed with running  sutures.  The skin graft was then placed in the primary defect and oriented appropriately.
Split-Thickness Skin Graft Text: The defect edges were debeveled with a #15 scalpel blade.  Given the location of the defect, shape of the defect and the proximity to free margins a split thickness skin graft was deemed most appropriate.  Using a sterile surgical marker, the primary defect shape was transferred to the donor site. The split thickness graft was then harvested.  The skin graft was then placed in the primary defect and oriented appropriately.
Burow's Graft Text: The defect edges were debeveled with a #15 scalpel blade.  Given the location of the defect, shape of the defect, the proximity to free margins and the presence of a standing cone deformity a Burow's skin graft was deemed most appropriate. The standing cone was removed and this tissue was then trimmed to the shape of the primary defect. The adipose tissue was also removed until only dermis and epidermis were left.  The skin margins of the secondary defect were undermined to an appropriate distance in all directions utilizing iris scissors.  The secondary defect was closed with interrupted buried subcutaneous sutures.  The skin edges were then re-apposed with running  sutures.  The skin graft was then placed in the primary defect and oriented appropriately.
Cartilage Graft Text: The defect edges were debeveled with a #15 scalpel blade.  Given the location of the defect, shape of the defect, the fact the defect involved a full thickness cartilage defect a cartilage graft was deemed most appropriate.  An appropriate donor site was identified, cleansed, and anesthetized. The cartilage graft was then harvested and transferred to the recipient site, oriented appropriately and then sutured into place.  The secondary defect was then repaired using a primary closure.
Composite Graft Text: The defect edges were debeveled with a #15 scalpel blade.  Given the location of the defect, shape of the defect, the proximity to free margins and the fact the defect was full thickness a composite graft was deemed most appropriate.  The defect was outline and then transferred to the donor site.  A full thickness graft was then excised from the donor site. The graft was then placed in the primary defect, oriented appropriately and then sutured into place.  The secondary defect was then repaired using a primary closure.
Epidermal Autograft Text: The defect edges were debeveled with a #15 scalpel blade.  Given the location of the defect, shape of the defect and the proximity to free margins an epidermal autograft was deemed most appropriate.  Using a sterile surgical marker, the primary defect shape was transferred to the donor site. The epidermal graft was then harvested.  The skin graft was then placed in the primary defect and oriented appropriately.
Dermal Autograft Text: The defect edges were debeveled with a #15 scalpel blade.  Given the location of the defect, shape of the defect and the proximity to free margins a dermal autograft was deemed most appropriate.  Using a sterile surgical marker, the primary defect shape was transferred to the donor site. The area thus outlined was incised deep to adipose tissue with a #15 scalpel blade.  The harvested graft was then trimmed of adipose and epidermal tissue until only dermis was left.  The skin graft was then placed in the primary defect and oriented appropriately.
Skin Substitute Text: The defect edges were debeveled with a #15 scalpel blade.  Given the location of the defect, shape of the defect and the proximity to free margins a skin substitute graft was deemed most appropriate.  The graft material was trimmed to fit the size of the defect. The graft was then placed in the primary defect and oriented appropriately.
Tissue Cultured Epidermal Autograft Text: The defect edges were debeveled with a #15 scalpel blade.  Given the location of the defect, shape of the defect and the proximity to free margins a tissue cultured epidermal autograft was deemed most appropriate.  The graft was then trimmed to fit the size of the defect.  The graft was then placed in the primary defect and oriented appropriately.
Xenograft Text: The defect edges were debeveled with a #15 scalpel blade.  Given the location of the defect, shape of the defect and the proximity to free margins a xenograft was deemed most appropriate.  The graft was then trimmed to fit the size of the defect.  The graft was then placed in the primary defect and oriented appropriately.
Purse String (Simple) Text: Given the location of the defect and the characteristics of the surrounding skin a purse string closure was deemed most appropriate.  Undermining was performed circumfirentially around the surgical defect.  A purse string suture was then placed and tightened.
Purse String (Intermediate) Text: Given the location of the defect and the characteristics of the surrounding skin a purse string intermediate closure was deemed most appropriate.  Undermining was performed circumfirentially around the surgical defect.  A purse string suture was then placed and tightened.
Partial Purse String (Simple) Text: Given the location of the defect and the characteristics of the surrounding skin a simple purse string closure was deemed most appropriate.  Undermining was performed circumfirentially around the surgical defect.  A purse string suture was then placed and tightened. Wound tension only allowed a partial closure of the circular defect.
Partial Purse String (Intermediate) Text: Given the location of the defect and the characteristics of the surrounding skin an intermediate purse string closure was deemed most appropriate.  Undermining was performed circumfirentially around the surgical defect.  A purse string suture was then placed and tightened. Wound tension only allowed a partial closure of the circular defect.
Localized Dermabrasion With Wire Brush Text: The patient was draped in routine manner.  Localized dermabrasion using 3 x 17 mm wire brush was performed in routine manner to papillary dermis. This spot dermabrasion is being performed to complete skin cancer reconstruction. It also will eliminate the other sun damaged precancerous cells that are known to be part of the regional effect of a lifetime's worth of sun exposure. This localized dermabrasion is therapeutic and should not be considered cosmetic in any regard.
Tarsorrhaphy Text: A tarsorrhaphy was performed using Frost sutures.
Complex Repair And Flap Additional Text (Will Appearing After The Standard Complex Repair Text): The complex repair was not sufficient to completely close the primary defect. The remaining additional defect was repaired with the flap mentioned below.
Complex Repair And Graft Additional Text (Will Appearing After The Standard Complex Repair Text): The complex repair was not sufficient to completely close the primary defect. The remaining additional defect was repaired with the graft mentioned below.
Unique Flap 1 Name: Myocutaneous Island pedicle Flap
Unique Flap 2 Name: Peng Flap
Unique Flap 3 Name: Mercedes Flap
Unique Flap 4 Name: Banner Flap
Unique Flap 5 Name: tunneled myocutaneous flap
Unique Flap 6 Name: Amy-B?ch flap
Unique Flap 7 Name: Mustarde flap
Unique Flap 8 Name: East to West Flap
Unique Flap 1 Text: A decision was made to reconstruct the defect utilizing a myocutaneous Island pedicle Flap based on the levator labii superioris muscle.  A telfa template was made of the defect.  This telfa template was then used to outline the myocutaneous flap, based along the meilolabial fold.  The donor area for the pedicle flap was then injected with anesthesia.  The flap was excised through the skin and subcutaneous tissue down to the layer of the underlying musculature.  The myocutaneous flap was carefully excised within this deep plane to maintain its blood supply. Based on the muscle. The edges of the donor site were undermined.   The donor site was closed in a primary fashion to the point of transposition.  The pedicle was then transposed into position and sutured.  Once the flap was sutured into place, adequate blood supply was confirmed with blanching and refill.
Unique Flap 2 Text: A decision was made to reconstruct the defect utilizing a Peng Flap (Bilateral Advancement Rotation Flap). Given the location of the defect and the proximity to free margins, this flap was deemed most appropriate.  Using a sterile surgical marker, the appropriate rotation flaps were drawn incorporating the defect and placing the expected incisions within the relaxed skin tension lines where possible.    The area thus outlined was incised deep to adipose tissue with a #15 scalpel blade.  The skin margins were undermined to an appropriate distance in all directions utilizing iris scissors.
Unique Flap 3 Text: The defect edges were debeveled with a #15 scalpel blade.  Given the location of the defect, shape of the defect and the proximity to free margins a Mercedes (double advancement flap) was deemed most appropriate.  Using a sterile surgical marker, the appropriate transposition flaps were drawn incorporating the defect and placing the expected incisions within the relaxed skin tension lines where possible.    The area thus outlined was incised deep to adipose tissue with a #15 scalpel blade.  The skin margins were undermined to an appropriate distance in all directions utilizing iris scissors.  Hemostasis was achieved with electrocautery.  The flaps were then advanced into the defect and anchored with interrupted buried subcutaneous sutures.
Unique Flap 4 Text: The defect edges were debeveled with a #15 scalpel blade.  Given the location of the defect and the proximity to free margins a Banner transposition flap was deemed most appropriate.  Using a sterile surgical marker, an appropriate Banner transposition flap was drawn incorporating the defect.    The area thus outlined was incised deep to adipose tissue with a #15 scalpel blade.  The skin margins were undermined to an appropriate distance in all directions utilizing iris scissors.
Unique Flap 5 Text: A decision was made to reconstruct the defect utilizing a tunneled myocutaneous Island pedicle Flap based on the anterior auricularis muscle.  A telfa template was made of the defect.  This telfa template was then used to outline the myocutaneous flap, based along the preauricular fold.  The donor area for the pedicle flap was then injected with anesthesia.  The flap was excised through the skin and subcutaneous tissue down to the layer of the underlying musculature.  The myocutaneous flap was carefully excised within this deep plane to maintain its blood supply based on the muscle. The edges of the donor site were undermined.   The donor site was closed in a primary fashion to the point of transposition.  The pedicle was then transposed through a tunnel into position and sutured.  Once the flap was sutured into place, adequate blood supply was confirmed with blanching and refill.
Unique Flap 6 Text: A decision was made to reconstruct the defect utilizing an Anti-aging-B?ch Flap (Bilateral helical Advancement Rotation Flap). Given the location of the defect and the proximity to free margins, this flap was deemed most appropriate.  Using a sterile surgical marker, the appropriate flaps were drawn incorporating the defect and placing the expected incisions within the relaxed skin tension lines where possible.  The area thus outlined was incised deep to adipose tissue with a #15 scalpel blade.  The skin margins were undermined to an appropriate distance in all directions utilizing iris scissors. Cartilage was incorporated into the flap arms to maintain helical anatomy.
Unique Flap 7 Text: A decision was made to reconstruct the defect utilizing a Mustarde Flap (Advancement Rotation Flap). Given the location of the defect and the proximity to free margins, this flap was deemed most appropriate.  Using a sterile surgical marker, the appropriate rotation flap was drawn incorporating the defect and placing the expected incisions within the relaxed skin tension lines where possible.    The area thus outlined was incised deep to adipose tissue with a #15 scalpel blade.  The skin margins were undermined to an appropriate distance in all directions utilizing iris scissors. The flap was advanced and rotated under the eyelid with a sling created laterally to keep ectropion minimal.
Unique Flap 8 Text: A decision was made to reconstruct the defect utilizing an East to West Flap (Modified Burows Advancement Flap). Given the location of the defect and the proximity to free margins, this flap was deemed most appropriate.  Using a sterile surgical marker, the appropriate advancement flaps were drawn incorporating the defect and placing the expected incisions within the relaxed skin tension lines where possible.    The area thus outlined was incised deep to adipose tissue with a #15 scalpel blade.  The skin margins were undermined to an appropriate distance in all directions utilizing iris scissors. Minimal alar distortion was created with flap approximation.
Manual Repair Warning Statement: We plan on removing the manually selected variable below in favor of our much easier automatic structured text blocks found in the previous tab. We decided to do this to help make the flow better and give you the full power of structured data. Manual selection is never going to be ideal in our platform and I would encourage you to avoid using manual selection from this point on, especially since I will be sunsetting this feature. It is important that you do one of two things with the customized text below. First, you can save all of the text in a word file so you can have it for future reference. Second, transfer the text to the appropriate area in the Library tab. Lastly, if there is a flap or graft type which we do not have you need to let us know right away so I can add it in before the variable is hidden. No need to panic, we plan to give you roughly 6 months to make the change.
Same Histology In Subsequent Stages Text: The pattern and morphology of the tumor is as described in the first stage.
No Residual Tumor Seen Histology Text: There were no malignant cells seen in the sections examined.
Inflammation Suggestive Of Cancer Camouflage Histology Text: There was a dense lymphocytic infiltrate which prevented adequate histologic evaluation of adjacent structures.
Bcc Histology Text: There were numerous aggregates of basaloid cells.
Bcc Infiltrative Histology Text: There were numerous aggregates of basaloid cells demonstrating an infiltrative pattern.
Mart-1 - Positive Histology Text: MART-1 staining demonstrates areas of higher density and clustering of melanocytes with Pagetoid spread upwards within the epidermis. The surgical margins are positive for tumor cells.
Mart-1 - Negative Histology Text: MART-1 staining demonstrates a normal density and pattern of melanocytes along the dermal-epidermal junction. The surgical margins are negative for tumor cells.
Information: Selecting Yes will display possible errors in your note based on the variables you have selected. This validation is only offered as a suggestion for you. PLEASE NOTE THAT THE VALIDATION TEXT WILL BE REMOVED WHEN YOU FINALIZE YOUR NOTE. IF YOU WANT TO FAX A PRELIMINARY NOTE YOU WILL NEED TO TOGGLE THIS TO 'NO' IF YOU DO NOT WANT IT IN YOUR FAXED NOTE.

## 2023-03-15 ENCOUNTER — APPOINTMENT (OUTPATIENT)
Dept: CARDIOLOGY | Facility: MEDICAL CENTER | Age: 88
End: 2023-03-15
Payer: MEDICARE

## 2023-03-22 ENCOUNTER — APPOINTMENT (RX ONLY)
Dept: URBAN - METROPOLITAN AREA CLINIC 36 | Facility: CLINIC | Age: 88
Setting detail: DERMATOLOGY
End: 2023-03-22

## 2023-03-22 DIAGNOSIS — Z48.02 ENCOUNTER FOR REMOVAL OF SUTURES: ICD-10-CM

## 2023-03-22 PROCEDURE — ? SUTURE REMOVAL (GLOBAL PERIOD)

## 2023-03-22 ASSESSMENT — LOCATION DETAILED DESCRIPTION DERM: LOCATION DETAILED: LEFT POSTAURICULAR SKIN

## 2023-03-22 ASSESSMENT — LOCATION SIMPLE DESCRIPTION DERM: LOCATION SIMPLE: POSTERIOR SCALP

## 2023-03-22 ASSESSMENT — LOCATION ZONE DERM: LOCATION ZONE: SCALP

## 2023-03-22 NOTE — PROCEDURE: MIPS QUALITY
Quality 111:Pneumonia Vaccination Status For Older Adults: Pneumococcal vaccine (PPSV23) administered on or after patient’s 60th birthday and before the end of the measurement period
Quality 431: Preventive Care And Screening: Unhealthy Alcohol Use - Screening: Patient not identified as an unhealthy alcohol user when screened for unhealthy alcohol use using a systematic screening method
Quality 130: Documentation Of Current Medications In The Medical Record: Current Medications Documented
Quality 402: Tobacco Use And Help With Quitting Among Adolescents: Patient screened for tobacco and is an ex-smoker
Detail Level: Detailed

## 2023-03-22 NOTE — PROCEDURE: SUTURE REMOVAL (GLOBAL PERIOD)
Detail Level: Detailed
Add 52562 Cpt? (Important Note: In 2017 The Use Of 26009 Is Being Tracked By Cms To Determine Future Global Period Reimbursement For Global Periods): no

## 2023-04-14 ENCOUNTER — APPOINTMENT (RX ONLY)
Dept: URBAN - METROPOLITAN AREA CLINIC 20 | Facility: CLINIC | Age: 88
Setting detail: DERMATOLOGY
End: 2023-04-14

## 2023-04-14 DIAGNOSIS — L81.4 OTHER MELANIN HYPERPIGMENTATION: ICD-10-CM

## 2023-04-14 DIAGNOSIS — L57.8 OTHER SKIN CHANGES DUE TO CHRONIC EXPOSURE TO NONIONIZING RADIATION: ICD-10-CM

## 2023-04-14 DIAGNOSIS — L57.0 ACTINIC KERATOSIS: ICD-10-CM

## 2023-04-14 DIAGNOSIS — Z86.007 PERSONAL HISTORY OF IN-SITU NEOPLASM OF SKIN: ICD-10-CM

## 2023-04-14 DIAGNOSIS — Z85.828 PERSONAL HISTORY OF OTHER MALIGNANT NEOPLASM OF SKIN: ICD-10-CM

## 2023-04-14 PROCEDURE — 99213 OFFICE O/P EST LOW 20 MIN: CPT | Mod: 25

## 2023-04-14 PROCEDURE — 17004 DESTROY PREMAL LESIONS 15/>: CPT

## 2023-04-14 PROCEDURE — ? LIQUID NITROGEN

## 2023-04-14 PROCEDURE — ? COUNSELING

## 2023-04-14 ASSESSMENT — LOCATION DETAILED DESCRIPTION DERM
LOCATION DETAILED: LEFT SUPERIOR MEDIAL FOREHEAD
LOCATION DETAILED: LEFT INFERIOR HELIX
LOCATION DETAILED: LEFT DISTAL DORSAL FOREARM
LOCATION DETAILED: RIGHT SUPERIOR CENTRAL MALAR CHEEK
LOCATION DETAILED: RIGHT ANTERIOR PROXIMAL UPPER ARM
LOCATION DETAILED: LEFT SUPERIOR LATERAL MALAR CHEEK
LOCATION DETAILED: LEFT INFERIOR CENTRAL MALAR CHEEK
LOCATION DETAILED: RIGHT LATERAL MALAR CHEEK
LOCATION DETAILED: RIGHT SUPERIOR LATERAL MALAR CHEEK
LOCATION DETAILED: LEFT MEDIAL FRONTAL SCALP
LOCATION DETAILED: LEFT CENTRAL POSTAURICULAR SKIN
LOCATION DETAILED: LEFT MEDIAL SUPERIOR CHEST
LOCATION DETAILED: RIGHT CENTRAL MALAR CHEEK
LOCATION DETAILED: LEFT CENTRAL FRONTAL SCALP
LOCATION DETAILED: LEFT SUPERIOR HELIX
LOCATION DETAILED: LEFT PROXIMAL DORSAL FOREARM
LOCATION DETAILED: LEFT ANTERIOR PROXIMAL UPPER ARM
LOCATION DETAILED: LEFT CENTRAL MALAR CHEEK
LOCATION DETAILED: RIGHT DISTAL DORSAL FOREARM
LOCATION DETAILED: RIGHT SUPERIOR HELIX
LOCATION DETAILED: RIGHT INFERIOR CRUS OF ANTIHELIX
LOCATION DETAILED: LEFT LATERAL FOREHEAD
LOCATION DETAILED: SUPERIOR MID FOREHEAD
LOCATION DETAILED: RIGHT MEDIAL FRONTAL SCALP
LOCATION DETAILED: LEFT CLAVICULAR SKIN

## 2023-04-14 ASSESSMENT — LOCATION SIMPLE DESCRIPTION DERM
LOCATION SIMPLE: RIGHT CHEEK
LOCATION SIMPLE: LEFT EAR
LOCATION SIMPLE: CHEST
LOCATION SIMPLE: RIGHT FOREARM
LOCATION SIMPLE: LEFT UPPER ARM
LOCATION SIMPLE: SCALP
LOCATION SIMPLE: SUPERIOR FOREHEAD
LOCATION SIMPLE: LEFT FOREHEAD
LOCATION SIMPLE: RIGHT EAR
LOCATION SIMPLE: LEFT SCALP
LOCATION SIMPLE: RIGHT SCALP
LOCATION SIMPLE: LEFT CHEEK
LOCATION SIMPLE: RIGHT UPPER ARM
LOCATION SIMPLE: LEFT FOREARM
LOCATION SIMPLE: LEFT CLAVICULAR SKIN

## 2023-04-14 ASSESSMENT — LOCATION ZONE DERM
LOCATION ZONE: ARM
LOCATION ZONE: FACE
LOCATION ZONE: TRUNK
LOCATION ZONE: EAR
LOCATION ZONE: SCALP

## 2023-04-14 NOTE — PROCEDURE: MIPS QUALITY
Quality 226: Preventive Care And Screening: Tobacco Use: Screening And Cessation Intervention: Patient screened for tobacco use and is an ex/non-smoker
Detail Level: Detailed
Quality 111:Pneumonia Vaccination Status For Older Adults: Pneumococcal Vaccination Previously Received
Quality 402: Tobacco Use And Help With Quitting Among Adolescents: Patient screened for tobacco and never smoked
Quality 130: Documentation Of Current Medications In The Medical Record: Current Medications Documented
Quality 431: Preventive Care And Screening: Unhealthy Alcohol Use - Screening: Patient screened for unhealthy alcohol use using a single question and scores less than 2 times per year

## 2023-09-07 ENCOUNTER — APPOINTMENT (RX ONLY)
Dept: URBAN - METROPOLITAN AREA CLINIC 20 | Facility: CLINIC | Age: 88
Setting detail: DERMATOLOGY
End: 2023-09-07

## 2023-09-07 DIAGNOSIS — L57.0 ACTINIC KERATOSIS: ICD-10-CM

## 2023-09-07 DIAGNOSIS — L57.8 OTHER SKIN CHANGES DUE TO CHRONIC EXPOSURE TO NONIONIZING RADIATION: ICD-10-CM

## 2023-09-07 PROBLEM — D48.5 NEOPLASM OF UNCERTAIN BEHAVIOR OF SKIN: Status: ACTIVE | Noted: 2023-09-07

## 2023-09-07 PROCEDURE — ? BIOPSY BY SHAVE METHOD

## 2023-09-07 PROCEDURE — 69100 BIOPSY OF EXTERNAL EAR: CPT | Mod: 76,59

## 2023-09-07 PROCEDURE — ? LIQUID NITROGEN

## 2023-09-07 PROCEDURE — 17003 DESTRUCT PREMALG LES 2-14: CPT

## 2023-09-07 PROCEDURE — 17000 DESTRUCT PREMALG LESION: CPT | Mod: 59

## 2023-09-07 PROCEDURE — 11102 TANGNTL BX SKIN SINGLE LES: CPT | Mod: 59

## 2023-09-07 PROCEDURE — ? COUNSELING

## 2023-09-07 PROCEDURE — 99212 OFFICE O/P EST SF 10 MIN: CPT | Mod: 25

## 2023-09-07 PROCEDURE — 69100 BIOPSY OF EXTERNAL EAR: CPT | Mod: 59

## 2023-09-07 ASSESSMENT — LOCATION SIMPLE DESCRIPTION DERM
LOCATION SIMPLE: LEFT FOREARM
LOCATION SIMPLE: LEFT FOREHEAD
LOCATION SIMPLE: LEFT CHEEK
LOCATION SIMPLE: SCALP
LOCATION SIMPLE: RIGHT EAR
LOCATION SIMPLE: RIGHT SCALP
LOCATION SIMPLE: LEFT NOSE
LOCATION SIMPLE: NOSE
LOCATION SIMPLE: LEFT SCALP
LOCATION SIMPLE: RIGHT CHEEK
LOCATION SIMPLE: RIGHT FOREHEAD

## 2023-09-07 ASSESSMENT — LOCATION DETAILED DESCRIPTION DERM
LOCATION DETAILED: NASAL DORSUM
LOCATION DETAILED: LEFT PROXIMAL DORSAL FOREARM
LOCATION DETAILED: LEFT SUPERIOR FOREHEAD
LOCATION DETAILED: RIGHT SUPERIOR PARIETAL SCALP
LOCATION DETAILED: RIGHT INFERIOR MEDIAL FOREHEAD
LOCATION DETAILED: RIGHT SUPERIOR HELIX
LOCATION DETAILED: RIGHT MEDIAL FRONTAL SCALP
LOCATION DETAILED: RIGHT SUPERIOR CENTRAL MALAR CHEEK
LOCATION DETAILED: LEFT SUPERIOR PREAURICULAR CHEEK
LOCATION DETAILED: LEFT SUPERIOR LATERAL FOREHEAD
LOCATION DETAILED: LEFT NASAL ALA
LOCATION DETAILED: RIGHT INFERIOR CENTRAL MALAR CHEEK
LOCATION DETAILED: LEFT CENTRAL FRONTAL SCALP
LOCATION DETAILED: RIGHT INFERIOR HELIX
LOCATION DETAILED: LEFT LATERAL MALAR CHEEK
LOCATION DETAILED: LEFT VENTRAL LATERAL PROXIMAL FOREARM

## 2023-09-07 ASSESSMENT — LOCATION ZONE DERM
LOCATION ZONE: EAR
LOCATION ZONE: SCALP
LOCATION ZONE: FACE
LOCATION ZONE: ARM
LOCATION ZONE: NOSE

## 2023-09-07 NOTE — PROCEDURE: MIPS QUALITY
Detail Level: Detailed
Quality 111:Pneumonia Vaccination Status For Older Adults: Pneumococcal Vaccination Previously Received
Quality 130: Documentation Of Current Medications In The Medical Record: Current Medications Documented
Quality 226: Preventive Care And Screening: Tobacco Use: Screening And Cessation Intervention: Patient screened for tobacco use and is an ex/non-smoker
Quality 431: Preventive Care And Screening: Unhealthy Alcohol Use - Screening: Patient screened for unhealthy alcohol use using a single question and scores less than 2 times per year
Quality 402: Tobacco Use And Help With Quitting Among Adolescents: Patient screened for tobacco and never smoked

## 2023-10-11 ENCOUNTER — APPOINTMENT (RX ONLY)
Dept: URBAN - METROPOLITAN AREA CLINIC 36 | Facility: CLINIC | Age: 88
Setting detail: DERMATOLOGY
End: 2023-10-11

## 2023-10-11 PROBLEM — C44.229 SQUAMOUS CELL CARCINOMA OF SKIN OF LEFT EAR AND EXTERNAL AURICULAR CANAL: Status: ACTIVE | Noted: 2023-10-11

## 2023-10-11 PROBLEM — C44.91 BASAL CELL CARCINOMA OF SKIN, UNSPECIFIED: Status: ACTIVE | Noted: 2023-10-11

## 2023-10-11 PROBLEM — C44.619 BASAL CELL CARCINOMA OF SKIN OF LEFT UPPER LIMB, INCLUDING SHOULDER: Status: ACTIVE | Noted: 2023-10-11

## 2023-10-11 PROCEDURE — 99213 OFFICE O/P EST LOW 20 MIN: CPT | Mod: 25

## 2023-10-11 PROCEDURE — ? CONSULTATION FOR MOHS SURGERY

## 2023-10-11 PROCEDURE — 11900 INJECT SKIN LESIONS </W 7: CPT

## 2023-10-11 PROCEDURE — ? INJECTION

## 2023-10-11 NOTE — PROCEDURE: CONSULTATION FOR MOHS SURGERY
Detail Level: Detailed
X Size Of Lesion In Cm (Optional): 0
Incorporate Mauc In Note: Yes
Other Plan: Bleomycin 15 unit injection for a total of 3 treatments

## 2023-10-11 NOTE — HPI: MOHS SURGERY CONSULTATION
Has The Cancer Been Biopsied Before?: has been previously biopsied
Who Is Your Referring Provider?: Zoë ROSS

## 2023-10-11 NOTE — PROCEDURE: INJECTION
Post-Care Instructions: I reviewed with the patient in detail post-care instructions. Patient understands to keep the injection sites clean and call the clinic if there is any redness, swelling or pain.
Type Of Vial Used?: Multi-Dose
Detail Level: None
Additional Comments: Bleomycin 15 unit injection for a total of 3 treatments
Procedure Information: Please note that the numeric value listed in the Medication (1) and associated J-code units and Medication (2) and associated J-code units variables are j-code amounts and do not represent either the concentration or the total amount of the medications injected.  I strongly recommend selecting no to the Render J-code information in note question. This will allow your note to be more clear. If you are billing j-codes with your injection codes you need to document the total amount of the medication injected. This amount should match the j-code units. For example, if you are injecting Triamcinolone 40mg as an intramuscular injection you would select 40 for the dose field.. This would allow you to document  with 4 units (40mg = 10mg x 4). The total volume is not used to calculate j-codes only the amount of the medication administered.
Expiration Date (Optional): 10/24
Administered By (Optional): Martinez Noland MD
Route: IL
Dose Administered (Numbers Only - Mg, G, Mcg, Units, Cc): 0
Bill For Wasted Drug?: no
Render J-Code Information In Note?: yes
Consent: The risks of the medication was reviewed with the patient.
Medication (1) And Associated J-Code Units: Bleomycin, 15 units
Lot # (Optional): [id number]
Treatment Number: 1
Dose Administered (Numbers Only - Mg, G, Mcg, Units, Cc): 0.2

## 2023-10-31 ENCOUNTER — APPOINTMENT (RX ONLY)
Dept: URBAN - METROPOLITAN AREA CLINIC 36 | Facility: CLINIC | Age: 88
Setting detail: DERMATOLOGY
End: 2023-10-31

## 2023-10-31 PROBLEM — C44.91 BASAL CELL CARCINOMA OF SKIN, UNSPECIFIED: Status: ACTIVE | Noted: 2023-10-31

## 2023-10-31 PROCEDURE — 11900 INJECT SKIN LESIONS </W 7: CPT

## 2023-10-31 PROCEDURE — ? INJECTION

## 2023-10-31 NOTE — PROCEDURE: INJECTION
Bill J-Code: yes
Route: IL
Post-Care Instructions: I reviewed with the patient in detail post-care instructions. Patient understands to keep the injection sites clean and call the clinic if there is any redness, swelling or pain.
Bill For Wasted Drug?: no
Dose Administered (Numbers Only - Mg, G, Mcg, Units, Cc): 0
Detail Level: None
Consent: The risks of the medication was reviewed with the patient.
Type Of Vial Used?: Multi-Dose
Treatment Number: 2
Dose Administered (Numbers Only - Mg, G, Mcg, Units, Cc): 0.2
Medication (1) And Associated J-Code Units: Bleomycin, 15 units
Procedure Information: Please note that the numeric value listed in the Medication (1) and associated J-code units and Medication (2) and associated J-code units variables are j-code amounts and do not represent either the concentration or the total amount of the medications injected.  I strongly recommend selecting no to the Render J-code information in note question. This will allow your note to be more clear. If you are billing j-codes with your injection codes you need to document the total amount of the medication injected. This amount should match the j-code units. For example, if you are injecting Triamcinolone 40mg as an intramuscular injection you would select 40 for the dose field.. This would allow you to document  with 4 units (40mg = 10mg x 4). The total volume is not used to calculate j-codes only the amount of the medication administered.
Type Of Vial Used?: Single Dose

## 2024-01-02 ENCOUNTER — APPOINTMENT (RX ONLY)
Dept: URBAN - METROPOLITAN AREA CLINIC 36 | Facility: CLINIC | Age: 89
Setting detail: DERMATOLOGY
End: 2024-01-02

## 2024-01-02 DIAGNOSIS — L57.0 ACTINIC KERATOSIS: ICD-10-CM

## 2024-01-02 PROBLEM — C44.219 BASAL CELL CARCINOMA OF SKIN OF LEFT EAR AND EXTERNAL AURICULAR CANAL: Status: ACTIVE | Noted: 2024-01-02

## 2024-01-02 PROBLEM — C44.311 BASAL CELL CARCINOMA OF SKIN OF NOSE: Status: ACTIVE | Noted: 2024-01-02

## 2024-01-02 PROCEDURE — 99213 OFFICE O/P EST LOW 20 MIN: CPT

## 2024-01-02 PROCEDURE — ? PRESCRIPTION

## 2024-01-02 PROCEDURE — ? OBSERVATION

## 2024-01-02 RX ORDER — FLUOROURACIL 5 MG/G
THIN LAYER CREAM TOPICAL QHS
Qty: 40 | Refills: 4 | Status: ERX

## 2024-01-03 ENCOUNTER — RX ONLY (OUTPATIENT)
Age: 89
Setting detail: RX ONLY
End: 2024-01-03

## 2024-01-03 RX ORDER — FLUOROURACIL 5 MG/G
THIN LAYER CREAM TOPICAL QHS
Qty: 40 | Refills: 4 | Status: ERX

## 2024-01-13 NOTE — PROCEDURE: LIQUID NITROGEN
13-Jan-2024 08:32
Show Aperture Variable?: Yes
Duration Of Freeze Thaw-Cycle (Seconds): 0
Render Post-Care Instructions In Note?: no
Consent: The patient's consent was obtained including but not limited to risks of crusting, scabbing, blistering, scarring, darker or lighter pigmentary change, recurrence, incomplete removal and infection.
Detail Level: Detailed
Post-Care Instructions: I reviewed with the patient in detail post-care instructions. Patient is to wear sunprotection, and avoid picking at any of the treated lesions. Pt may apply Vaseline to crusted or scabbing areas.

## 2024-01-16 ENCOUNTER — APPOINTMENT (RX ONLY)
Dept: URBAN - METROPOLITAN AREA CLINIC 36 | Facility: CLINIC | Age: 89
Setting detail: DERMATOLOGY
End: 2024-01-16

## 2024-01-16 DIAGNOSIS — L57.0 ACTINIC KERATOSIS: ICD-10-CM

## 2024-01-16 PROCEDURE — ? PATIENT SPECIFIC COUNSELING

## 2024-01-16 PROCEDURE — 99212 OFFICE O/P EST SF 10 MIN: CPT

## 2024-01-16 PROCEDURE — ? MEDICATION COUNSELING

## 2024-01-16 NOTE — PROCEDURE: MEDICATION COUNSELING
Tetracycline Pregnancy And Lactation Text: This medication is Pregnancy Category D and not consider safe during pregnancy. It is also excreted in breast milk.
Picato Pregnancy And Lactation Text: This medication is Pregnancy Category C. It is unknown if this medication is excreted in breast milk.
Terbinafine Counseling: Patient counseling regarding adverse effects of terbinafine including but not limited to headache, diarrhea, rash, upset stomach, liver function test abnormalities, itching, taste/smell disturbance, nausea, abdominal pain, and flatulence.  There is a rare possibility of liver failure that can occur when taking terbinafine.  The patient understands that a baseline LFT and kidney function test may be required. The patient verbalized understanding of the proper use and possible adverse effects of terbinafine.  All of the patient's questions and concerns were addressed.
Azelaic Acid Pregnancy And Lactation Text: This medication is considered safe during pregnancy and breast feeding.
Drysol Counseling:  I discussed with the patient the risks of drysol/aluminum chloride including but not limited to skin rash, itching, irritation, burning.
Topical Clindamycin Pregnancy And Lactation Text: This medication is Pregnancy Category B and is considered safe during pregnancy. It is unknown if it is excreted in breast milk.
Albendazole Counseling:  I discussed with the patient the risks of albendazole including but not limited to cytopenia, kidney damage, nausea/vomiting and severe allergy.  The patient understands that this medication is being used in an off-label manner.
Soolantra Counseling: I discussed with the patients the risks of topial Soolantra. This is a medicine which decreases the number of mites and inflammation in the skin. You experience burning, stinging, eye irritation or allergic reactions.  Please call our office if you develop any problems from using this medication.
Otezla Pregnancy And Lactation Text: This medication is Pregnancy Category C and it isn't known if it is safe during pregnancy. It is unknown if it is excreted in breast milk.
Humira Counseling:  I discussed with the patient the risks of adalimumab including but not limited to myelosuppression, immunosuppression, autoimmune hepatitis, demyelinating diseases, lymphoma, and serious infections.  The patient understands that monitoring is required including a PPD at baseline and must alert us or the primary physician if symptoms of infection or other concerning signs are noted.
Cephalexin Pregnancy And Lactation Text: This medication is Pregnancy Category B and considered safe during pregnancy.  It is also excreted in breast milk but can be used safely for shorter doses.
Bexarotene Pregnancy And Lactation Text: This medication is Pregnancy Category X and should not be given to women who are pregnant or may become pregnant. This medication should not be used if you are breast feeding.
Albendazole Pregnancy And Lactation Text: This medication is Pregnancy Category C and it isn't known if it is safe during pregnancy. It is also excreted in breast milk.
Klisyri Counseling:  I discussed with the patient the risks of Klisyri including but not limited to erythema, scaling, itching, weeping, crusting, and pain.
Siliq Pregnancy And Lactation Text: The risk during pregnancy and breastfeeding is uncertain with this medication.
Humira Pregnancy And Lactation Text: This medication is Pregnancy Category B and is considered safe during pregnancy. It is unknown if this medication is excreted in breast milk.
Cyclosporine Pregnancy And Lactation Text: This medication is Pregnancy Category C and it isn't know if it is safe during pregnancy. This medication is excreted in breast milk.
Topical Ketoconazole Counseling: Patient counseled that this medication may cause skin irritation or allergic reactions.  In the event of skin irritation, the patient was advised to reduce the amount of the drug applied or use it less frequently.   The patient verbalized understanding of the proper use and possible adverse effects of ketoconazole.  All of the patient's questions and concerns were addressed.
Soolantra Pregnancy And Lactation Text: This medication is Pregnancy Category C. This medication is considered safe during breast feeding.
Tremfya Counseling: I discussed with the patient the risks of guselkumab including but not limited to immunosuppression, serious infections, worsening of inflammatory bowel disease and drug reactions.  The patient understands that monitoring is required including a PPD at baseline and must alert us or the primary physician if symptoms of infection or other concerning signs are noted.
Wartpeel Pregnancy And Lactation Text: This medication is Pregnancy Category X and contraindicated in pregnancy and in women who may become pregnant. It is unknown if this medication is excreted in breast milk.
Tranexamic Acid Pregnancy And Lactation Text: It is unknown if this medication is safe during pregnancy or breast feeding.
Odomzo Pregnancy And Lactation Text: This medication is Pregnancy Category X and is absolutely contraindicated during pregnancy. It is unknown if it is excreted in breast milk.
Bimzelx Pregnancy And Lactation Text: This medication crosses the placenta and the safety is uncertain during pregnancy. It is unknown if this medication is present in breast milk.
Spironolactone Counseling: Patient advised regarding risks of diarrhea, abdominal pain, hyperkalemia, birth defects (for female patients), liver toxicity and renal toxicity. The patient may need blood work to monitor liver and kidney function and potassium levels while on therapy. The patient verbalized understanding of the proper use and possible adverse effects of spironolactone.  All of the patient's questions and concerns were addressed.
Dapsone Pregnancy And Lactation Text: This medication is Pregnancy Category C and is not considered safe during pregnancy or breast feeding.
Niacinamide Counseling: I recommended taking niacin or niacinamide, also know as vitamin B3, twice daily. Recent evidence suggests that taking vitamin B3 (500 mg twice daily) can reduce the risk of actinic keratoses and non-melanoma skin cancers. Side effects of vitamin B3 include flushing and headache.
Rinvoq Counseling: I discussed with the patient the risks of Rinvoq therapy including but not limited to upper respiratory tract infections, shingles, cold sores, bronchitis, nausea, cough, fever, acne, and headache. Live vaccines should be avoided.  This medication has been linked to serious infections; higher rate of mortality; malignancy and lymphoproliferative disorders; major adverse cardiovascular events; thrombosis; thrombocytopenia, anemia, and neutropenia; lipid elevations; liver enzyme elevations; and gastrointestinal perforations.
Minocycline Counseling: Patient advised regarding possible photosensitivity and discoloration of the teeth, skin, lips, tongue and gums.  Patient instructed to avoid sunlight, if possible.  When exposed to sunlight, patients should wear protective clothing, sunglasses, and sunscreen.  The patient was instructed to call the office immediately if the following severe adverse effects occur:  hearing changes, easy bruising/bleeding, severe headache, or vision changes.  The patient verbalized understanding of the proper use and possible adverse effects of minocycline.  All of the patient's questions and concerns were addressed.
Opioid Counseling: I discussed with the patient the potential side effects of opioids including but not limited to addiction, altered mental status, and depression. I stressed avoiding alcohol, benzodiazepines, muscle relaxants and sleep aids unless specifically okayed by a physician. The patient verbalized understanding of the proper use and possible adverse effects of opioids. All of the patient's questions and concerns were addressed. They were instructed to flush the remaining pills down the toilet if they did not need them for pain.
Niacinamide Pregnancy And Lactation Text: These medications are considered safe during pregnancy.
Cimzia Counseling:  I discussed with the patient the risks of Cimzia including but not limited to immunosuppression, allergic reactions and infections.  The patient understands that monitoring is required including a PPD at baseline and must alert us or the primary physician if symptoms of infection or other concerning signs are noted.
Benzoyl Peroxide Counseling: Patient counseled that medicine may cause skin irritation and bleach clothing.  In the event of skin irritation, the patient was advised to reduce the amount of the drug applied or use it less frequently.   The patient verbalized understanding of the proper use and possible adverse effects of benzoyl peroxide.  All of the patient's questions and concerns were addressed.
Opioid Pregnancy And Lactation Text: These medications can lead to premature delivery and should be avoided during pregnancy. These medications are also present in breast milk in small amounts.
Winlevi Counseling:  I discussed with the patient the risks of topical clascoterone including but not limited to erythema, scaling, itching, and stinging. Patient voiced their understanding.
Detail Level: Simple
Terbinafine Pregnancy And Lactation Text: This medication is Pregnancy Category B and is considered safe during pregnancy. It is also excreted in breast milk and breast feeding isn't recommended.
Oxybutynin Counseling:  I discussed with the patient the risks of oxybutynin including but not limited to skin rash, drowsiness, dry mouth, difficulty urinating, and blurred vision.
Protopic Counseling: Patient may experience a mild burning sensation during topical application. Protopic is not approved in children less than 2 years of age. There have been case reports of hematologic and skin malignancies in patients using topical calcineurin inhibitors although causality is questionable.
Clindamycin Counseling: I counseled the patient regarding use of clindamycin as an antibiotic for prophylactic and/or therapeutic purposes. Clindamycin is active against numerous classes of bacteria, including skin bacteria. Side effects may include nausea, diarrhea, gastrointestinal upset, rash, hives, yeast infections, and in rare cases, colitis.
Dutasteride Male Counseling: Dustasteride Counseling:  I discussed with the patient the risks of use of dutasteride including but not limited to decreased libido, decreased ejaculate volume, and gynecomastia. Women who can become pregnant should not handle medication.  All of the patient's questions and concerns were addressed.
Simponi Counseling:  I discussed with the patient the risks of golimumab including but not limited to myelosuppression, immunosuppression, autoimmune hepatitis, demyelinating diseases, lymphoma, and serious infections.  The patient understands that monitoring is required including a PPD at baseline and must alert us or the primary physician if symptoms of infection or other concerning signs are noted.
Elidel Counseling: Patient may experience a mild burning sensation during topical application. Elidel is not approved in children less than 2 years of age. There have been case reports of hematologic and skin malignancies in patients using topical calcineurin inhibitors although causality is questionable.
Klisyri Pregnancy And Lactation Text: It is unknown if this medication can harm a developing fetus or if it is excreted in breast milk.
Rinvoq Pregnancy And Lactation Text: Based on animal studies, Rinvoq may cause embryo-fetal harm when administered to pregnant women.  The medication should not be used in pregnancy.  Breastfeeding is not recommended during treatment and for 6 days after the last dose.
Methotrexate Counseling:  Patient counseled regarding adverse effects of methotrexate including but not limited to nausea, vomiting, abnormalities in liver function tests. Patients may develop mouth sores, rash, diarrhea, and abnormalities in blood counts. The patient understands that monitoring is required including LFT's and blood counts.  There is a rare possibility of scarring of the liver and lung problems that can occur when taking methotrexate. Persistent nausea, loss of appetite, pale stools, dark urine, cough, and shortness of breath should be reported immediately. Patient advised to discontinue methotrexate treatment at least three months before attempting to become pregnant.  I discussed the need for folate supplements while taking methotrexate.  These supplements can decrease side effects during methotrexate treatment. The patient verbalized understanding of the proper use and possible adverse effects of methotrexate.  All of the patient's questions and concerns were addressed.
Isotretinoin Counseling: Patient should get monthly blood tests, not donate blood, not drive at night if vision affected, not share medication, and not undergo elective surgery for 6 months after tx completed. Side effects reviewed, pt to contact office should one occur.
Fluconazole Counseling:  Patient counseled regarding adverse effects of fluconazole including but not limited to headache, diarrhea, nausea, upset stomach, liver function test abnormalities, taste disturbance, and stomach pain.  There is a rare possibility of liver failure that can occur when taking fluconazole.  The patient understands that monitoring of LFTs and kidney function test may be required, especially at baseline. The patient verbalized understanding of the proper use and possible adverse effects of fluconazole.  All of the patient's questions and concerns were addressed.
Gabapentin Counseling: I discussed with the patient the risks of gabapentin including but not limited to dizziness, somnolence, fatigue and ataxia.
Valtrex Counseling: I discussed with the patient the risks of valacyclovir including but not limited to kidney damage, nausea, vomiting and severe allergy.  The patient understands that if the infection seems to be worsening or is not improving, they are to call.
Topical Retinoid counseling:  Patient advised to apply a pea-sized amount only at bedtime and wait 30 minutes after washing their face before applying.  If too drying, patient may add a non-comedogenic moisturizer. The patient verbalized understanding of the proper use and possible adverse effects of retinoids.  All of the patient's questions and concerns were addressed.
Spironolactone Pregnancy And Lactation Text: This medication can cause feminization of the male fetus and should be avoided during pregnancy. The active metabolite is also found in breast milk.
Ivermectin Counseling:  Patient instructed to take medication on an empty stomach with a full glass of water.  Patient informed of potential adverse effects including but not limited to nausea, diarrhea, dizziness, itching, and swelling of the extremities or lymph nodes.  The patient verbalized understanding of the proper use and possible adverse effects of ivermectin.  All of the patient's questions and concerns were addressed.
Quinolones Counseling:  I discussed with the patient the risks of fluoroquinolones including but not limited to GI upset, allergic reaction, drug rash, diarrhea, dizziness, photosensitivity, yeast infections, liver function test abnormalities, tendonitis/tendon rupture.
Methotrexate Pregnancy And Lactation Text: This medication is Pregnancy Category X and is known to cause fetal harm. This medication is excreted in breast milk.
Xolair Counseling:  Patient informed of potential adverse effects including but not limited to fever, muscle aches, rash and allergic reactions.  The patient verbalized understanding of the proper use and possible adverse effects of Xolair.  All of the patient's questions and concerns were addressed.
Isotretinoin Pregnancy And Lactation Text: This medication is Pregnancy Category X and is considered extremely dangerous during pregnancy. It is unknown if it is excreted in breast milk.
Cimetidine Counseling:  I discussed with the patient the risks of Cimetidine including but not limited to gynecomastia, headache, diarrhea, nausea, drowsiness, arrhythmias, pancreatitis, skin rashes, psychosis, bone marrow suppression and kidney toxicity.
Sotyktu Counseling:  I discussed the most common side effects of Sotyktu including: common cold, sore throat, sinus infections, cold sores, canker sores, folliculitis, and acne.  I also discussed more serious side effects of Sotyktu including but not limited to: serious allergic reactions; increased risk for infections such as TB; cancers such as lymphomas; rhabdomyolysis and elevated CPK; and elevated triglycerides and liver enzymes. 
Valtrex Pregnancy And Lactation Text: this medication is Pregnancy Category B and is considered safe during pregnancy. This medication is not directly found in breast milk but it's metabolite acyclovir is present.
Fluconazole Pregnancy And Lactation Text: This medication is Pregnancy Category C and it isn't know if it is safe during pregnancy. It is also excreted in breast milk.
Minoxidil Counseling: Minoxidil is a topical medication which can increase blood flow where it is applied. It is uncertain how this medication increases hair growth. Side effects are uncommon and include stinging and allergic reactions.
Benzoyl Peroxide Pregnancy And Lactation Text: This medication is Pregnancy Category C. It is unknown if benzoyl peroxide is excreted in breast milk.
Include Pregnancy/Lactation Warning?: No
Nsaids Counseling: NSAID Counseling: I discussed with the patient that NSAIDs should be taken with food. Prolonged use of NSAIDs can result in the development of stomach ulcers.  Patient advised to stop taking NSAIDs if abdominal pain occurs.  The patient verbalized understanding of the proper use and possible adverse effects of NSAIDs.  All of the patient's questions and concerns were addressed.
Protopic Pregnancy And Lactation Text: This medication is Pregnancy Category C. It is unknown if this medication is excreted in breast milk when applied topically.
Winlevi Pregnancy And Lactation Text: This medication is considered safe during pregnancy and breastfeeding.
Cimzia Pregnancy And Lactation Text: This medication crosses the placenta but can be considered safe in certain situations. Cimzia may be excreted in breast milk.
Ilumya Counseling: I discussed with the patient the risks of tildrakizumab including but not limited to immunosuppression, malignancy, posterior leukoencephalopathy syndrome, and serious infections.  The patient understands that monitoring is required including a PPD at baseline and must alert us or the primary physician if symptoms of infection or other concerning signs are noted.
Dutasteride Female Counseling: Dutasteride Counseling:  I discussed with the patient the risks of use of dutasteride including but not limited to decreased libido and sexual dysfunction. Explained the teratogenic nature of the medication and stressed the importance of not getting pregnant during treatment. All of the patient's questions and concerns were addressed.
Azathioprine Counseling:  I discussed with the patient the risks of azathioprine including but not limited to myelosuppression, immunosuppression, hepatotoxicity, lymphoma, and infections.  The patient understands that monitoring is required including baseline LFTs, Creatinine, possible TPMP genotyping and weekly CBCs for the first month and then every 2 weeks thereafter.  The patient verbalized understanding of the proper use and possible adverse effects of azathioprine.  All of the patient's questions and concerns were addressed.
Qbrexza Counseling:  I discussed with the patient the risks of Qbrexza including but not limited to headache, mydriasis, blurred vision, dry eyes, nasal dryness, dry mouth, dry throat, dry skin, urinary hesitation, and constipation.  Local skin reactions including erythema, burning, stinging, and itching can also occur.
Arava Counseling:  Patient counseled regarding adverse effects of Arava including but not limited to nausea, vomiting, abnormalities in liver function tests. Patients may develop mouth sores, rash, diarrhea, and abnormalities in blood counts. The patient understands that monitoring is required including LFTs and blood counts.  There is a rare possibility of scarring of the liver and lung problems that can occur when taking methotrexate. Persistent nausea, loss of appetite, pale stools, dark urine, cough, and shortness of breath should be reported immediately. Patient advised to discontinue Arava treatment and consult with a physician prior to attempting conception. The patient will have to undergo a treatment to eliminate Arava from the body prior to conception.
Carac Counseling:  I discussed with the patient the risks of Carac including but not limited to erythema, scaling, itching, weeping, crusting, and pain.
Topical Metronidazole Counseling: Metronidazole is a topical antibiotic medication. You may experience burning, stinging, redness, or allergic reactions.  Please call our office if you develop any problems from using this medication.
Propranolol Counseling:  I discussed with the patient the risks of propranolol including but not limited to low heart rate, low blood pressure, low blood sugar, restlessness and increased cold sensitivity. They should call the office if they experience any of these side effects.
Gabapentin Pregnancy And Lactation Text: This medication is Pregnancy Category C and isn't considered safe during pregnancy. It is excreted in breast milk.
Clindamycin Pregnancy And Lactation Text: This medication can be used in pregnancy if certain situations. Clindamycin is also present in breast milk.
Glycopyrrolate Counseling:  I discussed with the patient the risks of glycopyrrolate including but not limited to skin rash, drowsiness, dry mouth, difficulty urinating, and blurred vision.
High Dose Vitamin A Counseling: Side effects reviewed, pt to contact office should one occur.
Prednisone Counseling:  I discussed with the patient the risks of prolonged use of prednisone including but not limited to weight gain, insomnia, osteoporosis, mood changes, diabetes, susceptibility to infection, glaucoma and high blood pressure.  In cases where prednisone use is prolonged, patients should be monitored with blood pressure checks, serum glucose levels and an eye exam.  Additionally, the patient may need to be placed on GI prophylaxis, PCP prophylaxis, and calcium and vitamin D supplementation and/or a bisphosphonate.  The patient verbalized understanding of the proper use and the possible adverse effects of prednisone.  All of the patient's questions and concerns were addressed.
Skyrizi Counseling: I discussed with the patient the risks of risankizumab-rzaa including but not limited to immunosuppression, and serious infections.  The patient understands that monitoring is required including a PPD at baseline and must alert us or the primary physician if symptoms of infection or other concerning signs are noted.
Topical Metronidazole Pregnancy And Lactation Text: This medication is Pregnancy Category B and considered safe during pregnancy.  It is also considered safe to use while breastfeeding.
Xolair Pregnancy And Lactation Text: This medication is Pregnancy Category B and is considered safe during pregnancy. This medication is excreted in breast milk.
VTAMA Counseling: I discussed with the patient that VTAMA is not for use in the eyes, mouth or mouth. They should call the office if they develop any signs of allergic reactions to VTAMA. The patient verbalized understanding of the proper use and possible adverse effects of VTAMA.  All of the patient's questions and concerns were addressed.
Griseofulvin Counseling:  I discussed with the patient the risks of griseofulvin including but not limited to photosensitivity, cytopenia, liver damage, nausea/vomiting and severe allergy.  The patient understands that this medication is best absorbed when taken with a fatty meal (e.g., ice cream or french fries).
Minoxidil Pregnancy And Lactation Text: This medication has not been assigned a Pregnancy Risk Category but animal studies failed to show danger with the topical medication. It is unknown if the medication is excreted in breast milk.
Nsaids Pregnancy And Lactation Text: These medications are considered safe up to 30 weeks gestation. It is excreted in breast milk.
Cosentyx Counseling:  I discussed with the patient the risks of Cosentyx including but not limited to worsening of Crohn's disease, immunosuppression, allergic reactions and infections.  The patient understands that monitoring is required including a PPD at baseline and must alert us or the primary physician if symptoms of infection or other concerning signs are noted.
Sotyktu Pregnancy And Lactation Text: There is insufficient data to evaluate whether or not Sotyktu is safe to use during pregnancy.   It is not known if Sotyktu passes into breast milk and whether or not it is safe to use when breastfeeding.  
Rifampin Counseling: I discussed with the patient the risks of rifampin including but not limited to liver damage, kidney damage, red-orange body fluids, nausea/vomiting and severe allergy.
Adbry Counseling: I discussed with the patient the risks of tralokinumab including but not limited to eye infection and irritation, cold sores, injection site reactions, worsening of asthma, allergic reactions and increased risk of parasitic infection.  Live vaccines should be avoided while taking tralokinumab. The patient understands that monitoring is required and they must alert us or the primary physician if symptoms of infection or other concerning signs are noted.
Vtama Pregnancy And Lactation Text: It is unknown if this medication can cause problems during pregnancy and breastfeeding.
Olanzapine Counseling- I discussed with the patient the common side effects of olanzapine including but are not limited to: lack of energy, dry mouth, increased appetite, sleepiness, tremor, constipation, dizziness, changes in behavior, or restlessness.  Explained that teenagers are more likely to experience headaches, abdominal pain, pain in the arms or legs, tiredness, and sleepiness.  Serious side effects include but are not limited: increased risk of death in elderly patients who are confused, have memory loss, or dementia-related psychosis; hyperglycemia; increased cholesterol and triglycerides; and weight gain.
Eucrisa Counseling: Patient may experience a mild burning sensation during topical application. Eucrisa is not approved in children less than 2 years of age.
Azathioprine Pregnancy And Lactation Text: This medication is Pregnancy Category D and isn't considered safe during pregnancy. It is unknown if this medication is excreted in breast milk.
Doxycycline Counseling:  Patient counseled regarding possible photosensitivity and increased risk for sunburn.  Patient instructed to avoid sunlight, if possible.  When exposed to sunlight, patients should wear protective clothing, sunglasses, and sunscreen.  The patient was instructed to call the office immediately if the following severe adverse effects occur:  hearing changes, easy bruising/bleeding, severe headache, or vision changes.  The patient verbalized understanding of the proper use and possible adverse effects of doxycycline.  All of the patient's questions and concerns were addressed.
Qbrexza Pregnancy And Lactation Text: There is no available data on Qbrexza use in pregnant women.  There is no available data on Qbrexza use in lactation.
Propranolol Pregnancy And Lactation Text: This medication is Pregnancy Category C and it isn't known if it is safe during pregnancy. It is excreted in breast milk.
Dutasteride Pregnancy And Lactation Text: This medication is absolutely contraindicated in women, especially during pregnancy and breast feeding. Feminization of male fetuses is possible if taking while pregnant.
Doxycycline Pregnancy And Lactation Text: This medication is Pregnancy Category D and not consider safe during pregnancy. It is also excreted in breast milk but is considered safe for shorter treatment courses.
Erivedge Counseling- I discussed with the patient the risks of Erivedge including but not limited to nausea, vomiting, diarrhea, constipation, weight loss, changes in the sense of taste, decreased appetite, muscle spasms, and hair loss.  The patient verbalized understanding of the proper use and possible adverse effects of Erivedge.  All of the patient's questions and concerns were addressed.
Finasteride Male Counseling: Finasteride Counseling:  I discussed with the patient the risks of use of finasteride including but not limited to decreased libido, decreased ejaculate volume, gynecomastia, and depression. Women should not handle medication.  All of the patient's questions and concerns were addressed.
Doxepin Counseling:  Patient advised that the medication is sedating and not to drive a car after taking this medication. Patient informed of potential adverse effects including but not limited to dry mouth, urinary retention, and blurry vision.  The patient verbalized understanding of the proper use and possible adverse effects of doxepin.  All of the patient's questions and concerns were addressed.
Topical Steroids Counseling: I discussed with the patient that prolonged use of topical steroids can result in the increased appearance of superficial blood vessels (telangiectasias), lightening (hypopigmentation) and thinning of the skin (atrophy).  Patient understands to avoid using high potency steroids in skin folds, the groin or the face.  The patient verbalized understanding of the proper use and possible adverse effects of topical steroids.  All of the patient's questions and concerns were addressed.
SSKI Counseling:  I discussed with the patient the risks of SSKI including but not limited to thyroid abnormalities, metallic taste, GI upset, fever, headache, acne, arthralgias, paraesthesias, lymphadenopathy, easy bleeding, arrhythmias, and allergic reaction.
Mirvaso Counseling: Mirvaso is a topical medication which can decrease superficial blood flow where applied. Side effects are uncommon and include stinging, redness and allergic reactions.
Xeljanz Counseling: I discussed with the patient the risks of Xeljanz therapy including increased risk of infection, liver issues, headache, diarrhea, or cold symptoms. Live vaccines should be avoided. They were instructed to call if they have any problems.
High Dose Vitamin A Pregnancy And Lactation Text: High dose vitamin A therapy is contraindicated during pregnancy and breast feeding.
Glycopyrrolate Pregnancy And Lactation Text: This medication is Pregnancy Category B and is considered safe during pregnancy. It is unknown if it is excreted breast milk.
Griseofulvin Pregnancy And Lactation Text: This medication is Pregnancy Category X and is known to cause serious birth defects. It is unknown if this medication is excreted in breast milk but breast feeding should be avoided.
Itraconazole Counseling:  I discussed with the patient the risks of itraconazole including but not limited to liver damage, nausea/vomiting, neuropathy, and severe allergy.  The patient understands that this medication is best absorbed when taken with acidic beverages such as non-diet cola or ginger ale.  The patient understands that monitoring is required including baseline LFTs and repeat LFTs at intervals.  The patient understands that they are to contact us or the primary physician if concerning signs are noted.
Dupixent Counseling: I discussed with the patient the risks of dupilumab including but not limited to eye infection and irritation, cold sores, injection site reactions, worsening of asthma, allergic reactions and increased risk of parasitic infection.  Live vaccines should be avoided while taking dupilumab. Dupilumab will also interact with certain medications such as warfarin and cyclosporine. The patient understands that monitoring is required and they must alert us or the primary physician if symptoms of infection or other concerning signs are noted.
Xelaleidaz Pregnancy And Lactation Text: This medication is Pregnancy Category D and is not considered safe during pregnancy.  The risk during breast feeding is also uncertain.
Calcipotriene Counseling:  I discussed with the patient the risks of calcipotriene including but not limited to erythema, scaling, itching, and irritation.
Rifampin Pregnancy And Lactation Text: This medication is Pregnancy Category C and it isn't know if it is safe during pregnancy. It is also excreted in breast milk and should not be used if you are breast feeding.
Adbry Pregnancy And Lactation Text: It is unknown if this medication will adversely affect pregnancy or breast feeding.
Mirvaso Pregnancy And Lactation Text: This medication has not been assigned a Pregnancy Risk Category. It is unknown if the medication is excreted in breast milk.
Zoryve Counseling:  I discussed with the patient that Zoryve is not for use in the eyes, mouth or vagina. The most commonly reported side effects include diarrhea, headache, insomnia, application site pain, upper respiratory tract infections, and urinary tract infections.  All of the patient's questions and concerns were addressed.
Cellcept Counseling:  I discussed with the patient the risks of mycophenolate mofetil including but not limited to infection/immunosuppression, GI upset, hypokalemia, hypercholesterolemia, bone marrow suppression, lymphoproliferative disorders, malignancy, GI ulceration/bleed/perforation, colitis, interstitial lung disease, kidney failure, progressive multifocal leukoencephalopathy, and birth defects.  The patient understands that monitoring is required including a baseline creatinine and regular CBC testing. In addition, patient must alert us immediately if symptoms of infection or other concerning signs are noted.
Clofazimine Counseling:  I discussed with the patient the risks of clofazimine including but not limited to skin and eye pigmentation, liver damage, nausea/vomiting, gastrointestinal bleeding and allergy.
Olanzapine Pregnancy And Lactation Text: This medication is pregnancy category C.   There are no adequate and well controlled trials with olanzapine in pregnant females.  Olanzapine should be used during pregnancy only if the potential benefit justifies the potential risk to the fetus.   In a study in lactating healthy women, olanzapine was excreted in breast milk.  It is recommended that women taking olanzapine should not breast feed.
Rhofade Counseling: Rhofade is a topical medication which can decrease superficial blood flow where applied. Side effects are uncommon and include stinging, redness and allergic reactions.
Azithromycin Counseling:  I discussed with the patient the risks of azithromycin including but not limited to GI upset, allergic reaction, drug rash, diarrhea, and yeast infections.
Infliximab Counseling:  I discussed with the patient the risks of infliximab including but not limited to myelosuppression, immunosuppression, autoimmune hepatitis, demyelinating diseases, lymphoma, and serious infections.  The patient understands that monitoring is required including a PPD at baseline and must alert us or the primary physician if symptoms of infection or other concerning signs are noted.
Tazorac Counseling:  Patient advised that medication is irritating and drying.  Patient may need to apply sparingly and wash off after an hour before eventually leaving it on overnight.  The patient verbalized understanding of the proper use and possible adverse effects of tazorac.  All of the patient's questions and concerns were addressed.
Stelara Counseling:  I discussed with the patient the risks of ustekinumab including but not limited to immunosuppression, malignancy, posterior leukoencephalopathy syndrome, and serious infections.  The patient understands that monitoring is required including a PPD at baseline and must alert us or the primary physician if symptoms of infection or other concerning signs are noted.
Sski Pregnancy And Lactation Text: This medication is Pregnancy Category D and isn't considered safe during pregnancy. It is excreted in breast milk.
Topical Steroids Applications Pregnancy And Lactation Text: Most topical steroids are considered safe to use during pregnancy and lactation.  Any topical steroid applied to the breast or nipple should be washed off before breastfeeding.
Finasteride Female Counseling: Finasteride Counseling:  I discussed with the patient the risks of use of finasteride including but not limited to decreased libido and sexual dysfunction. Explained the teratogenic nature of the medication and stressed the importance of not getting pregnant during treatment. All of the patient's questions and concerns were addressed.
Hydroquinone Counseling:  Patient advised that medication may result in skin irritation, lightening (hypopigmentation), dryness, and burning.  In the event of skin irritation, the patient was advised to reduce the amount of the drug applied or use it less frequently.  Rarely, spots that are treated with hydroquinone can become darker (pseudoochronosis).  Should this occur, patient instructed to stop medication and call the office. The patient verbalized understanding of the proper use and possible adverse effects of hydroquinone.  All of the patient's questions and concerns were addressed.
Hydroxychloroquine Counseling:  I discussed with the patient that a baseline ophthalmologic exam is needed at the start of therapy and every year thereafter while on therapy. A CBC may also be warranted for monitoring.  The side effects of this medication were discussed with the patient, including but not limited to agranulocytosis, aplastic anemia, seizures, rashes, retinopathy, and liver toxicity. Patient instructed to call the office should any adverse effect occur.  The patient verbalized understanding of the proper use and possible adverse effects of Plaquenil.  All the patient's questions and concerns were addressed.
Doxepin Pregnancy And Lactation Text: This medication is Pregnancy Category C and it isn't known if it is safe during pregnancy. It is also excreted in breast milk and breast feeding isn't recommended.
Erythromycin Counseling:  I discussed with the patient the risks of erythromycin including but not limited to GI upset, allergic reaction, drug rash, diarrhea, increase in liver enzymes, and yeast infections.
Litfulo Counseling: I discussed with the patient the risks of Litfulo therapy including but not limited to upper respiratory tract infections, shingles, cold sores, and nausea. Live vaccines should be avoided.  This medication has been linked to serious infections; higher rate of mortality; malignancy and lymphoproliferative disorders; major adverse cardiovascular events; thrombosis; gastrointestinal perforations; neutropenia; lymphopenia; anemia; liver enzyme elevations; and lipid elevations.
Calcipotriene Pregnancy And Lactation Text: The use of this medication during pregnancy or lactation is not recommended as there is insufficient data.
Hydroxychloroquine Pregnancy And Lactation Text: This medication has been shown to cause fetal harm but it isn't assigned a Pregnancy Risk Category. There are small amounts excreted in breast milk.
Hydroxyzine Counseling: Patient advised that the medication is sedating and not to drive a car after taking this medication.  Patient informed of potential adverse effects including but not limited to dry mouth, urinary retention, and blurry vision.  The patient verbalized understanding of the proper use and possible adverse effects of hydroxyzine.  All of the patient's questions and concerns were addressed.
Aklief counseling:  Patient advised to apply a pea-sized amount only at bedtime and wait 30 minutes after washing their face before applying.  If too drying, patient may add a non-comedogenic moisturizer.  The most commonly reported side effects including irritation, redness, scaling, dryness, stinging, burning, itching, and increased risk of sunburn.  The patient verbalized understanding of the proper use and possible adverse effects of retinoids.  All of the patient's questions and concerns were addressed.
Erythromycin Pregnancy And Lactation Text: This medication is Pregnancy Category B and is considered safe during pregnancy. It is also excreted in breast milk.
Cantharidin Counseling:  I discussed with the patient the risks of Cantharidin including but not limited to pain, redness, burning, itching, and blistering.
Azithromycin Pregnancy And Lactation Text: This medication is considered safe during pregnancy and is also secreted in breast milk.
Oral Minoxidil Counseling- I discussed with the patient the risks of oral minoxidil including but not limited to shortness of breath, swelling of the feet or ankles, dizziness, lightheadedness, unwanted hair growth and allergic reaction.  The patient verbalized understanding of the proper use and possible adverse effects of oral minoxidil.  All of the patient's questions and concerns were addressed.
Cibinqo Counseling: I discussed with the patient the risks of Cibinqo therapy including but not limited to common cold, nausea, headache, cold sores, increased blood CPK levels, dizziness, UTIs, fatigue, acne, and vomitting. Live vaccines should be avoided.  This medication has been linked to serious infections; higher rate of mortality; malignancy and lymphoproliferative disorders; major adverse cardiovascular events; thrombosis; thrombocytopenia and lymphopenia; lipid elevations; and retinal detachment.
Opzelura Counseling:  I discussed with the patient the risks of Opzelura including but not limited to nasopharngitis, bronchitis, ear infection, eosinophila, hives, diarrhea, folliculitis, tonsillitis, and rhinorrhea.  Taken orally, this medication has been linked to serious infections; higher rate of mortality; malignancy and lymphoproliferative disorders; major adverse cardiovascular events; thrombosis; thrombocytopenia, anemia, and neutropenia; and lipid elevations.
Sarecycline Counseling: Patient advised regarding possible photosensitivity and discoloration of the teeth, skin, lips, tongue and gums.  Patient instructed to avoid sunlight, if possible.  When exposed to sunlight, patients should wear protective clothing, sunglasses, and sunscreen.  The patient was instructed to call the office immediately if the following severe adverse effects occur:  hearing changes, easy bruising/bleeding, severe headache, or vision changes.  The patient verbalized understanding of the proper use and possible adverse effects of sarecycline.  All of the patient's questions and concerns were addressed.
Dupixent Pregnancy And Lactation Text: This medication likely crosses the placenta but the risk for the fetus is uncertain. This medication is excreted in breast milk.
Acitretin Counseling:  I discussed with the patient the risks of acitretin including but not limited to hair loss, dry lips/skin/eyes, liver damage, hyperlipidemia, depression/suicidal ideation, photosensitivity.  Serious rare side effects can include but are not limited to pancreatitis, pseudotumor cerebri, bony changes, clot formation/stroke/heart attack.  Patient understands that alcohol is contraindicated since it can result in liver toxicity and significantly prolong the elimination of the drug by many years.
Bactrim Counseling:  I discussed with the patient the risks of sulfa antibiotics including but not limited to GI upset, allergic reaction, drug rash, diarrhea, dizziness, photosensitivity, and yeast infections.  Rarely, more serious reactions can occur including but not limited to aplastic anemia, agranulocytosis, methemoglobinemia, blood dyscrasias, liver or kidney failure, lung infiltrates or desquamative/blistering drug rashes.
Cibinqo Pregnancy And Lactation Text: It is unknown if this medication will adversely affect pregnancy or breast feeding.  You should not take this medication if you are currently pregnant or planning a pregnancy or while breastfeeding.
Rituxan Counseling:  I discussed with the patient the risks of Rituxan infusions. Side effects can include infusion reactions, severe drug rashes including mucocutaneous reactions, reactivation of latent hepatitis and other infections and rarely progressive multifocal leukoencephalopathy.  All of the patient's questions and concerns were addressed.
Tazorac Pregnancy And Lactation Text: This medication is not safe during pregnancy. It is unknown if this medication is excreted in breast milk.
Finasteride Pregnancy And Lactation Text: This medication is absolutely contraindicated during pregnancy. It is unknown if it is excreted in breast milk.
Enbrel Counseling:  I discussed with the patient the risks of etanercept including but not limited to myelosuppression, immunosuppression, autoimmune hepatitis, demyelinating diseases, lymphoma, and infections.  The patient understands that monitoring is required including a PPD at baseline and must alert us or the primary physician if symptoms of infection or other concerning signs are noted.
Cyclophosphamide Counseling:  I discussed with the patient the risks of cyclophosphamide including but not limited to hair loss, hormonal abnormalities, decreased fertility, abdominal pain, diarrhea, nausea and vomiting, bone marrow suppression and infection. The patient understands that monitoring is required while taking this medication.
Oral Minoxidil Pregnancy And Lactation Text: This medication should only be used when clearly needed if you are pregnant, attempting to become pregnant or breast feeding.
Solaraze Counseling:  I discussed with the patient the risks of Solaraze including but not limited to erythema, scaling, itching, weeping, crusting, and pain.
5-Fu Counseling: 5-Fluorouracil Counseling:  I discussed with the patient the risks of 5-fluorouracil including but not limited to erythema, scaling, itching, weeping, crusting, and pain.
Topical Sulfur Applications Counseling: Topical Sulfur Counseling: Patient counseled that this medication may cause skin irritation or allergic reactions.  In the event of skin irritation, the patient was advised to reduce the amount of the drug applied or use it less frequently.   The patient verbalized understanding of the proper use and possible adverse effects of topical sulfur application.  All of the patient's questions and concerns were addressed.
Litfulo Pregnancy And Lactation Text: Based on animal studies, Lifulo may cause embryo-fetal harm when administered to pregnant women.  The medication should not be used in pregnancy.  Breastfeeding is not recommended during treatment.
Cantharidin Pregnancy And Lactation Text: This medication has not been proven safe during pregnancy. It is unknown if this medication is excreted in breast milk.
Thalidomide Counseling: I discussed with the patient the risks of thalidomide including but not limited to birth defects, anxiety, weakness, chest pain, dizziness, cough and severe allergy.
Libtayo Counseling- I discussed with the patient the risks of Libtayo including but not limited to nausea, vomiting, diarrhea, and bone or muscle pain.  The patient verbalized understanding of the proper use and possible adverse effects of Libtayo.  All of the patient's questions and concerns were addressed.
Colchicine Counseling:  Patient counseled regarding adverse effects including but not limited to stomach upset (nausea, vomiting, stomach pain, or diarrhea).  Patient instructed to limit alcohol consumption while taking this medication.  Colchicine may reduce blood counts especially with prolonged use.  The patient understands that monitoring of kidney function and blood counts may be required, especially at baseline. The patient verbalized understanding of the proper use and possible adverse effects of colchicine.  All of the patient's questions and concerns were addressed.
Metronidazole Counseling:  I discussed with the patient the risks of metronidazole including but not limited to seizures, nausea/vomiting, a metallic taste in the mouth, nausea/vomiting and severe allergy.
Olumiant Counseling: I discussed with the patient the risks of Olumiant therapy including but not limited to upper respiratory tract infections, shingles, cold sores, and nausea. Live vaccines should be avoided.  This medication has been linked to serious infections; higher rate of mortality; malignancy and lymphoproliferative disorders; major adverse cardiovascular events; thrombosis; gastrointestinal perforations; neutropenia; lymphopenia; anemia; liver enzyme elevations; and lipid elevations.
Aklief Pregnancy And Lactation Text: It is unknown if this medication is safe to use during pregnancy.  It is unknown if this medication is excreted in breast milk.  Breastfeeding women should use the topical cream on the smallest area of the skin for the shortest time needed while breastfeeding.  Do not apply to nipple and areola.
Taltz Counseling: I discussed with the patient the risks of ixekizumab including but not limited to immunosuppression, serious infections, worsening of inflammatory bowel disease and drug reactions.  The patient understands that monitoring is required including a PPD at baseline and must alert us or the primary physician if symptoms of infection or other concerning signs are noted.
Hydroxyzine Pregnancy And Lactation Text: This medication is not safe during pregnancy and should not be taken. It is also excreted in breast milk and breast feeding isn't recommended.
Imiquimod Counseling:  I discussed with the patient the risks of imiquimod including but not limited to erythema, scaling, itching, weeping, crusting, and pain.  Patient understands that the inflammatory response to imiquimod is variable from person to person and was educated regarded proper titration schedule.  If flu-like symptoms develop, patient knows to discontinue the medication and contact us.
Libtayo Pregnancy And Lactation Text: This medication is contraindicated in pregnancy and when breast feeding.
Topical Sulfur Applications Pregnancy And Lactation Text: This medication is Pregnancy Category C and has an unknown safety profile during pregnancy. It is unknown if this topical medication is excreted in breast milk.
Zyclara Counseling:  I discussed with the patient the risks of imiquimod including but not limited to erythema, scaling, itching, weeping, crusting, and pain.  Patient understands that the inflammatory response to imiquimod is variable from person to person and was educated regarded proper titration schedule.  If flu-like symptoms develop, patient knows to discontinue the medication and contact us.
Low Dose Naltrexone Counseling- I discussed with the patient the potential risks and side effects of low dose naltrexone including but not limited to: more vivid dreams, headaches, nausea, vomiting, abdominal pain, fatigue, dizziness, and anxiety.
Opzelura Pregnancy And Lactation Text: There is insufficient data to evaluate drug-associated risk for major birth defects, miscarriage, or other adverse maternal or fetal outcomes.  There is a pregnancy registry that monitors pregnancy outcomes in pregnant persons exposed to the medication during pregnancy.  It is unknown if this medication is excreted in breast milk.  Do not breastfeed during treatment and for about 4 weeks after the last dose.
Ketoconazole Counseling:   Patient counseled regarding improving absorption with orange juice.  Adverse effects include but are not limited to breast enlargement, headache, diarrhea, nausea, upset stomach, liver function test abnormalities, taste disturbance, and stomach pain.  There is a rare possibility of liver failure that can occur when taking ketoconazole. The patient understands that monitoring of LFTs may be required, especially at baseline. The patient verbalized understanding of the proper use and possible adverse effects of ketoconazole.  All of the patient's questions and concerns were addressed.
Otezla Counseling: The side effects of Otezla were discussed with the patient, including but not limited to worsening or new depression, weight loss, diarrhea, nausea, upper respiratory tract infection, and headache. Patient instructed to call the office should any adverse effect occur.  The patient verbalized understanding of the proper use and possible adverse effects of Otezla.  All the patient's questions and concerns were addressed.
Topical Clindamycin Counseling: Patient counseled that this medication may cause skin irritation or allergic reactions.  In the event of skin irritation, the patient was advised to reduce the amount of the drug applied or use it less frequently.   The patient verbalized understanding of the proper use and possible adverse effects of clindamycin.  All of the patient's questions and concerns were addressed.
Tetracycline Counseling: Patient counseled regarding possible photosensitivity and increased risk for sunburn.  Patient instructed to avoid sunlight, if possible.  When exposed to sunlight, patients should wear protective clothing, sunglasses, and sunscreen.  The patient was instructed to call the office immediately if the following severe adverse effects occur:  hearing changes, easy bruising/bleeding, severe headache, or vision changes.  The patient verbalized understanding of the proper use and possible adverse effects of tetracycline.  All of the patient's questions and concerns were addressed. Patient understands to avoid pregnancy while on therapy due to potential birth defects.
Rituxan Pregnancy And Lactation Text: This medication is Pregnancy Category C and it isn't know if it is safe during pregnancy. It is unknown if this medication is excreted in breast milk but similar antibodies are known to be excreted.
Cyclophosphamide Pregnancy And Lactation Text: This medication is Pregnancy Category D and it isn't considered safe during pregnancy. This medication is excreted in breast milk.
Solaraze Pregnancy And Lactation Text: This medication is Pregnancy Category B and is considered safe. There is some data to suggest avoiding during the third trimester. It is unknown if this medication is excreted in breast milk.
Acitretin Pregnancy And Lactation Text: This medication is Pregnancy Category X and should not be given to women who are pregnant or may become pregnant in the future. This medication is excreted in breast milk.
Birth Control Pills Counseling: Birth Control Pill Counseling: I discussed with the patient the potential side effects of OCPs including but not limited to increased risk of stroke, heart attack, thrombophlebitis, deep venous thrombosis, hepatic adenomas, breast changes, GI upset, headaches, and depression.  The patient verbalized understanding of the proper use and possible adverse effects of OCPs. All of the patient's questions and concerns were addressed.
Bactrim Pregnancy And Lactation Text: This medication is Pregnancy Category D and is known to cause fetal risk.  It is also excreted in breast milk.
Birth Control Pills Pregnancy And Lactation Text: This medication should be avoided if pregnant and for the first 30 days post-partum.
Cyclosporine Counseling:  I discussed with the patient the risks of cyclosporine including but not limited to hypertension, gingival hyperplasia,myelosuppression, immunosuppression, liver damage, kidney damage, neurotoxicity, lymphoma, and serious infections. The patient understands that monitoring is required including baseline blood pressure, CBC, CMP, lipid panel and uric acid, and then 1-2 times monthly CMP and blood pressure.
Wartpeel Counseling:  I discussed with the patient the risks of Wartpeel including but not limited to erythema, scaling, itching, weeping, crusting, and pain.
Olumiant Pregnancy And Lactation Text: Based on animal studies, Olumiant may cause embryo-fetal harm when administered to pregnant women.  The medication should not be used in pregnancy.  Breastfeeding is not recommended during treatment.
Cephalexin Counseling: I counseled the patient regarding use of cephalexin as an antibiotic for prophylactic and/or therapeutic purposes. Cephalexin (commonly prescribed under brand name Keflex) is a cephalosporin antibiotic which is active against numerous classes of bacteria, including most skin bacteria. Side effects may include nausea, diarrhea, gastrointestinal upset, rash, hives, yeast infections, and in rare cases, hepatitis, kidney disease, seizures, fever, confusion, neurologic symptoms, and others. Patients with severe allergies to penicillin medications are cautioned that there is about a 10% incidence of cross-reactivity with cephalosporins. When possible, patients with penicillin allergies should use alternatives to cephalosporins for antibiotic therapy.
Siliq Counseling:  I discussed with the patient the risks of Siliq including but not limited to new or worsening depression, suicidal thoughts and behavior, immunosuppression, malignancy, posterior leukoencephalopathy syndrome, and serious infections.  The patient understands that monitoring is required including a PPD at baseline and must alert us or the primary physician if symptoms of infection or other concerning signs are noted. There is also a special program designed to monitor depression which is required with Siliq.
Odomzo Counseling- I discussed with the patient the risks of Odomzo including but not limited to nausea, vomiting, diarrhea, constipation, weight loss, changes in the sense of taste, decreased appetite, muscle spasms, and hair loss.  The patient verbalized understanding of the proper use and possible adverse effects of Odomzo.  All of the patient's questions and concerns were addressed.
Bexarotene Counseling:  I discussed with the patient the risks of bexarotene including but not limited to hair loss, dry lips/skin/eyes, liver abnormalities, hyperlipidemia, pancreatitis, depression/suicidal ideation, photosensitivity, drug rash/allergic reactions, hypothyroidism, anemia, leukopenia, infection, cataracts, and teratogenicity.  Patient understands that they will need regular blood tests to check lipid profile, liver function tests, white blood cell count, thyroid function tests and pregnancy test if applicable.
Dapsone Counseling: I discussed with the patient the risks of dapsone including but not limited to hemolytic anemia, agranulocytosis, rashes, methemoglobinemia, kidney failure, peripheral neuropathy, headaches, GI upset, and liver toxicity.  Patients who start dapsone require monitoring including baseline LFTs and weekly CBCs for the first month, then every month thereafter.  The patient verbalized understanding of the proper use and possible adverse effects of dapsone.  All of the patient's questions and concerns were addressed.
Tranexamic Acid Counseling:  Patient advised of the small risk of bleeding problems with tranexamic acid. They were also instructed to call if they developed any nausea, vomiting or diarrhea. All of the patient's questions and concerns were addressed.
Picato Counseling:  I discussed with the patient the risks of Picato including but not limited to erythema, scaling, itching, weeping, crusting, and pain.
Azelaic Acid Counseling: Patient counseled that medicine may cause skin irritation and to avoid applying near the eyes.  In the event of skin irritation, the patient was advised to reduce the amount of the drug applied or use it less frequently.   The patient verbalized understanding of the proper use and possible adverse effects of azelaic acid.  All of the patient's questions and concerns were addressed.
Ketoconazole Pregnancy And Lactation Text: This medication is Pregnancy Category C and it isn't know if it is safe during pregnancy. It is also excreted in breast milk and breast feeding isn't recommended.
Low Dose Naltrexone Pregnancy And Lactation Text: Naltrexone is pregnancy category C.  There have been no adequate and well-controlled studies in pregnant women.  It should be used in pregnancy only if the potential benefit justifies the potential risk to the fetus.   Limited data indicates that naltrexone is minimally excreted into breastmilk.
Bimzelx Counseling:  I discussed with the patient the risks of Bimzelx including but not limited to depression, immunosuppression, allergic reactions and infections.  The patient understands that monitoring is required including a PPD at baseline and must alert us or the primary physician if symptoms of infection or other concerning signs are noted.
Metronidazole Pregnancy And Lactation Text: This medication is Pregnancy Category B and considered safe during pregnancy.  It is also excreted in breast milk.

## 2024-01-16 NOTE — PROCEDURE: PATIENT SPECIFIC COUNSELING
Detail Level: Zone
Using 5fu qhs on scalp, ears, nose,and forearms for 2 weeks with good reaction. Continue for 2 more weeks. Follow up in 2 months.

## 2024-03-12 ENCOUNTER — APPOINTMENT (RX ONLY)
Dept: URBAN - METROPOLITAN AREA CLINIC 36 | Facility: CLINIC | Age: 89
Setting detail: DERMATOLOGY
End: 2024-03-12

## 2024-03-12 DIAGNOSIS — L57.0 ACTINIC KERATOSIS: ICD-10-CM

## 2024-03-12 PROCEDURE — 99212 OFFICE O/P EST SF 10 MIN: CPT

## 2024-03-12 PROCEDURE — ? PATIENT SPECIFIC COUNSELING

## 2024-03-12 PROCEDURE — ? MEDICATION COUNSELING

## 2024-03-12 NOTE — PROCEDURE: PATIENT SPECIFIC COUNSELING
Detail Level: Zone
used  5 fu topical on scalp, ears,nose and forearms for 4 weeks with good reaction. Off for 2 weeks. No tumors.

## 2024-03-12 NOTE — PROCEDURE: MEDICATION COUNSELING
Carac Pregnancy And Lactation Text: This medication is Pregnancy Category X and contraindicated in pregnancy and in women who may become pregnant. It is unknown if this medication is excreted in breast milk.
Wartpeel Counseling:  I discussed with the patient the risks of Wartpeel including but not limited to erythema, scaling, itching, weeping, crusting, and pain.
Cyclosporine Counseling:  I discussed with the patient the risks of cyclosporine including but not limited to hypertension, gingival hyperplasia,myelosuppression, immunosuppression, liver damage, kidney damage, neurotoxicity, lymphoma, and serious infections. The patient understands that monitoring is required including baseline blood pressure, CBC, CMP, lipid panel and uric acid, and then 1-2 times monthly CMP and blood pressure.
Tranexamic Acid Counseling:  Patient advised of the small risk of bleeding problems with tranexamic acid. They were also instructed to call if they developed any nausea, vomiting or diarrhea. All of the patient's questions and concerns were addressed.
Qbrexza Pregnancy And Lactation Text: There is no available data on Qbrexza use in pregnant women.  There is no available data on Qbrexza use in lactation.
Tremfya Counseling: I discussed with the patient the risks of guselkumab including but not limited to immunosuppression, serious infections, worsening of inflammatory bowel disease and drug reactions.  The patient understands that monitoring is required including a PPD at baseline and must alert us or the primary physician if symptoms of infection or other concerning signs are noted.
Dapsone Counseling: I discussed with the patient the risks of dapsone including but not limited to hemolytic anemia, agranulocytosis, rashes, methemoglobinemia, kidney failure, peripheral neuropathy, headaches, GI upset, and liver toxicity.  Patients who start dapsone require monitoring including baseline LFTs and weekly CBCs for the first month, then every month thereafter.  The patient verbalized understanding of the proper use and possible adverse effects of dapsone.  All of the patient's questions and concerns were addressed.
Odomzo Pregnancy And Lactation Text: This medication is Pregnancy Category X and is absolutely contraindicated during pregnancy. It is unknown if it is excreted in breast milk.
Siliq Pregnancy And Lactation Text: The risk during pregnancy and breastfeeding is uncertain with this medication.
Enbrel Pregnancy And Lactation Text: This medication is Pregnancy Category B and is considered safe during pregnancy. It is unknown if this medication is excreted in breast milk.
Cimetidine Pregnancy And Lactation Text: This medication is Pregnancy Category B and is considered safe during pregnancy. It is also excreted in breast milk and breast feeding isn't recommended.
Birth Control Pills Pregnancy And Lactation Text: This medication should be avoided if pregnant and for the first 30 days post-partum.
Low Dose Naltrexone Pregnancy And Lactation Text: Naltrexone is pregnancy category C.  There have been no adequate and well-controlled studies in pregnant women.  It should be used in pregnancy only if the potential benefit justifies the potential risk to the fetus.   Limited data indicates that naltrexone is minimally excreted into breastmilk.
Eucrisa Counseling: Patient may experience a mild burning sensation during topical application. Eucrisa is not approved in children less than 2 years of age.
Doxepin Counseling:  Patient advised that the medication is sedating and not to drive a car after taking this medication. Patient informed of potential adverse effects including but not limited to dry mouth, urinary retention, and blurry vision.  The patient verbalized understanding of the proper use and possible adverse effects of doxepin.  All of the patient's questions and concerns were addressed.
Humira Counseling:  I discussed with the patient the risks of adalimumab including but not limited to myelosuppression, immunosuppression, autoimmune hepatitis, demyelinating diseases, lymphoma, and serious infections.  The patient understands that monitoring is required including a PPD at baseline and must alert us or the primary physician if symptoms of infection or other concerning signs are noted.
Tazorac Pregnancy And Lactation Text: This medication is not safe during pregnancy. It is unknown if this medication is excreted in breast milk.
Griseofulvin Pregnancy And Lactation Text: This medication is Pregnancy Category X and is known to cause serious birth defects. It is unknown if this medication is excreted in breast milk but breast feeding should be avoided.
Xeljanz Counseling: I discussed with the patient the risks of Xeljanz therapy including increased risk of infection, liver issues, headache, diarrhea, or cold symptoms. Live vaccines should be avoided. They were instructed to call if they have any problems.
Adbry Counseling: I discussed with the patient the risks of tralokinumab including but not limited to eye infection and irritation, cold sores, injection site reactions, worsening of asthma, allergic reactions and increased risk of parasitic infection.  Live vaccines should be avoided while taking tralokinumab. The patient understands that monitoring is required and they must alert us or the primary physician if symptoms of infection or other concerning signs are noted.
Rifampin Counseling: I discussed with the patient the risks of rifampin including but not limited to liver damage, kidney damage, red-orange body fluids, nausea/vomiting and severe allergy.
Litfulo Pregnancy And Lactation Text: Based on animal studies, Lifulo may cause embryo-fetal harm when administered to pregnant women.  The medication should not be used in pregnancy.  Breastfeeding is not recommended during treatment.
Doxycycline Pregnancy And Lactation Text: This medication is Pregnancy Category D and not consider safe during pregnancy. It is also excreted in breast milk but is considered safe for shorter treatment courses.
Otezla Pregnancy And Lactation Text: This medication is Pregnancy Category C and it isn't known if it is safe during pregnancy. It is unknown if it is excreted in breast milk.
High Dose Vitamin A Pregnancy And Lactation Text: High dose vitamin A therapy is contraindicated during pregnancy and breast feeding.
Mirvaso Counseling: Mirvaso is a topical medication which can decrease superficial blood flow where applied. Side effects are uncommon and include stinging, redness and allergic reactions.
Rifampin Pregnancy And Lactation Text: This medication is Pregnancy Category C and it isn't know if it is safe during pregnancy. It is also excreted in breast milk and should not be used if you are breast feeding.
Adbry Pregnancy And Lactation Text: It is unknown if this medication will adversely affect pregnancy or breast feeding.
Calcipotriene Counseling:  I discussed with the patient the risks of calcipotriene including but not limited to erythema, scaling, itching, and irritation.
Xelaleidaz Pregnancy And Lactation Text: This medication is Pregnancy Category D and is not considered safe during pregnancy.  The risk during breast feeding is also uncertain.
Mirvaso Pregnancy And Lactation Text: This medication has not been assigned a Pregnancy Risk Category. It is unknown if the medication is excreted in breast milk.
Dapsone Pregnancy And Lactation Text: This medication is Pregnancy Category C and is not considered safe during pregnancy or breast feeding.
Topical Ketoconazole Counseling: Patient counseled that this medication may cause skin irritation or allergic reactions.  In the event of skin irritation, the patient was advised to reduce the amount of the drug applied or use it less frequently.   The patient verbalized understanding of the proper use and possible adverse effects of ketoconazole.  All of the patient's questions and concerns were addressed.
Olumiant Counseling: I discussed with the patient the risks of Olumiant therapy including but not limited to upper respiratory tract infections, shingles, cold sores, and nausea. Live vaccines should be avoided.  This medication has been linked to serious infections; higher rate of mortality; malignancy and lymphoproliferative disorders; major adverse cardiovascular events; thrombosis; gastrointestinal perforations; neutropenia; lymphopenia; anemia; liver enzyme elevations; and lipid elevations.
Itraconazole Counseling:  I discussed with the patient the risks of itraconazole including but not limited to liver damage, nausea/vomiting, neuropathy, and severe allergy.  The patient understands that this medication is best absorbed when taken with acidic beverages such as non-diet cola or ginger ale.  The patient understands that monitoring is required including baseline LFTs and repeat LFTs at intervals.  The patient understands that they are to contact us or the primary physician if concerning signs are noted.
Eucrisa Pregnancy And Lactation Text: This medication has not been assigned a Pregnancy Risk Category but animal studies failed to show danger with the topical medication. It is unknown if the medication is excreted in breast milk.
Spironolactone Counseling: Patient advised regarding risks of diarrhea, abdominal pain, hyperkalemia, birth defects (for female patients), liver toxicity and renal toxicity. The patient may need blood work to monitor liver and kidney function and potassium levels while on therapy. The patient verbalized understanding of the proper use and possible adverse effects of spironolactone.  All of the patient's questions and concerns were addressed.
Azithromycin Counseling:  I discussed with the patient the risks of azithromycin including but not limited to GI upset, allergic reaction, drug rash, diarrhea, and yeast infections.
Niacinamide Counseling: I recommended taking niacin or niacinamide, also know as vitamin B3, twice daily. Recent evidence suggests that taking vitamin B3 (500 mg twice daily) can reduce the risk of actinic keratoses and non-melanoma skin cancers. Side effects of vitamin B3 include flushing and headache.
Opioid Counseling: I discussed with the patient the potential side effects of opioids including but not limited to addiction, altered mental status, and depression. I stressed avoiding alcohol, benzodiazepines, muscle relaxants and sleep aids unless specifically okayed by a physician. The patient verbalized understanding of the proper use and possible adverse effects of opioids. All of the patient's questions and concerns were addressed. They were instructed to flush the remaining pills down the toilet if they did not need them for pain.
Cyclosporine Pregnancy And Lactation Text: This medication is Pregnancy Category C and it isn't know if it is safe during pregnancy. This medication is excreted in breast milk.
Rhofade Counseling: Rhofade is a topical medication which can decrease superficial blood flow where applied. Side effects are uncommon and include stinging, redness and allergic reactions.
Tranexamic Acid Pregnancy And Lactation Text: It is unknown if this medication is safe during pregnancy or breast feeding.
Hydroquinone Counseling:  Patient advised that medication may result in skin irritation, lightening (hypopigmentation), dryness, and burning.  In the event of skin irritation, the patient was advised to reduce the amount of the drug applied or use it less frequently.  Rarely, spots that are treated with hydroquinone can become darker (pseudoochronosis).  Should this occur, patient instructed to stop medication and call the office. The patient verbalized understanding of the proper use and possible adverse effects of hydroquinone.  All of the patient's questions and concerns were addressed.
Topical Clindamycin Counseling: Patient counseled that this medication may cause skin irritation or allergic reactions.  In the event of skin irritation, the patient was advised to reduce the amount of the drug applied or use it less frequently.   The patient verbalized understanding of the proper use and possible adverse effects of clindamycin.  All of the patient's questions and concerns were addressed.
Calcipotriene Pregnancy And Lactation Text: The use of this medication during pregnancy or lactation is not recommended as there is insufficient data.
Dutasteride Male Counseling: Dustasteride Counseling:  I discussed with the patient the risks of use of dutasteride including but not limited to decreased libido, decreased ejaculate volume, and gynecomastia. Women who can become pregnant should not handle medication.  All of the patient's questions and concerns were addressed.
Spironolactone Pregnancy And Lactation Text: This medication can cause feminization of the male fetus and should be avoided during pregnancy. The active metabolite is also found in breast milk.
Winlevi Counseling:  I discussed with the patient the risks of topical clascoterone including but not limited to erythema, scaling, itching, and stinging. Patient voiced their understanding.
Niacinamide Pregnancy And Lactation Text: These medications are considered safe during pregnancy.
Azithromycin Pregnancy And Lactation Text: This medication is considered safe during pregnancy and is also secreted in breast milk.
Xolair Counseling:  Patient informed of potential adverse effects including but not limited to fever, muscle aches, rash and allergic reactions.  The patient verbalized understanding of the proper use and possible adverse effects of Xolair.  All of the patient's questions and concerns were addressed.
Opioid Pregnancy And Lactation Text: These medications can lead to premature delivery and should be avoided during pregnancy. These medications are also present in breast milk in small amounts.
Methotrexate Counseling:  Patient counseled regarding adverse effects of methotrexate including but not limited to nausea, vomiting, abnormalities in liver function tests. Patients may develop mouth sores, rash, diarrhea, and abnormalities in blood counts. The patient understands that monitoring is required including LFT's and blood counts.  There is a rare possibility of scarring of the liver and lung problems that can occur when taking methotrexate. Persistent nausea, loss of appetite, pale stools, dark urine, cough, and shortness of breath should be reported immediately. Patient advised to discontinue methotrexate treatment at least three months before attempting to become pregnant.  I discussed the need for folate supplements while taking methotrexate.  These supplements can decrease side effects during methotrexate treatment. The patient verbalized understanding of the proper use and possible adverse effects of methotrexate.  All of the patient's questions and concerns were addressed.
Oxybutynin Counseling:  I discussed with the patient the risks of oxybutynin including but not limited to skin rash, drowsiness, dry mouth, difficulty urinating, and blurred vision.
Simponi Counseling:  I discussed with the patient the risks of golimumab including but not limited to myelosuppression, immunosuppression, autoimmune hepatitis, demyelinating diseases, lymphoma, and serious infections.  The patient understands that monitoring is required including a PPD at baseline and must alert us or the primary physician if symptoms of infection or other concerning signs are noted.
Erythromycin Counseling:  I discussed with the patient the risks of erythromycin including but not limited to GI upset, allergic reaction, drug rash, diarrhea, increase in liver enzymes, and yeast infections.
Doxepin Pregnancy And Lactation Text: This medication is Pregnancy Category C and it isn't known if it is safe during pregnancy. It is also excreted in breast milk and breast feeding isn't recommended.
Bimzelx Counseling:  I discussed with the patient the risks of Bimzelx including but not limited to depression, immunosuppression, allergic reactions and infections.  The patient understands that monitoring is required including a PPD at baseline and must alert us or the primary physician if symptoms of infection or other concerning signs are noted.
Aklief counseling:  Patient advised to apply a pea-sized amount only at bedtime and wait 30 minutes after washing their face before applying.  If too drying, patient may add a non-comedogenic moisturizer.  The most commonly reported side effects including irritation, redness, scaling, dryness, stinging, burning, itching, and increased risk of sunburn.  The patient verbalized understanding of the proper use and possible adverse effects of retinoids.  All of the patient's questions and concerns were addressed.
Gabapentin Counseling: I discussed with the patient the risks of gabapentin including but not limited to dizziness, somnolence, fatigue and ataxia.
Topical Ketoconazole Pregnancy And Lactation Text: This medication is Pregnancy Category B and is considered safe during pregnancy. It is unknown if it is excreted in breast milk.
Opzelura Counseling:  I discussed with the patient the risks of Opzelura including but not limited to nasopharngitis, bronchitis, ear infection, eosinophila, hives, diarrhea, folliculitis, tonsillitis, and rhinorrhea.  Taken orally, this medication has been linked to serious infections; higher rate of mortality; malignancy and lymphoproliferative disorders; major adverse cardiovascular events; thrombosis; thrombocytopenia, anemia, and neutropenia; and lipid elevations.
Itraconazole Pregnancy And Lactation Text: This medication is Pregnancy Category C and it isn't know if it is safe during pregnancy. It is also excreted in breast milk.
Ilumya Counseling: I discussed with the patient the risks of tildrakizumab including but not limited to immunosuppression, malignancy, posterior leukoencephalopathy syndrome, and serious infections.  The patient understands that monitoring is required including a PPD at baseline and must alert us or the primary physician if symptoms of infection or other concerning signs are noted.
Valtrex Counseling: I discussed with the patient the risks of valacyclovir including but not limited to kidney damage, nausea, vomiting and severe allergy.  The patient understands that if the infection seems to be worsening or is not improving, they are to call.
Sarecycline Counseling: Patient advised regarding possible photosensitivity and discoloration of the teeth, skin, lips, tongue and gums.  Patient instructed to avoid sunlight, if possible.  When exposed to sunlight, patients should wear protective clothing, sunglasses, and sunscreen.  The patient was instructed to call the office immediately if the following severe adverse effects occur:  hearing changes, easy bruising/bleeding, severe headache, or vision changes.  The patient verbalized understanding of the proper use and possible adverse effects of sarecycline.  All of the patient's questions and concerns were addressed.
Olumiant Pregnancy And Lactation Text: Based on animal studies, Olumiant may cause embryo-fetal harm when administered to pregnant women.  The medication should not be used in pregnancy.  Breastfeeding is not recommended during treatment.
Cantharidin Counseling:  I discussed with the patient the risks of Cantharidin including but not limited to pain, redness, burning, itching, and blistering.
Winlevi Pregnancy And Lactation Text: This medication is considered safe during pregnancy and breastfeeding.
Valtrex Pregnancy And Lactation Text: this medication is Pregnancy Category B and is considered safe during pregnancy. This medication is not directly found in breast milk but it's metabolite acyclovir is present.
Xolair Pregnancy And Lactation Text: This medication is Pregnancy Category B and is considered safe during pregnancy. This medication is excreted in breast milk.
Solaraze Counseling:  I discussed with the patient the risks of Solaraze including but not limited to erythema, scaling, itching, weeping, crusting, and pain.
Methotrexate Pregnancy And Lactation Text: This medication is Pregnancy Category X and is known to cause fetal harm. This medication is excreted in breast milk.
Cantharidin Pregnancy And Lactation Text: This medication has not been proven safe during pregnancy. It is unknown if this medication is excreted in breast milk.
Erythromycin Pregnancy And Lactation Text: This medication is Pregnancy Category B and is considered safe during pregnancy. It is also excreted in breast milk.
Hydroxyzine Counseling: Patient advised that the medication is sedating and not to drive a car after taking this medication.  Patient informed of potential adverse effects including but not limited to dry mouth, urinary retention, and blurry vision.  The patient verbalized understanding of the proper use and possible adverse effects of hydroxyzine.  All of the patient's questions and concerns were addressed.
Dutasteride Female Counseling: Dutasteride Counseling:  I discussed with the patient the risks of use of dutasteride including but not limited to decreased libido and sexual dysfunction. Explained the teratogenic nature of the medication and stressed the importance of not getting pregnant during treatment. All of the patient's questions and concerns were addressed.
Acitretin Counseling:  I discussed with the patient the risks of acitretin including but not limited to hair loss, dry lips/skin/eyes, liver damage, hyperlipidemia, depression/suicidal ideation, photosensitivity.  Serious rare side effects can include but are not limited to pancreatitis, pseudotumor cerebri, bony changes, clot formation/stroke/heart attack.  Patient understands that alcohol is contraindicated since it can result in liver toxicity and significantly prolong the elimination of the drug by many years.
Bactrim Counseling:  I discussed with the patient the risks of sulfa antibiotics including but not limited to GI upset, allergic reaction, drug rash, diarrhea, dizziness, photosensitivity, and yeast infections.  Rarely, more serious reactions can occur including but not limited to aplastic anemia, agranulocytosis, methemoglobinemia, blood dyscrasias, liver or kidney failure, lung infiltrates or desquamative/blistering drug rashes.
Nsaids Counseling: NSAID Counseling: I discussed with the patient that NSAIDs should be taken with food. Prolonged use of NSAIDs can result in the development of stomach ulcers.  Patient advised to stop taking NSAIDs if abdominal pain occurs.  The patient verbalized understanding of the proper use and possible adverse effects of NSAIDs.  All of the patient's questions and concerns were addressed.
Hydroxyzine Pregnancy And Lactation Text: This medication is not safe during pregnancy and should not be taken. It is also excreted in breast milk and breast feeding isn't recommended.
Aklief Pregnancy And Lactation Text: It is unknown if this medication is safe to use during pregnancy.  It is unknown if this medication is excreted in breast milk.  Breastfeeding women should use the topical cream on the smallest area of the skin for the shortest time needed while breastfeeding.  Do not apply to nipple and areola.
Ketoconazole Counseling:   Patient counseled regarding improving absorption with orange juice.  Adverse effects include but are not limited to breast enlargement, headache, diarrhea, nausea, upset stomach, liver function test abnormalities, taste disturbance, and stomach pain.  There is a rare possibility of liver failure that can occur when taking ketoconazole. The patient understands that monitoring of LFTs may be required, especially at baseline. The patient verbalized understanding of the proper use and possible adverse effects of ketoconazole.  All of the patient's questions and concerns were addressed.
Imiquimod Counseling:  I discussed with the patient the risks of imiquimod including but not limited to erythema, scaling, itching, weeping, crusting, and pain.  Patient understands that the inflammatory response to imiquimod is variable from person to person and was educated regarded proper titration schedule.  If flu-like symptoms develop, patient knows to discontinue the medication and contact us.
Arava Counseling:  Patient counseled regarding adverse effects of Arava including but not limited to nausea, vomiting, abnormalities in liver function tests. Patients may develop mouth sores, rash, diarrhea, and abnormalities in blood counts. The patient understands that monitoring is required including LFTs and blood counts.  There is a rare possibility of scarring of the liver and lung problems that can occur when taking methotrexate. Persistent nausea, loss of appetite, pale stools, dark urine, cough, and shortness of breath should be reported immediately. Patient advised to discontinue Arava treatment and consult with a physician prior to attempting conception. The patient will have to undergo a treatment to eliminate Arava from the body prior to conception.
Metronidazole Counseling:  I discussed with the patient the risks of metronidazole including but not limited to seizures, nausea/vomiting, a metallic taste in the mouth, nausea/vomiting and severe allergy.
Dutasteride Pregnancy And Lactation Text: This medication is absolutely contraindicated in women, especially during pregnancy and breast feeding. Feminization of male fetuses is possible if taking while pregnant.
Sarecycline Pregnancy And Lactation Text: This medication is Pregnancy Category D and not consider safe during pregnancy. It is also excreted in breast milk.
5-Fu Counseling: 5-Fluorouracil Counseling:  I discussed with the patient the risks of 5-fluorouracil including but not limited to erythema, scaling, itching, weeping, crusting, and pain.
Topical Metronidazole Counseling: Metronidazole is a topical antibiotic medication. You may experience burning, stinging, redness, or allergic reactions.  Please call our office if you develop any problems from using this medication.
Gabapentin Pregnancy And Lactation Text: This medication is Pregnancy Category C and isn't considered safe during pregnancy. It is excreted in breast milk.
Skyrizi Counseling: I discussed with the patient the risks of risankizumab-rzaa including but not limited to immunosuppression, and serious infections.  The patient understands that monitoring is required including a PPD at baseline and must alert us or the primary physician if symptoms of infection or other concerning signs are noted.
Bimzelx Pregnancy And Lactation Text: This medication crosses the placenta and the safety is uncertain during pregnancy. It is unknown if this medication is present in breast milk.
Azathioprine Counseling:  I discussed with the patient the risks of azathioprine including but not limited to myelosuppression, immunosuppression, hepatotoxicity, lymphoma, and infections.  The patient understands that monitoring is required including baseline LFTs, Creatinine, possible TPMP genotyping and weekly CBCs for the first month and then every 2 weeks thereafter.  The patient verbalized understanding of the proper use and possible adverse effects of azathioprine.  All of the patient's questions and concerns were addressed.
Opzelura Pregnancy And Lactation Text: There is insufficient data to evaluate drug-associated risk for major birth defects, miscarriage, or other adverse maternal or fetal outcomes.  There is a pregnancy registry that monitors pregnancy outcomes in pregnant persons exposed to the medication during pregnancy.  It is unknown if this medication is excreted in breast milk.  Do not breastfeed during treatment and for about 4 weeks after the last dose.
Propranolol Counseling:  I discussed with the patient the risks of propranolol including but not limited to low heart rate, low blood pressure, low blood sugar, restlessness and increased cold sensitivity. They should call the office if they experience any of these side effects.
Prednisone Counseling:  I discussed with the patient the risks of prolonged use of prednisone including but not limited to weight gain, insomnia, osteoporosis, mood changes, diabetes, susceptibility to infection, glaucoma and high blood pressure.  In cases where prednisone use is prolonged, patients should be monitored with blood pressure checks, serum glucose levels and an eye exam.  Additionally, the patient may need to be placed on GI prophylaxis, PCP prophylaxis, and calcium and vitamin D supplementation and/or a bisphosphonate.  The patient verbalized understanding of the proper use and the possible adverse effects of prednisone.  All of the patient's questions and concerns were addressed.
Tetracycline Counseling: Patient counseled regarding possible photosensitivity and increased risk for sunburn.  Patient instructed to avoid sunlight, if possible.  When exposed to sunlight, patients should wear protective clothing, sunglasses, and sunscreen.  The patient was instructed to call the office immediately if the following severe adverse effects occur:  hearing changes, easy bruising/bleeding, severe headache, or vision changes.  The patient verbalized understanding of the proper use and possible adverse effects of tetracycline.  All of the patient's questions and concerns were addressed. Patient understands to avoid pregnancy while on therapy due to potential birth defects.
Solaraze Pregnancy And Lactation Text: This medication is Pregnancy Category B and is considered safe. There is some data to suggest avoiding during the third trimester. It is unknown if this medication is excreted in breast milk.
Glycopyrrolate Counseling:  I discussed with the patient the risks of glycopyrrolate including but not limited to skin rash, drowsiness, dry mouth, difficulty urinating, and blurred vision.
Use Enhanced Medication Counseling?: No
Topical Metronidazole Pregnancy And Lactation Text: This medication is Pregnancy Category B and considered safe during pregnancy.  It is also considered safe to use while breastfeeding.
Azathioprine Pregnancy And Lactation Text: This medication is Pregnancy Category D and isn't considered safe during pregnancy. It is unknown if this medication is excreted in breast milk.
Nsaids Pregnancy And Lactation Text: These medications are considered safe up to 30 weeks gestation. It is excreted in breast milk.
VTAMA Counseling: I discussed with the patient that VTAMA is not for use in the eyes, mouth or mouth. They should call the office if they develop any signs of allergic reactions to VTAMA. The patient verbalized understanding of the proper use and possible adverse effects of VTAMA.  All of the patient's questions and concerns were addressed.
Acitretin Pregnancy And Lactation Text: This medication is Pregnancy Category X and should not be given to women who are pregnant or may become pregnant in the future. This medication is excreted in breast milk.
Bactrim Pregnancy And Lactation Text: This medication is Pregnancy Category D and is known to cause fetal risk.  It is also excreted in breast milk.
Cosentyx Counseling:  I discussed with the patient the risks of Cosentyx including but not limited to worsening of Crohn's disease, immunosuppression, allergic reactions and infections.  The patient understands that monitoring is required including a PPD at baseline and must alert us or the primary physician if symptoms of infection or other concerning signs are noted.
Imiquimod Pregnancy And Lactation Text: This medication is Pregnancy Category C. It is unknown if this medication is excreted in breast milk.
Erivedge Counseling- I discussed with the patient the risks of Erivedge including but not limited to nausea, vomiting, diarrhea, constipation, weight loss, changes in the sense of taste, decreased appetite, muscle spasms, and hair loss.  The patient verbalized understanding of the proper use and possible adverse effects of Erivedge.  All of the patient's questions and concerns were addressed.
Bexarotene Counseling:  I discussed with the patient the risks of bexarotene including but not limited to hair loss, dry lips/skin/eyes, liver abnormalities, hyperlipidemia, pancreatitis, depression/suicidal ideation, photosensitivity, drug rash/allergic reactions, hypothyroidism, anemia, leukopenia, infection, cataracts, and teratogenicity.  Patient understands that they will need regular blood tests to check lipid profile, liver function tests, white blood cell count, thyroid function tests and pregnancy test if applicable.
Cephalexin Counseling: I counseled the patient regarding use of cephalexin as an antibiotic for prophylactic and/or therapeutic purposes. Cephalexin (commonly prescribed under brand name Keflex) is a cephalosporin antibiotic which is active against numerous classes of bacteria, including most skin bacteria. Side effects may include nausea, diarrhea, gastrointestinal upset, rash, hives, yeast infections, and in rare cases, hepatitis, kidney disease, seizures, fever, confusion, neurologic symptoms, and others. Patients with severe allergies to penicillin medications are cautioned that there is about a 10% incidence of cross-reactivity with cephalosporins. When possible, patients with penicillin allergies should use alternatives to cephalosporins for antibiotic therapy.
Finasteride Male Counseling: Finasteride Counseling:  I discussed with the patient the risks of use of finasteride including but not limited to decreased libido, decreased ejaculate volume, gynecomastia, and depression. Women should not handle medication.  All of the patient's questions and concerns were addressed.
Infliximab Counseling:  I discussed with the patient the risks of infliximab including but not limited to myelosuppression, immunosuppression, autoimmune hepatitis, demyelinating diseases, lymphoma, and serious infections.  The patient understands that monitoring is required including a PPD at baseline and must alert us or the primary physician if symptoms of infection or other concerning signs are noted.
Olanzapine Counseling- I discussed with the patient the common side effects of olanzapine including but are not limited to: lack of energy, dry mouth, increased appetite, sleepiness, tremor, constipation, dizziness, changes in behavior, or restlessness.  Explained that teenagers are more likely to experience headaches, abdominal pain, pain in the arms or legs, tiredness, and sleepiness.  Serious side effects include but are not limited: increased risk of death in elderly patients who are confused, have memory loss, or dementia-related psychosis; hyperglycemia; increased cholesterol and triglycerides; and weight gain.
Ketoconazole Pregnancy And Lactation Text: This medication is Pregnancy Category C and it isn't know if it is safe during pregnancy. It is also excreted in breast milk and breast feeding isn't recommended.
Propranolol Pregnancy And Lactation Text: This medication is Pregnancy Category C and it isn't known if it is safe during pregnancy. It is excreted in breast milk.
Cimzia Counseling:  I discussed with the patient the risks of Cimzia including but not limited to immunosuppression, allergic reactions and infections.  The patient understands that monitoring is required including a PPD at baseline and must alert us or the primary physician if symptoms of infection or other concerning signs are noted.
Metronidazole Pregnancy And Lactation Text: This medication is Pregnancy Category B and considered safe during pregnancy.  It is also excreted in breast milk.
Azelaic Acid Counseling: Patient counseled that medicine may cause skin irritation and to avoid applying near the eyes.  In the event of skin irritation, the patient was advised to reduce the amount of the drug applied or use it less frequently.   The patient verbalized understanding of the proper use and possible adverse effects of azelaic acid.  All of the patient's questions and concerns were addressed.
Picato Counseling:  I discussed with the patient the risks of Picato including but not limited to erythema, scaling, itching, weeping, crusting, and pain.
Topical Steroids Counseling: I discussed with the patient that prolonged use of topical steroids can result in the increased appearance of superficial blood vessels (telangiectasias), lightening (hypopigmentation) and thinning of the skin (atrophy).  Patient understands to avoid using high potency steroids in skin folds, the groin or the face.  The patient verbalized understanding of the proper use and possible adverse effects of topical steroids.  All of the patient's questions and concerns were addressed.
Cimzia Pregnancy And Lactation Text: This medication crosses the placenta but can be considered safe in certain situations. Cimzia may be excreted in breast milk.
Cellcept Counseling:  I discussed with the patient the risks of mycophenolate mofetil including but not limited to infection/immunosuppression, GI upset, hypokalemia, hypercholesterolemia, bone marrow suppression, lymphoproliferative disorders, malignancy, GI ulceration/bleed/perforation, colitis, interstitial lung disease, kidney failure, progressive multifocal leukoencephalopathy, and birth defects.  The patient understands that monitoring is required including a baseline creatinine and regular CBC testing. In addition, patient must alert us immediately if symptoms of infection or other concerning signs are noted.
Stelara Counseling:  I discussed with the patient the risks of ustekinumab including but not limited to immunosuppression, malignancy, posterior leukoencephalopathy syndrome, and serious infections.  The patient understands that monitoring is required including a PPD at baseline and must alert us or the primary physician if symptoms of infection or other concerning signs are noted.
Terbinafine Counseling: Patient counseling regarding adverse effects of terbinafine including but not limited to headache, diarrhea, rash, upset stomach, liver function test abnormalities, itching, taste/smell disturbance, nausea, abdominal pain, and flatulence.  There is a rare possibility of liver failure that can occur when taking terbinafine.  The patient understands that a baseline LFT and kidney function test may be required. The patient verbalized understanding of the proper use and possible adverse effects of terbinafine.  All of the patient's questions and concerns were addressed.
Albendazole Counseling:  I discussed with the patient the risks of albendazole including but not limited to cytopenia, kidney damage, nausea/vomiting and severe allergy.  The patient understands that this medication is being used in an off-label manner.
Vtama Pregnancy And Lactation Text: It is unknown if this medication can cause problems during pregnancy and breastfeeding.
Glycopyrrolate Pregnancy And Lactation Text: This medication is Pregnancy Category B and is considered safe during pregnancy. It is unknown if it is excreted breast milk.
Drysol Counseling:  I discussed with the patient the risks of drysol/aluminum chloride including but not limited to skin rash, itching, irritation, burning.
Soolantra Counseling: I discussed with the patients the risks of topial Soolantra. This is a medicine which decreases the number of mites and inflammation in the skin. You experience burning, stinging, eye irritation or allergic reactions.  Please call our office if you develop any problems from using this medication.
Klisyri Counseling:  I discussed with the patient the risks of Klisyri including but not limited to erythema, scaling, itching, weeping, crusting, and pain.
Drysol Pregnancy And Lactation Text: This medication is considered safe during pregnancy and breast feeding.
Soolantra Pregnancy And Lactation Text: This medication is Pregnancy Category C. This medication is considered safe during breast feeding.
Cephalexin Pregnancy And Lactation Text: This medication is Pregnancy Category B and considered safe during pregnancy.  It is also excreted in breast milk but can be used safely for shorter doses.
Zoryve Counseling:  I discussed with the patient that Zoryve is not for use in the eyes, mouth or vagina. The most commonly reported side effects include diarrhea, headache, insomnia, application site pain, upper respiratory tract infections, and urinary tract infections.  All of the patient's questions and concerns were addressed.
SSKI Counseling:  I discussed with the patient the risks of SSKI including but not limited to thyroid abnormalities, metallic taste, GI upset, fever, headache, acne, arthralgias, paraesthesias, lymphadenopathy, easy bleeding, arrhythmias, and allergic reaction.
Rinvoq Counseling: I discussed with the patient the risks of Rinvoq therapy including but not limited to upper respiratory tract infections, shingles, cold sores, bronchitis, nausea, cough, fever, acne, and headache. Live vaccines should be avoided.  This medication has been linked to serious infections; higher rate of mortality; malignancy and lymphoproliferative disorders; major adverse cardiovascular events; thrombosis; thrombocytopenia, anemia, and neutropenia; lipid elevations; liver enzyme elevations; and gastrointestinal perforations.
Minocycline Counseling: Patient advised regarding possible photosensitivity and discoloration of the teeth, skin, lips, tongue and gums.  Patient instructed to avoid sunlight, if possible.  When exposed to sunlight, patients should wear protective clothing, sunglasses, and sunscreen.  The patient was instructed to call the office immediately if the following severe adverse effects occur:  hearing changes, easy bruising/bleeding, severe headache, or vision changes.  The patient verbalized understanding of the proper use and possible adverse effects of minocycline.  All of the patient's questions and concerns were addressed.
Clofazimine Counseling:  I discussed with the patient the risks of clofazimine including but not limited to skin and eye pigmentation, liver damage, nausea/vomiting, gastrointestinal bleeding and allergy.
Finasteride Female Counseling: Finasteride Counseling:  I discussed with the patient the risks of use of finasteride including but not limited to decreased libido and sexual dysfunction. Explained the teratogenic nature of the medication and stressed the importance of not getting pregnant during treatment. All of the patient's questions and concerns were addressed.
Olanzapine Pregnancy And Lactation Text: This medication is pregnancy category C.   There are no adequate and well controlled trials with olanzapine in pregnant females.  Olanzapine should be used during pregnancy only if the potential benefit justifies the potential risk to the fetus.   In a study in lactating healthy women, olanzapine was excreted in breast milk.  It is recommended that women taking olanzapine should not breast feed.
Bexarotene Pregnancy And Lactation Text: This medication is Pregnancy Category X and should not be given to women who are pregnant or may become pregnant. This medication should not be used if you are breast feeding.
Protopic Counseling: Patient may experience a mild burning sensation during topical application. Protopic is not approved in children less than 2 years of age. There have been case reports of hematologic and skin malignancies in patients using topical calcineurin inhibitors although causality is questionable.
Benzoyl Peroxide Counseling: Patient counseled that medicine may cause skin irritation and bleach clothing.  In the event of skin irritation, the patient was advised to reduce the amount of the drug applied or use it less frequently.   The patient verbalized understanding of the proper use and possible adverse effects of benzoyl peroxide.  All of the patient's questions and concerns were addressed.
Topical Steroids Applications Pregnancy And Lactation Text: Most topical steroids are considered safe to use during pregnancy and lactation.  Any topical steroid applied to the breast or nipple should be washed off before breastfeeding.
Rinvoq Pregnancy And Lactation Text: Based on animal studies, Rinvoq may cause embryo-fetal harm when administered to pregnant women.  The medication should not be used in pregnancy.  Breastfeeding is not recommended during treatment and for 6 days after the last dose.
Libtayo Counseling- I discussed with the patient the risks of Libtayo including but not limited to nausea, vomiting, diarrhea, and bone or muscle pain.  The patient verbalized understanding of the proper use and possible adverse effects of Libtayo.  All of the patient's questions and concerns were addressed.
Sski Pregnancy And Lactation Text: This medication is Pregnancy Category D and isn't considered safe during pregnancy. It is excreted in breast milk.
Cibinqo Counseling: I discussed with the patient the risks of Cibinqo therapy including but not limited to common cold, nausea, headache, cold sores, increased blood CPK levels, dizziness, UTIs, fatigue, acne, and vomitting. Live vaccines should be avoided.  This medication has been linked to serious infections; higher rate of mortality; malignancy and lymphoproliferative disorders; major adverse cardiovascular events; thrombosis; thrombocytopenia and lymphopenia; lipid elevations; and retinal detachment.
Dupixent Counseling: I discussed with the patient the risks of dupilumab including but not limited to eye infection and irritation, cold sores, injection site reactions, worsening of asthma, allergic reactions and increased risk of parasitic infection.  Live vaccines should be avoided while taking dupilumab. Dupilumab will also interact with certain medications such as warfarin and cyclosporine. The patient understands that monitoring is required and they must alert us or the primary physician if symptoms of infection or other concerning signs are noted.
Albendazole Pregnancy And Lactation Text: This medication is Pregnancy Category C and it isn't known if it is safe during pregnancy. It is also excreted in breast milk.
Detail Level: Simple
Hydroxychloroquine Counseling:  I discussed with the patient that a baseline ophthalmologic exam is needed at the start of therapy and every year thereafter while on therapy. A CBC may also be warranted for monitoring.  The side effects of this medication were discussed with the patient, including but not limited to agranulocytosis, aplastic anemia, seizures, rashes, retinopathy, and liver toxicity. Patient instructed to call the office should any adverse effect occur.  The patient verbalized understanding of the proper use and possible adverse effects of Plaquenil.  All the patient's questions and concerns were addressed.
Topical Retinoid counseling:  Patient advised to apply a pea-sized amount only at bedtime and wait 30 minutes after washing their face before applying.  If too drying, patient may add a non-comedogenic moisturizer. The patient verbalized understanding of the proper use and possible adverse effects of retinoids.  All of the patient's questions and concerns were addressed.
Dupixent Pregnancy And Lactation Text: This medication likely crosses the placenta but the risk for the fetus is uncertain. This medication is excreted in breast milk.
Ivermectin Counseling:  Patient instructed to take medication on an empty stomach with a full glass of water.  Patient informed of potential adverse effects including but not limited to nausea, diarrhea, dizziness, itching, and swelling of the extremities or lymph nodes.  The patient verbalized understanding of the proper use and possible adverse effects of ivermectin.  All of the patient's questions and concerns were addressed.
Finasteride Pregnancy And Lactation Text: This medication is absolutely contraindicated during pregnancy. It is unknown if it is excreted in breast milk.
Hydroxychloroquine Pregnancy And Lactation Text: This medication has been shown to cause fetal harm but it isn't assigned a Pregnancy Risk Category. There are small amounts excreted in breast milk.
Rituxan Counseling:  I discussed with the patient the risks of Rituxan infusions. Side effects can include infusion reactions, severe drug rashes including mucocutaneous reactions, reactivation of latent hepatitis and other infections and rarely progressive multifocal leukoencephalopathy.  All of the patient's questions and concerns were addressed.
Fluconazole Counseling:  Patient counseled regarding adverse effects of fluconazole including but not limited to headache, diarrhea, nausea, upset stomach, liver function test abnormalities, taste disturbance, and stomach pain.  There is a rare possibility of liver failure that can occur when taking fluconazole.  The patient understands that monitoring of LFTs and kidney function test may be required, especially at baseline. The patient verbalized understanding of the proper use and possible adverse effects of fluconazole.  All of the patient's questions and concerns were addressed.
Isotretinoin Counseling: Patient should get monthly blood tests, not donate blood, not drive at night if vision affected, not share medication, and not undergo elective surgery for 6 months after tx completed. Side effects reviewed, pt to contact office should one occur.
Clindamycin Counseling: I counseled the patient regarding use of clindamycin as an antibiotic for prophylactic and/or therapeutic purposes. Clindamycin is active against numerous classes of bacteria, including skin bacteria. Side effects may include nausea, diarrhea, gastrointestinal upset, rash, hives, yeast infections, and in rare cases, colitis.
Oral Minoxidil Counseling- I discussed with the patient the risks of oral minoxidil including but not limited to shortness of breath, swelling of the feet or ankles, dizziness, lightheadedness, unwanted hair growth and allergic reaction.  The patient verbalized understanding of the proper use and possible adverse effects of oral minoxidil.  All of the patient's questions and concerns were addressed.
Klisyri Pregnancy And Lactation Text: It is unknown if this medication can harm a developing fetus or if it is excreted in breast milk.
Rituxan Pregnancy And Lactation Text: This medication is Pregnancy Category C and it isn't know if it is safe during pregnancy. It is unknown if this medication is excreted in breast milk but similar antibodies are known to be excreted.
Sotyktu Counseling:  I discussed the most common side effects of Sotyktu including: common cold, sore throat, sinus infections, cold sores, canker sores, folliculitis, and acne.  I also discussed more serious side effects of Sotyktu including but not limited to: serious allergic reactions; increased risk for infections such as TB; cancers such as lymphomas; rhabdomyolysis and elevated CPK; and elevated triglycerides and liver enzymes. 
Minoxidil Counseling: Minoxidil is a topical medication which can increase blood flow where it is applied. It is uncertain how this medication increases hair growth. Side effects are uncommon and include stinging and allergic reactions.
Elidel Counseling: Patient may experience a mild burning sensation during topical application. Elidel is not approved in children less than 2 years of age. There have been case reports of hematologic and skin malignancies in patients using topical calcineurin inhibitors although causality is questionable.
Topical Sulfur Applications Counseling: Topical Sulfur Counseling: Patient counseled that this medication may cause skin irritation or allergic reactions.  In the event of skin irritation, the patient was advised to reduce the amount of the drug applied or use it less frequently.   The patient verbalized understanding of the proper use and possible adverse effects of topical sulfur application.  All of the patient's questions and concerns were addressed.
Taltz Counseling: I discussed with the patient the risks of ixekizumab including but not limited to immunosuppression, serious infections, worsening of inflammatory bowel disease and drug reactions.  The patient understands that monitoring is required including a PPD at baseline and must alert us or the primary physician if symptoms of infection or other concerning signs are noted.
Cibinqo Pregnancy And Lactation Text: It is unknown if this medication will adversely affect pregnancy or breast feeding.  You should not take this medication if you are currently pregnant or planning a pregnancy or while breastfeeding.
Quinolones Counseling:  I discussed with the patient the risks of fluoroquinolones including but not limited to GI upset, allergic reaction, drug rash, diarrhea, dizziness, photosensitivity, yeast infections, liver function test abnormalities, tendonitis/tendon rupture.
Cyclophosphamide Counseling:  I discussed with the patient the risks of cyclophosphamide including but not limited to hair loss, hormonal abnormalities, decreased fertility, abdominal pain, diarrhea, nausea and vomiting, bone marrow suppression and infection. The patient understands that monitoring is required while taking this medication.
Libtayo Pregnancy And Lactation Text: This medication is contraindicated in pregnancy and when breast feeding.
Colchicine Counseling:  Patient counseled regarding adverse effects including but not limited to stomach upset (nausea, vomiting, stomach pain, or diarrhea).  Patient instructed to limit alcohol consumption while taking this medication.  Colchicine may reduce blood counts especially with prolonged use.  The patient understands that monitoring of kidney function and blood counts may be required, especially at baseline. The patient verbalized understanding of the proper use and possible adverse effects of colchicine.  All of the patient's questions and concerns were addressed.
Benzoyl Peroxide Pregnancy And Lactation Text: This medication is Pregnancy Category C. It is unknown if benzoyl peroxide is excreted in breast milk.
Thalidomide Counseling: I discussed with the patient the risks of thalidomide including but not limited to birth defects, anxiety, weakness, chest pain, dizziness, cough and severe allergy.
Protopic Pregnancy And Lactation Text: This medication is Pregnancy Category C. It is unknown if this medication is excreted in breast milk when applied topically.
Qbrexza Counseling:  I discussed with the patient the risks of Qbrexza including but not limited to headache, mydriasis, blurred vision, dry eyes, nasal dryness, dry mouth, dry throat, dry skin, urinary hesitation, and constipation.  Local skin reactions including erythema, burning, stinging, and itching can also occur.
Birth Control Pills Counseling: Birth Control Pill Counseling: I discussed with the patient the potential side effects of OCPs including but not limited to increased risk of stroke, heart attack, thrombophlebitis, deep venous thrombosis, hepatic adenomas, breast changes, GI upset, headaches, and depression.  The patient verbalized understanding of the proper use and possible adverse effects of OCPs. All of the patient's questions and concerns were addressed.
Topical Sulfur Applications Pregnancy And Lactation Text: This medication is Pregnancy Category C and has an unknown safety profile during pregnancy. It is unknown if this topical medication is excreted in breast milk.
Cyclophosphamide Pregnancy And Lactation Text: This medication is Pregnancy Category D and it isn't considered safe during pregnancy. This medication is excreted in breast milk.
Zyclara Counseling:  I discussed with the patient the risks of imiquimod including but not limited to erythema, scaling, itching, weeping, crusting, and pain.  Patient understands that the inflammatory response to imiquimod is variable from person to person and was educated regarded proper titration schedule.  If flu-like symptoms develop, patient knows to discontinue the medication and contact us.
Isotretinoin Pregnancy And Lactation Text: This medication is Pregnancy Category X and is considered extremely dangerous during pregnancy. It is unknown if it is excreted in breast milk.
Cimetidine Counseling:  I discussed with the patient the risks of Cimetidine including but not limited to gynecomastia, headache, diarrhea, nausea, drowsiness, arrhythmias, pancreatitis, skin rashes, psychosis, bone marrow suppression and kidney toxicity.
Oral Minoxidil Pregnancy And Lactation Text: This medication should only be used when clearly needed if you are pregnant, attempting to become pregnant or breast feeding.
Clindamycin Pregnancy And Lactation Text: This medication can be used in pregnancy if certain situations. Clindamycin is also present in breast milk.
Enbrel Counseling:  I discussed with the patient the risks of etanercept including but not limited to myelosuppression, immunosuppression, autoimmune hepatitis, demyelinating diseases, lymphoma, and infections.  The patient understands that monitoring is required including a PPD at baseline and must alert us or the primary physician if symptoms of infection or other concerning signs are noted.
Low Dose Naltrexone Counseling- I discussed with the patient the potential risks and side effects of low dose naltrexone including but not limited to: more vivid dreams, headaches, nausea, vomiting, abdominal pain, fatigue, dizziness, and anxiety.
High Dose Vitamin A Counseling: Side effects reviewed, pt to contact office should one occur.
Doxycycline Counseling:  Patient counseled regarding possible photosensitivity and increased risk for sunburn.  Patient instructed to avoid sunlight, if possible.  When exposed to sunlight, patients should wear protective clothing, sunglasses, and sunscreen.  The patient was instructed to call the office immediately if the following severe adverse effects occur:  hearing changes, easy bruising/bleeding, severe headache, or vision changes.  The patient verbalized understanding of the proper use and possible adverse effects of doxycycline.  All of the patient's questions and concerns were addressed.
Griseofulvin Counseling:  I discussed with the patient the risks of griseofulvin including but not limited to photosensitivity, cytopenia, liver damage, nausea/vomiting and severe allergy.  The patient understands that this medication is best absorbed when taken with a fatty meal (e.g., ice cream or french fries).
Otezla Counseling: The side effects of Otezla were discussed with the patient, including but not limited to worsening or new depression, weight loss, diarrhea, nausea, upper respiratory tract infection, and headache. Patient instructed to call the office should any adverse effect occur.  The patient verbalized understanding of the proper use and possible adverse effects of Otezla.  All the patient's questions and concerns were addressed.
Tazorac Counseling:  Patient advised that medication is irritating and drying.  Patient may need to apply sparingly and wash off after an hour before eventually leaving it on overnight.  The patient verbalized understanding of the proper use and possible adverse effects of tazorac.  All of the patient's questions and concerns were addressed.
Sotyktu Pregnancy And Lactation Text: There is insufficient data to evaluate whether or not Sotyktu is safe to use during pregnancy.   It is not known if Sotyktu passes into breast milk and whether or not it is safe to use when breastfeeding.  
Carac Counseling:  I discussed with the patient the risks of Carac including but not limited to erythema, scaling, itching, weeping, crusting, and pain.
Litfulo Counseling: I discussed with the patient the risks of Litfulo therapy including but not limited to upper respiratory tract infections, shingles, cold sores, and nausea. Live vaccines should be avoided.  This medication has been linked to serious infections; higher rate of mortality; malignancy and lymphoproliferative disorders; major adverse cardiovascular events; thrombosis; gastrointestinal perforations; neutropenia; lymphopenia; anemia; liver enzyme elevations; and lipid elevations.
Odomzo Counseling- I discussed with the patient the risks of Odomzo including but not limited to nausea, vomiting, diarrhea, constipation, weight loss, changes in the sense of taste, decreased appetite, muscle spasms, and hair loss.  The patient verbalized understanding of the proper use and possible adverse effects of Odomzo.  All of the patient's questions and concerns were addressed.
Siliq Counseling:  I discussed with the patient the risks of Siliq including but not limited to new or worsening depression, suicidal thoughts and behavior, immunosuppression, malignancy, posterior leukoencephalopathy syndrome, and serious infections.  The patient understands that monitoring is required including a PPD at baseline and must alert us or the primary physician if symptoms of infection or other concerning signs are noted. There is also a special program designed to monitor depression which is required with Siliq.

## 2024-07-23 ENCOUNTER — APPOINTMENT (RX ONLY)
Dept: URBAN - METROPOLITAN AREA CLINIC 20 | Facility: CLINIC | Age: 89
Setting detail: DERMATOLOGY
End: 2024-07-23

## 2024-07-23 DIAGNOSIS — L57.0 ACTINIC KERATOSIS: ICD-10-CM

## 2024-07-23 DIAGNOSIS — L57.8 OTHER SKIN CHANGES DUE TO CHRONIC EXPOSURE TO NONIONIZING RADIATION: ICD-10-CM

## 2024-07-23 PROCEDURE — 99212 OFFICE O/P EST SF 10 MIN: CPT | Mod: 25

## 2024-07-23 PROCEDURE — 17004 DESTROY PREMAL LESIONS 15/>: CPT

## 2024-07-23 PROCEDURE — ? COUNSELING

## 2024-07-23 PROCEDURE — ? LIQUID NITROGEN

## 2024-07-23 ASSESSMENT — LOCATION DETAILED DESCRIPTION DERM
LOCATION DETAILED: LEFT SUPERIOR FOREHEAD
LOCATION DETAILED: RIGHT MEDIAL FOREHEAD
LOCATION DETAILED: RIGHT SUPERIOR PARIETAL SCALP
LOCATION DETAILED: LEFT SUPERIOR PARIETAL SCALP
LOCATION DETAILED: NASAL ROOT
LOCATION DETAILED: RIGHT NASAL SIDEWALL
LOCATION DETAILED: RIGHT INFERIOR PARIETAL SCALP
LOCATION DETAILED: RIGHT INFERIOR HELIX
LOCATION DETAILED: LEFT CENTRAL MALAR CHEEK
LOCATION DETAILED: LEFT INFERIOR PREAURICULAR CHEEK
LOCATION DETAILED: LEFT MEDIAL FRONTAL SCALP
LOCATION DETAILED: LEFT TRIANGULAR FOSSA
LOCATION DETAILED: NASAL DORSUM
LOCATION DETAILED: LEFT CENTRAL TEMPLE
LOCATION DETAILED: RIGHT CENTRAL MALAR CHEEK

## 2024-07-23 ASSESSMENT — LOCATION SIMPLE DESCRIPTION DERM
LOCATION SIMPLE: LEFT TEMPLE
LOCATION SIMPLE: LEFT CHEEK
LOCATION SIMPLE: LEFT EAR
LOCATION SIMPLE: RIGHT EAR
LOCATION SIMPLE: RIGHT FOREHEAD
LOCATION SIMPLE: RIGHT NOSE
LOCATION SIMPLE: NOSE
LOCATION SIMPLE: RIGHT CHEEK
LOCATION SIMPLE: SCALP
LOCATION SIMPLE: LEFT SCALP
LOCATION SIMPLE: LEFT FOREHEAD

## 2024-07-23 ASSESSMENT — LOCATION ZONE DERM
LOCATION ZONE: NOSE
LOCATION ZONE: SCALP
LOCATION ZONE: FACE
LOCATION ZONE: EAR

## 2024-09-24 ENCOUNTER — APPOINTMENT (RX ONLY)
Dept: URBAN - METROPOLITAN AREA CLINIC 20 | Facility: CLINIC | Age: 89
Setting detail: DERMATOLOGY
End: 2024-09-24

## 2024-09-24 DIAGNOSIS — L57.8 OTHER SKIN CHANGES DUE TO CHRONIC EXPOSURE TO NONIONIZING RADIATION: ICD-10-CM

## 2024-09-24 DIAGNOSIS — L57.0 ACTINIC KERATOSIS: ICD-10-CM

## 2024-09-24 PROBLEM — D48.5 NEOPLASM OF UNCERTAIN BEHAVIOR OF SKIN: Status: ACTIVE | Noted: 2024-09-24

## 2024-09-24 PROCEDURE — 17003 DESTRUCT PREMALG LES 2-14: CPT | Mod: 59

## 2024-09-24 PROCEDURE — ? COUNSELING

## 2024-09-24 PROCEDURE — ? LIQUID NITROGEN (CM)

## 2024-09-24 PROCEDURE — ? LIQUID NITROGEN

## 2024-09-24 PROCEDURE — 17000 DESTRUCT PREMALG LESION: CPT | Mod: 59

## 2024-09-24 PROCEDURE — 17110 DESTRUCTION B9 LES UP TO 14: CPT

## 2024-09-24 PROCEDURE — 99212 OFFICE O/P EST SF 10 MIN: CPT | Mod: 25

## 2024-09-24 PROCEDURE — ? ADDITIONAL NOTES

## 2024-09-24 ASSESSMENT — LOCATION SIMPLE DESCRIPTION DERM
LOCATION SIMPLE: RIGHT FOREHEAD
LOCATION SIMPLE: LEFT FOREHEAD
LOCATION SIMPLE: RIGHT EAR
LOCATION SIMPLE: SCALP
LOCATION SIMPLE: RIGHT CHEEK
LOCATION SIMPLE: LEFT CHEEK

## 2024-09-24 ASSESSMENT — LOCATION DETAILED DESCRIPTION DERM
LOCATION DETAILED: RIGHT SUPERIOR PARIETAL SCALP
LOCATION DETAILED: RIGHT CENTRAL MALAR CHEEK
LOCATION DETAILED: LEFT CENTRAL MALAR CHEEK
LOCATION DETAILED: LEFT SUPERIOR PARIETAL SCALP
LOCATION DETAILED: RIGHT MEDIAL FOREHEAD
LOCATION DETAILED: RIGHT SUPERIOR HELIX
LOCATION DETAILED: LEFT LATERAL FOREHEAD

## 2024-09-24 ASSESSMENT — LOCATION ZONE DERM
LOCATION ZONE: SCALP
LOCATION ZONE: FACE
LOCATION ZONE: EAR

## 2024-09-24 NOTE — PROCEDURE: ADDITIONAL NOTES
Render Risk Assessment In Note?: no
Detail Level: Simple
Additional Notes: discussed treatment options\\n-biopsy; Mohs\\n-LN2 understanding this most likley will not get rid of it\\n-IL MTX \\n\\Mayra this time pt would prefer to avoid pain.  Start with/ LN2 if areas worsen will do chemo IL

## 2024-09-24 NOTE — PROCEDURE: LIQUID NITROGEN
Render Post-Care Instructions In Note?: no
Medical Necessity Information: It is in your best interest to select a reason for this procedure from the list below. All of these items fulfill various CMS LCD requirements except the new and changing color options.
Detail Level: Detailed
Medical Necessity Clause: This procedure was medically necessary because the lesions that were treated were:
Post-Care Instructions: I reviewed with the patient in detail post-care instructions. Patient is to wear sunprotection, and avoid picking at any of the treated lesions. Pt may apply Vaseline to crusted or scabbing areas.
Consent: The patient's consent was obtained including but not limited to risks of crusting, scabbing, blistering, scarring, darker or lighter pigmentary change, recurrence, incomplete removal and infection.
Size Of Lesion In Cm (Optional): 0
Show Aperture Variable?: Yes

## 2024-11-26 NOTE — ASSESSMENT & PLAN NOTE
Pre-op Instructions For Out-Patient Endoscopy Surgery    Medication Instructions:  Please stop herbs and any supplements now (includes vitamins and minerals).    For these medications:  Dulaglutide (Trulicity), Exenatide (Byetta and Bydureon, Liraglutide (Victoza), Lixisenatide (Adlyxin), Semaglutide (Ozempic and Rybelsus), Tirzepatide (Mounjaro)- Stop 1 week prior if taking weekly or 1 day prior if taking every 12 hours or daily.     Please contact your surgeon and prescribing physician for pre-op instructions for any blood thinners. ASPIRIN    If you have inhalers/aerosol treatments at home, please use them the morning of your surgery and bring the inhalers with you to the hospital.    Please take the following medications the morning of your surgery with a sip of water:    Imdur    Surgery Instructions:  After midnight before surgery:  Do not eat or drink anything, including water, mints, gum, and hard candy.  You may brush your teeth without swallowing.  No smoking, chewing tobacco, or street drugs.    Please shower or bathe before surgery.       Please do not wear any cologne, lotion, powder, jewelry, piercings, perfume, makeup, nail polish, hair accessories, or hair spray on the day of surgery.  Wear loose comfortable clothing.    Leave your valuables at home but bring a payment source for any after-surgery prescriptions you plan to fill at [facility].  Bring a storage case for any glasses/contacts.    An adult who is responsible for you MUST drive you home and should be with you for the first 24 hours after surgery.     The Day of Surgery:  Arrive at [facility] at the time directed by your surgeon and check in at the desk.     If you have a living will or healthcare power of , please bring a copy.    You will be taken to the pre-op holding area where you will be prepared for surgery.  A physical assessment will be performed by a nurse practitioner or house  Chronic in nature. Stable. Following up with endocrinology.

## 2024-12-11 ENCOUNTER — APPOINTMENT (OUTPATIENT)
Dept: URBAN - METROPOLITAN AREA CLINIC 20 | Facility: CLINIC | Age: 89
Setting detail: DERMATOLOGY
End: 2024-12-11

## 2024-12-11 DIAGNOSIS — L57.0 ACTINIC KERATOSIS: ICD-10-CM

## 2024-12-11 PROCEDURE — 99213 OFFICE O/P EST LOW 20 MIN: CPT | Mod: 25

## 2024-12-11 PROCEDURE — ? LIQUID NITROGEN

## 2024-12-11 PROCEDURE — 17000 DESTRUCT PREMALG LESION: CPT

## 2024-12-11 PROCEDURE — ? DIAGNOSIS COMMENT

## 2024-12-11 PROCEDURE — 17003 DESTRUCT PREMALG LES 2-14: CPT

## 2024-12-11 PROCEDURE — ? COUNSELING

## 2024-12-11 PROCEDURE — ? PHOTO-DOCUMENTATION

## 2024-12-11 ASSESSMENT — LOCATION ZONE DERM
LOCATION ZONE: SCALP
LOCATION ZONE: NECK
LOCATION ZONE: NOSE
LOCATION ZONE: EAR
LOCATION ZONE: FACE
LOCATION ZONE: ARM

## 2024-12-11 ASSESSMENT — LOCATION SIMPLE DESCRIPTION DERM
LOCATION SIMPLE: RIGHT EAR
LOCATION SIMPLE: LEFT NOSE
LOCATION SIMPLE: LEFT FOREARM
LOCATION SIMPLE: LEFT FOREHEAD
LOCATION SIMPLE: LEFT EAR
LOCATION SIMPLE: LEFT ANTERIOR NECK
LOCATION SIMPLE: RIGHT SCALP

## 2024-12-11 ASSESSMENT — LOCATION DETAILED DESCRIPTION DERM
LOCATION DETAILED: LEFT SUPERIOR HELIX
LOCATION DETAILED: LEFT DISTAL DORSAL FOREARM
LOCATION DETAILED: LEFT NASAL ALA
LOCATION DETAILED: LEFT TRIANGULAR FOSSA
LOCATION DETAILED: RIGHT INFERIOR HELIX
LOCATION DETAILED: LEFT LATERAL FOREHEAD
LOCATION DETAILED: LEFT SUPERIOR MEDIAL FOREHEAD
LOCATION DETAILED: RIGHT LATERAL FRONTAL SCALP
LOCATION DETAILED: LEFT CLAVICULAR NECK

## 2024-12-11 NOTE — PROCEDURE: DIAGNOSIS COMMENT
Detail Level: Simple
Render Risk Assessment In Note?: no
Comment: Treated with ln2 last visit in September, patient and his daughter wish to avoid biopsy today. See pictures. Will speak with Kiene about injecting w/o biopsy if possible. Has history of profuse bleeding.

## 2025-01-27 ENCOUNTER — APPOINTMENT (OUTPATIENT)
Dept: URBAN - METROPOLITAN AREA CLINIC 36 | Facility: CLINIC | Age: OVER 89
Setting detail: DERMATOLOGY
End: 2025-01-27

## 2025-01-27 PROBLEM — C44.319 BASAL CELL CARCINOMA OF SKIN OF OTHER PARTS OF FACE: Status: ACTIVE | Noted: 2025-01-27

## 2025-01-27 PROBLEM — C44.91 BASAL CELL CARCINOMA OF SKIN, UNSPECIFIED: Status: ACTIVE | Noted: 2025-01-27

## 2025-01-27 PROBLEM — C44.219 BASAL CELL CARCINOMA OF SKIN OF LEFT EAR AND EXTERNAL AURICULAR CANAL: Status: ACTIVE | Noted: 2025-01-27

## 2025-01-27 PROBLEM — C44.311 BASAL CELL CARCINOMA OF SKIN OF NOSE: Status: ACTIVE | Noted: 2025-01-27

## 2025-01-27 PROCEDURE — 11900 INJECT SKIN LESIONS </W 7: CPT | Mod: 59

## 2025-01-27 PROCEDURE — 17282 DSTR MAL LS F/E/E/N/L/M1.1-2: CPT | Mod: 76

## 2025-01-27 PROCEDURE — ? CURETTAGE AND DESTRUCTION WITH PATHOLOGY

## 2025-01-27 PROCEDURE — 17282 DSTR MAL LS F/E/E/N/L/M1.1-2: CPT

## 2025-01-27 PROCEDURE — 99212 OFFICE O/P EST SF 10 MIN: CPT | Mod: 25

## 2025-01-27 PROCEDURE — ? CONSULTATION FOR MOHS SURGERY

## 2025-01-27 PROCEDURE — ? INJECTION

## 2025-01-27 NOTE — PROCEDURE: CONSULTATION FOR MOHS SURGERY
Detail Level: Detailed
X Size Of Lesion In Cm (Optional): 0
Other Plan: Pt is going to get Bleomycin. It is medically necessary. Pts lesion is growing, sore, and bleeding. Dr. Noland does not advise surgery based on his age. Pt will return in two weeks for another bleo injection
Incorporate Mauc In Note: Yes
Other Plan: Pt is going to get lesion treated by c and fely.

## 2025-01-27 NOTE — PROCEDURE: CURETTAGE AND DESTRUCTION WITH PATHOLOGY
Detail Level: Detailed
Size Of Lesion In Cm: 0.9
Size Of Lesion After Curettage: 1.5
Anesthesia Type: 1% lidocaine with epinephrine and a 1:10 solution of 8.4% sodium bicarbonate
Anesthesia Volume In Cc: 4
Cautery Type: electrodesiccation
Number Of Curettages: 3
What Was Performed First?: Curettage
Additional Information: (Optional): The wound was cleaned, and a pressure dressing was applied.  The patient received detailed post-op instructions.
Lab: [id number]
Lab Facility: 
Histology Text: Following the procedure a portion of the curetted material was sent for histologic evaluation.
Biopsy Type: H and E
Path Notes (To The Dermatopathologist): SCC
Render Path Notes In Note?: No
Consent was obtained from the patient. The risks, benefits and alternatives to therapy were discussed in detail. Specifically, the risks of infection, scarring, bleeding, prolonged wound healing, nerve injury, incomplete removal, allergy to anesthesia and recurrence were addressed. Alternatives to ED&C, such as: surgical removal and XRT were also discussed.  Prior to the procedure, the treatment site was clearly identified and confirmed by the patient. All components of Universal Protocol/PAUSE Rule completed.
Post-Care Instructions: I reviewed with the patient in detail post-care instructions. Patient is to keep the area dry for 48 hours, and not to engage in any swimming until the area is healed. Should the patient develop any fevers, chills, bleeding, severe pain patient will contact the office immediately.
Billing Type: Third-Party Bill
Bill As A Line Item Or As Units: Line Item
Size Of Lesion In Cm: 1.2
Size Of Lesion After Curettage: 1.8

## 2025-01-27 NOTE — PROCEDURE: INJECTION
Dose Administered (Numbers Only - Mg, G, Mcg, Units, Cc): 0.2
Consent: The risks of the medication was reviewed with the patient.
Type Of Vial Used?: Multi-Dose
Hide Second Medication?: No
Procedure Information: Please note that the numeric value listed in the Medication (1) and associated J-code units and Medication (2) and associated J-code units variables are j-code amounts and do not represent either the concentration or the total amount of the medications injected.  I strongly recommend selecting no to the Render J-code information in note question. This will allow your note to be more clear. If you are billing j-codes with your injection codes you need to document the total amount of the medication injected. This amount should match the j-code units. For example, if you are injecting Triamcinolone 40mg as an intramuscular injection you would select 40 for the dose field.. This would allow you to document  with 4 units (40mg = 10mg x 4). The total volume is not used to calculate j-codes only the amount of the medication administered.
Render J-Code Information In Note?: yes
Detail Level: None
Dose Administered (Numbers Only - Mg, G, Mcg, Units, Cc): 0
Route: IL
Type Of Vial Used?: Single Dose
Post-Care Instructions: I reviewed with the patient in detail post-care instructions. Patient understands to keep the injection sites clean and call the clinic if there is any redness, swelling or pain.
Medication (1) And Associated J-Code Units: Bleomycin, 15 units
Treatment Number: 1

## 2025-02-10 ENCOUNTER — APPOINTMENT (OUTPATIENT)
Dept: URBAN - METROPOLITAN AREA CLINIC 36 | Facility: CLINIC | Age: OVER 89
Setting detail: DERMATOLOGY
End: 2025-02-10

## 2025-02-10 PROBLEM — C44.91 BASAL CELL CARCINOMA OF SKIN, UNSPECIFIED: Status: ACTIVE | Noted: 2025-02-10

## 2025-02-10 PROCEDURE — 11900 INJECT SKIN LESIONS </W 7: CPT

## 2025-02-10 PROCEDURE — ? INJECTION

## 2025-03-24 ENCOUNTER — APPOINTMENT (OUTPATIENT)
Dept: URBAN - METROPOLITAN AREA CLINIC 36 | Facility: CLINIC | Age: OVER 89
Setting detail: DERMATOLOGY
End: 2025-03-24

## 2025-03-24 PROBLEM — C44.91 BASAL CELL CARCINOMA OF SKIN, UNSPECIFIED: Status: ACTIVE | Noted: 2025-03-24

## 2025-03-24 PROCEDURE — ? INJECTION

## 2025-03-24 PROCEDURE — 11900 INJECT SKIN LESIONS </W 7: CPT

## 2025-03-24 NOTE — PROCEDURE: INJECTION
Dose Administered (Numbers Only - Mg, G, Mcg, Units, Cc): 0.2
Consent: The risks of the medication was reviewed with the patient.
Type Of Vial Used?: Multi-Dose
Hide Second Medication?: No
Procedure Information: Please note that the numeric value listed in the Medication (1) and associated J-code units and Medication (2) and associated J-code units variables are j-code amounts and do not represent either the concentration or the total amount of the medications injected.  I strongly recommend selecting no to the Render J-code information in note question. This will allow your note to be more clear. If you are billing j-codes with your injection codes you need to document the total amount of the medication injected. This amount should match the j-code units. For example, if you are injecting Triamcinolone 40mg as an intramuscular injection you would select 40 for the dose field.. This would allow you to document  with 4 units (40mg = 10mg x 4). The total volume is not used to calculate j-codes only the amount of the medication administered.
Render J-Code Information In Note?: yes
Detail Level: None
Dose Administered (Numbers Only - Mg, G, Mcg, Units, Cc): 0
Route: IL
Type Of Vial Used?: Single Dose
Post-Care Instructions: I reviewed with the patient in detail post-care instructions. Patient understands to keep the injection sites clean and call the clinic if there is any redness, swelling or pain.
Medication (1) And Associated J-Code Units: Bleomycin, 15 units
Treatment Number: 3

## 2025-04-08 ENCOUNTER — APPOINTMENT (OUTPATIENT)
Dept: URBAN - METROPOLITAN AREA CLINIC 20 | Facility: CLINIC | Age: OVER 89
Setting detail: DERMATOLOGY
End: 2025-04-08

## 2025-04-08 DIAGNOSIS — L57.8 OTHER SKIN CHANGES DUE TO CHRONIC EXPOSURE TO NONIONIZING RADIATION: ICD-10-CM

## 2025-04-08 DIAGNOSIS — Z86.007 PERSONAL HISTORY OF IN-SITU NEOPLASM OF SKIN: ICD-10-CM

## 2025-04-08 DIAGNOSIS — L81.4 OTHER MELANIN HYPERPIGMENTATION: ICD-10-CM

## 2025-04-08 DIAGNOSIS — L82.1 OTHER SEBORRHEIC KERATOSIS: ICD-10-CM

## 2025-04-08 DIAGNOSIS — L57.0 ACTINIC KERATOSIS: ICD-10-CM

## 2025-04-08 DIAGNOSIS — Z85.828 PERSONAL HISTORY OF OTHER MALIGNANT NEOPLASM OF SKIN: ICD-10-CM

## 2025-04-08 PROCEDURE — ? ADDITIONAL NOTES

## 2025-04-08 PROCEDURE — 99213 OFFICE O/P EST LOW 20 MIN: CPT | Mod: 25

## 2025-04-08 PROCEDURE — ? LIQUID NITROGEN

## 2025-04-08 PROCEDURE — 17000 DESTRUCT PREMALG LESION: CPT

## 2025-04-08 PROCEDURE — ? COUNSELING

## 2025-04-08 PROCEDURE — 17003 DESTRUCT PREMALG LES 2-14: CPT

## 2025-04-08 ASSESSMENT — LOCATION DETAILED DESCRIPTION DERM
LOCATION DETAILED: LEFT MEDIAL FRONTAL SCALP
LOCATION DETAILED: LEFT INFERIOR CENTRAL MALAR CHEEK
LOCATION DETAILED: LEFT SUPERIOR LATERAL UPPER BACK
LOCATION DETAILED: LEFT LATERAL FOREHEAD
LOCATION DETAILED: LEFT CLAVICULAR SKIN
LOCATION DETAILED: RIGHT MEDIAL FRONTAL SCALP
LOCATION DETAILED: RIGHT CENTRAL MALAR CHEEK
LOCATION DETAILED: LEFT ANTERIOR PROXIMAL UPPER ARM
LOCATION DETAILED: LEFT SUPERIOR FOREHEAD
LOCATION DETAILED: RIGHT CENTRAL FRONTAL SCALP
LOCATION DETAILED: RIGHT SUPERIOR MEDIAL FOREHEAD
LOCATION DETAILED: LEFT SUPERIOR MEDIAL FOREHEAD
LOCATION DETAILED: LEFT CENTRAL POSTAURICULAR SKIN
LOCATION DETAILED: LEFT NASAL SIDEWALL
LOCATION DETAILED: LEFT TRIANGULAR FOSSA
LOCATION DETAILED: LEFT DISTAL DORSAL FOREARM
LOCATION DETAILED: LEFT POSTERIOR SHOULDER
LOCATION DETAILED: LEFT PROXIMAL DORSAL FOREARM
LOCATION DETAILED: RIGHT SUPERIOR HELIX
LOCATION DETAILED: LEFT CENTRAL FRONTAL SCALP
LOCATION DETAILED: LEFT MEDIAL SUPERIOR CHEST
LOCATION DETAILED: LEFT CENTRAL MALAR CHEEK
LOCATION DETAILED: RIGHT ANTERIOR PROXIMAL UPPER ARM
LOCATION DETAILED: RIGHT DISTAL DORSAL FOREARM
LOCATION DETAILED: RIGHT SCAPHA

## 2025-04-08 ASSESSMENT — LOCATION SIMPLE DESCRIPTION DERM
LOCATION SIMPLE: LEFT CHEEK
LOCATION SIMPLE: SCALP
LOCATION SIMPLE: LEFT FOREARM
LOCATION SIMPLE: LEFT UPPER BACK
LOCATION SIMPLE: RIGHT UPPER ARM
LOCATION SIMPLE: LEFT NOSE
LOCATION SIMPLE: CHEST
LOCATION SIMPLE: RIGHT SCALP
LOCATION SIMPLE: LEFT SCALP
LOCATION SIMPLE: LEFT EAR
LOCATION SIMPLE: LEFT FOREHEAD
LOCATION SIMPLE: LEFT SHOULDER
LOCATION SIMPLE: RIGHT FOREHEAD
LOCATION SIMPLE: RIGHT CHEEK
LOCATION SIMPLE: LEFT UPPER ARM
LOCATION SIMPLE: RIGHT EAR
LOCATION SIMPLE: RIGHT FOREARM
LOCATION SIMPLE: LEFT CLAVICULAR SKIN

## 2025-04-08 ASSESSMENT — LOCATION ZONE DERM
LOCATION ZONE: ARM
LOCATION ZONE: SCALP
LOCATION ZONE: NOSE
LOCATION ZONE: EAR
LOCATION ZONE: TRUNK
LOCATION ZONE: FACE

## 2025-04-08 NOTE — PROCEDURE: ADDITIONAL NOTES
Render Risk Assessment In Note?: no
Additional Notes: Receiving bleo injections with KK- will wait until the end of the summer to continue treatment
Detail Level: Simple

## 2025-06-16 ENCOUNTER — APPOINTMENT (OUTPATIENT)
Dept: URBAN - METROPOLITAN AREA CLINIC 36 | Facility: CLINIC | Age: OVER 89
Setting detail: DERMATOLOGY
End: 2025-06-16

## 2025-06-16 DIAGNOSIS — L57.0 ACTINIC KERATOSIS: ICD-10-CM

## 2025-06-16 PROBLEM — C44.311 BASAL CELL CARCINOMA OF SKIN OF NOSE: Status: ACTIVE | Noted: 2025-06-16

## 2025-06-16 PROCEDURE — ? OBSERVATION

## 2025-06-16 PROCEDURE — ? LIQUID NITROGEN

## 2025-06-16 ASSESSMENT — LOCATION SIMPLE DESCRIPTION DERM
LOCATION SIMPLE: RIGHT CHEEK
LOCATION SIMPLE: RIGHT TEMPLE
LOCATION SIMPLE: RIGHT EAR
LOCATION SIMPLE: LEFT FOREHEAD
LOCATION SIMPLE: LEFT EAR
LOCATION SIMPLE: LEFT TEMPLE

## 2025-06-16 ASSESSMENT — LOCATION DETAILED DESCRIPTION DERM
LOCATION DETAILED: RIGHT LATERAL MALAR CHEEK
LOCATION DETAILED: RIGHT TRIANGULAR FOSSA
LOCATION DETAILED: LEFT TRIANGULAR FOSSA
LOCATION DETAILED: LEFT MID TEMPLE
LOCATION DETAILED: RIGHT INFERIOR TEMPLE
LOCATION DETAILED: LEFT SUPERIOR CRUS OF ANTIHELIX
LOCATION DETAILED: LEFT FOREHEAD

## 2025-06-16 ASSESSMENT — LOCATION ZONE DERM
LOCATION ZONE: FACE
LOCATION ZONE: EAR

## 2025-06-16 NOTE — PROCEDURE: LIQUID NITROGEN
Duration Of Freeze Thaw-Cycle (Seconds): 8
Show Aperture Variable?: Yes
Detail Level: Detailed
Consent: The patient's consent was obtained including but not limited to risks of crusting, scabbing, blistering, scarring, darker or lighter pigmentary change, recurrence, incomplete removal and infection.
Post-Care Instructions: I reviewed with the patient in detail post-care instructions. Patient is to wear sunprotection, and avoid picking at any of the treated lesions. Pt may apply Vaseline to crusted or scabbing areas.
Render Post-Care Instructions In Note?: no
Number Of Freeze-Thaw Cycles: 2 freeze-thaw cycles